# Patient Record
Sex: MALE | Race: WHITE | NOT HISPANIC OR LATINO | Employment: OTHER | URBAN - METROPOLITAN AREA
[De-identification: names, ages, dates, MRNs, and addresses within clinical notes are randomized per-mention and may not be internally consistent; named-entity substitution may affect disease eponyms.]

---

## 2021-05-19 ENCOUNTER — LAB VISIT (OUTPATIENT)
Dept: LAB | Facility: HOSPITAL | Age: 71
End: 2021-05-19
Attending: FAMILY MEDICINE
Payer: MEDICARE

## 2021-05-19 DIAGNOSIS — I10 ESSENTIAL HYPERTENSION, MALIGNANT: ICD-10-CM

## 2021-05-19 DIAGNOSIS — R43.2 AGEUSIA: ICD-10-CM

## 2021-05-19 DIAGNOSIS — R43.0 ABSENT SENSE OF SMELL: Primary | ICD-10-CM

## 2021-05-19 DIAGNOSIS — E78.00 PURE HYPERCHOLESTEROLEMIA: ICD-10-CM

## 2021-05-19 LAB
ALBUMIN SERPL BCP-MCNC: 4.3 G/DL (ref 3.5–5.2)
ALBUMIN/CREAT UR: NORMAL UG/MG (ref 0–30)
ALP SERPL-CCNC: 74 U/L (ref 55–135)
ALT SERPL W/O P-5'-P-CCNC: 19 U/L (ref 10–44)
ANION GAP SERPL CALC-SCNC: 8 MMOL/L (ref 8–16)
AST SERPL-CCNC: 25 U/L (ref 10–40)
BASOPHILS # BLD AUTO: 0.09 K/UL (ref 0–0.2)
BASOPHILS NFR BLD: 1.4 % (ref 0–1.9)
BILIRUB SERPL-MCNC: 1.1 MG/DL (ref 0.1–1)
BILIRUB UR QL STRIP: NEGATIVE
BUN SERPL-MCNC: 18 MG/DL (ref 8–23)
CALCIUM SERPL-MCNC: 9.2 MG/DL (ref 8.7–10.5)
CHLORIDE SERPL-SCNC: 99 MMOL/L (ref 95–110)
CHOLEST SERPL-MCNC: 169 MG/DL (ref 120–199)
CHOLEST/HDLC SERPL: 2.2 {RATIO} (ref 2–5)
CLARITY UR: CLEAR
CO2 SERPL-SCNC: 27 MMOL/L (ref 23–29)
COLOR UR: YELLOW
CREAT SERPL-MCNC: 1 MG/DL (ref 0.5–1.4)
CREAT UR-MCNC: 41 MG/DL (ref 23–375)
DIFFERENTIAL METHOD: ABNORMAL
EOSINOPHIL # BLD AUTO: 0.3 K/UL (ref 0–0.5)
EOSINOPHIL NFR BLD: 5 % (ref 0–8)
ERYTHROCYTE [DISTWIDTH] IN BLOOD BY AUTOMATED COUNT: 13.1 % (ref 11.5–14.5)
EST. GFR  (AFRICAN AMERICAN): >60 ML/MIN/1.73 M^2
EST. GFR  (NON AFRICAN AMERICAN): >60 ML/MIN/1.73 M^2
GLUCOSE SERPL-MCNC: 97 MG/DL (ref 70–110)
GLUCOSE UR QL STRIP: NEGATIVE
HCT VFR BLD AUTO: 41.1 % (ref 40–54)
HDLC SERPL-MCNC: 77 MG/DL (ref 40–75)
HDLC SERPL: 45.6 % (ref 20–50)
HGB BLD-MCNC: 13.8 G/DL (ref 14–18)
HGB UR QL STRIP: NEGATIVE
IMM GRANULOCYTES # BLD AUTO: 0.01 K/UL (ref 0–0.04)
IMM GRANULOCYTES NFR BLD AUTO: 0.2 % (ref 0–0.5)
KETONES UR QL STRIP: NEGATIVE
LDLC SERPL CALC-MCNC: 83.8 MG/DL (ref 63–159)
LEUKOCYTE ESTERASE UR QL STRIP: NEGATIVE
LYMPHOCYTES # BLD AUTO: 2 K/UL (ref 1–4.8)
LYMPHOCYTES NFR BLD: 31.8 % (ref 18–48)
MCH RBC QN AUTO: 30.2 PG (ref 27–31)
MCHC RBC AUTO-ENTMCNC: 33.6 G/DL (ref 32–36)
MCV RBC AUTO: 90 FL (ref 82–98)
MICROALBUMIN UR DL<=1MG/L-MCNC: <2 UG/ML
MONOCYTES # BLD AUTO: 0.6 K/UL (ref 0.3–1)
MONOCYTES NFR BLD: 9.3 % (ref 4–15)
NEUTROPHILS # BLD AUTO: 3.4 K/UL (ref 1.8–7.7)
NEUTROPHILS NFR BLD: 52.3 % (ref 38–73)
NITRITE UR QL STRIP: NEGATIVE
NONHDLC SERPL-MCNC: 92 MG/DL
NRBC BLD-RTO: 0 /100 WBC
PH UR STRIP: 7 [PH] (ref 5–8)
PLATELET # BLD AUTO: 236 K/UL (ref 150–450)
PMV BLD AUTO: 10.5 FL (ref 9.2–12.9)
POTASSIUM SERPL-SCNC: 4 MMOL/L (ref 3.5–5.1)
PROT SERPL-MCNC: 7.1 G/DL (ref 6–8.4)
PROT UR QL STRIP: NEGATIVE
RBC # BLD AUTO: 4.57 M/UL (ref 4.6–6.2)
SODIUM SERPL-SCNC: 134 MMOL/L (ref 136–145)
SP GR UR STRIP: 1.01 (ref 1–1.03)
TRIGL SERPL-MCNC: 41 MG/DL (ref 30–150)
TSH SERPL DL<=0.005 MIU/L-ACNC: 1.99 UIU/ML (ref 0.34–5.6)
URN SPEC COLLECT METH UR: NORMAL
UROBILINOGEN UR STRIP-ACNC: NEGATIVE EU/DL
WBC # BLD AUTO: 6.42 K/UL (ref 3.9–12.7)

## 2021-05-19 PROCEDURE — 81003 URINALYSIS AUTO W/O SCOPE: CPT | Performed by: FAMILY MEDICINE

## 2021-05-19 PROCEDURE — 80061 LIPID PANEL: CPT | Performed by: FAMILY MEDICINE

## 2021-05-19 PROCEDURE — 86769 SARS-COV-2 COVID-19 ANTIBODY: CPT | Performed by: FAMILY MEDICINE

## 2021-05-19 PROCEDURE — 82043 UR ALBUMIN QUANTITATIVE: CPT | Performed by: FAMILY MEDICINE

## 2021-05-19 PROCEDURE — 80053 COMPREHEN METABOLIC PANEL: CPT | Performed by: FAMILY MEDICINE

## 2021-05-19 PROCEDURE — 84443 ASSAY THYROID STIM HORMONE: CPT | Performed by: FAMILY MEDICINE

## 2021-05-19 PROCEDURE — 82570 ASSAY OF URINE CREATININE: CPT | Performed by: FAMILY MEDICINE

## 2021-05-19 PROCEDURE — 85025 COMPLETE CBC W/AUTO DIFF WBC: CPT | Performed by: FAMILY MEDICINE

## 2021-05-20 LAB
SARS-COV-2 IGG SERPL IA-ACNC: 66.9 AU/ML
SARS-COV-2 IGG SERPL QL IA: POSITIVE

## 2021-09-10 ENCOUNTER — HOSPITAL ENCOUNTER (OUTPATIENT)
Facility: HOSPITAL | Age: 71
Discharge: HOME | End: 2021-09-11
Attending: EMERGENCY MEDICINE | Admitting: INTERNAL MEDICINE
Payer: MEDICARE

## 2021-09-10 DIAGNOSIS — R07.9 CHEST PAIN: ICD-10-CM

## 2021-09-10 DIAGNOSIS — R55 SYNCOPE: ICD-10-CM

## 2021-09-10 DIAGNOSIS — R79.89 ELEVATED TROPONIN: ICD-10-CM

## 2021-09-10 DIAGNOSIS — R55 SYNCOPE, UNSPECIFIED SYNCOPE TYPE: Primary | ICD-10-CM

## 2021-09-10 DIAGNOSIS — R56.9 SEIZURE: ICD-10-CM

## 2021-09-10 PROBLEM — Z87.898 HISTORY OF SEIZURE: Status: ACTIVE | Noted: 2021-09-10

## 2021-09-10 PROBLEM — I10 ESSENTIAL HYPERTENSION: Status: ACTIVE | Noted: 2021-09-10

## 2021-09-10 PROBLEM — D64.9 ANEMIA: Status: ACTIVE | Noted: 2021-09-10

## 2021-09-10 LAB
ADENOVIRUS: NOT DETECTED
ALBUMIN SERPL BCP-MCNC: 3.9 G/DL (ref 3.5–5.2)
ALP SERPL-CCNC: 59 U/L (ref 55–135)
ALT SERPL W/O P-5'-P-CCNC: 23 U/L (ref 10–44)
AMPHET+METHAMPHET UR QL: NEGATIVE
ANION GAP SERPL CALC-SCNC: 8 MMOL/L (ref 8–16)
AST SERPL-CCNC: 28 U/L (ref 10–40)
BARBITURATES UR QL SCN>200 NG/ML: NEGATIVE
BASOPHILS # BLD AUTO: 0.08 K/UL (ref 0–0.2)
BASOPHILS NFR BLD: 1.1 % (ref 0–1.9)
BENZODIAZ UR QL SCN>200 NG/ML: NEGATIVE
BILIRUB SERPL-MCNC: 1 MG/DL (ref 0.1–1)
BILIRUB UR QL STRIP: NEGATIVE
BNP SERPL-MCNC: 129 PG/ML (ref 0–99)
BORDETELLA PARAPERTUSSIS (IS1001): NOT DETECTED
BORDETELLA PERTUSSIS (PTXP): NOT DETECTED
BUN SERPL-MCNC: 20 MG/DL (ref 8–23)
BZE UR QL SCN: NEGATIVE
CALCIUM SERPL-MCNC: 9.2 MG/DL (ref 8.7–10.5)
CANNABINOIDS UR QL SCN: NEGATIVE
CHLAMYDIA PNEUMONIAE: NOT DETECTED
CHLORIDE SERPL-SCNC: 101 MMOL/L (ref 95–110)
CK SERPL-CCNC: 119 U/L (ref 20–200)
CLARITY UR: CLEAR
CO2 SERPL-SCNC: 26 MMOL/L (ref 23–29)
COLOR UR: YELLOW
CORONAVIRUS 229E, COMMON COLD VIRUS: NOT DETECTED
CORONAVIRUS HKU1, COMMON COLD VIRUS: NOT DETECTED
CORONAVIRUS NL63, COMMON COLD VIRUS: NOT DETECTED
CORONAVIRUS OC43, COMMON COLD VIRUS: NOT DETECTED
CREAT SERPL-MCNC: 1 MG/DL (ref 0.5–1.4)
CREAT UR-MCNC: 90 MG/DL (ref 23–375)
CRP SERPL-MCNC: 0.04 MG/DL
D DIMER PPP IA.FEU-MCNC: 3.61 UG/ML FEU
DIFFERENTIAL METHOD: ABNORMAL
EOSINOPHIL # BLD AUTO: 0.2 K/UL (ref 0–0.5)
EOSINOPHIL NFR BLD: 2.6 % (ref 0–8)
ERYTHROCYTE [DISTWIDTH] IN BLOOD BY AUTOMATED COUNT: 12.7 % (ref 11.5–14.5)
EST. GFR  (AFRICAN AMERICAN): >60 ML/MIN/1.73 M^2
EST. GFR  (NON AFRICAN AMERICAN): >60 ML/MIN/1.73 M^2
FERRITIN SERPL-MCNC: 188 NG/ML (ref 20–300)
FLUBV RNA NPH QL NAA+NON-PROBE: NOT DETECTED
GLUCOSE SERPL-MCNC: 92 MG/DL (ref 70–110)
GLUCOSE SERPL-MCNC: 95 MG/DL (ref 70–110)
GLUCOSE UR QL STRIP: NEGATIVE
HCT VFR BLD AUTO: 39.2 % (ref 40–54)
HGB BLD-MCNC: 13.5 G/DL (ref 14–18)
HGB UR QL STRIP: NEGATIVE
HPIV1 RNA NPH QL NAA+NON-PROBE: NOT DETECTED
HPIV2 RNA NPH QL NAA+NON-PROBE: NOT DETECTED
HPIV3 RNA NPH QL NAA+NON-PROBE: NOT DETECTED
HPIV4 RNA NPH QL NAA+NON-PROBE: NOT DETECTED
HUMAN METAPNEUMOVIRUS: NOT DETECTED
IMM GRANULOCYTES # BLD AUTO: 0.04 K/UL (ref 0–0.04)
IMM GRANULOCYTES NFR BLD AUTO: 0.5 % (ref 0–0.5)
INFLUENZA A (SUBTYPES H1,H1-2009,H3): NOT DETECTED
INR PPP: 1.2
KETONES UR QL STRIP: NEGATIVE
LEUKOCYTE ESTERASE UR QL STRIP: NEGATIVE
LYMPHOCYTES # BLD AUTO: 1.2 K/UL (ref 1–4.8)
LYMPHOCYTES NFR BLD: 15.5 % (ref 18–48)
MAGNESIUM SERPL-MCNC: 1.8 MG/DL (ref 1.6–2.6)
MCH RBC QN AUTO: 31.1 PG (ref 27–31)
MCHC RBC AUTO-ENTMCNC: 34.4 G/DL (ref 32–36)
MCV RBC AUTO: 90 FL (ref 82–98)
MONOCYTES # BLD AUTO: 0.8 K/UL (ref 0.3–1)
MONOCYTES NFR BLD: 10.1 % (ref 4–15)
MYCOPLASMA PNEUMONIAE: NOT DETECTED
NEUTROPHILS # BLD AUTO: 5.2 K/UL (ref 1.8–7.7)
NEUTROPHILS NFR BLD: 70.2 % (ref 38–73)
NITRITE UR QL STRIP: NEGATIVE
NRBC BLD-RTO: 0 /100 WBC
OPIATES UR QL SCN: NEGATIVE
PCP UR QL SCN>25 NG/ML: NEGATIVE
PH UR STRIP: 7 [PH] (ref 5–8)
PLATELET # BLD AUTO: 225 K/UL (ref 150–450)
PMV BLD AUTO: 9.3 FL (ref 9.2–12.9)
POTASSIUM SERPL-SCNC: 4.4 MMOL/L (ref 3.5–5.1)
PROT SERPL-MCNC: 6.7 G/DL (ref 6–8.4)
PROT UR QL STRIP: NEGATIVE
PROTHROMBIN TIME: 14.6 SEC (ref 11.8–14.3)
RBC # BLD AUTO: 4.34 M/UL (ref 4.6–6.2)
RESPIRATORY INFECTION PANEL SOURCE: NORMAL
RSV RNA NPH QL NAA+NON-PROBE: NOT DETECTED
RV+EV RNA NPH QL NAA+NON-PROBE: NOT DETECTED
SARS-COV-2 RDRP RESP QL NAA+PROBE: NEGATIVE
SODIUM SERPL-SCNC: 135 MMOL/L (ref 136–145)
SP GR UR STRIP: 1.01 (ref 1–1.03)
TOXICOLOGY INFORMATION: NORMAL
TROPONIN I SERPL DL<=0.01 NG/ML-MCNC: 0.06 NG/ML
TROPONIN I SERPL DL<=0.01 NG/ML-MCNC: 0.06 NG/ML
TROPONIN I SERPL DL<=0.01 NG/ML-MCNC: 0.07 NG/ML
URN SPEC COLLECT METH UR: NORMAL
UROBILINOGEN UR STRIP-ACNC: NEGATIVE EU/DL
WBC # BLD AUTO: 7.44 K/UL (ref 3.9–12.7)

## 2021-09-10 PROCEDURE — G0378 HOSPITAL OBSERVATION PER HR: HCPCS

## 2021-09-10 PROCEDURE — 86140 C-REACTIVE PROTEIN: CPT | Performed by: PHYSICIAN ASSISTANT

## 2021-09-10 PROCEDURE — 85379 FIBRIN DEGRADATION QUANT: CPT | Performed by: PHYSICIAN ASSISTANT

## 2021-09-10 PROCEDURE — 83735 ASSAY OF MAGNESIUM: CPT | Performed by: EMERGENCY MEDICINE

## 2021-09-10 PROCEDURE — 80053 COMPREHEN METABOLIC PANEL: CPT | Performed by: PHYSICIAN ASSISTANT

## 2021-09-10 PROCEDURE — 93005 ELECTROCARDIOGRAM TRACING: CPT | Performed by: INTERNAL MEDICINE

## 2021-09-10 PROCEDURE — 36415 COLL VENOUS BLD VENIPUNCTURE: CPT | Performed by: PHYSICIAN ASSISTANT

## 2021-09-10 PROCEDURE — 83880 ASSAY OF NATRIURETIC PEPTIDE: CPT | Performed by: PHYSICIAN ASSISTANT

## 2021-09-10 PROCEDURE — 85025 COMPLETE CBC W/AUTO DIFF WBC: CPT | Performed by: PHYSICIAN ASSISTANT

## 2021-09-10 PROCEDURE — A9585 GADOBUTROL INJECTION: HCPCS | Performed by: INTERNAL MEDICINE

## 2021-09-10 PROCEDURE — 25500020 PHARM REV CODE 255: Performed by: INTERNAL MEDICINE

## 2021-09-10 PROCEDURE — 93010 ELECTROCARDIOGRAM REPORT: CPT | Mod: ,,, | Performed by: INTERNAL MEDICINE

## 2021-09-10 PROCEDURE — 25000003 PHARM REV CODE 250: Performed by: NURSE PRACTITIONER

## 2021-09-10 PROCEDURE — 85610 PROTHROMBIN TIME: CPT | Performed by: PHYSICIAN ASSISTANT

## 2021-09-10 PROCEDURE — U0002 COVID-19 LAB TEST NON-CDC: HCPCS | Performed by: EMERGENCY MEDICINE

## 2021-09-10 PROCEDURE — 84484 ASSAY OF TROPONIN QUANT: CPT | Performed by: PHYSICIAN ASSISTANT

## 2021-09-10 PROCEDURE — 93010 EKG 12-LEAD: ICD-10-PCS | Mod: ,,, | Performed by: INTERNAL MEDICINE

## 2021-09-10 PROCEDURE — 82728 ASSAY OF FERRITIN: CPT | Performed by: PHYSICIAN ASSISTANT

## 2021-09-10 PROCEDURE — 81003 URINALYSIS AUTO W/O SCOPE: CPT | Performed by: PHYSICIAN ASSISTANT

## 2021-09-10 PROCEDURE — 87798 DETECT AGENT NOS DNA AMP: CPT | Performed by: NURSE PRACTITIONER

## 2021-09-10 PROCEDURE — 36415 COLL VENOUS BLD VENIPUNCTURE: CPT | Performed by: EMERGENCY MEDICINE

## 2021-09-10 PROCEDURE — 80307 DRUG TEST PRSMV CHEM ANLYZR: CPT | Performed by: PHYSICIAN ASSISTANT

## 2021-09-10 PROCEDURE — 82962 GLUCOSE BLOOD TEST: CPT

## 2021-09-10 PROCEDURE — 84484 ASSAY OF TROPONIN QUANT: CPT | Mod: 91 | Performed by: EMERGENCY MEDICINE

## 2021-09-10 PROCEDURE — 99285 EMERGENCY DEPT VISIT HI MDM: CPT | Mod: 25

## 2021-09-10 PROCEDURE — 82550 ASSAY OF CK (CPK): CPT | Performed by: EMERGENCY MEDICINE

## 2021-09-10 RX ORDER — AMOXICILLIN 250 MG
1 CAPSULE ORAL 2 TIMES DAILY
Status: DISCONTINUED | OUTPATIENT
Start: 2021-09-10 | End: 2021-09-11 | Stop reason: HOSPADM

## 2021-09-10 RX ORDER — POTASSIUM CHLORIDE 7.45 MG/ML
40 INJECTION INTRAVENOUS
Status: DISCONTINUED | OUTPATIENT
Start: 2021-09-10 | End: 2021-09-11 | Stop reason: HOSPADM

## 2021-09-10 RX ORDER — POLYETHYLENE GLYCOL 3350 17 G/17G
17 POWDER, FOR SOLUTION ORAL 2 TIMES DAILY PRN
Status: DISCONTINUED | OUTPATIENT
Start: 2021-09-10 | End: 2021-09-11 | Stop reason: HOSPADM

## 2021-09-10 RX ORDER — ASPIRIN 81 MG/1
81 TABLET ORAL DAILY
COMMUNITY
End: 2023-10-21 | Stop reason: CLARIF

## 2021-09-10 RX ORDER — POTASSIUM CHLORIDE 7.45 MG/ML
20 INJECTION INTRAVENOUS
Status: DISCONTINUED | OUTPATIENT
Start: 2021-09-10 | End: 2021-09-11 | Stop reason: HOSPADM

## 2021-09-10 RX ORDER — POTASSIUM CHLORIDE 20 MEQ/1
40 TABLET, EXTENDED RELEASE ORAL
Status: DISCONTINUED | OUTPATIENT
Start: 2021-09-10 | End: 2021-09-11 | Stop reason: HOSPADM

## 2021-09-10 RX ORDER — ATORVASTATIN CALCIUM 40 MG/1
40 TABLET, FILM COATED ORAL NIGHTLY
Status: ON HOLD | COMMUNITY
End: 2023-10-25 | Stop reason: HOSPADM

## 2021-09-10 RX ORDER — MAGNESIUM SULFATE HEPTAHYDRATE 40 MG/ML
2 INJECTION, SOLUTION INTRAVENOUS
Status: DISCONTINUED | OUTPATIENT
Start: 2021-09-10 | End: 2021-09-11 | Stop reason: HOSPADM

## 2021-09-10 RX ORDER — TALC
6 POWDER (GRAM) TOPICAL NIGHTLY PRN
Status: DISCONTINUED | OUTPATIENT
Start: 2021-09-10 | End: 2021-09-11 | Stop reason: HOSPADM

## 2021-09-10 RX ORDER — MAGNESIUM SULFATE 1 G/100ML
1 INJECTION INTRAVENOUS
Status: DISCONTINUED | OUTPATIENT
Start: 2021-09-10 | End: 2021-09-11 | Stop reason: HOSPADM

## 2021-09-10 RX ORDER — GADOBUTROL 604.72 MG/ML
7 INJECTION INTRAVENOUS
Status: COMPLETED | OUTPATIENT
Start: 2021-09-10 | End: 2021-09-10

## 2021-09-10 RX ORDER — ASPIRIN 325 MG
325 TABLET ORAL
Status: COMPLETED | OUTPATIENT
Start: 2021-09-10 | End: 2021-09-10

## 2021-09-10 RX ORDER — LANOLIN ALCOHOL/MO/W.PET/CERES
800 CREAM (GRAM) TOPICAL
Status: DISCONTINUED | OUTPATIENT
Start: 2021-09-10 | End: 2021-09-11 | Stop reason: HOSPADM

## 2021-09-10 RX ORDER — ONDANSETRON 2 MG/ML
4 INJECTION INTRAMUSCULAR; INTRAVENOUS EVERY 8 HOURS PRN
Status: DISCONTINUED | OUTPATIENT
Start: 2021-09-10 | End: 2021-09-11 | Stop reason: HOSPADM

## 2021-09-10 RX ORDER — VALSARTAN 40 MG/1
40 TABLET ORAL DAILY
Status: DISCONTINUED | OUTPATIENT
Start: 2021-09-11 | End: 2021-09-11 | Stop reason: HOSPADM

## 2021-09-10 RX ORDER — SODIUM CHLORIDE 0.9 % (FLUSH) 0.9 %
10 SYRINGE (ML) INJECTION
Status: DISCONTINUED | OUTPATIENT
Start: 2021-09-10 | End: 2021-09-11 | Stop reason: HOSPADM

## 2021-09-10 RX ORDER — POTASSIUM CHLORIDE 20 MEQ/1
20 TABLET, EXTENDED RELEASE ORAL
Status: DISCONTINUED | OUTPATIENT
Start: 2021-09-10 | End: 2021-09-11 | Stop reason: HOSPADM

## 2021-09-10 RX ORDER — ACETAMINOPHEN 325 MG/1
650 TABLET ORAL EVERY 4 HOURS PRN
Status: DISCONTINUED | OUTPATIENT
Start: 2021-09-10 | End: 2021-09-11 | Stop reason: HOSPADM

## 2021-09-10 RX ORDER — HYDRALAZINE HYDROCHLORIDE 20 MG/ML
10 INJECTION INTRAMUSCULAR; INTRAVENOUS EVERY 4 HOURS PRN
Status: DISCONTINUED | OUTPATIENT
Start: 2021-09-10 | End: 2021-09-11 | Stop reason: HOSPADM

## 2021-09-10 RX ORDER — MAGNESIUM SULFATE HEPTAHYDRATE 40 MG/ML
4 INJECTION, SOLUTION INTRAVENOUS
Status: DISCONTINUED | OUTPATIENT
Start: 2021-09-10 | End: 2021-09-11 | Stop reason: HOSPADM

## 2021-09-10 RX ORDER — ASPIRIN 81 MG/1
81 TABLET ORAL DAILY
Status: DISCONTINUED | OUTPATIENT
Start: 2021-09-11 | End: 2021-09-11 | Stop reason: HOSPADM

## 2021-09-10 RX ORDER — ATORVASTATIN CALCIUM 20 MG/1
20 TABLET, FILM COATED ORAL DAILY
Status: DISCONTINUED | OUTPATIENT
Start: 2021-09-10 | End: 2021-09-11 | Stop reason: HOSPADM

## 2021-09-10 RX ORDER — VALSARTAN 40 MG/1
40 TABLET ORAL DAILY
COMMUNITY
End: 2023-10-21 | Stop reason: CLARIF

## 2021-09-10 RX ADMIN — IOHEXOL 100 ML: 350 INJECTION, SOLUTION INTRAVENOUS at 03:09

## 2021-09-10 RX ADMIN — GADOBUTROL 7 ML: 604.72 INJECTION INTRAVENOUS at 06:09

## 2021-09-10 RX ADMIN — SENNOSIDES AND DOCUSATE SODIUM 1 TABLET: 8.6; 5 TABLET ORAL at 09:09

## 2021-09-10 RX ADMIN — RIVAROXABAN 2.5 MG: 2.5 TABLET, FILM COATED ORAL at 04:09

## 2021-09-10 RX ADMIN — ASPIRIN 325 MG ORAL TABLET 325 MG: 325 PILL ORAL at 02:09

## 2021-09-10 RX ADMIN — ATORVASTATIN CALCIUM 20 MG: 20 TABLET, FILM COATED ORAL at 09:09

## 2021-09-11 ENCOUNTER — HOSPITAL ENCOUNTER (OUTPATIENT)
Dept: RADIOLOGY | Facility: HOSPITAL | Age: 71
Discharge: HOME | End: 2021-09-11
Payer: MEDICARE

## 2021-09-11 ENCOUNTER — CLINICAL SUPPORT (OUTPATIENT)
Dept: CARDIOLOGY | Facility: HOSPITAL | Age: 71
End: 2021-09-11
Payer: MEDICARE

## 2021-09-11 VITALS
HEART RATE: 83 BPM | BODY MASS INDEX: 23.25 KG/M2 | DIASTOLIC BLOOD PRESSURE: 58 MMHG | RESPIRATION RATE: 18 BRPM | WEIGHT: 157 LBS | TEMPERATURE: 99 F | SYSTOLIC BLOOD PRESSURE: 117 MMHG | OXYGEN SATURATION: 96 % | HEIGHT: 69 IN

## 2021-09-11 PROBLEM — R55 SYNCOPE: Status: RESOLVED | Noted: 2021-09-10 | Resolved: 2021-09-11

## 2021-09-11 LAB
ANION GAP SERPL CALC-SCNC: 10 MMOL/L (ref 8–16)
BASOPHILS # BLD AUTO: 0.08 K/UL (ref 0–0.2)
BASOPHILS NFR BLD: 0.9 % (ref 0–1.9)
BUN SERPL-MCNC: 18 MG/DL (ref 8–23)
CALCIUM SERPL-MCNC: 9.2 MG/DL (ref 8.7–10.5)
CHLORIDE SERPL-SCNC: 100 MMOL/L (ref 95–110)
CO2 SERPL-SCNC: 27 MMOL/L (ref 23–29)
CREAT SERPL-MCNC: 0.9 MG/DL (ref 0.5–1.4)
CV STRESS BASE HR: 57 BPM
DIASTOLIC BLOOD PRESSURE: 80 MMHG
DIFFERENTIAL METHOD: NORMAL
EOSINOPHIL # BLD AUTO: 0.3 K/UL (ref 0–0.5)
EOSINOPHIL NFR BLD: 3.3 % (ref 0–8)
ERYTHROCYTE [DISTWIDTH] IN BLOOD BY AUTOMATED COUNT: 12.7 % (ref 11.5–14.5)
EST. GFR  (AFRICAN AMERICAN): >60 ML/MIN/1.73 M^2
EST. GFR  (NON AFRICAN AMERICAN): >60 ML/MIN/1.73 M^2
GLUCOSE SERPL-MCNC: 87 MG/DL (ref 70–110)
HCT VFR BLD AUTO: 41.7 % (ref 40–54)
HGB BLD-MCNC: 14.1 G/DL (ref 14–18)
IMM GRANULOCYTES # BLD AUTO: 0.02 K/UL (ref 0–0.04)
IMM GRANULOCYTES NFR BLD AUTO: 0.2 % (ref 0–0.5)
LYMPHOCYTES # BLD AUTO: 1.8 K/UL (ref 1–4.8)
LYMPHOCYTES NFR BLD: 21.2 % (ref 18–48)
MAGNESIUM SERPL-MCNC: 2 MG/DL (ref 1.6–2.6)
MCH RBC QN AUTO: 29.8 PG (ref 27–31)
MCHC RBC AUTO-ENTMCNC: 33.8 G/DL (ref 32–36)
MCV RBC AUTO: 88 FL (ref 82–98)
MONOCYTES # BLD AUTO: 0.8 K/UL (ref 0.3–1)
MONOCYTES NFR BLD: 9.4 % (ref 4–15)
NEUTROPHILS # BLD AUTO: 5.5 K/UL (ref 1.8–7.7)
NEUTROPHILS NFR BLD: 65 % (ref 38–73)
NRBC BLD-RTO: 0 /100 WBC
OHS CV CPX 1 MINUTE RECOVERY HEART RATE: 85 BPM
OHS CV CPX 85 PERCENT MAX PREDICTED HEART RATE MALE: 125
OHS CV CPX MAX PREDICTED HEART RATE: 147
OHS CV CPX PATIENT IS FEMALE: 0
OHS CV CPX PATIENT IS MALE: 1
OHS CV CPX PEAK DIASTOLIC BLOOD PRESSURE: 75 MMHG
OHS CV CPX PEAK HEAR RATE: 100 BPM
OHS CV CPX PEAK RATE PRESSURE PRODUCT: NORMAL
OHS CV CPX PEAK SYSTOLIC BLOOD PRESSURE: 125 MMHG
OHS CV CPX PERCENT MAX PREDICTED HEART RATE ACHIEVED: 68
OHS CV CPX RATE PRESSURE PRODUCT PRESENTING: 6954
PLATELET # BLD AUTO: 220 K/UL (ref 150–450)
PMV BLD AUTO: 9.2 FL (ref 9.2–12.9)
POTASSIUM SERPL-SCNC: 3.9 MMOL/L (ref 3.5–5.1)
PROCALCITONIN SERPL IA-MCNC: <0.05 NG/ML (ref 0–0.5)
RBC # BLD AUTO: 4.73 M/UL (ref 4.6–6.2)
SODIUM SERPL-SCNC: 137 MMOL/L (ref 136–145)
SYSTOLIC BLOOD PRESSURE: 122 MMHG
TSH SERPL DL<=0.005 MIU/L-ACNC: 3.49 UIU/ML (ref 0.34–5.6)
WBC # BLD AUTO: 8.49 K/UL (ref 3.9–12.7)

## 2021-09-11 PROCEDURE — 84145 PROCALCITONIN (PCT): CPT | Performed by: INTERNAL MEDICINE

## 2021-09-11 PROCEDURE — 25000003 PHARM REV CODE 250: Performed by: NURSE PRACTITIONER

## 2021-09-11 PROCEDURE — 99900035 HC TECH TIME PER 15 MIN (STAT)

## 2021-09-11 PROCEDURE — 94761 N-INVAS EAR/PLS OXIMETRY MLT: CPT

## 2021-09-11 PROCEDURE — 93018 CV STRESS TEST I&R ONLY: CPT | Mod: ,,, | Performed by: INTERNAL MEDICINE

## 2021-09-11 PROCEDURE — 84443 ASSAY THYROID STIM HORMONE: CPT | Performed by: NURSE PRACTITIONER

## 2021-09-11 PROCEDURE — 93016 CV STRESS TEST SUPVJ ONLY: CPT | Mod: ,,, | Performed by: INTERNAL MEDICINE

## 2021-09-11 PROCEDURE — 36415 COLL VENOUS BLD VENIPUNCTURE: CPT | Performed by: INTERNAL MEDICINE

## 2021-09-11 PROCEDURE — 80048 BASIC METABOLIC PNL TOTAL CA: CPT | Performed by: NURSE PRACTITIONER

## 2021-09-11 PROCEDURE — 99214 OFFICE O/P EST MOD 30 MIN: CPT | Mod: 25,,, | Performed by: INTERNAL MEDICINE

## 2021-09-11 PROCEDURE — 36415 COLL VENOUS BLD VENIPUNCTURE: CPT | Performed by: NURSE PRACTITIONER

## 2021-09-11 PROCEDURE — 93016 NUCLEAR STRESS TEST (CUPID ONLY): ICD-10-PCS | Mod: ,,, | Performed by: INTERNAL MEDICINE

## 2021-09-11 PROCEDURE — 78452 HT MUSCLE IMAGE SPECT MULT: CPT | Mod: TC

## 2021-09-11 PROCEDURE — G0378 HOSPITAL OBSERVATION PER HR: HCPCS

## 2021-09-11 PROCEDURE — 63600175 PHARM REV CODE 636 W HCPCS: Performed by: INTERNAL MEDICINE

## 2021-09-11 PROCEDURE — 25000003 PHARM REV CODE 250: Performed by: INTERNAL MEDICINE

## 2021-09-11 PROCEDURE — 95819 EEG AWAKE AND ASLEEP: CPT

## 2021-09-11 PROCEDURE — 99214 PR OFFICE/OUTPT VISIT, EST, LEVL IV, 30-39 MIN: ICD-10-PCS | Mod: 25,,, | Performed by: INTERNAL MEDICINE

## 2021-09-11 PROCEDURE — 85025 COMPLETE CBC W/AUTO DIFF WBC: CPT | Performed by: NURSE PRACTITIONER

## 2021-09-11 PROCEDURE — 93018 NUCLEAR STRESS TEST (CUPID ONLY): ICD-10-PCS | Mod: ,,, | Performed by: INTERNAL MEDICINE

## 2021-09-11 PROCEDURE — 93017 CV STRESS TEST TRACING ONLY: CPT

## 2021-09-11 PROCEDURE — A9502 TC99M TETROFOSMIN: HCPCS

## 2021-09-11 PROCEDURE — 83735 ASSAY OF MAGNESIUM: CPT | Performed by: NURSE PRACTITIONER

## 2021-09-11 RX ORDER — LANOLIN ALCOHOL/MO/W.PET/CERES
400 CREAM (GRAM) TOPICAL DAILY
Status: DISCONTINUED | OUTPATIENT
Start: 2021-09-11 | End: 2021-09-11 | Stop reason: HOSPADM

## 2021-09-11 RX ORDER — LEVETIRACETAM 500 MG/1
500 TABLET ORAL ONCE
Status: COMPLETED | OUTPATIENT
Start: 2021-09-11 | End: 2021-09-11

## 2021-09-11 RX ORDER — REGADENOSON 0.08 MG/ML
0.4 INJECTION, SOLUTION INTRAVENOUS
Status: COMPLETED | OUTPATIENT
Start: 2021-09-11 | End: 2021-09-11

## 2021-09-11 RX ADMIN — REGADENOSON 0.4 MG: 0.08 INJECTION, SOLUTION INTRAVENOUS at 09:09

## 2021-09-11 RX ADMIN — ASPIRIN 81 MG: 81 TABLET, DELAYED RELEASE ORAL at 10:09

## 2021-09-11 RX ADMIN — VALSARTAN 40 MG: 40 TABLET ORAL at 10:09

## 2021-09-11 RX ADMIN — SENNOSIDES AND DOCUSATE SODIUM 1 TABLET: 8.6; 5 TABLET ORAL at 10:09

## 2021-09-11 RX ADMIN — Medication 400 MG: at 05:09

## 2021-09-11 RX ADMIN — RIVAROXABAN 2.5 MG: 2.5 TABLET, FILM COATED ORAL at 10:09

## 2021-09-11 RX ADMIN — LEVETIRACETAM 500 MG: 500 TABLET ORAL at 06:09

## 2021-09-11 RX ADMIN — RIVAROXABAN 2.5 MG: 2.5 TABLET, FILM COATED ORAL at 05:09

## 2021-09-15 ENCOUNTER — OFFICE VISIT (OUTPATIENT)
Dept: FAMILY MEDICINE | Facility: CLINIC | Age: 71
End: 2021-09-15
Payer: MEDICARE

## 2021-09-15 ENCOUNTER — OFFICE VISIT (OUTPATIENT)
Dept: CARDIOLOGY | Facility: CLINIC | Age: 71
End: 2021-09-15
Payer: MEDICARE

## 2021-09-15 VITALS
OXYGEN SATURATION: 97 % | BODY MASS INDEX: 24.88 KG/M2 | HEIGHT: 69 IN | RESPIRATION RATE: 16 BRPM | WEIGHT: 168 LBS | HEART RATE: 38 BPM | DIASTOLIC BLOOD PRESSURE: 80 MMHG | SYSTOLIC BLOOD PRESSURE: 124 MMHG

## 2021-09-15 VITALS
HEIGHT: 69 IN | OXYGEN SATURATION: 97 % | BODY MASS INDEX: 24.44 KG/M2 | SYSTOLIC BLOOD PRESSURE: 126 MMHG | HEART RATE: 34 BPM | TEMPERATURE: 98 F | WEIGHT: 165 LBS | DIASTOLIC BLOOD PRESSURE: 52 MMHG

## 2021-09-15 DIAGNOSIS — R55 SYNCOPE AND COLLAPSE: ICD-10-CM

## 2021-09-15 DIAGNOSIS — Z09 HOSPITAL DISCHARGE FOLLOW-UP: Primary | ICD-10-CM

## 2021-09-15 DIAGNOSIS — I10 ESSENTIAL HYPERTENSION: ICD-10-CM

## 2021-09-15 DIAGNOSIS — R00.1 BRADYCARDIA: ICD-10-CM

## 2021-09-15 DIAGNOSIS — Z23 NEED FOR INFLUENZA VACCINATION: ICD-10-CM

## 2021-09-15 DIAGNOSIS — R79.89 POSITIVE D DIMER: ICD-10-CM

## 2021-09-15 DIAGNOSIS — E78.5 DYSLIPIDEMIA: Primary | ICD-10-CM

## 2021-09-15 PROCEDURE — 93000 EKG 12-LEAD: ICD-10-PCS | Mod: S$GLB,,, | Performed by: INTERNAL MEDICINE

## 2021-09-15 PROCEDURE — 99215 PR OFFICE/OUTPT VISIT, EST, LEVL V, 40-54 MIN: ICD-10-PCS | Mod: S$GLB,,, | Performed by: INTERNAL MEDICINE

## 2021-09-15 PROCEDURE — 99204 PR OFFICE/OUTPT VISIT, NEW, LEVL IV, 45-59 MIN: ICD-10-PCS | Mod: 25,S$GLB,, | Performed by: FAMILY MEDICINE

## 2021-09-15 PROCEDURE — 93000 ELECTROCARDIOGRAM COMPLETE: CPT | Mod: S$GLB,,, | Performed by: INTERNAL MEDICINE

## 2021-09-15 PROCEDURE — 90662 IIV NO PRSV INCREASED AG IM: CPT | Mod: S$GLB,,, | Performed by: FAMILY MEDICINE

## 2021-09-15 PROCEDURE — 99204 OFFICE O/P NEW MOD 45 MIN: CPT | Mod: 25,S$GLB,, | Performed by: FAMILY MEDICINE

## 2021-09-15 PROCEDURE — G0008 ADMIN INFLUENZA VIRUS VAC: HCPCS | Mod: S$GLB,,, | Performed by: FAMILY MEDICINE

## 2021-09-15 PROCEDURE — 99215 OFFICE O/P EST HI 40 MIN: CPT | Mod: S$GLB,,, | Performed by: INTERNAL MEDICINE

## 2021-09-15 PROCEDURE — 90662 FLU VACCINE - QUADRIVALENT - HIGH DOSE (65+) PRESERVATIVE FREE IM: ICD-10-PCS | Mod: S$GLB,,, | Performed by: FAMILY MEDICINE

## 2021-09-15 PROCEDURE — G0008 FLU VACCINE - QUADRIVALENT - HIGH DOSE (65+) PRESERVATIVE FREE IM: ICD-10-PCS | Mod: S$GLB,,, | Performed by: FAMILY MEDICINE

## 2021-09-15 RX ORDER — ZINC GLUCONATE 50 MG
50 TABLET ORAL DAILY
COMMUNITY

## 2021-09-15 RX ORDER — MULTIVIT WITH MINERALS/HERBS
1 TABLET ORAL DAILY
COMMUNITY

## 2021-09-28 ENCOUNTER — HOSPITAL ENCOUNTER (OUTPATIENT)
Dept: CARDIOLOGY | Facility: CLINIC | Age: 71
Discharge: HOME | End: 2021-09-28
Attending: INTERNAL MEDICINE
Payer: MEDICARE

## 2021-09-28 VITALS — BODY MASS INDEX: 24.44 KG/M2 | HEIGHT: 69 IN | WEIGHT: 165 LBS

## 2021-09-28 DIAGNOSIS — R55 SYNCOPE AND COLLAPSE: ICD-10-CM

## 2021-09-28 DIAGNOSIS — I10 ESSENTIAL HYPERTENSION: ICD-10-CM

## 2021-09-28 DIAGNOSIS — E78.5 DYSLIPIDEMIA: ICD-10-CM

## 2021-09-28 PROCEDURE — 93306 ECHO (CUPID ONLY): ICD-10-PCS | Mod: S$GLB,,, | Performed by: INTERNAL MEDICINE

## 2021-09-28 PROCEDURE — 93224 XTRNL ECG REC UP TO 48 HRS: CPT | Mod: S$GLB,,, | Performed by: INTERNAL MEDICINE

## 2021-09-28 PROCEDURE — 93224 HOLTER MONITOR - 48 HOUR (CUPID ONLY): ICD-10-PCS | Mod: S$GLB,,, | Performed by: INTERNAL MEDICINE

## 2021-09-28 PROCEDURE — 93306 TTE W/DOPPLER COMPLETE: CPT | Mod: S$GLB,,, | Performed by: INTERNAL MEDICINE

## 2021-09-29 ENCOUNTER — TELEPHONE (OUTPATIENT)
Dept: CARDIOLOGY | Facility: CLINIC | Age: 71
End: 2021-09-29

## 2021-09-29 DIAGNOSIS — R55 SYNCOPE AND COLLAPSE: Primary | ICD-10-CM

## 2021-10-18 ENCOUNTER — OFFICE VISIT (OUTPATIENT)
Dept: CARDIOLOGY | Facility: CLINIC | Age: 71
End: 2021-10-18
Payer: MEDICARE

## 2021-10-18 VITALS
RESPIRATION RATE: 16 BRPM | SYSTOLIC BLOOD PRESSURE: 120 MMHG | DIASTOLIC BLOOD PRESSURE: 64 MMHG | BODY MASS INDEX: 24.59 KG/M2 | OXYGEN SATURATION: 95 % | HEIGHT: 69 IN | WEIGHT: 166 LBS | HEART RATE: 63 BPM

## 2021-10-18 DIAGNOSIS — I72.4 POPLITEAL ANEURYSM: ICD-10-CM

## 2021-10-18 DIAGNOSIS — I10 ESSENTIAL HYPERTENSION: ICD-10-CM

## 2021-10-18 DIAGNOSIS — R07.89 OTHER CHEST PAIN: ICD-10-CM

## 2021-10-18 DIAGNOSIS — R55 SYNCOPE AND COLLAPSE: ICD-10-CM

## 2021-10-18 DIAGNOSIS — E78.5 DYSLIPIDEMIA: ICD-10-CM

## 2021-10-18 PROCEDURE — 93000 ELECTROCARDIOGRAM COMPLETE: CPT | Mod: S$GLB,,, | Performed by: INTERNAL MEDICINE

## 2021-10-18 PROCEDURE — 99215 PR OFFICE/OUTPT VISIT, EST, LEVL V, 40-54 MIN: ICD-10-PCS | Mod: S$GLB,,, | Performed by: INTERNAL MEDICINE

## 2021-10-18 PROCEDURE — 99215 OFFICE O/P EST HI 40 MIN: CPT | Mod: S$GLB,,, | Performed by: INTERNAL MEDICINE

## 2021-10-18 PROCEDURE — 93000 EKG 12-LEAD: ICD-10-PCS | Mod: S$GLB,,, | Performed by: INTERNAL MEDICINE

## 2021-10-18 RX ORDER — MAGNESIUM OXIDE 200 MG
150 TABLET,CHEWABLE ORAL
COMMUNITY
End: 2023-10-21 | Stop reason: CLARIF

## 2021-10-20 LAB
OHS CV EVENT MONITOR DAY: 0
OHS CV HOLTER LENGTH DECIMAL HOURS: 48
OHS CV HOLTER LENGTH HOURS: 48
OHS CV HOLTER LENGTH MINUTES: 0
OHS CV HOLTER SINUS AVERAGE HR: 62
OHS CV HOLTER SINUS MAX HR: 129
OHS CV HOLTER SINUS MIN HR: 38

## 2021-10-21 ENCOUNTER — OFFICE VISIT (OUTPATIENT)
Dept: FAMILY MEDICINE | Facility: CLINIC | Age: 71
End: 2021-10-21
Payer: MEDICARE

## 2021-10-21 VITALS
HEIGHT: 69 IN | WEIGHT: 166 LBS | SYSTOLIC BLOOD PRESSURE: 130 MMHG | BODY MASS INDEX: 24.59 KG/M2 | HEART RATE: 51 BPM | TEMPERATURE: 98 F | OXYGEN SATURATION: 99 % | DIASTOLIC BLOOD PRESSURE: 74 MMHG | RESPIRATION RATE: 16 BRPM

## 2021-10-21 DIAGNOSIS — R55 SYNCOPE AND COLLAPSE: Primary | ICD-10-CM

## 2021-10-21 DIAGNOSIS — Z00.00 PREVENTATIVE HEALTH CARE: ICD-10-CM

## 2021-10-21 PROCEDURE — 99214 OFFICE O/P EST MOD 30 MIN: CPT | Mod: S$GLB,,, | Performed by: FAMILY MEDICINE

## 2021-10-21 PROCEDURE — 99214 PR OFFICE/OUTPT VISIT, EST, LEVL IV, 30-39 MIN: ICD-10-PCS | Mod: S$GLB,,, | Performed by: FAMILY MEDICINE

## 2021-11-01 LAB
AORTIC VALVE CUSP SEPERATION: 2.5 CM
AV INDEX (PROSTH): 1.01
AV MEAN GRADIENT: 3 MMHG
AV PEAK GRADIENT: 2 MMHG
AV REGURGITATION PRESSURE HALF TIME: 844 MS
AV VALVE AREA: 4.57 CM2
AV VELOCITY RATIO: 0.58
BSA FOR ECHO PROCEDURE: 1.91 M2
CV ECHO LV RWT: 0.53 CM
DOP CALC AO PEAK VEL: 0.79 M/S
DOP CALC AO VTI: 17.3 CM
DOP CALC LVOT AREA: 4.5 CM2
DOP CALC LVOT DIAMETER: 2.4 CM
DOP CALC LVOT PEAK VEL: 0.46 M/S
DOP CALC LVOT STROKE VOLUME: 79.13 CM3
DOP CALCLVOT PEAK VEL VTI: 17.5 CM
E WAVE DECELERATION TIME: 377 MS
E/A RATIO: 0.86
E/E' RATIO: 7.69 M/S
ECHO LV POSTERIOR WALL: 1.04 CM (ref 0.6–1.1)
EJECTION FRACTION: 65 %
FRACTIONAL SHORTENING: 34 % (ref 28–44)
INTERVENTRICULAR SEPTUM: 0.91 CM (ref 0.6–1.1)
IVRT: 127 MS
LEFT ATRIUM SIZE: 3.5 CM
LEFT INTERNAL DIMENSION IN SYSTOLE: 2.62 CM (ref 2.1–4)
LEFT VENTRICLE MASS INDEX: 64 G/M2
LEFT VENTRICULAR INTERNAL DIMENSION IN DIASTOLE: 3.96 CM (ref 3.5–6)
LEFT VENTRICULAR MASS: 120.68 G
LV LATERAL E/E' RATIO: 7.14 M/S
LV SEPTAL E/E' RATIO: 8.33 M/S
MV PEAK A VEL: 0.58 M/S
MV PEAK E VEL: 0.5 M/S
MV STENOSIS PRESSURE HALF TIME: 67 MS
MV VALVE AREA P 1/2 METHOD: 3.28 CM2
PISA TR MAX VEL: 1.98 M/S
RA PRESSURE: 3 MMHG
RIGHT VENTRICULAR END-DIASTOLIC DIMENSION: 2.83 CM
TDI LATERAL: 0.07 M/S
TDI SEPTAL: 0.06 M/S
TDI: 0.07 M/S
TR MAX PG: 16 MMHG
TV REST PULMONARY ARTERY PRESSURE: 19 MMHG

## 2021-11-18 ENCOUNTER — CLINICAL SUPPORT (OUTPATIENT)
Dept: CARDIAC REHAB | Facility: HOSPITAL | Age: 71
End: 2021-11-18
Payer: MEDICARE

## 2021-11-18 DIAGNOSIS — Z95.1 S/P CABG (CORONARY ARTERY BYPASS GRAFT): ICD-10-CM

## 2021-11-18 PROCEDURE — 93797 PHYS/QHP OP CAR RHAB WO ECG: CPT

## 2021-11-22 ENCOUNTER — CLINICAL SUPPORT (OUTPATIENT)
Dept: CARDIAC REHAB | Facility: HOSPITAL | Age: 71
End: 2021-11-22
Payer: MEDICARE

## 2021-11-22 DIAGNOSIS — Z95.1 S/P CABG (CORONARY ARTERY BYPASS GRAFT): ICD-10-CM

## 2021-11-22 PROCEDURE — 93798 PHYS/QHP OP CAR RHAB W/ECG: CPT

## 2021-11-24 ENCOUNTER — CLINICAL SUPPORT (OUTPATIENT)
Dept: CARDIAC REHAB | Facility: HOSPITAL | Age: 71
End: 2021-11-24
Payer: MEDICARE

## 2021-11-24 DIAGNOSIS — Z95.1 S/P CABG (CORONARY ARTERY BYPASS GRAFT): ICD-10-CM

## 2021-11-24 PROCEDURE — 93798 PHYS/QHP OP CAR RHAB W/ECG: CPT

## 2021-11-29 ENCOUNTER — CLINICAL SUPPORT (OUTPATIENT)
Dept: CARDIAC REHAB | Facility: HOSPITAL | Age: 71
End: 2021-11-29
Payer: MEDICARE

## 2021-11-29 DIAGNOSIS — Z95.1 S/P CABG (CORONARY ARTERY BYPASS GRAFT): ICD-10-CM

## 2021-11-29 PROCEDURE — 93798 PHYS/QHP OP CAR RHAB W/ECG: CPT

## 2021-12-06 ENCOUNTER — CLINICAL SUPPORT (OUTPATIENT)
Dept: CARDIAC REHAB | Facility: HOSPITAL | Age: 71
End: 2021-12-06
Payer: MEDICARE

## 2021-12-06 DIAGNOSIS — Z95.1 S/P CABG (CORONARY ARTERY BYPASS GRAFT): ICD-10-CM

## 2021-12-06 PROCEDURE — 93798 PHYS/QHP OP CAR RHAB W/ECG: CPT

## 2021-12-08 ENCOUNTER — CLINICAL SUPPORT (OUTPATIENT)
Dept: CARDIAC REHAB | Facility: HOSPITAL | Age: 71
End: 2021-12-08
Payer: MEDICARE

## 2021-12-08 DIAGNOSIS — Z95.1 S/P CABG (CORONARY ARTERY BYPASS GRAFT): ICD-10-CM

## 2021-12-08 PROCEDURE — 93798 PHYS/QHP OP CAR RHAB W/ECG: CPT

## 2021-12-10 ENCOUNTER — CLINICAL SUPPORT (OUTPATIENT)
Dept: CARDIAC REHAB | Facility: HOSPITAL | Age: 71
End: 2021-12-10
Payer: MEDICARE

## 2021-12-10 DIAGNOSIS — Z95.1 S/P CABG (CORONARY ARTERY BYPASS GRAFT): ICD-10-CM

## 2021-12-10 PROCEDURE — 93798 PHYS/QHP OP CAR RHAB W/ECG: CPT

## 2021-12-13 ENCOUNTER — CLINICAL SUPPORT (OUTPATIENT)
Dept: CARDIAC REHAB | Facility: HOSPITAL | Age: 71
End: 2021-12-13
Payer: MEDICARE

## 2021-12-13 DIAGNOSIS — Z95.1 S/P CABG (CORONARY ARTERY BYPASS GRAFT): ICD-10-CM

## 2021-12-13 PROCEDURE — 93798 PHYS/QHP OP CAR RHAB W/ECG: CPT

## 2021-12-15 ENCOUNTER — CLINICAL SUPPORT (OUTPATIENT)
Dept: CARDIAC REHAB | Facility: HOSPITAL | Age: 71
End: 2021-12-15
Payer: MEDICARE

## 2021-12-15 DIAGNOSIS — Z95.1 S/P CABG (CORONARY ARTERY BYPASS GRAFT): ICD-10-CM

## 2021-12-15 PROCEDURE — 93798 PHYS/QHP OP CAR RHAB W/ECG: CPT

## 2021-12-17 ENCOUNTER — CLINICAL SUPPORT (OUTPATIENT)
Dept: CARDIAC REHAB | Facility: HOSPITAL | Age: 71
End: 2021-12-17
Payer: MEDICARE

## 2021-12-17 DIAGNOSIS — Z95.1 S/P CABG (CORONARY ARTERY BYPASS GRAFT): ICD-10-CM

## 2021-12-17 PROCEDURE — 93798 PHYS/QHP OP CAR RHAB W/ECG: CPT

## 2021-12-20 ENCOUNTER — CLINICAL SUPPORT (OUTPATIENT)
Dept: CARDIAC REHAB | Facility: HOSPITAL | Age: 71
End: 2021-12-20
Payer: MEDICARE

## 2021-12-20 DIAGNOSIS — Z95.1 S/P CABG (CORONARY ARTERY BYPASS GRAFT): ICD-10-CM

## 2021-12-20 PROCEDURE — 93798 PHYS/QHP OP CAR RHAB W/ECG: CPT

## 2021-12-22 ENCOUNTER — CLINICAL SUPPORT (OUTPATIENT)
Dept: CARDIAC REHAB | Facility: HOSPITAL | Age: 71
End: 2021-12-22
Payer: MEDICARE

## 2021-12-22 DIAGNOSIS — Z95.1 S/P CABG (CORONARY ARTERY BYPASS GRAFT): ICD-10-CM

## 2021-12-22 PROCEDURE — 93798 PHYS/QHP OP CAR RHAB W/ECG: CPT

## 2021-12-27 ENCOUNTER — CLINICAL SUPPORT (OUTPATIENT)
Dept: CARDIAC REHAB | Facility: HOSPITAL | Age: 71
End: 2021-12-27
Payer: MEDICARE

## 2021-12-27 DIAGNOSIS — Z95.1 S/P CABG (CORONARY ARTERY BYPASS GRAFT): ICD-10-CM

## 2021-12-27 PROCEDURE — 93798 PHYS/QHP OP CAR RHAB W/ECG: CPT

## 2021-12-29 ENCOUNTER — CLINICAL SUPPORT (OUTPATIENT)
Dept: CARDIAC REHAB | Facility: HOSPITAL | Age: 71
End: 2021-12-29
Payer: MEDICARE

## 2021-12-29 DIAGNOSIS — Z95.1 S/P CABG (CORONARY ARTERY BYPASS GRAFT): ICD-10-CM

## 2021-12-29 PROCEDURE — 93798 PHYS/QHP OP CAR RHAB W/ECG: CPT

## 2022-01-03 ENCOUNTER — CLINICAL SUPPORT (OUTPATIENT)
Dept: CARDIAC REHAB | Facility: HOSPITAL | Age: 72
End: 2022-01-03
Payer: MEDICARE

## 2022-01-03 DIAGNOSIS — Z95.1 S/P CABG (CORONARY ARTERY BYPASS GRAFT): ICD-10-CM

## 2022-01-03 PROCEDURE — 93798 PHYS/QHP OP CAR RHAB W/ECG: CPT

## 2022-01-05 ENCOUNTER — CLINICAL SUPPORT (OUTPATIENT)
Dept: CARDIAC REHAB | Facility: HOSPITAL | Age: 72
End: 2022-01-05
Payer: MEDICARE

## 2022-01-05 DIAGNOSIS — Z95.1 S/P CABG (CORONARY ARTERY BYPASS GRAFT): ICD-10-CM

## 2022-01-05 PROCEDURE — 93798 PHYS/QHP OP CAR RHAB W/ECG: CPT

## 2022-01-07 ENCOUNTER — CLINICAL SUPPORT (OUTPATIENT)
Dept: CARDIAC REHAB | Facility: HOSPITAL | Age: 72
End: 2022-01-07
Payer: MEDICARE

## 2022-01-07 DIAGNOSIS — Z95.1 S/P CABG (CORONARY ARTERY BYPASS GRAFT): ICD-10-CM

## 2022-01-07 PROCEDURE — 93798 PHYS/QHP OP CAR RHAB W/ECG: CPT

## 2022-01-10 ENCOUNTER — CLINICAL SUPPORT (OUTPATIENT)
Dept: CARDIAC REHAB | Facility: HOSPITAL | Age: 72
End: 2022-01-10
Payer: MEDICARE

## 2022-01-10 DIAGNOSIS — Z95.1 S/P CABG (CORONARY ARTERY BYPASS GRAFT): ICD-10-CM

## 2022-01-10 PROCEDURE — 93798 PHYS/QHP OP CAR RHAB W/ECG: CPT

## 2022-01-14 ENCOUNTER — CLINICAL SUPPORT (OUTPATIENT)
Dept: CARDIAC REHAB | Facility: HOSPITAL | Age: 72
End: 2022-01-14
Payer: MEDICARE

## 2022-01-14 DIAGNOSIS — Z95.1 S/P CABG (CORONARY ARTERY BYPASS GRAFT): ICD-10-CM

## 2022-01-14 PROCEDURE — 93798 PHYS/QHP OP CAR RHAB W/ECG: CPT

## 2022-01-17 ENCOUNTER — CLINICAL SUPPORT (OUTPATIENT)
Dept: CARDIAC REHAB | Facility: HOSPITAL | Age: 72
End: 2022-01-17
Payer: MEDICARE

## 2022-01-17 DIAGNOSIS — Z95.1 S/P CABG (CORONARY ARTERY BYPASS GRAFT): ICD-10-CM

## 2022-01-17 PROCEDURE — 93798 PHYS/QHP OP CAR RHAB W/ECG: CPT

## 2022-01-24 ENCOUNTER — CLINICAL SUPPORT (OUTPATIENT)
Dept: CARDIAC REHAB | Facility: HOSPITAL | Age: 72
End: 2022-01-24
Payer: MEDICARE

## 2022-01-24 DIAGNOSIS — Z95.1 S/P CABG (CORONARY ARTERY BYPASS GRAFT): ICD-10-CM

## 2022-01-24 PROBLEM — Z00.00 PREVENTATIVE HEALTH CARE: Status: RESOLVED | Noted: 2021-10-21 | Resolved: 2022-01-24

## 2022-01-24 PROCEDURE — 93798 PHYS/QHP OP CAR RHAB W/ECG: CPT

## 2022-01-26 ENCOUNTER — CLINICAL SUPPORT (OUTPATIENT)
Dept: CARDIAC REHAB | Facility: HOSPITAL | Age: 72
End: 2022-01-26
Payer: MEDICARE

## 2022-01-26 DIAGNOSIS — Z95.1 S/P CABG (CORONARY ARTERY BYPASS GRAFT): ICD-10-CM

## 2022-01-26 PROCEDURE — 93798 PHYS/QHP OP CAR RHAB W/ECG: CPT

## 2022-01-28 ENCOUNTER — CLINICAL SUPPORT (OUTPATIENT)
Dept: CARDIAC REHAB | Facility: HOSPITAL | Age: 72
End: 2022-01-28
Payer: MEDICARE

## 2022-01-28 DIAGNOSIS — Z95.1 S/P CABG (CORONARY ARTERY BYPASS GRAFT): ICD-10-CM

## 2022-01-28 PROCEDURE — 93798 PHYS/QHP OP CAR RHAB W/ECG: CPT

## 2022-02-02 ENCOUNTER — CLINICAL SUPPORT (OUTPATIENT)
Dept: CARDIAC REHAB | Facility: HOSPITAL | Age: 72
End: 2022-02-02
Payer: MEDICARE

## 2022-02-02 DIAGNOSIS — Z95.1 S/P CABG (CORONARY ARTERY BYPASS GRAFT): ICD-10-CM

## 2022-02-02 PROCEDURE — 93798 PHYS/QHP OP CAR RHAB W/ECG: CPT

## 2022-03-24 ENCOUNTER — TELEPHONE (OUTPATIENT)
Dept: ALLERGY | Facility: CLINIC | Age: 72
End: 2022-03-24
Payer: MEDICARE

## 2022-03-24 NOTE — TELEPHONE ENCOUNTER
Called to schedule a new patient appointment, received patient records and amb. Referral. Spoke with patients wife and she stated she spoke with her husbands insurance and  is not a covered physician

## 2023-10-09 ENCOUNTER — TELEPHONE (OUTPATIENT)
Dept: HEMATOLOGY/ONCOLOGY | Facility: CLINIC | Age: 73
End: 2023-10-09
Payer: MEDICARE

## 2023-10-09 DIAGNOSIS — C67.9 UROTHELIAL CARCINOMA OF BLADDER: Primary | ICD-10-CM

## 2023-10-09 NOTE — NURSING
Patient and patient's wife, Courtney, arrived to facility and asked to speak with someone in regards to being able to transfer care here. They live in [place], but they rented an apartment in [place] so patient could receive treatment in [place] at [facility]. Mrs. Valdez states they were home for the weekend, and they noticed our facility so they wanted to stop in for a tour and to speak to me about transferring care.  Patient is currently being treated by Dr. Willett at [facility] for bladder ca. He has received 1 cycle of immunotherapy, and is due for cycle 2 on Monday, Oct. 16th. Patient does have a port a cath in place.    After speaking with patient and Mrs. Valdez, they have requested an appointment here. I offered our next available appt of Friday, Oct. 13th, but they are unable to attend that date and time. They accepted an appointment on Tuesday, Oct. 17th at 1400 with Dr. Jimenez.   I provided them with a tour of the facility, and we confirmed appointment date and time.    Navigation tool completed.     Oncology Navigation   Intake  Cancer Type: -- (Invasive high-grade urothelial carcinoma)  Internal / External Referral: External (self)  Date of Referral: 10/09/23  Initial Nurse Navigator Contact: 10/09/23  Referral to Initial Contact Timeline (days): 0  Date Worked: 10/09/23  First Appointment Available: 10/13/23  Appointment Date: 10/17/23  First Available Date vs. Scheduled Date (days): 4  Reason if booked > 7 days after scheduling: Patient request     Treatment  Current Status: Active    Surgery: Completed  Type of Surgery: Radical cystoprostatectomy  Surgery Schedule Date: 08/15/23    Medical Oncologist: Dr. Jimenez  Consult Date: 10/17/23       Procedures: Port / PICC; CT  CT Schedule Date: 09/29/23  Port / PICC Schedule Date: 05/18/23             Support Systems: Spouse/significant other     Acuity      Follow Up  No follow-ups on file.

## 2023-10-09 NOTE — TELEPHONE ENCOUNTER
Returned call and left msg on       ----- Message from Cynthia Calhoun sent at 10/9/2023  1:08 PM CDT -----  Type: Needs Medical Advice  Who Called:  Courtney   Symptoms (please be specific):    How long has patient had these symptoms:    Pharmacy name and phone #:    Best Call Back Number: [phone]  Additional Information: Courtney is requesting a call back to schedule a NP appt. for her  and to schedule a tour of the facility today if possible.

## 2023-10-11 ENCOUNTER — TELEPHONE (OUTPATIENT)
Dept: HEMATOLOGY/ONCOLOGY | Facility: CLINIC | Age: 73
End: 2023-10-11
Payer: MEDICARE

## 2023-10-11 NOTE — NURSING
Patient's wife, Mrs. Valdez, returned my call to inform me patient is currently being admitted into [facility] since his current physicians are all in [place].  She will keep us updated on if patient will be able to make his appointment with Dr. Jimenez on Tuesday. I told her we would keep his appointment scheduled until we know for sure if he will still be admitted at that time.   She is also requesting an appointment with a urologist that Dr. Jimenez recommends as they are still wanting to come home and establish care in [place].

## 2023-10-13 ENCOUNTER — TELEPHONE (OUTPATIENT)
Dept: HEMATOLOGY/ONCOLOGY | Facility: CLINIC | Age: 73
End: 2023-10-13
Payer: MEDICARE

## 2023-10-13 NOTE — NURSING
Returned patient's wife call. She wanted to update us that patient had an IR procedure done at [facility] yesterday for the bleeding.   She states they are hoping they are discharged over the weekend and can make the appointment with Dr. Jimenez on Tuesday; however, she requested if we can schedule another visit later in the week just in case they can't make Tuesday.  I have scheduled them on Thursday, 10/19 at 0800. If they come to appointment on Tuesday, we can cancel the appointment on Thursday.     Imaging received from [facility]; I will place orders for [facility] to upload the imaging.

## 2023-10-13 NOTE — TELEPHONE ENCOUNTER
----- Message from Mckenna Kelly sent at 10/12/2023  4:10 PM CDT -----  Type: Need Medical Advice   Who Called: wife of patient  Best callback number: [phone]  Additional Information: Patient wife returned call from Christiane  Please call to further assist, Thanks.

## 2023-10-16 ENCOUNTER — TELEPHONE (OUTPATIENT)
Dept: HEMATOLOGY/ONCOLOGY | Facility: CLINIC | Age: 73
End: 2023-10-16
Payer: MEDICARE

## 2023-10-16 NOTE — NURSING
Spoke with wife regarding patient's appointment with Dr. Jimenez  Patient is currently admitted at [facility] due to complications from previous procedure.  Patient tested positive for c-diff.  Wife stated he has been placed on abx and must be diarrhea free for 24 hours prior to being discharge from [facility]  Discussed with wife to contact clinic asap if she feels he will not be able to come to appt on 10/19, wife agreed.  Will review Dr. Jimenez's schedule for appt next week if 10/19 cancelled.    Wife wishes to have all services under one umbrella.  Discussed possibility of seeing UroOnc on SS if needed.  Wife agreed to plan to see Dr. Jimenez for recommendations on Urology and UroOnc if needed.  Agreed to check in with wife either late Tuesday or Wednesday am to determine discharge of patient.    Wife provided [facility] phone number and Navigation number.  Wife verbalized understanding and expressed appreciation for assistance.

## 2023-10-17 ENCOUNTER — TELEPHONE (OUTPATIENT)
Dept: HEMATOLOGY/ONCOLOGY | Facility: CLINIC | Age: 73
End: 2023-10-17
Payer: MEDICARE

## 2023-10-17 NOTE — NURSING
Returned call to wife, patient will be discharged and they plan on being at appointment with Dr. Jimenez on Thursday.  Confirmed date, time and location with wife.

## 2023-10-17 NOTE — TELEPHONE ENCOUNTER
----- Message from Mckenna Kelly sent at 10/17/2023  1:18 PM CDT -----  Type: Need Medical Advice   Who Called: Giovanny Melendez callback number: [phone]  Additional Information: Patient wife called to tell Renea, he will be released from hospital today, the patient will make the appointment on Thursday to see Dr Jimenez  Please call to further assist, Thanks.

## 2023-10-18 ENCOUNTER — TELEPHONE (OUTPATIENT)
Dept: HEMATOLOGY/ONCOLOGY | Facility: CLINIC | Age: 73
End: 2023-10-18
Payer: MEDICARE

## 2023-10-18 NOTE — NURSING
Spoke with wife to inform that appointment for tomorrow will need to be rescheduled due to provider illness.  Wife was extremely upset and concerned that patient is having to wait for an appointment.  Offered an appointment with Dr. Stack on 10/25, wife accepted but requested an appointment with Dr. Jimenez to be scheduled at next available.  Scheduled with Dr. Jimenez on 10/31, will review after patient sees Dr. Stack to determine if appointment will be necessary.  Wife again expressed her frustration and disappointment with the last minute changes.  After speaking with wife, she stated she felt better and expressed appreciation for patience and willingness to work with her and patient.  Wife expressed concern that if needed to go to hospital who would he see.  Explained that if patient presented to ED for emergency care that if admitted and needing services from additional providers she should request to have [facility] physicians.  Her desire is to have all physicians under one umbrella.  Wife agreed with plan.  Encouraged wife to reach out me if she had any additional questions.  Wife expressed appreciation and verbalized understanding.

## 2023-10-21 PROBLEM — Z98.890 HISTORY OF ILEAL CONDUIT: Status: ACTIVE | Noted: 2023-10-21

## 2023-10-21 PROBLEM — R79.89 ELEVATED SERUM CREATININE: Status: ACTIVE | Noted: 2023-10-21

## 2023-10-21 PROBLEM — Z86.711 HISTORY OF PULMONARY EMBOLISM: Status: ACTIVE | Noted: 2023-10-21

## 2023-10-21 PROBLEM — A04.72 C. DIFFICILE COLITIS: Status: ACTIVE | Noted: 2023-10-21

## 2023-10-23 ENCOUNTER — TELEPHONE (OUTPATIENT)
Dept: HEMATOLOGY/ONCOLOGY | Facility: CLINIC | Age: 73
End: 2023-10-23
Payer: MEDICARE

## 2023-10-23 PROBLEM — C67.8 MALIGNANT NEOPLASM OF OVERLAPPING SITES OF BLADDER: Status: ACTIVE | Noted: 2023-10-23

## 2023-10-23 PROBLEM — C67.9 MALIGNANT NEOPLASM OF URINARY BLADDER: Status: ACTIVE | Noted: 2023-10-23

## 2023-10-23 PROBLEM — Z86.711 HISTORY OF PULMONARY EMBOLUS (PE): Status: ACTIVE | Noted: 2023-10-23

## 2023-10-23 NOTE — NURSING
Spoke with wife, she requested to cancel appointment with Dr. Stack on 19/25 and keep 10/31 with Dr. Jimenez.  She spoke with Dr. Barbosa and Dr. Jimenez this weekend in hospital.  She requested to have all records from [facility] stay in October for visit.  Informed her the records would be requested.  Wife verbalized understanding and expressed appreciation for all assistance.

## 2023-10-27 ENCOUNTER — TELEPHONE (OUTPATIENT)
Dept: INFUSION THERAPY | Facility: HOSPITAL | Age: 73
End: 2023-10-27
Payer: MEDICARE

## 2023-10-27 ENCOUNTER — TELEPHONE (OUTPATIENT)
Dept: HEMATOLOGY/ONCOLOGY | Facility: CLINIC | Age: 73
End: 2023-10-27
Payer: MEDICARE

## 2023-10-27 DIAGNOSIS — C67.9 MALIGNANT NEOPLASM OF URINARY BLADDER: Primary | ICD-10-CM

## 2023-10-27 NOTE — NURSING
Wife called this am very distraught over her 's situation.  She stated she feels that she needs to speak with someone regarding everything that has happen over the last few months.  She is very upset regarding her 's decline in health and her decline and exhaustion.  Offered services of psychology and social workers.  Wife stated she needs assistance with several things including establishing care with a primary care physician.  She requested an internist with experience.  She also stated that her  would be evaluated today to determine if he needs to be admitted to a rehab facility for strengthening and increase nutrition.  She accepted offer of psychology and  assistance.  Message and referral sent to  team and Psychology.

## 2023-10-27 NOTE — TELEPHONE ENCOUNTER
TRI received a request to contact pt's wife for emotional support. TRI called and spoke with Courtney. She related to SW the difficult and stressfully journey they have been on since March with her husbands illness including many hospitalizations. She voiced she had a melt down and realizing she needs assistance. She reported to TRI she is coming to a 2:00 PM appointment with psychology today. She said she has relies on her oneyda and today decided she could come to see the psychologist for counseling.     TRI made a plan to meet with pt and wife at the doctors appointment with Dr Jimenez on 10/31. She agreed to meet with SW in person on 10/31.    SW to follow.     Radha Goldberg, RADHAW

## 2023-10-30 NOTE — PROGRESS NOTES
PATIENT: Aren Bey Jr.  MRN: [id number]  DATE: 10/31/2023      Diagnosis:   1. Urothelial carcinoma of bladder    2. Malignant neoplasm of urinary bladder    3. Prostate cancer    4. Healthcare maintenance    5. Anorexia    6. C. difficile colitis    7. Normocytic anemia    8. Hypertension, unspecified type    9. Thrombophilia        Chief Complaint: Follow-up and Bladder Cancer      Oncologic History:      Oncologic History     Oncologic Treatment     Pathology           Subjective:    Initial History: Mr. Bey is a 73 y.o. male with HTN, Seizures, bladder cancer, h/o pulmonary embolism, who presents to establish care for bladder cancer.  Patient underwent transurethral resection of bladder tumor on 04/28/2023 with path showing invasive urothelial carcinoma, high-grade with involvement of muscularis propria and positive for lymphovascular invasion.  Patient underwent chemotherapy at [facility].  The patient then underwent radical cystectomy and prostatectomy on 08/15/2023 with pathology showing invasive high-grade urothelial carcinoma measuring 4.2 cm, positive lymphovascular invasion, tumor identified within the soft tissue surrounding the left distal ureter, 4/6 positive regional lymph nodes with extranodal extension present. pT3aN2.  Also seen was acinar adenocarcinoma of the prostate grade group 1 (Sarah score 3+3=6) in 1 2 5% of prostate tissue pT2N0.  The patient's surgery was ultimately complicated by small-bowel obstruction, and pulmonary embolism.  PT recevied 1 dose of Nivolumab 9/18/23.  The patient was placed on anticoagulation for pulmonary embolism and ultimately developed a right perinephric bleed requiring embolization.  PT also developed UTI and acquired ureteral obstruction with placement of bilateral ureteral stents.  PT also developed C diff and was hospitalized for suspected recurrence at [facility] from 10/21/2023 to 10/25/2023.  Patient was discharged on  fidaxomicin.    Currently the patient states he has 4 days lots of fidaxomicin.  He endorses clear urine.  He endorses decreased appetite, fatigue, shortness of breath with exertion, chronic leg swelling.  The patient denies CP, cough, SOB, abdominal pain, nausea, vomiting, constipation, diarrhea.  The patient denies fever, chills, night sweats, weight loss, new lumps or bumps, easy bruising or bleeding.    Past Medical History:   Past Medical History:   Diagnosis Date    Aneurysm artery, popliteal     right leg    Hypertension     Seizures        Past Surgical HIstory: No past surgical history on file.    Family History:   Family History   Problem Relation Age of Onset    Heart disease Mother     Hypertension Mother        Social History:  reports that he has never smoked. He has never used smokeless tobacco. He reports that he does not currently use alcohol. He reports that he does not use drugs.    Allergies:  Review of patient's allergies indicates:   Allergen Reactions    Clopidogrel Other (See Comments)     Increased body temperature and redness        Medications:  Current Outpatient Medications   Medication Sig Dispense Refill    acetaminophen (TYLENOL) 500 MG tablet Take 500-1,000 mg by mouth every 6 (six) hours as needed (fever/pain).      ASCORBIC ACID, VITAMIN C, ORAL Take 1 tablet by mouth once daily.      atorvastatin (LIPITOR) 20 MG tablet Take 1 tablet (20 mg total) by mouth once daily. 90 tablet 3    b complex vitamins tablet Take 1 tablet by mouth once daily.      droNABinol (MARINOL) 2.5 MG capsule Take 5 mg by mouth 2 (two) times a day.      ferrous sulfate (SLOW RELEASE IRON) 142 mg (45 mg iron) TbSR Take 1 tablet by mouth Daily.      fidaxomicin (DIFICID) 200 mg Tab Take 1 tablet (200 mg total) by mouth 2 (two) times daily. for 10 days 20 tablet 0    Lactobacillus rhamnosus GG (CULTURELLE) 10 billion cell capsule Take 1 capsule by mouth once daily. for 10 days 10 capsule 0     "LIDOcaine-prilocaine (EMLA) cream Apply topically once as needed (to port access).      multivitamin with minerals tablet Take 1 tablet by mouth once daily.      oxyCODONE (ROXICODONE) 5 MG immediate release tablet Take 1 tablet (5 mg total) by mouth every 4 (four) hours as needed for Pain. 20 tablet 0    UNABLE TO FIND Take 1 capsule by mouth once daily. medication name: Colon health 80 billion      zinc gluconate 50 mg tablet Take 50 mg by mouth once daily.      cholecalciferol, vitamin D3, (VITAMIN D3 ORAL) Take 5,000 Units by mouth once daily.      fosfomycin (MONUROL) 3 gram Pack SMARTSIG:3 Gram(s) By Mouth Once       No current facility-administered medications for this visit.       Review of Systems   Constitutional:  Positive for appetite change and fatigue. Negative for chills, diaphoresis, fever and unexpected weight change.   HENT:  Negative for mouth sores.    Eyes:  Negative for visual disturbance.   Respiratory:  Positive for shortness of breath (with exertion). Negative for cough.    Cardiovascular:  Positive for leg swelling. Negative for chest pain and palpitations.   Gastrointestinal:  Negative for abdominal pain, constipation, diarrhea, nausea and vomiting.   Genitourinary:  Negative for frequency.   Musculoskeletal:  Negative for back pain.   Skin:  Negative for color change and rash.   Neurological:  Negative for headaches.   Hematological:  Negative for adenopathy. Does not bruise/bleed easily.   Psychiatric/Behavioral:  The patient is not nervous/anxious.        ECOG Performance Status: 1   Objective:      Vitals:   Vitals:    10/31/23 1421   BP: 136/70   BP Location: Right arm   Patient Position: Sitting   BP Method: Medium (Manual)   Pulse: 85   Resp: 12   Temp: 96.8 °F (36 °C)   TempSrc: Temporal   SpO2: 99%   Weight: 67.6 kg (149 lb 0.5 oz)   Height: 5' 9" (1.753 m)       Physical Exam  Constitutional:       General: He is not in acute distress.     Appearance: He is well-developed. He is " not diaphoretic.   HENT:      Head: Normocephalic and atraumatic.   Cardiovascular:      Rate and Rhythm: Normal rate and regular rhythm.      Heart sounds: Normal heart sounds. No murmur heard.     No friction rub. No gallop.   Pulmonary:      Effort: Pulmonary effort is normal. No respiratory distress.      Breath sounds: Normal breath sounds. No wheezing or rales.   Chest:      Chest wall: No tenderness.   Abdominal:      General: Bowel sounds are normal. There is no distension.      Palpations: Abdomen is soft. There is no mass.      Tenderness: There is no abdominal tenderness. There is no rebound.      Comments: Ileal conduit on RLQ   Musculoskeletal:      Cervical back: Normal range of motion.   Lymphadenopathy:      Cervical: No cervical adenopathy.      Upper Body:      Right upper body: No supraclavicular or axillary adenopathy.      Left upper body: No supraclavicular or axillary adenopathy.   Skin:     General: Skin is warm and dry.      Findings: No erythema or rash.   Neurological:      Mental Status: He is alert and oriented to person, place, and time.   Psychiatric:         Behavior: Behavior normal.         Laboratory Data:  Lab Visit on 10/31/2023   Component Date Value Ref Range Status    WBC 10/31/2023 7.05  3.90 - 12.70 K/uL Final    RBC 10/31/2023 3.81 (L)  4.60 - 6.20 M/uL Final    Hemoglobin 10/31/2023 11.1 (L)  14.0 - 18.0 g/dL Final    Hematocrit 10/31/2023 34.5 (L)  40.0 - 54.0 % Final    MCV 10/31/2023 91  82 - 98 fL Final    MCH 10/31/2023 29.1  27.0 - 31.0 pg Final    MCHC 10/31/2023 32.2  32.0 - 36.0 g/dL Final    RDW 10/31/2023 15.6 (H)  11.5 - 14.5 % Final    Platelets 10/31/2023 301  150 - 450 K/uL Final    MPV 10/31/2023 8.7 (L)  9.2 - 12.9 fL Final    Immature Granulocytes 10/31/2023 0.7 (H)  0.0 - 0.5 % Final    Gran # (ANC) 10/31/2023 4.8  1.8 - 7.7 K/uL Final    Immature Grans (Abs) 10/31/2023 0.05 (H)  0.00 - 0.04 K/uL Final    Comment: Mild elevation in immature granulocytes  is non specific and   can be seen in a variety of conditions including stress response,   acute inflammation, trauma and pregnancy. Correlation with other   laboratory and clinical findings is essential.      Lymph # 10/31/2023 1.0  1.0 - 4.8 K/uL Final    Mono # 10/31/2023 0.9  0.3 - 1.0 K/uL Final    Eos # 10/31/2023 0.2  0.0 - 0.5 K/uL Final    Baso # 10/31/2023 0.12  0.00 - 0.20 K/uL Final    nRBC 10/31/2023 0  0 /100 WBC Final    Gran % 10/31/2023 67.7  38.0 - 73.0 % Final    Lymph % 10/31/2023 14.6 (L)  18.0 - 48.0 % Final    Mono % 10/31/2023 13.0  4.0 - 15.0 % Final    Eosinophil % 10/31/2023 2.3  0.0 - 8.0 % Final    Basophil % 10/31/2023 1.7  0.0 - 1.9 % Final    Differential Method 10/31/2023 Automated   Final    Sodium 10/31/2023 137  136 - 145 mmol/L Final    Potassium 10/31/2023 5.0  3.5 - 5.1 mmol/L Final    Chloride 10/31/2023 102  95 - 110 mmol/L Final    CO2 10/31/2023 24  23 - 29 mmol/L Final    Glucose 10/31/2023 112 (H)  70 - 110 mg/dL Final    BUN 10/31/2023 20  8 - 23 mg/dL Final    Creatinine 10/31/2023 1.4  0.5 - 1.4 mg/dL Final    Calcium 10/31/2023 9.5  8.7 - 10.5 mg/dL Final    Total Protein 10/31/2023 7.2  6.0 - 8.4 g/dL Final    Albumin 10/31/2023 2.9 (L)  3.5 - 5.2 g/dL Final    Total Bilirubin 10/31/2023 0.4  0.1 - 1.0 mg/dL Final    Comment: For infants and newborns, interpretation of results should be based  on gestational age, weight and in agreement with clinical  observations.    Premature Infant recommended reference ranges:  Up to 24 hours.............<8.0 mg/dL  Up to 48 hours............<12.0 mg/dL  3-5 days..................<15.0 mg/dL  6-29 days.................<15.0 mg/dL      Alkaline Phosphatase 10/31/2023 101  55 - 135 U/L Final    AST 10/31/2023 72 (H)  10 - 40 U/L Final    ALT 10/31/2023 43  10 - 44 U/L Final    eGFR 10/31/2023 52.4 (A)  >60 mL/min/1.73 m^2 Final    Anion Gap 10/31/2023 11  8 - 16 mmol/L Final   No results displayed because visit has over 200 results.             Imaging:     CT abdomen and pelvis 10/21/23    New small right greater left pleural effusions identified with dependent atelectasis.  There is diffuse hepatic steatosis.  The non contrasted gallbladder, pancreas, and adrenal glands are unremarkable.  Spleen is normal size.  Calcified granulomas are noted within the spleen.  Urinary bladder is surgically absent.  There is a right lower quadrant ileal conduit.  Surgical clips are noted about the right renal pelvis.  There is minimal hydronephrosis decreased.  There are bilateral nephroureteral stents present.  There is no bowel obstruction.  There is scattered colonic stool.  There is colonic wall thickening most notable of the sigmoid colon and rectum.  Correlate for colitis.  There is no evidence of acute appendicitis.  IVC filter is present.  Atherosclerotic calcifications are noted.  Abdominal aorta is not aneurysmal.  There is small volume ascites.  There is mild anasarca.  There is no intraperitoneal free air identified.  No lymphadenopathy is noted.  No acute displaced fractures noted.          Assessment:       1. Urothelial carcinoma of bladder    2. Malignant neoplasm of urinary bladder    3. Prostate cancer    4. Healthcare maintenance    5. Anorexia    6. C. difficile colitis    7. Normocytic anemia    8. Hypertension, unspecified type    9. Thrombophilia           Plan:     Bladder Cancer - pt with stage IIIB bladder cancer pT3N2 s/p neoadjuvant chemo followed by radical cystoprostatectomy 8/15/23  -PT received one dose of Nivolumab on 9/18/23  -Will have pt undergo PET/CT    Prostate Cancer - patient with acinar adenocarcinoma of the prostate grade group 1 ([place] score 3+3=6) in 1 2 5% of prostate tissue pT2N0  -Will assess PSA    Anorexia - pt with improvement on marinol but takes medication nightly due to sedation  -Will monitor    C Diff - pt with recurrent C diff on fidaxomicin with 4 days left  -Diarrhea improved   -Will  Monitor    Anemia - Hemoglobin 11.1g/dL on 10/31/23  -Will monitor     HTN - pt not on meds  -Stable  -Will monitor    Thrombophilia - pt with prior PE  -Pt subsequently developed a right perinephric bleed requiring embolization   -Patient with IVC filter in place  -Will monitor    Route Chart for Scheduling    Med Onc Chart Routing      Follow up with physician Other. Pt needs a CBC, CMP, PSA today.  Pt needs a PET/CT with return appt with me once completed.  Pt needs an appt with Dr Alegria in family medicine for primary care.  Pt needs approval for Nivolumab.  Pt needs an appt with Dr Ghosh in Urology at [facility].   Follow up with [facility]    Infusion scheduling note    Injection scheduling note    Labs    Imaging    Pharmacy appointment    Other referrals                     Jackson Jimenez MD  [facility]  Hematology and Oncology  [facility]   [address] [place] [address]   O: [phone]  F: [phone]

## 2023-10-31 ENCOUNTER — OFFICE VISIT (OUTPATIENT)
Dept: HEMATOLOGY/ONCOLOGY | Facility: CLINIC | Age: 73
End: 2023-10-31
Payer: MEDICARE

## 2023-10-31 ENCOUNTER — DOCUMENTATION ONLY (OUTPATIENT)
Dept: INFUSION THERAPY | Facility: HOSPITAL | Age: 73
End: 2023-10-31
Payer: MEDICARE

## 2023-10-31 ENCOUNTER — LAB VISIT (OUTPATIENT)
Dept: LAB | Facility: HOSPITAL | Age: 73
End: 2023-10-31
Attending: INTERNAL MEDICINE
Payer: MEDICARE

## 2023-10-31 VITALS
WEIGHT: 149.06 LBS | HEART RATE: 85 BPM | RESPIRATION RATE: 12 BRPM | HEIGHT: 69 IN | TEMPERATURE: 97 F | SYSTOLIC BLOOD PRESSURE: 136 MMHG | DIASTOLIC BLOOD PRESSURE: 70 MMHG | BODY MASS INDEX: 22.08 KG/M2 | OXYGEN SATURATION: 99 %

## 2023-10-31 DIAGNOSIS — I10 HYPERTENSION, UNSPECIFIED TYPE: ICD-10-CM

## 2023-10-31 DIAGNOSIS — Z00.00 HEALTHCARE MAINTENANCE: ICD-10-CM

## 2023-10-31 DIAGNOSIS — C67.9 MALIGNANT NEOPLASM OF URINARY BLADDER: ICD-10-CM

## 2023-10-31 DIAGNOSIS — C61 PROSTATE CANCER: ICD-10-CM

## 2023-10-31 DIAGNOSIS — C67.9 UROTHELIAL CARCINOMA OF BLADDER: Primary | ICD-10-CM

## 2023-10-31 DIAGNOSIS — R63.0 ANOREXIA: ICD-10-CM

## 2023-10-31 DIAGNOSIS — A04.72 C. DIFFICILE COLITIS: ICD-10-CM

## 2023-10-31 DIAGNOSIS — D64.9 NORMOCYTIC ANEMIA: ICD-10-CM

## 2023-10-31 DIAGNOSIS — D68.59 THROMBOPHILIA: ICD-10-CM

## 2023-10-31 LAB
ALBUMIN SERPL BCP-MCNC: 2.9 G/DL (ref 3.5–5.2)
ALP SERPL-CCNC: 101 U/L (ref 55–135)
ALT SERPL W/O P-5'-P-CCNC: 43 U/L (ref 10–44)
ANION GAP SERPL CALC-SCNC: 11 MMOL/L (ref 8–16)
AST SERPL-CCNC: 72 U/L (ref 10–40)
BASOPHILS # BLD AUTO: 0.12 K/UL (ref 0–0.2)
BASOPHILS NFR BLD: 1.7 % (ref 0–1.9)
BILIRUB SERPL-MCNC: 0.4 MG/DL (ref 0.1–1)
BUN SERPL-MCNC: 20 MG/DL (ref 8–23)
CALCIUM SERPL-MCNC: 9.5 MG/DL (ref 8.7–10.5)
CHLORIDE SERPL-SCNC: 102 MMOL/L (ref 95–110)
CO2 SERPL-SCNC: 24 MMOL/L (ref 23–29)
COMPLEXED PSA SERPL-MCNC: 0.02 NG/ML (ref 0–4)
CREAT SERPL-MCNC: 1.4 MG/DL (ref 0.5–1.4)
DIFFERENTIAL METHOD: ABNORMAL
EOSINOPHIL # BLD AUTO: 0.2 K/UL (ref 0–0.5)
EOSINOPHIL NFR BLD: 2.3 % (ref 0–8)
ERYTHROCYTE [DISTWIDTH] IN BLOOD BY AUTOMATED COUNT: 15.6 % (ref 11.5–14.5)
EST. GFR  (NO RACE VARIABLE): 52.4 ML/MIN/1.73 M^2
GLUCOSE SERPL-MCNC: 112 MG/DL (ref 70–110)
HCT VFR BLD AUTO: 34.5 % (ref 40–54)
HGB BLD-MCNC: 11.1 G/DL (ref 14–18)
IMM GRANULOCYTES # BLD AUTO: 0.05 K/UL (ref 0–0.04)
IMM GRANULOCYTES NFR BLD AUTO: 0.7 % (ref 0–0.5)
LYMPHOCYTES # BLD AUTO: 1 K/UL (ref 1–4.8)
LYMPHOCYTES NFR BLD: 14.6 % (ref 18–48)
MCH RBC QN AUTO: 29.1 PG (ref 27–31)
MCHC RBC AUTO-ENTMCNC: 32.2 G/DL (ref 32–36)
MCV RBC AUTO: 91 FL (ref 82–98)
MONOCYTES # BLD AUTO: 0.9 K/UL (ref 0.3–1)
MONOCYTES NFR BLD: 13 % (ref 4–15)
NEUTROPHILS # BLD AUTO: 4.8 K/UL (ref 1.8–7.7)
NEUTROPHILS NFR BLD: 67.7 % (ref 38–73)
NRBC BLD-RTO: 0 /100 WBC
PLATELET # BLD AUTO: 301 K/UL (ref 150–450)
PMV BLD AUTO: 8.7 FL (ref 9.2–12.9)
POTASSIUM SERPL-SCNC: 5 MMOL/L (ref 3.5–5.1)
PROT SERPL-MCNC: 7.2 G/DL (ref 6–8.4)
RBC # BLD AUTO: 3.81 M/UL (ref 4.6–6.2)
SODIUM SERPL-SCNC: 137 MMOL/L (ref 136–145)
WBC # BLD AUTO: 7.05 K/UL (ref 3.9–12.7)

## 2023-10-31 PROCEDURE — 3288F FALL RISK ASSESSMENT DOCD: CPT | Mod: CPTII,S$GLB,, | Performed by: INTERNAL MEDICINE

## 2023-10-31 PROCEDURE — 99215 PR OFFICE/OUTPT VISIT, EST, LEVL V, 40-54 MIN: ICD-10-PCS | Mod: S$GLB,,, | Performed by: INTERNAL MEDICINE

## 2023-10-31 PROCEDURE — 84153 ASSAY OF PSA TOTAL: CPT | Performed by: INTERNAL MEDICINE

## 2023-10-31 PROCEDURE — 1157F PR ADVANCE CARE PLAN OR EQUIV PRESENT IN MEDICAL RECORD: ICD-10-PCS | Mod: CPTII,S$GLB,, | Performed by: INTERNAL MEDICINE

## 2023-10-31 PROCEDURE — 3078F DIAST BP <80 MM HG: CPT | Mod: CPTII,S$GLB,, | Performed by: INTERNAL MEDICINE

## 2023-10-31 PROCEDURE — 1111F PR DISCHARGE MEDS RECONCILED W/ CURRENT OUTPATIENT MED LIST: ICD-10-PCS | Mod: CPTII,S$GLB,, | Performed by: INTERNAL MEDICINE

## 2023-10-31 PROCEDURE — 1159F PR MEDICATION LIST DOCUMENTED IN MEDICAL RECORD: ICD-10-PCS | Mod: CPTII,S$GLB,, | Performed by: INTERNAL MEDICINE

## 2023-10-31 PROCEDURE — 1101F PR PT FALLS ASSESS DOC 0-1 FALLS W/OUT INJ PAST YR: ICD-10-PCS | Mod: CPTII,S$GLB,, | Performed by: INTERNAL MEDICINE

## 2023-10-31 PROCEDURE — 99999 PR PBB SHADOW E&M-EST. PATIENT-LVL V: CPT | Mod: PBBFAC,,, | Performed by: INTERNAL MEDICINE

## 2023-10-31 PROCEDURE — 1111F DSCHRG MED/CURRENT MED MERGE: CPT | Mod: CPTII,S$GLB,, | Performed by: INTERNAL MEDICINE

## 2023-10-31 PROCEDURE — 3075F SYST BP GE 130 - 139MM HG: CPT | Mod: CPTII,S$GLB,, | Performed by: INTERNAL MEDICINE

## 2023-10-31 PROCEDURE — 99999 PR PBB SHADOW E&M-EST. PATIENT-LVL V: ICD-10-PCS | Mod: PBBFAC,,, | Performed by: INTERNAL MEDICINE

## 2023-10-31 PROCEDURE — 3288F PR FALLS RISK ASSESSMENT DOCUMENTED: ICD-10-PCS | Mod: CPTII,S$GLB,, | Performed by: INTERNAL MEDICINE

## 2023-10-31 PROCEDURE — 1126F PR PAIN SEVERITY QUANTIFIED, NO PAIN PRESENT: ICD-10-PCS | Mod: CPTII,S$GLB,, | Performed by: INTERNAL MEDICINE

## 2023-10-31 PROCEDURE — 1157F ADVNC CARE PLAN IN RCRD: CPT | Mod: CPTII,S$GLB,, | Performed by: INTERNAL MEDICINE

## 2023-10-31 PROCEDURE — 3075F PR MOST RECENT SYSTOLIC BLOOD PRESS GE 130-139MM HG: ICD-10-PCS | Mod: CPTII,S$GLB,, | Performed by: INTERNAL MEDICINE

## 2023-10-31 PROCEDURE — 1159F MED LIST DOCD IN RCRD: CPT | Mod: CPTII,S$GLB,, | Performed by: INTERNAL MEDICINE

## 2023-10-31 PROCEDURE — 1126F AMNT PAIN NOTED NONE PRSNT: CPT | Mod: CPTII,S$GLB,, | Performed by: INTERNAL MEDICINE

## 2023-10-31 PROCEDURE — 36415 COLL VENOUS BLD VENIPUNCTURE: CPT | Mod: PN | Performed by: INTERNAL MEDICINE

## 2023-10-31 PROCEDURE — 85025 COMPLETE CBC W/AUTO DIFF WBC: CPT | Mod: PN | Performed by: INTERNAL MEDICINE

## 2023-10-31 PROCEDURE — 3078F PR MOST RECENT DIASTOLIC BLOOD PRESSURE < 80 MM HG: ICD-10-PCS | Mod: CPTII,S$GLB,, | Performed by: INTERNAL MEDICINE

## 2023-10-31 PROCEDURE — 1101F PT FALLS ASSESS-DOCD LE1/YR: CPT | Mod: CPTII,S$GLB,, | Performed by: INTERNAL MEDICINE

## 2023-10-31 PROCEDURE — 80053 COMPREHEN METABOLIC PANEL: CPT | Mod: PN | Performed by: INTERNAL MEDICINE

## 2023-10-31 PROCEDURE — 99215 OFFICE O/P EST HI 40 MIN: CPT | Mod: S$GLB,,, | Performed by: INTERNAL MEDICINE

## 2023-10-31 RX ORDER — GRANULES FOR ORAL 3 G/1
POWDER ORAL
COMMUNITY
Start: 2023-10-25 | End: 2023-11-17 | Stop reason: CLARIF

## 2023-10-31 NOTE — PROGRESS NOTES
SW met with pt and wife as planned after Dr Jimenez saw pt in clinic. SW introduced self in person and provided them with card for contact information to reach SW.     Pt and wife both voiced relief after today's visit with Dr Jimenez. They both said they liked him how through he was and how he spoke with them. They felt well informed and more optimistic about moving forward with care.     Wife Courtney said she feels less stressed since coming here. She did make it to her psychology appointment Friday. She said she plans to follow up with this service. Pt and wife thanked SW for meeting with them. They denied any further needs from SW. SW walked pt down to lab.     RADHA TalleyW

## 2023-11-01 ENCOUNTER — TELEPHONE (OUTPATIENT)
Dept: HEMATOLOGY/ONCOLOGY | Facility: CLINIC | Age: 73
End: 2023-11-01
Payer: MEDICARE

## 2023-11-01 NOTE — TELEPHONE ENCOUNTER
Spoke to wife and she gave me information to previous oncology phyisican   Zenaida Martinez   Phone [phone]  Fax [phone]  To get previous chemotherapy records.  ----- Message from Sabra Leblanc sent at 11/1/2023  2:03 PM CDT -----  Contact: Pt's spouse  Type:  Patient Returning Call    Who Called:Pt's spouse, Courtney  Who Left Message for Patient:Tracey   Does the patient know what this is regarding?:yes  Would the patient rather a call back or a response via [facility]? call  Best Call Back Number: [phone]  Additional Information: Pt's spouse is returning Tracey's call. Pt's spouse states to please let the phone ring because she may be in another part of the house. Please call pt's spouse back to advise.

## 2023-11-08 ENCOUNTER — TELEPHONE (OUTPATIENT)
Dept: HEMATOLOGY/ONCOLOGY | Facility: CLINIC | Age: 73
End: 2023-11-08
Payer: MEDICARE

## 2023-11-08 NOTE — TELEPHONE ENCOUNTER
Advised with Dr Jimenez and he stated -the problem is whether he has a UTI or not, his UA is going to be positive.He will reach out to Dr eRdman for advise. Patient wife verbalized understanding of this.   ---- Message from Jen Garcia sent at 11/8/2023 12:18 PM CST -----  Contact: self  Type: Sooner Appointment Request        Caller is requesting a sooner appointment. Caller declined first available appointment listed below. Caller will not accept being placed on the waitlist and is requesting a message be sent to doctor.        Name of Caller: [employer] [facility]   Best Call Back Number: [phone] (Nurse janet)  Additional Information: Pt had blood in urostomy bag with some sediment pt has a history of UTI.  Pt urine is clear today. Pt had s temp last night as well. Thanks

## 2023-11-09 ENCOUNTER — TELEPHONE (OUTPATIENT)
Dept: HEMATOLOGY/ONCOLOGY | Facility: CLINIC | Age: 73
End: 2023-11-09
Payer: MEDICARE

## 2023-11-09 NOTE — TELEPHONE ENCOUNTER
Per Dr Jimenez ok to move up his pet to sooner. Scheduled on 11/16. No more fever or urine issues. Has appt with Urology on Monday.  ----- Message from Cynthia Calhoun sent at 11/9/2023  9:20 AM CST -----  Type: Needs Medical Advice  Who Called: Courtney (spouse)  Symptoms (please be specific):    How long has patient had these symptoms:    Pharmacy name and phone #:    Best Call Back Number: [phone]  Additional Information: Courtney is requesting a call back regarding pain in her  right side under rib cage going from front to the back. Courtney would also like to  schedule a PET for Mr. Bey for 11/10 if possible.

## 2023-11-10 ENCOUNTER — TELEPHONE (OUTPATIENT)
Dept: HEMATOLOGY/ONCOLOGY | Facility: CLINIC | Age: 73
End: 2023-11-10
Payer: MEDICARE

## 2023-11-10 DIAGNOSIS — C67.8 MALIGNANT NEOPLASM OF OVERLAPPING SITES OF BLADDER: Primary | ICD-10-CM

## 2023-11-10 NOTE — NURSING
Spoke with wife, patient will have labs and PET on 11/6, CV w/ Dr. Jimenez and start tx on 11/7.  Wife asked if it always had to be on Friday, explained that she could discuss with Dr. Jimenez to see if it could be moved up a day or at least earlier on the next Friday of treatment.  Wife verbalized understanding of date, times and locations.

## 2023-11-13 ENCOUNTER — OFFICE VISIT (OUTPATIENT)
Dept: UROLOGY | Facility: CLINIC | Age: 73
End: 2023-11-13
Payer: MEDICARE

## 2023-11-13 VITALS
SYSTOLIC BLOOD PRESSURE: 115 MMHG | DIASTOLIC BLOOD PRESSURE: 75 MMHG | HEIGHT: 69 IN | BODY MASS INDEX: 19.69 KG/M2 | WEIGHT: 132.94 LBS | HEART RATE: 111 BPM

## 2023-11-13 DIAGNOSIS — N99.89 URETERAL-ILEAL LOOP ANASTOMOTIC STRICTURE: ICD-10-CM

## 2023-11-13 DIAGNOSIS — C67.9 MALIGNANT NEOPLASM OF URINARY BLADDER: ICD-10-CM

## 2023-11-13 DIAGNOSIS — C67.8 MALIGNANT NEOPLASM OF OVERLAPPING SITES OF BLADDER: Primary | ICD-10-CM

## 2023-11-13 DIAGNOSIS — C61 PROSTATE CANCER: ICD-10-CM

## 2023-11-13 DIAGNOSIS — C67.9 UROTHELIAL CARCINOMA OF BLADDER: ICD-10-CM

## 2023-11-13 PROCEDURE — 3078F PR MOST RECENT DIASTOLIC BLOOD PRESSURE < 80 MM HG: ICD-10-PCS | Mod: CPTII,S$GLB,, | Performed by: STUDENT IN AN ORGANIZED HEALTH CARE EDUCATION/TRAINING PROGRAM

## 2023-11-13 PROCEDURE — 3078F DIAST BP <80 MM HG: CPT | Mod: CPTII,S$GLB,, | Performed by: STUDENT IN AN ORGANIZED HEALTH CARE EDUCATION/TRAINING PROGRAM

## 2023-11-13 PROCEDURE — 3288F PR FALLS RISK ASSESSMENT DOCUMENTED: ICD-10-PCS | Mod: CPTII,S$GLB,, | Performed by: STUDENT IN AN ORGANIZED HEALTH CARE EDUCATION/TRAINING PROGRAM

## 2023-11-13 PROCEDURE — 1157F ADVNC CARE PLAN IN RCRD: CPT | Mod: CPTII,S$GLB,, | Performed by: STUDENT IN AN ORGANIZED HEALTH CARE EDUCATION/TRAINING PROGRAM

## 2023-11-13 PROCEDURE — 3288F FALL RISK ASSESSMENT DOCD: CPT | Mod: CPTII,S$GLB,, | Performed by: STUDENT IN AN ORGANIZED HEALTH CARE EDUCATION/TRAINING PROGRAM

## 2023-11-13 PROCEDURE — 1159F PR MEDICATION LIST DOCUMENTED IN MEDICAL RECORD: ICD-10-PCS | Mod: CPTII,S$GLB,, | Performed by: STUDENT IN AN ORGANIZED HEALTH CARE EDUCATION/TRAINING PROGRAM

## 2023-11-13 PROCEDURE — 1111F PR DISCHARGE MEDS RECONCILED W/ CURRENT OUTPATIENT MED LIST: ICD-10-PCS | Mod: CPTII,S$GLB,, | Performed by: STUDENT IN AN ORGANIZED HEALTH CARE EDUCATION/TRAINING PROGRAM

## 2023-11-13 PROCEDURE — 1111F DSCHRG MED/CURRENT MED MERGE: CPT | Mod: CPTII,S$GLB,, | Performed by: STUDENT IN AN ORGANIZED HEALTH CARE EDUCATION/TRAINING PROGRAM

## 2023-11-13 PROCEDURE — 1126F PR PAIN SEVERITY QUANTIFIED, NO PAIN PRESENT: ICD-10-PCS | Mod: CPTII,S$GLB,, | Performed by: STUDENT IN AN ORGANIZED HEALTH CARE EDUCATION/TRAINING PROGRAM

## 2023-11-13 PROCEDURE — 1101F PT FALLS ASSESS-DOCD LE1/YR: CPT | Mod: CPTII,S$GLB,, | Performed by: STUDENT IN AN ORGANIZED HEALTH CARE EDUCATION/TRAINING PROGRAM

## 2023-11-13 PROCEDURE — 99205 PR OFFICE/OUTPT VISIT, NEW, LEVL V, 60-74 MIN: ICD-10-PCS | Mod: S$GLB,,, | Performed by: STUDENT IN AN ORGANIZED HEALTH CARE EDUCATION/TRAINING PROGRAM

## 2023-11-13 PROCEDURE — 1159F MED LIST DOCD IN RCRD: CPT | Mod: CPTII,S$GLB,, | Performed by: STUDENT IN AN ORGANIZED HEALTH CARE EDUCATION/TRAINING PROGRAM

## 2023-11-13 PROCEDURE — 1157F PR ADVANCE CARE PLAN OR EQUIV PRESENT IN MEDICAL RECORD: ICD-10-PCS | Mod: CPTII,S$GLB,, | Performed by: STUDENT IN AN ORGANIZED HEALTH CARE EDUCATION/TRAINING PROGRAM

## 2023-11-13 PROCEDURE — 3074F PR MOST RECENT SYSTOLIC BLOOD PRESSURE < 130 MM HG: ICD-10-PCS | Mod: CPTII,S$GLB,, | Performed by: STUDENT IN AN ORGANIZED HEALTH CARE EDUCATION/TRAINING PROGRAM

## 2023-11-13 PROCEDURE — 99999 PR PBB SHADOW E&M-EST. PATIENT-LVL III: CPT | Mod: PBBFAC,,, | Performed by: STUDENT IN AN ORGANIZED HEALTH CARE EDUCATION/TRAINING PROGRAM

## 2023-11-13 PROCEDURE — 1101F PR PT FALLS ASSESS DOC 0-1 FALLS W/OUT INJ PAST YR: ICD-10-PCS | Mod: CPTII,S$GLB,, | Performed by: STUDENT IN AN ORGANIZED HEALTH CARE EDUCATION/TRAINING PROGRAM

## 2023-11-13 PROCEDURE — 99999 PR PBB SHADOW E&M-EST. PATIENT-LVL III: ICD-10-PCS | Mod: PBBFAC,,, | Performed by: STUDENT IN AN ORGANIZED HEALTH CARE EDUCATION/TRAINING PROGRAM

## 2023-11-13 PROCEDURE — 3074F SYST BP LT 130 MM HG: CPT | Mod: CPTII,S$GLB,, | Performed by: STUDENT IN AN ORGANIZED HEALTH CARE EDUCATION/TRAINING PROGRAM

## 2023-11-13 PROCEDURE — 1126F AMNT PAIN NOTED NONE PRSNT: CPT | Mod: CPTII,S$GLB,, | Performed by: STUDENT IN AN ORGANIZED HEALTH CARE EDUCATION/TRAINING PROGRAM

## 2023-11-13 PROCEDURE — 99205 OFFICE O/P NEW HI 60 MIN: CPT | Mod: S$GLB,,, | Performed by: STUDENT IN AN ORGANIZED HEALTH CARE EDUCATION/TRAINING PROGRAM

## 2023-11-13 NOTE — PROGRESS NOTES
[facility]  Urologic Oncology Clinic Note        Date of Service: 11/13/2023        Chief Complaint/Reason for Consultation: Node positive bladder cancer    Requesting Provider:   Jackson Jimenez MD  [address] [place] [address]      History of Present Illness:   Patient 73 y.o. 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 He did have neoadjuvant chemotherapy. The patient's surgery was ultimately complicated by small-bowel obstruction, and pulmonary embolism.  PT recevied 1 dose of Nivolumab 9/18/23.  The patient was placed on anticoagulation for pulmonary embolism and ultimately developed a right perinephric bleed requiring embolization. He is currently not on anticoagulation and does IVC filter.  PT also developed UTI and acquired ureteral obstruction with placement of bilateral ureteral stents.  PT also developed C diff and was hospitalized for suspected recurrence at [facility] from 10/21/2023 to 10/25/2023.  Patient was discharged on fidaxomicin.     Blood thinners:  none    No Hx of TIA, MI, and CVA   Abdominal surgeries:  radical cystectomy w/ ileal loop urinary diversion, umbilical hernia      Today his wife reports clear yellow urine output, one episode of hematuria the other day w/o fever, she does report that he has low grade fever nightly. She also reports that he has poor appetite and weight loss.       Urologic History:     8/15/2023 -- Radical cystectomy w/ ileal loop urinary diversion -- final path pT3aN2 , mateo 3+3 prostate cancer  9/28/2023 -- IR placement of nephroureteral stents       Patient Active Problem List    Diagnosis Date Noted    Prostate cancer 10/31/2023    Malignant neoplasm of overlapping sites of bladder 10/23/2023    History of pulmonary embolus (PE) 10/23/2023     "Malignant neoplasm of urinary bladder 10/23/2023    History of ileal conduit 10/21/2023    C. 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 No pertinent surgical history.   Family History   Problem Relation Age of Onset    Heart disease Mother     Hypertension Mother       Social History     Tobacco Use    Smoking status: Never    Smokeless tobacco: Never   Substance Use Topics    Alcohol use: Not Currently        Review of Systems   Constitutional:  Negative for activity change, fatigue and fever.   HENT:  Negative for congestion.    Respiratory:  Negative for cough.    Cardiovascular:  Negative for chest pain.   Gastrointestinal:  Negative for abdominal pain.   Genitourinary:  Negative for hematuria.             OBJECTIVE:     Vitals:    11/13/23 1112   BP: 115/75   Pulse: (!) 111   Weight: 60.3 kg (132 lb 15 oz)   Height: 5' 9" (1.753 m)          General Appearance: Alert, cooperative, no distress  Head: Normocephalic  Eyes: Clear conjunctiva  Neck: No obvious LND or JVD  Lungs: Normal chest excursion, no accessory muscle use  Chest: Regular rate rhythm by palpation, no pedal edema  Abdomen: Soft, non-tender, non-distended, surgical scars healing well, hernias none, urostomy pink patent and productive in right lower quadrant w/ 2 ureteral stents in place  Extremities: Atraumatic "   Lymph Nodes: No appreciable lymph adenopathy  Neurologic: No gross gait, motor or sensory deficits            LAB:      CMP  Sodium   Date Value Ref Range Status   10/31/2023 137 136 - 145 mmol/L Final     Potassium   Date Value Ref Range Status   10/31/2023 5.0 3.5 - 5.1 mmol/L Final     Chloride   Date Value Ref Range Status   10/31/2023 102 95 - 110 mmol/L Final     CO2   Date Value Ref Range Status   10/31/2023 24 23 - 29 mmol/L Final     Glucose   Date Value Ref Range Status   10/31/2023 112 (H) 70 - 110 mg/dL Final     BUN   Date Value Ref Range Status   10/31/2023 20 8 - 23 mg/dL Final     Creatinine   Date Value Ref Range Status   10/31/2023 1.4 0.5 - 1.4 mg/dL Final     Calcium   Date Value Ref Range Status   10/31/2023 9.5 8.7 - 10.5 mg/dL Final     Total Protein   Date Value Ref Range Status   10/31/2023 7.2 6.0 - 8.4 g/dL Final     Albumin   Date Value Ref Range Status   10/31/2023 2.9 (L) 3.5 - 5.2 g/dL Final     Total Bilirubin   Date Value Ref Range Status   10/31/2023 0.4 0.1 - 1.0 mg/dL Final     Comment:     For infants and newborns, interpretation of results should be based  on gestational age, weight and in agreement with clinical  observations.    Premature Infant recommended reference ranges:  Up to 24 hours.............<8.0 mg/dL  Up to 48 hours............<12.0 mg/dL  3-5 days..................<15.0 mg/dL  6-29 days.................<15.0 mg/dL       Alkaline Phosphatase   Date Value Ref Range Status   10/31/2023 101 55 - 135 U/L Final     AST   Date Value Ref Range Status   10/31/2023 72 (H) 10 - 40 U/L Final     ALT   Date Value Ref Range Status   10/31/2023 43 10 - 44 U/L Final     Anion Gap   Date Value Ref Range Status   10/31/2023 11 8 - 16 mmol/L Final     eGFR   Date Value Ref Range Status   10/31/2023 52.4 (A) >60 mL/min/1.73 m^2 Final     Lab Results   Component Value Date    WBC 7.05 10/31/2023    HGB 11.1 (L) 10/31/2023    HCT 34.5 (L) 10/31/2023    MCV 91 10/31/2023      10/31/2023               IMAGING:        10/21/2023  CT ABDOMEN PELVIS WITHOUT CONTRAST     CLINICAL HISTORY:  Fever, history of cancer..     TECHNIQUE:  Axial CT images of the abdomen and pelvis were obtained without intravenous contrast. No oral contrast.  Coronal and sagittal reformations were obtained.  Automated exposure control was utilized.  Total exam DLP is 343 mGy cm.     COMPARISON:  Outside CT 09/26/2023     FINDINGS:  New small right greater left pleural effusions identified with dependent atelectasis.  There is diffuse hepatic steatosis.  The non contrasted gallbladder, pancreas, and adrenal glands are unremarkable.  Spleen is normal size.  Calcified granulomas are noted within the spleen.  Urinary bladder is surgically absent.  There is a right lower quadrant ileal conduit.  Surgical clips are noted about the right renal pelvis.  There is minimal hydronephrosis decreased.  There are bilateral nephroureteral stents present.  There is no bowel obstruction.  There is scattered colonic stool.  There is colonic wall thickening most notable of the sigmoid colon and rectum.  Correlate for colitis.  There is no evidence of acute appendicitis.  IVC filter is present.  Atherosclerotic calcifications are noted.  Abdominal aorta is not aneurysmal.  There is small volume ascites.  There is mild anasarca.  There is no intraperitoneal free air identified.  No lymphadenopathy is noted.  No acute displaced fractures noted.     Impression:     1. Portions of colon demonstrate wall thickening may reflect colitis.  This is most notable within the sigmoid colon and rectum.  2. New small right greater than left pleural effusions.  3. Mild anasarca.  Small volume ascites.  4. Bilateral nephroureteral stents are present.  There is minimal hydronephrosis decreased.        Electronically signed by: Sd Ford MD  Date:                                            10/21/2023  Time:                                            13:57      10/21/2023  XR CHEST PA AND LATERAL     CLINICAL HISTORY:  Fever, unspecified     TECHNIQUE:  PA and lateral views of the chest were performed.     COMPARISON:  09/10/2021     FINDINGS:  Sternotomy wires noted.  Right-sided port catheter.The lungs are clear, with normal appearance of pulmonary vasculature and no pleural effusion or pneumothorax.     The cardiac silhouette is normal in size. The hilar and mediastinal contours are unremarkable.     Bones are intact.     Impression:     No acute abnormality.        Electronically signed by: Ange Parikh MD  Date:                                            10/21/2023  Time:                                           11:52    ASSESSMENT/PLAN:       72 yo M w hx of radical cystectomy w/ ileal loop urinary diversion on 8/15/2023 -- final path pT3aN2 w/ 3+3 prostate cancer. Now s/p single dose of nivolumab.      Plan:    Node positive, Bladder Cancer  Discussed overall prognosis is poor in this setting with 5 year survival approaching 30%. Discussed agreement to pursue nivolumab adjuvant therapy.     Will follow up labs and upcoming PET imaging.    Discussed referral to prehab clinic to help with nutrition and PT assessment.     Ureteral strictures  Discussed management with stents.   Discussed virtual visit in Jan 2024 to discuss operative intervention to replace stents.   Discussed that stents are not permanent and that they need to be changed every 3-4 months  Discussed possible revision of uretero-ileal anastomosis in future pending disease progression.   Would like to wait 6 months to 1 year to perform ureter-ileal anastomotic revision    Patient was given my number to call if ever signs of infection -- instructed that he would need cath specimen form urostomy to rule out or rule in UTI.    The total time for the new patient visit was at least 60 minutes in both face-to-face and non-face-to-face activities, which included chart review, interpretation of  results, counseling, education, ordering meds/tests/procedures, and/or coordination of care.         South Ghosh MD  Urologic Oncology  P: [phone]

## 2023-11-16 ENCOUNTER — HOSPITAL ENCOUNTER (OUTPATIENT)
Dept: RADIOLOGY | Facility: HOSPITAL | Age: 73
Discharge: HOME | End: 2023-11-16
Attending: INTERNAL MEDICINE
Payer: MEDICARE

## 2023-11-16 ENCOUNTER — PATIENT MESSAGE (OUTPATIENT)
Dept: HEMATOLOGY/ONCOLOGY | Facility: CLINIC | Age: 73
End: 2023-11-16
Payer: MEDICARE

## 2023-11-16 ENCOUNTER — TELEPHONE (OUTPATIENT)
Dept: HEMATOLOGY/ONCOLOGY | Facility: CLINIC | Age: 73
End: 2023-11-16
Payer: MEDICARE

## 2023-11-16 ENCOUNTER — OFFICE VISIT (OUTPATIENT)
Dept: PALLIATIVE MEDICINE | Facility: CLINIC | Age: 73
End: 2023-11-16
Payer: MEDICARE

## 2023-11-16 DIAGNOSIS — C67.9 MALIGNANT NEOPLASM OF URINARY BLADDER: ICD-10-CM

## 2023-11-16 DIAGNOSIS — Z51.5 PALLIATIVE CARE BY SPECIALIST: Primary | ICD-10-CM

## 2023-11-16 DIAGNOSIS — C61 PROSTATE CANCER: ICD-10-CM

## 2023-11-16 DIAGNOSIS — C67.9 MALIGNANT NEOPLASM OF URINARY BLADDER, UNSPECIFIED SITE: ICD-10-CM

## 2023-11-16 LAB — GLUCOSE SERPL-MCNC: 79 MG/DL (ref 70–110)

## 2023-11-16 PROCEDURE — 1123F ACP DISCUSS/DSCN MKR DOCD: CPT | Mod: CPTII,95,, | Performed by: NURSE PRACTITIONER

## 2023-11-16 PROCEDURE — 78815 NM PET CT FDG SKULL BASE TO MID THIGH: ICD-10-PCS | Mod: 26,PI,, | Performed by: RADIOLOGY

## 2023-11-16 PROCEDURE — 78815 PET IMAGE W/CT SKULL-THIGH: CPT | Mod: 26,PI,, | Performed by: RADIOLOGY

## 2023-11-16 PROCEDURE — 99204 PR OFFICE/OUTPT VISIT, NEW, LEVL IV, 45-59 MIN: ICD-10-PCS | Mod: 95,,, | Performed by: NURSE PRACTITIONER

## 2023-11-16 PROCEDURE — 99204 OFFICE O/P NEW MOD 45 MIN: CPT | Mod: 95,,, | Performed by: NURSE PRACTITIONER

## 2023-11-16 PROCEDURE — 1123F PR ADV CARE PLAN DISCUSSED, PLAN OR SURROGATE DOCUMENTED: ICD-10-PCS | Mod: CPTII,95,, | Performed by: NURSE PRACTITIONER

## 2023-11-16 PROCEDURE — A9552 F18 FDG: HCPCS | Mod: PN

## 2023-11-16 NOTE — PROGRESS NOTES
PET Imaging Questionnaire    Are you a Diabetic? Recent Blood Sugar level? No    Are you anemic? Bone Marrow Stimulation Meds? No    Have you had a CT Scan, if so when & where was your last one? Yes -     Have you had a PET Scan, if so when & where was your last one? No    Chemotherapy or currently on Chemotherapy? Yes    Radiation therapy? No    Surgical History: No past surgical history on file.     Have you been fasting for at least 6 hours? Yes    Is there any chance you may be pregnant or breastfeeding? No    Assay: 13.73 MCi@:12.36   Injection Site:lt hand    Residual: .592 mCi@: 12.38   Technologist: Laura Boland Injected:13.13mCi

## 2023-11-16 NOTE — PROGRESS NOTES
The patient location is: [place]  The chief complaint leading to consultation is: symptom management, GOC discussion    Visit type: audiovisual    Face to Face time with patient: 20  45 minutes of total time spent on the encounter, which includes face to face time and non-face to face time preparing to see the patient (eg, review of tests), Obtaining and/or reviewing separately obtained history, Documenting clinical information in the electronic or other health record, Independently interpreting results (not separately reported) and communicating results to the patient/family/caregiver, or Care coordination (not separately reported).         Each patient to whom he or she provides medical services by telemedicine is:  (1) informed of the relationship between the physician and patient and the respective role of any other health care provider with respect to management of the patient; and (2) notified that he or she may decline to receive medical services by telemedicine and may withdraw from such care at any time.    Notes:         Virtual Visit  [facility]      Consult Requested By: Dr. Jackson Jimenez        ASSESSMENT/PLAN:     Plan/Recommendations:  Diagnoses and all orders for this visit:    Palliative care by specialist  Introduced the role of Palliative Care to provide support for patients with serious illness  Patient is independent ADLS, well supported by wife, no in home needs identified today  ECOG 2   Discussed resources available at cancer center including support groups,  PT, Psych , SW, nutritionist, and services in integrative oncology    Malignant neoplasm of urinary bladder, unspecified site  Prostate cancer  Patient under management of Dr Jimenez, he is on a break from therapy as he does not want to be sick during the holidays  Continue to follow      Understanding of illness: cancer is treatable    Prognosis: fair    Goals of care: avoid hospitalization    Follow up: after  thanksgiving         SUBJECTIVE:     History of Present Illness:  Patient is a 73 y.o. year old male with h/o bladder cancer under the management of Dr Jimenez. He has had a complicated  course since diagnosis, he was seen by Palliative Care while inpatient at [facility] for Cdiff, he is referred for continued support with symptom management and [facility] discussion      Cancer History   Muscle invasive bladder cancer s/p radical cystectomy w/ ileal loop urinary diversion on 8/15/2023 showing pT3aN2 disease. He did have neoadjuvant chemotherapy. The patient's surgery was ultimately complicated by small-bowel obstruction, and pulmonary embolism.  PT recevied 1 dose of Nivolumab 9/18/23.  The patient was placed on anticoagulation for pulmonary embolism and ultimately developed a right perinephric bleed requiring embolization. He is currently not on anticoagulation and does IVC filter.  PT also developed UTI and acquired ureteral obstruction with placement of bilateral ureteral stents.  PT also developed C diff and was hospitalized for suspected recurrence at [facility] from 10/21/2023 to 10/25/2023.  Patient was discharged on fidaxomicin.      Palliative Discussion:  Patient  seen virtually today, he states he felt to weak to get to clinic, wife is present and assisting with history, she reports he was diagnosed with bladder cancer around April this year, organically though to be curable with surgical intervention, however following TURBT and cystoprostatectomy his cancer is at stage III and is considered only treatable. Patient originally received care in [place] but has established now with Dr Jimenez in order to be close to home. Patient started Immunotherapy but he is taking a break until after thanksgiving as he does not want to risk being sick while his family is here.  He has recovered from Cdiff, he has some mild diarrhea which is being managed with lomotil    Discussed goals, patient  enjoys fishing and  gardening and is hoping to be able to do this again some day. His wife would also like to have more time with him at home. Wife is full time care giver. Denies caregiver strain. Denies any in home needs  Discussed the role of Palliative Care in outpatient setting to provide support and symptom management. Also informed of resources here at Cancer center to include support groups, Psychologist, PT, Nutritionists , SW and Integrative Oncology    ROS:  Review of Systems   Constitutional:  Positive for appetite change and unexpected weight change.   HENT:  Negative for mouth sores.    Eyes:  Negative for visual disturbance.   Respiratory:  Positive for shortness of breath. Negative for cough.    Cardiovascular:  Negative for chest pain.   Gastrointestinal:  Positive for diarrhea. Negative for abdominal pain.   Genitourinary:  Negative for frequency.   Musculoskeletal:  Negative for back pain.   Skin:  Negative for rash.   Neurological:  Negative for headaches.   Hematological:  Negative for adenopathy.   Psychiatric/Behavioral:  The patient is nervous/anxious.        Past Medical History:   Diagnosis Date    Aneurysm artery, popliteal     right leg    Hypertension     Seizures      No past surgical history on file.  Family History   Problem Relation Age of Onset    Heart disease Mother     Hypertension Mother      Review of patient's allergies indicates:   Allergen Reactions    Clopidogrel Other (See Comments)     Increased body temperature and redness      Social Determinants of Health     Tobacco Use: Low Risk  (11/29/2023)    Patient History     Smoking Tobacco Use: Never     Smokeless Tobacco Use: Never     Passive Exposure: Not on file   Alcohol Use: Not At Risk (11/10/2023)    AUDIT-C     Frequency of Alcohol Consumption: Never     Average Number of Drinks: Patient does not drink     Frequency of Binge Drinking: Never   Financial Resource Strain: Unknown (11/10/2023)    Overall Financial Resource Strain (CARDIA)      Difficulty of Paying Living Expenses: Patient refused   Food Insecurity: No Food Insecurity (11/18/2023)    Hunger Vital Sign     Worried About Running Out of Food in the Last Year: Never true     Ran Out of Food in the Last Year: Never true   Transportation Needs: No Transportation Needs (11/18/2023)    PRAPARE - Transportation     Lack of Transportation (Medical): No     Lack of Transportation (Non-Medical): No   Physical Activity: Insufficiently Active (11/10/2023)    Exercise Vital Sign     Days of Exercise per Week: 7 days     Minutes of Exercise per Session: 10 min   Stress: No Stress Concern Present (11/10/2023)    [employer] [place] [employer] - Occupational Stress Questionnaire     Feeling of Stress : Not at all   Social Connections: Unknown (11/10/2023)    Social Connection and Isolation Panel [NHANES]     Frequency of Communication with Friends and Family: More than three times a week     Frequency of Social Gatherings with Friends and Family: Patient refused     Attends Shinto Services: Not on file     Active Member of Clubs or Organizations: Patient refused     Attends Club or Organization Meetings: Patient refused     Marital Status:    Housing Stability: Low Risk  (11/18/2023)    Housing Stability Vital Sign     Unable to Pay for Housing in the Last Year: No     Number of Places Lived in the Last Year: 2     Unstable Housing in the Last Year: No   Depression: Low Risk  (10/21/2021)    Depression     Last PHQ-4: Flowsheet Data: 0   Recent Concern: Depression - Medium Risk (9/15/2021)    Depression     Last PHQ-4: Flowsheet Data: 4      The Modified Caregiver Strain Index (MCSI) -   No data recorded   No data recorded   No data recorded   No data recorded   No data recorded   No data recorded   No data recorded   No data recorded   No data recorded   No data recorded   No data recorded   No data recorded   No data recorded   No data recorded       OBJECTIVE:     Physical  Exam:  Vitals:    Physical Exam  HENT:      Head: Normocephalic.   Pulmonary:      Effort: Pulmonary effort is normal.   Musculoskeletal:      Cervical back: Normal range of motion.   Neurological:      Mental Status: He is alert and oriented to person, place, and time.   Psychiatric:         Mood and Affect: Mood normal.           Review of Symptoms      Symptom Assessment (ESAS 0-10 Scale)  Pain:  0  Dyspnea:  2  Anxiety:  0  Nausea:  0  Depression:  0  Anorexia:  4  Fatigue:  0  Insomnia:  0  Restlessness:  0  Agitation:  0       Anxiety:  Is nervous/anxious    ECOG Performance Status stGstrstastdstest:st st1st Living Arrangements:  Lives with spouse    Psychosocial/Cultural:   See Palliative Psychosocial Note: Yes  **Primary  to Follow**  Palliative Care  Consult: No      Advance Care Planning   Advance Directives:   Living Will: Yes        Copy on chart: Yes      Decision Making:  Patient answered questions and Family answered questions  Goals of Care: The patient endorses that what is most important right now is to focus on spending time at home    Accordingly, we have decided that the best plan to meet the patient's goals includes continuing with treatment                Medications:    Current Outpatient Medications:     acetaminophen (TYLENOL) 500 MG tablet, Take 500-1,000 mg by mouth every 6 (six) hours as needed (fever/pain)., Disp: , Rfl:     ASCORBIC ACID, VITAMIN C, ORAL, Take 1 tablet by mouth once daily., Disp: , Rfl:     atorvastatin (LIPITOR) 20 MG tablet, Take 2 tablets (40 mg total) by mouth once daily., Disp: , Rfl:     b complex vitamins tablet, Take 1 tablet by mouth once daily., Disp: , Rfl:     cholecalciferol, vitamin D3, (VITAMIN D3 ORAL), Take 5,000 Units by mouth once daily., Disp: , Rfl:     diphenoxylate-atropine 2.5-0.025 mg (LOMOTIL) 2.5-0.025 mg per tablet, Take 1 tablet by mouth 4 (four) times daily as needed for Diarrhea., Disp: , Rfl:     doxycycline (VIBRA-TABS) 100  MG tablet, Take 1 tablet (100 mg total) by mouth every 12 (twelve) hours. for 25 days, Disp: 50 tablet, Rfl: 0    ferrous sulfate (SLOW RELEASE IRON) 142 mg (45 mg iron) TbSR, Take 1 tablet by mouth Daily., Disp: , Rfl:     fidaxomicin (DIFICID) 200 mg Tab, Take 1 tablet (200 mg total) by mouth every other day. for 25 days, Disp: 12 tablet, Rfl: 0    Lactobacillus rhamnosus GG (CULTURELLE) 10 billion cell capsule, Take 1 capsule by mouth once daily., Disp: 30 capsule, Rfl: 0    LIDOcaine-prilocaine (EMLA) cream, Apply topically once as needed (to port access)., Disp: , Rfl:     multivitamin with minerals tablet, Take 1 tablet by mouth once daily., Disp: , Rfl:     UNABLE TO FIND, Take 1 capsule by mouth once daily. medication name: Colon health 80 billion, Disp: , Rfl:     zinc gluconate 50 mg tablet, Take 50 mg by mouth once daily., Disp: , Rfl:     Labs:  CBC:   WBC   Date Value Ref Range Status   11/29/2023 8.42 3.90 - 12.70 K/uL Final     Hemoglobin   Date Value Ref Range Status   11/29/2023 10.7 (L) 14.0 - 18.0 g/dL Final     Hematocrit   Date Value Ref Range Status   11/29/2023 32.3 (L) 40.0 - 54.0 % Final     MCV   Date Value Ref Range Status   11/29/2023 91 82 - 98 fL Final     Platelets   Date Value Ref Range Status   11/29/2023 352 150 - 450 K/uL Final       LFT:   Lab Results   Component Value Date    AST 25 11/29/2023    ALKPHOS 82 11/29/2023    BILITOT 0.4 11/29/2023       Albumin:   Albumin   Date Value Ref Range Status   11/29/2023 3.3 (L) 3.5 - 5.2 g/dL Final     Protein:   Total Protein   Date Value Ref Range Status   11/29/2023 7.4 6.0 - 8.4 g/dL Final             Signature: Lakisha Salinas NP

## 2023-11-16 NOTE — PROGRESS NOTES
PATIENT: Aren Bey Jr.  MRN: [id number]  DATE: 11/18/2023      Diagnosis:   1. Urothelial carcinoma of bladder    2. Acute cystitis with hematuria    3. Sepsis, due to unspecified organism, unspecified whether acute organ dysfunction present    4. Malignant neoplasm of urinary bladder, unspecified site    5. Prostate cancer    6. Healthcare maintenance    7. Anorexia    8. C. difficile colitis    9. Normocytic anemia    10. Hypertension, unspecified type    11. Thrombophilia          Chief Complaint: urothelial carcinoma of bladder  (2 week follow up )      Oncologic History:      Oncologic History     Oncologic Treatment     Pathology           Subjective:    Interval History:  Mr. Bey is a 73 y.o. male with HTN, Seizures, bladder cancer, h/o pulmonary embolism, who presents for bladder cancer.  Since the last clinic visit the patient saw Dr. Calderon with Urology on 11/13/2023 for with recommendations for virtual visit in January to discuss possible operative intervention replaced stents, possible revision of ureteral ileal anastomosis pending disease progression.  It was instructed that the patient would need catheterized specimen from urostomy to rule out UTI if patient with future symptoms.  Patient underwent PET-CT on 11/16/2023 showing a new hypermetabolic subpleural nodule in the posterior right lower lobe measuring 2.2 x 1.3 cm.  The patient endorses a fever starting on the night of 11/16/2023.  Patient was prescribed Bactrim for suspected potential urinary tract infection.  The patient states he continued to have fever this morning with last fever at 1:00 p.m. today.  The patient also endorses abdominal pain and cloudy urinary sediment.  The patient denies CP, cough, SOB, nausea, vomiting, constipation, diarrhea.  The patient denies chills, night sweats, weight loss, new lumps or bumps, easy bruising or bleeding.    Prior History:  Patient underwent transurethral resection of bladder tumor on  04/28/2023 with path showing invasive urothelial carcinoma, high-grade with involvement of muscularis propria and positive for lymphovascular invasion.  Patient underwent chemotherapy at [facility].  The patient then underwent radical cystectomy and prostatectomy on 08/15/2023 with pathology showing invasive high-grade urothelial carcinoma measuring 4.2 cm, positive lymphovascular invasion, tumor identified within the soft tissue surrounding the left distal ureter, 4/6 positive regional lymph nodes with extranodal extension present. pT3aN2.  Also seen was acinar adenocarcinoma of the prostate grade group 1 ([place] score 3+3=6) in 1 2 5% of prostate tissue pT2N0.  The patient's surgery was ultimately complicated by small-bowel obstruction, and pulmonary embolism.  PT recevied 1 dose of Nivolumab 9/18/23.  The patient was placed on anticoagulation for pulmonary embolism and ultimately developed a right perinephric bleed requiring embolization.  PT also developed UTI and acquired ureteral obstruction with placement of bilateral ureteral stents.  PT also developed C diff and was hospitalized for suspected recurrence at [facility] from 10/21/2023 to 10/25/2023.  Patient was discharged on fidaxomicin.    Past Medical History:   Past Medical History:   Diagnosis Date    Aneurysm artery, popliteal     right leg    Hypertension     Seizures        Past Surgical HIstory: No past surgical history on file.    Family History:   Family History   Problem Relation Age of Onset    Heart disease Mother     Hypertension Mother        Social History:  reports that he has never smoked. He has never used smokeless tobacco. He reports that he does not currently use alcohol. He reports that he does not use drugs.    Allergies:  Review of patient's allergies indicates:   Allergen Reactions    Clopidogrel Other (See Comments)     Increased body temperature and redness        Medications:  No current  facility-administered medications for this visit.     No current outpatient medications on file.     Facility-Administered Medications Ordered in Other Visits   Medication Dose Route Frequency Provider Last Rate Last Admin    0.9%  NaCl infusion   Intravenous Continuous Brenden Reagan  mL/hr at 11/18/23 0943 New Bag at 11/18/23 0943    acetaminophen tablet 650 mg  650 mg Oral Q8H PRN Emi Greer MD   650 mg at 11/18/23 0943    ascorbic acid (vitamin C) tablet 500 mg  500 mg Oral Daily Emi Greer MD   500 mg at 11/18/23 0943    atorvastatin tablet 40 mg  40 mg Oral Daily Emi Greer MD   40 mg at 11/18/23 0943    chlorhexidine 0.12 % solution 15 mL  15 mL Mouth/Throat BID Brenden Reagan MD   15 mL at 11/18/23 0943    cholecalciferol (vitamin D3) 125 mcg (5,000 unit) tablet 5,000 Units  5,000 Units Oral Daily Emi Greer MD   5,000 Units at 11/18/23 0943    dextrose 50% injection 12.5 g  12.5 g Intravenous PRN Emi Greer MD        dextrose 50% injection 25 g  25 g Intravenous PRN Emi Greer MD        dextrose oral liquid/gel 15 g  15 g Oral PRN Emi Greer MD        dextrose oral liquid/gel 30 g  30 g Oral PRN Emi Greer MD        fidaxomicin tablet 200 mg  200 mg Oral BID Brenden Reagan MD   200 mg at 11/18/23 1056    glucagon (human recombinant) injection 1 mg  1 mg Intramuscular PRN Emi Greer MD        multivitamin tablet  1 tablet Oral Daily Emi Greer MD   1 tablet at 11/18/23 0943    mupirocin 2 % ointment   Nasal BID Brenden Reagan MD   1 g at 11/18/23 0943    naloxone 0.4 mg/mL injection 0.02 mg  0.02 mg Intravenous PRN Emi Greer MD        piperacillin-tazobactam (ZOSYN) 4.5 g in dextrose 5 % in water (D5W) 100 mL IVPB (MB+)  4.5 g Intravenous Q8H Greer, Emi, MD 25 mL/hr at 11/18/23 0943 4.5 g at 11/18/23 0943    sodium chloride 0.9% bolus 500 mL 500 mL  500 mL Intravenous Once Brenden Reagan MD        sodium chloride  "0.9% flush 10 mL  10 mL Intravenous Q12H PRN Emi Greer MD        vit b cmplx 3-fa-vit c-biotin 1- mg-mg-mcg per tablet 1 tablet  1 tablet Oral Daily Emi Greer MD   1 tablet at 11/18/23 0943       Review of Systems   Constitutional:  Positive for fever. Negative for appetite change, chills, diaphoresis, fatigue and unexpected weight change.   HENT:  Negative for mouth sores.    Eyes:  Negative for visual disturbance.   Respiratory:  Negative for cough and shortness of breath.    Cardiovascular:  Negative for chest pain, palpitations and leg swelling.   Gastrointestinal:  Positive for abdominal pain. Negative for constipation, diarrhea, nausea and vomiting.   Genitourinary:  Negative for frequency.        Cloudy urinary sediment in urostomy bag   Musculoskeletal:  Negative for back pain.   Skin:  Negative for color change and rash.   Neurological:  Negative for headaches.   Hematological:  Negative for adenopathy. Does not bruise/bleed easily.   Psychiatric/Behavioral:  The patient is not nervous/anxious.        ECOG Performance Status: 1   Objective:      Vitals:   Vitals:    11/17/23 1448   BP: (!) 96/50   BP Location: Right arm   Patient Position: Sitting   BP Method: Medium (Manual)   Pulse: 83   Resp: 16   Temp: 99 °F (37.2 °C)   TempSrc: Oral   SpO2: 97%   Weight: 62.9 kg (138 lb 10.7 oz)   Height: 5' 9" (1.753 m)         Physical Exam  Constitutional:       General: He is not in acute distress.     Appearance: He is well-developed. He is not diaphoretic.   HENT:      Head: Normocephalic and atraumatic.   Cardiovascular:      Rate and Rhythm: Normal rate and regular rhythm.      Heart sounds: Normal heart sounds. No murmur heard.     No friction rub. No gallop.   Pulmonary:      Effort: Pulmonary effort is normal. No respiratory distress.      Breath sounds: Normal breath sounds. No wheezing or rales.   Chest:      Chest wall: No tenderness.   Abdominal:      General: Bowel sounds are normal. " There is no distension.      Palpations: Abdomen is soft. There is no mass.      Tenderness: There is no abdominal tenderness. There is no rebound.      Comments: Ileal conduit on RLQ with clear urine visualized   Musculoskeletal:      Cervical back: Normal range of motion.   Lymphadenopathy:      Cervical: No cervical adenopathy.      Upper Body:      Right upper body: No supraclavicular or axillary adenopathy.      Left upper body: No supraclavicular or axillary adenopathy.   Skin:     General: Skin is warm and dry.      Findings: No erythema or rash.   Neurological:      Mental Status: He is alert and oriented to person, place, and time.   Psychiatric:         Behavior: Behavior normal.         Laboratory Data:  Admission on 11/17/2023   Component Date Value Ref Range Status    aPTT 11/17/2023 33.4  24.6 - 36.7 sec Final    PTT normal range is not established for pediatrics.    Blood Culture, Routine 11/17/2023 No Growth to date   Preliminary    Blood Culture, Routine 11/17/2023 No Growth to date   Preliminary    WBC 11/17/2023 13.53 (H)  3.90 - 12.70 K/uL Final    RBC 11/17/2023 3.29 (L)  4.60 - 6.20 M/uL Final    Hemoglobin 11/17/2023 9.8 (L)  14.0 - 18.0 g/dL Final    Hematocrit 11/17/2023 30.4 (L)  40.0 - 54.0 % Final    MCV 11/17/2023 92  82 - 98 fL Final    MCH 11/17/2023 29.8  27.0 - 31.0 pg Final    MCHC 11/17/2023 32.2  32.0 - 36.0 g/dL Final    RDW 11/17/2023 16.0 (H)  11.5 - 14.5 % Final    Platelets 11/17/2023 292  150 - 450 K/uL Final    MPV 11/17/2023 8.9 (L)  9.2 - 12.9 fL Final    Immature Granulocytes 11/17/2023 0.3  0.0 - 0.5 % Final    Gran # (ANC) 11/17/2023 10.6 (H)  1.8 - 7.7 K/uL Final    Immature Grans (Abs) 11/17/2023 0.04  0.00 - 0.04 K/uL Final    Comment: Mild elevation in immature granulocytes is non specific and   can be seen in a variety of conditions including stress response,   acute inflammation, trauma and pregnancy. Correlation with other   laboratory and clinical findings is  essential.      Lymph # 11/17/2023 1.4  1.0 - 4.8 K/uL Final    Mono # 11/17/2023 1.4 (H)  0.3 - 1.0 K/uL Final    Eos # 11/17/2023 0.0  0.0 - 0.5 K/uL Final    Baso # 11/17/2023 0.10  0.00 - 0.20 K/uL Final    nRBC 11/17/2023 0  0 /100 WBC Final    Gran % 11/17/2023 78.0 (H)  38.0 - 73.0 % Final    Lymph % 11/17/2023 10.6 (L)  18.0 - 48.0 % Final    Mono % 11/17/2023 10.1  4.0 - 15.0 % Final    Eosinophil % 11/17/2023 0.3  0.0 - 8.0 % Final    Basophil % 11/17/2023 0.7  0.0 - 1.9 % Final    Differential Method 11/17/2023 Automated   Final    Sodium 11/17/2023 124 (L)  136 - 145 mmol/L Final    Potassium 11/17/2023 4.2  3.5 - 5.1 mmol/L Final    Anion Gap reference range revised on 4/28/2023    Chloride 11/17/2023 92 (L)  95 - 110 mmol/L Final    CO2 11/17/2023 21 (L)  22 - 31 mmol/L Final    Glucose 11/17/2023 112 (H)  70 - 110 mg/dL Final    Comment: The ADA recommends the following guidelines for fasting glucose:    Normal:       less than 100 mg/dL    Prediabetes:  100 mg/dL to 125 mg/dL    Diabetes:     126 mg/dL or higher      BUN 11/17/2023 25 (H)  9 - 21 mg/dL Final    Creatinine 11/17/2023 1.71 (H)  0.50 - 1.40 mg/dL Final    Calcium 11/17/2023 8.6  8.4 - 10.2 mg/dL Final    Total Protein 11/17/2023 7.2  6.0 - 8.4 g/dL Final    Albumin 11/17/2023 3.6  3.5 - 5.2 g/dL Final    Total Bilirubin 11/17/2023 0.5  0.2 - 1.3 mg/dL Final    Alkaline Phosphatase 11/17/2023 77  38 - 145 U/L Final    AST 11/17/2023 38  17 - 59 U/L Final    ALT 11/17/2023 29  0 - 50 U/L Final    Anion Gap 11/17/2023 11  5 - 12 mmol/L Final    Anion Gap reference range revised on 4/28/2023    eGFR 11/17/2023 41 (A)  >60 mL/min/1.73 m^2 Final    Lactate (Lactic Acid) 11/17/2023 2.1  0.5 - 2.2 mmol/L Final    PT 11/17/2023 12.9  11.8 - 14.7 sec Final    PT normal range is not established for pediatrics.    INR 11/17/2023 1.0   Final    Specimen UA 11/17/2023 Urine, Unspecified   Final    urostomy    Color, UA 11/17/2023 Yellow  Yellow,  Straw, Doris Final    Appearance, UA 11/17/2023 Clear  Clear Final    pH, UA 11/17/2023 6.0  5.0 - 8.0 Final    Specific Gravity, UA 11/17/2023 <=1.005  1.005 - 1.030 Final    Protein, UA 11/17/2023 Trace (A)  Negative Final    Comment: Recommend a 24 hour urine protein or a urine   protein/creatinine ratio if globulin induced proteinuria is  clinically suspected.      Glucose, UA 11/17/2023 Negative  Negative Final    Ketones, UA 11/17/2023 Negative  Negative Final    Bilirubin (UA) 11/17/2023 Negative  Negative Final    Occult Blood UA 11/17/2023 2+ (A)  Negative Final    Nitrite, UA 11/17/2023 Positive (A)  Negative Final    Urobilinogen, UA 11/17/2023 0.2  <2.0 EU/dL Final    Leukocytes, UA 11/17/2023 3+ (A)  Negative Final    SARS-CoV2 (COVID-19) Qualitative P* 11/17/2023 Negative  Negative Final    Comment: This test utilizes a real-time RT-PCR test intended for  the simultaneous qualitative detection and differentiation  of SARS-CoV-2, influenza A, influenza B, and respiratory  syncytial virus (RSV) viral RNA. The analytical sensitivity  (limit of detection) of the SARS-CoV-2 assay is 131 copies/mL.  Negative results do not rule out the possibility of SARS-CoV-2,  influenza A virus, influenza B virus and/or RSV infection   and should not be used as a sole basis for treatment or other  patient management decisions.    This test is only for use under the Food and Drug Administration's  Emergency Use Authorization (EUA). The [employer] Xpert Xpress  SARS-CoV-2/FLU/RSV Letter of Authorization, along with the   authorized Fact Sheet for Healthcare Providers, the authorized  Fact Sheet for Patients and authorized labeling are available  on the FDA website:    http://www.fda.gov/medical-devices/coronavirus-disease-2019-  covid-19-emergency-useauthorizations-medical-devices/vitro-  diagnostics-euas                           .      Influenza A, Molecular 11/17/2023 Negative  Negative Final    Influenza B, Molecular  11/17/2023 Negative  Negative Final    RBC, UA 11/17/2023 4  0 - 4 /hpf Final    WBC, UA 11/17/2023 21 (H)  0 - 5 /hpf Final    Squam Epithel, UA 11/17/2023 2  /hpf Final    Hyaline Casts, UA 11/17/2023 2 (A)  0 - 1 /lpf Final    Bacteria 11/17/2023 Negative  Negative /hpf Final    RBC, UA 11/17/2023 4  0 - 4 /hpf Final    WBC, UA 11/17/2023 21 (H)  0 - 5 /hpf Final    Bacteria 11/17/2023 Negative  Negative /hpf Final    Squam Epithel, UA 11/17/2023 2  /hpf Final    Hyaline Casts, UA 11/17/2023 2 (A)  0 - 1 /lpf Final    Microscopic Comment 11/17/2023 SEE COMMENT   Final    Comment: Other formed elements not mentioned in the report are not   present in the microscopic examination.       Specimen UA 11/17/2023 Urine, Clean Catch   Final    Color, UA 11/17/2023 Yellow  Yellow, Straw, Doris Final    Appearance, UA 11/17/2023 Clear  Clear Final    pH, UA 11/17/2023 7.0  5.0 - 8.0 Final    Specific Gravity, UA 11/17/2023 <=1.005  1.005 - 1.030 Final    Protein, UA 11/17/2023 Trace (A)  Negative Final    Comment: Recommend a 24 hour urine protein or a urine   protein/creatinine ratio if globulin induced proteinuria is  clinically suspected.      Glucose, UA 11/17/2023 Negative  Negative Final    Ketones, UA 11/17/2023 Negative  Negative Final    Bilirubin (UA) 11/17/2023 Negative  Negative Final    Occult Blood UA 11/17/2023 2+ (A)  Negative Final    Nitrite, UA 11/17/2023 Negative  Negative Final    Urobilinogen, UA 11/17/2023 Negative  <2.0 EU/dL Final    Leukocytes, UA 11/17/2023 3+ (A)  Negative Final    RBC, UA 11/17/2023 3  0 - 4 /hpf Final    WBC, UA 11/17/2023 5  0 - 5 /hpf Final    Bacteria 11/17/2023 Negative  Negative /hpf Final    Microscopic Comment 11/17/2023 SEE COMMENT   Final    Comment: Other formed elements not mentioned in the report are not   present in the microscopic examination.   MUCUS PRESENT  Urinalysis done on 2 mL of urine. Normal ranges are based on 12 mLs   of spun urine. Urine elements in  small numbers may be undetectable   and not reported.      C. diff PCR 11/18/2023 Positive (A)  Negative Final    Comment: C. difficile positive result(s) called and verbal readback obtained   from  Billy Cesar([facility]). by [facility] 11/18/2023 08:35      Sodium 11/18/2023 126 (L)  136 - 145 mmol/L Final    Potassium 11/18/2023 4.0  3.5 - 5.1 mmol/L Final    Anion Gap reference range revised on 4/28/2023    Chloride 11/18/2023 97  95 - 110 mmol/L Final    CO2 11/18/2023 22  22 - 31 mmol/L Final    Glucose 11/18/2023 113 (H)  70 - 110 mg/dL Final    Comment: The ADA recommends the following guidelines for fasting glucose:    Normal:       less than 100 mg/dL    Prediabetes:  100 mg/dL to 125 mg/dL    Diabetes:     126 mg/dL or higher      BUN 11/18/2023 18  9 - 21 mg/dL Final    Creatinine 11/18/2023 1.54 (H)  0.50 - 1.40 mg/dL Final    Calcium 11/18/2023 8.4  8.4 - 10.2 mg/dL Final    Anion Gap 11/18/2023 7  5 - 12 mmol/L Final    Anion Gap reference range revised on 4/28/2023    eGFR 11/18/2023 47 (A)  >60 mL/min/1.73 m^2 Final    WBC 11/18/2023 12.38  3.90 - 12.70 K/uL Final    RBC 11/18/2023 3.33 (L)  4.60 - 6.20 M/uL Final    Hemoglobin 11/18/2023 9.9 (L)  14.0 - 18.0 g/dL Final    Hematocrit 11/18/2023 29.9 (L)  40.0 - 54.0 % Final    MCV 11/18/2023 90  82 - 98 fL Final    MCH 11/18/2023 29.7  27.0 - 31.0 pg Final    MCHC 11/18/2023 33.1  32.0 - 36.0 g/dL Final    RDW 11/18/2023 15.9 (H)  11.5 - 14.5 % Final    Platelets 11/18/2023 270  150 - 450 K/uL Final    MPV 11/18/2023 8.6 (L)  9.2 - 12.9 fL Final    Immature Granulocytes 11/18/2023 0.5  0.0 - 0.5 % Final    Gran # (ANC) 11/18/2023 10.1 (H)  1.8 - 7.7 K/uL Final    Immature Grans (Abs) 11/18/2023 0.06 (H)  0.00 - 0.04 K/uL Final    Comment: Mild elevation in immature granulocytes is non specific and   can be seen in a variety of conditions including stress response,   acute inflammation, trauma and pregnancy. Correlation with other   laboratory and clinical  findings is essential.      Lymph # 11/18/2023 0.9 (L)  1.0 - 4.8 K/uL Final    Mono # 11/18/2023 1.2 (H)  0.3 - 1.0 K/uL Final    Eos # 11/18/2023 0.0  0.0 - 0.5 K/uL Final    Baso # 11/18/2023 0.09  0.00 - 0.20 K/uL Final    nRBC 11/18/2023 0  0 /100 WBC Final    Gran % 11/18/2023 81.2 (H)  38.0 - 73.0 % Final    Lymph % 11/18/2023 7.5 (L)  18.0 - 48.0 % Final    Mono % 11/18/2023 10.0  4.0 - 15.0 % Final    Eosinophil % 11/18/2023 0.1  0.0 - 8.0 % Final    Basophil % 11/18/2023 0.7  0.0 - 1.9 % Final    Differential Method 11/18/2023 Automated   Final    Sodium, Urine 11/18/2023 97  20 - 250 mmol/L Final    Comment: The random urine reference ranges provided were established   for 24 hour urine collections.  No reference ranges exist for  random urine specimens.  Correlate clinically.      Osmolality, Urine 11/18/2023 401  50 - 1200 mOsm/kg Final    Comment: The random urine reference ranges provided were established   for 24 hour urine collections.  No reference ranges exist for  random urine specimens.  Correlate clinically.      Lactate (Lactic Acid) 11/18/2023 3.0 (H)  0.5 - 2.2 mmol/L Final    Giardia Antigen - EIA 11/18/2023 Negative  Negative Final    Cryptosporidium Antigen 11/18/2023 Negative  Negative Final    Procalcitonin 11/18/2023 0.49 (H)  <0.25 ng/mL Final    Comment: A concentration < 0.25 ng/mL represents a low risk of bacterial   infection.  Procalcitonin may not be accurate among patients with localized   infection, recent trauma or major surgery, immunosuppressed state,   invasive fungal infection, renal dysfunction. Decisions regarding   initiation or continuation of antibiotic therapy should not be based   solely on procalcitonin levels.     Hospital Outpatient Visit on 11/16/2023   Component Date Value Ref Range Status    POC Glucose 11/16/2023 79  70 - 110 MG/DL Final         Imaging:     PET/CT 11/16/23  Head/neck:     No significant abnormal hypermetabolic foci are identified within the  head and neck region. No lymphadenopathy is present.     Chest:     Since the recent CT of 10/21/2023, the bilateral pleural effusions have resolved. There is a new hypermetabolic subpleural nodule in the posterior right lower lobe which is suspicious for primary or metastatic neoplasm. The cannot be certain whether this nodule was present on the recent CT study due to the presence of a right pleural effusion, but the nodule was not present on the previous CTA of the chest on 09/10/2021. On images 130 9-141 the nodule measures 2.2 x 1.3 cm with a max SUV of 2.9. No other significant abnormal hypermetabolic foci are identified within the chest. No lymphadenopathy is present.     Abdomen/pelvis:     There are postoperative changes of a cystectomy and prostatectomy with ureteral diversion right lower quadrant ileostomy. There is no hydronephrosis. No significant abnormal hypermetabolic foci are identified within the abdomen and pelvis. No lymphadenopathy is present. The adrenal glands are normal.     Skeleton:     No significant abnormal hypermetabolic foci are identified within the skeleton. There are no findings to suggest osseous metastatic disease.       Assessment:       1. Urothelial carcinoma of bladder    2. Acute cystitis with hematuria    3. Sepsis, due to unspecified organism, unspecified whether acute organ dysfunction present    4. Malignant neoplasm of urinary bladder, unspecified site    5. Prostate cancer    6. Healthcare maintenance    7. Anorexia    8. C. difficile colitis    9. Normocytic anemia    10. Hypertension, unspecified type    11. Thrombophilia             Plan:     UTI - pt with concern for UTI and sepsis given fever and complaint of cloudy urine  -Pt continuing to have fevers despite Bactrim  -PT instructed to go to the ER  -checkout given to [facility] physician Dr Iftikhar Morrow    Bladder Cancer - pt with stage IIIB bladder cancer pT3N2 s/p neoadjuvant chemo followed by radical  cystoprostatectomy 8/15/23  -PT received one dose of Nivolumab on 9/18/23  -PEt/Ct suggestive of a RLL pulmonary nodule  -PT will need biopsy prior to initiating treatment  -Would treat with Pembrolizumab if pt with metastatic disease    Pulmonary nodule - see above    Prostate Cancer - patient with acinar adenocarcinoma of the prostate grade group 1 ([place] score 3+3=6) in 1 2 5% of prostate tissue pT2N0  -PSA 10/31/23 0.02ng/mL    Anorexia - pt with improvement on marinol but takes medication nightly due to sedation  -Will monitor    C Diff - pt with recurrent C diff on fidaxomicin with 4 days left  -Diarrhea improved   -Will Monitor    Anemia - Hemoglobin 11.1g/dL on 10/31/23  -Labs not drawn 11/16/23  -Pt to have labs in ER  -Will monitor     HTN - pt not on meds  -Stable  -Will monitor    Thrombophilia - pt with prior PE  -Pt subsequently developed a right perinephric bleed requiring embolization   -Patient with IVC filter in place  -Will monitor    Route Chart for Scheduling  Med Onc Route Chart for Scheduling  Treatment Plan Information   OP NIVOLUMAB 480 MG Q4W   Jackson Jimenez MD   Upcoming Treatment Dates - OP NIVOLUMAB 480 MG Q4W    11/16/2023       Chemotherapy       nivolumab 480 mg in sodium chloride 0.9% 98 mL infusion  12/14/2023       Chemotherapy       nivolumab 480 mg in sodium chloride 0.9% 98 mL infusion  1/11/2024       Chemotherapy       nivolumab 480 mg in sodium chloride 0.9% 98 mL infusion  2/8/2024       Chemotherapy       nivolumab 480 mg in sodium chloride 0.9% 98 mL infusion      Jackson Jimenez MD  [facility]  Hematology and Oncology  [facility]   [address] [place] [address]   O: [phone]  F: [phone]

## 2023-11-17 ENCOUNTER — OFFICE VISIT (OUTPATIENT)
Dept: HEMATOLOGY/ONCOLOGY | Facility: CLINIC | Age: 73
End: 2023-11-17
Payer: MEDICARE

## 2023-11-17 VITALS
OXYGEN SATURATION: 97 % | TEMPERATURE: 99 F | RESPIRATION RATE: 16 BRPM | HEART RATE: 83 BPM | HEIGHT: 69 IN | SYSTOLIC BLOOD PRESSURE: 96 MMHG | DIASTOLIC BLOOD PRESSURE: 50 MMHG | BODY MASS INDEX: 20.54 KG/M2 | WEIGHT: 138.69 LBS

## 2023-11-17 DIAGNOSIS — I10 HYPERTENSION, UNSPECIFIED TYPE: ICD-10-CM

## 2023-11-17 DIAGNOSIS — C67.9 UROTHELIAL CARCINOMA OF BLADDER: Primary | ICD-10-CM

## 2023-11-17 DIAGNOSIS — A04.72 C. DIFFICILE COLITIS: ICD-10-CM

## 2023-11-17 DIAGNOSIS — Z00.00 HEALTHCARE MAINTENANCE: ICD-10-CM

## 2023-11-17 DIAGNOSIS — A41.9 SEPSIS, DUE TO UNSPECIFIED ORGANISM, UNSPECIFIED WHETHER ACUTE ORGAN DYSFUNCTION PRESENT: ICD-10-CM

## 2023-11-17 DIAGNOSIS — C67.9 MALIGNANT NEOPLASM OF URINARY BLADDER, UNSPECIFIED SITE: ICD-10-CM

## 2023-11-17 DIAGNOSIS — N30.01 ACUTE CYSTITIS WITH HEMATURIA: ICD-10-CM

## 2023-11-17 DIAGNOSIS — C61 PROSTATE CANCER: ICD-10-CM

## 2023-11-17 DIAGNOSIS — R63.0 ANOREXIA: ICD-10-CM

## 2023-11-17 DIAGNOSIS — D68.59 THROMBOPHILIA: ICD-10-CM

## 2023-11-17 DIAGNOSIS — D64.9 NORMOCYTIC ANEMIA: ICD-10-CM

## 2023-11-17 PROBLEM — N17.9 AKI (ACUTE KIDNEY INJURY): Status: ACTIVE | Noted: 2023-11-17

## 2023-11-17 PROBLEM — R65.20 SEVERE SEPSIS: Status: ACTIVE | Noted: 2023-11-17

## 2023-11-17 PROBLEM — E87.1 HYPONATREMIA: Status: ACTIVE | Noted: 2023-11-17

## 2023-11-17 PROCEDURE — 99999 PR PBB SHADOW E&M-EST. PATIENT-LVL IV: CPT | Mod: PBBFAC,,, | Performed by: INTERNAL MEDICINE

## 2023-11-17 PROCEDURE — 3078F PR MOST RECENT DIASTOLIC BLOOD PRESSURE < 80 MM HG: ICD-10-PCS | Mod: CPTII,S$GLB,, | Performed by: INTERNAL MEDICINE

## 2023-11-17 PROCEDURE — 99999 PR PBB SHADOW E&M-EST. PATIENT-LVL IV: ICD-10-PCS | Mod: PBBFAC,,, | Performed by: INTERNAL MEDICINE

## 2023-11-17 PROCEDURE — 99215 PR OFFICE/OUTPT VISIT, EST, LEVL V, 40-54 MIN: ICD-10-PCS | Mod: S$GLB,,, | Performed by: INTERNAL MEDICINE

## 2023-11-17 PROCEDURE — 1101F PT FALLS ASSESS-DOCD LE1/YR: CPT | Mod: CPTII,S$GLB,, | Performed by: INTERNAL MEDICINE

## 2023-11-17 PROCEDURE — 1157F PR ADVANCE CARE PLAN OR EQUIV PRESENT IN MEDICAL RECORD: ICD-10-PCS | Mod: CPTII,S$GLB,, | Performed by: INTERNAL MEDICINE

## 2023-11-17 PROCEDURE — 3078F DIAST BP <80 MM HG: CPT | Mod: CPTII,S$GLB,, | Performed by: INTERNAL MEDICINE

## 2023-11-17 PROCEDURE — 3074F PR MOST RECENT SYSTOLIC BLOOD PRESSURE < 130 MM HG: ICD-10-PCS | Mod: CPTII,S$GLB,, | Performed by: INTERNAL MEDICINE

## 2023-11-17 PROCEDURE — 1111F PR DISCHARGE MEDS RECONCILED W/ CURRENT OUTPATIENT MED LIST: ICD-10-PCS | Mod: CPTII,S$GLB,, | Performed by: INTERNAL MEDICINE

## 2023-11-17 PROCEDURE — 3288F PR FALLS RISK ASSESSMENT DOCUMENTED: ICD-10-PCS | Mod: CPTII,S$GLB,, | Performed by: INTERNAL MEDICINE

## 2023-11-17 PROCEDURE — 3074F SYST BP LT 130 MM HG: CPT | Mod: CPTII,S$GLB,, | Performed by: INTERNAL MEDICINE

## 2023-11-17 PROCEDURE — 3288F FALL RISK ASSESSMENT DOCD: CPT | Mod: CPTII,S$GLB,, | Performed by: INTERNAL MEDICINE

## 2023-11-17 PROCEDURE — 1101F PR PT FALLS ASSESS DOC 0-1 FALLS W/OUT INJ PAST YR: ICD-10-PCS | Mod: CPTII,S$GLB,, | Performed by: INTERNAL MEDICINE

## 2023-11-17 PROCEDURE — 1157F ADVNC CARE PLAN IN RCRD: CPT | Mod: CPTII,S$GLB,, | Performed by: INTERNAL MEDICINE

## 2023-11-17 PROCEDURE — 1125F AMNT PAIN NOTED PAIN PRSNT: CPT | Mod: CPTII,S$GLB,, | Performed by: INTERNAL MEDICINE

## 2023-11-17 PROCEDURE — 99215 OFFICE O/P EST HI 40 MIN: CPT | Mod: S$GLB,,, | Performed by: INTERNAL MEDICINE

## 2023-11-17 PROCEDURE — 1125F PR PAIN SEVERITY QUANTIFIED, PAIN PRESENT: ICD-10-PCS | Mod: CPTII,S$GLB,, | Performed by: INTERNAL MEDICINE

## 2023-11-17 PROCEDURE — 1111F DSCHRG MED/CURRENT MED MERGE: CPT | Mod: CPTII,S$GLB,, | Performed by: INTERNAL MEDICINE

## 2023-11-18 PROBLEM — E43 SEVERE MALNUTRITION: Status: ACTIVE | Noted: 2023-11-18

## 2023-11-19 PROBLEM — N30.00 ACUTE CYSTITIS WITHOUT HEMATURIA: Status: ACTIVE | Noted: 2023-11-19

## 2023-11-19 PROBLEM — A04.72 C. DIFFICILE COLITIS: Status: ACTIVE | Noted: 2023-11-19

## 2023-11-21 ENCOUNTER — TELEPHONE (OUTPATIENT)
Dept: OTOLARYNGOLOGY | Facility: CLINIC | Age: 73
End: 2023-11-21
Payer: MEDICARE

## 2023-11-21 ENCOUNTER — TELEPHONE (OUTPATIENT)
Dept: HEMATOLOGY/ONCOLOGY | Facility: CLINIC | Age: 73
End: 2023-11-21
Payer: MEDICARE

## 2023-11-21 NOTE — TELEPHONE ENCOUNTER
Spoke to pt's wife on telephone to confirm f/u aroldo on 11/29 at 10:20am. She confirmed and advised pt still admitted at [facility].

## 2023-11-21 NOTE — TELEPHONE ENCOUNTER
----- Message from Kelly Hidalgo Patient Care Assistant sent at 2023  8:38 AM CST -----  Regardin week hospial follow up  Type: Needs Medical Advice  Who Called:  eddy Melendez Call Back Number: [phone]    Additional Information: eddy is being discharges today from [facility] and needs a 1 week hospital follow up . Please call eddy to confirm date and time thank you

## 2023-11-21 NOTE — TELEPHONE ENCOUNTER
----- Message from Kristy Wisdom sent at 11/21/2023 10:08 AM CST -----  Regarding: Needs hospital follow up  Type: Needs Medical Advice  Who Called:  [facility] Call Back Number: [phone]    Additional Information: Pt is being discharged today and needs 1 month hospital follow up please advise

## 2023-11-22 ENCOUNTER — TELEPHONE (OUTPATIENT)
Dept: UROLOGY | Facility: CLINIC | Age: 73
End: 2023-11-22
Payer: MEDICARE

## 2023-11-22 ENCOUNTER — PATIENT MESSAGE (OUTPATIENT)
Dept: UROLOGY | Facility: CLINIC | Age: 73
End: 2023-11-22
Payer: MEDICARE

## 2023-11-22 NOTE — TELEPHONE ENCOUNTER
Spoke with patient's wife who was able to provide acceptable patient identifiers prior to start of conversation. Mrs Caldwell requesting virtual visit second to difficult commute for she and her  due to health issues at this time.  Requesting first available.   Virtual appointment scheduled as requested.

## 2023-11-22 NOTE — TELEPHONE ENCOUNTER
Spoke to pt's wife and scheduled pt a follow up with dr. Rivas. She verbalized understanding of the apt.

## 2023-11-27 ENCOUNTER — OFFICE VISIT (OUTPATIENT)
Dept: UROLOGY | Facility: CLINIC | Age: 73
End: 2023-11-27
Payer: MEDICARE

## 2023-11-27 ENCOUNTER — TELEPHONE (OUTPATIENT)
Dept: FAMILY MEDICINE | Facility: CLINIC | Age: 73
End: 2023-11-27
Payer: MEDICARE

## 2023-11-27 DIAGNOSIS — N99.89 URETERAL-ILEAL LOOP ANASTOMOTIC STRICTURE: ICD-10-CM

## 2023-11-27 DIAGNOSIS — C67.9 MALIGNANT NEOPLASM OF URINARY BLADDER, UNSPECIFIED SITE: ICD-10-CM

## 2023-11-27 DIAGNOSIS — C67.9 UROTHELIAL CARCINOMA OF BLADDER: Primary | ICD-10-CM

## 2023-11-27 PROCEDURE — 1111F DSCHRG MED/CURRENT MED MERGE: CPT | Mod: CPTII,95,, | Performed by: STUDENT IN AN ORGANIZED HEALTH CARE EDUCATION/TRAINING PROGRAM

## 2023-11-27 PROCEDURE — 1157F ADVNC CARE PLAN IN RCRD: CPT | Mod: CPTII,95,, | Performed by: STUDENT IN AN ORGANIZED HEALTH CARE EDUCATION/TRAINING PROGRAM

## 2023-11-27 PROCEDURE — 99214 OFFICE O/P EST MOD 30 MIN: CPT | Mod: 95,,, | Performed by: STUDENT IN AN ORGANIZED HEALTH CARE EDUCATION/TRAINING PROGRAM

## 2023-11-27 NOTE — TELEPHONE ENCOUNTER
----- Message from Yahaira Alberto sent at 11/27/2023  8:53 AM CST -----  Contact: WifeCourtney  Type:  Sooner Appointment Request    Caller is requesting a sooner appointment.  Caller declined first available appointment listed below.  Caller will not accept being placed on the waitlist and is requesting a message be sent to doctor.    Name of Caller:  Wife, Courtney  When is the first available appointment?  N/A    Would the patient rather a call back or a response via [facility]?   Call back  Best Call Back Number:  [phone]    Additional Information: States she and her  both would like to speak with Dr Villegas only - states patient and Dr Villegas's father went to [facility] together and [employer] together - Dr Villegas's dad mentioned Dr Villegas to these patients - states both patient (cancer patient) and wife (Courtney - MRN [id number]) need new PCP's in the [place] area due to their medical issues - states patient, Aren, is a cancer patient and wife, Courtney, has lower spine and neck issues that will need surgery and both have been advised to establish care and with Dr Villegas, especially considering the personal relationship with Dr Villegas's dad - please have only Dr Villegas call back to discuss - thank you

## 2023-11-27 NOTE — PROGRESS NOTES
[facility]  Urologic Oncology Clinic Note        Telehealth visit  Visit type: audio + visual   Patient location : Home      Patient was identified by name and birth date. The patient agreed to proceed with a telehealth visit. The visit was performed by audio and video communication.       Date of Service: 11/27/2023        Chief Complaint/Reason for Consultation: Node positive bladder cancer    Requesting Provider:   No referring provider defined for this encounter.      History of Present Illness:   Patient 73 y.o. 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 He did have neoadjuvant chemotherapy. The patient's surgery was ultimately complicated by small-bowel obstruction, and pulmonary embolism.  PT recevied 1 dose of Nivolumab 9/18/23.  The patient was placed on anticoagulation for pulmonary embolism and ultimately developed a right perinephric bleed requiring embolization. He is currently not on anticoagulation and does IVC filter.  PT also developed UTI and acquired ureteral obstruction with placement of bilateral ureteral stents.  PT also developed C diff and was hospitalized for suspected recurrence at [facility] from 10/21/2023 to 10/25/2023.  Patient was discharged on fidaxomicin.     Blood thinners:  none    No Hx of TIA, MI, and CVA   Abdominal surgeries:  radical cystectomy w/ ileal loop urinary diversion, umbilical hernia      Patient and wife requested visit today due to recent hospitalization for c diff and possible UTI.      Urologic History:     8/15/2023 -- Radical cystectomy w/ ileal loop urinary diversion -- final path pT3aN2 , mateo 3+3 prostate cancer  9/28/2023 -- IR placement of nephroureteral stents       Patient Active Problem List    Diagnosis Date Noted    Acute cystitis without  hematuria 11/19/2023    C. difficile colitis 11/19/2023    Severe malnutrition 11/18/2023    Severe sepsis 11/17/2023    RAOUL (acute kidney injury) 11/17/2023    Hyponatremia 11/17/2023    Prostate cancer 10/31/2023    Malignant neoplasm of overlapping sites of bladder 10/23/2023    History of pulmonary embolus (PE) 10/23/2023    Malignant neoplasm of urinary bladder 10/23/2023    History of ileal conduit 10/21/2023    History of pulmonary embolism 10/21/2023    Elevated serum creatinine 10/21/2023    Other chest pain 10/18/2021    Popliteal aneurysm 10/18/2021    Syncope and collapse 09/15/2021    Dyslipidemia 09/15/2021    History of seizure 09/10/2021    Elevated troponin 09/10/2021    Positive D dimer 09/10/2021    Mild Anemia 09/10/2021    Essential hypertension 09/10/2021          Review of patient's allergies indicates:   Allergen Reactions    Clopidogrel Other (See Comments)     Increased body temperature and redness         Past Medical History:   Diagnosis Date    Aneurysm artery, popliteal     right leg    Hypertension     Seizures       No past surgical history on file.   Family History   Problem Relation Age of Onset    Heart disease Mother     Hypertension Mother       Social History     Tobacco Use    Smoking status: Never    Smokeless tobacco: Never   Substance Use Topics    Alcohol use: Not Currently        Review of Systems   Constitutional:  Negative for activity change, fatigue and fever.   HENT:  Negative for congestion.    Respiratory:  Negative for cough.    Cardiovascular:  Negative for chest pain.   Gastrointestinal:  Negative for abdominal pain.   Genitourinary:  Negative for hematuria.             OBJECTIVE:     There were no vitals filed for this visit.         General Appearance: Alert, cooperative, no distress  Head: Normocephalic  Eyes: Clear conjunctiva  Neck: No obvious LND or JVD  Lungs: Normal chest excursion, no accessory muscle use  Chest: Regular rate rhythm by palpation, no  pedal edema  Abdomen: Soft, non-tender, non-distended, surgical scars healing well, hernias none, urostomy pink patent and productive in right lower quadrant w/ 2 ureteral stents in place  Extremities: Atraumatic   Lymph Nodes: No appreciable lymph adenopathy  Neurologic: No gross gait, motor or sensory deficits            LAB:      CMP  Sodium   Date Value Ref Range Status   11/21/2023 132 (L) 136 - 145 mmol/L Final     Potassium   Date Value Ref Range Status   11/21/2023 3.6 3.5 - 5.1 mmol/L Final     Comment:     Anion Gap reference range revised on 4/28/2023     Chloride   Date Value Ref Range Status   11/21/2023 102 95 - 110 mmol/L Final     CO2   Date Value Ref Range Status   11/21/2023 20 (L) 22 - 31 mmol/L Final     Glucose   Date Value Ref Range Status   11/21/2023 111 (H) 70 - 110 mg/dL Final     Comment:     The ADA recommends the following guidelines for fasting glucose:    Normal:       less than 100 mg/dL    Prediabetes:  100 mg/dL to 125 mg/dL    Diabetes:     126 mg/dL or higher       BUN   Date Value Ref Range Status   11/21/2023 16 9 - 21 mg/dL Final     Creatinine   Date Value Ref Range Status   11/21/2023 0.94 0.50 - 1.40 mg/dL Final     Calcium   Date Value Ref Range Status   11/21/2023 8.8 8.4 - 10.2 mg/dL Final     Total Protein   Date Value Ref Range Status   11/17/2023 7.2 6.0 - 8.4 g/dL Final     Albumin   Date Value Ref Range Status   11/17/2023 3.6 3.5 - 5.2 g/dL Final     Total Bilirubin   Date Value Ref Range Status   11/17/2023 0.5 0.2 - 1.3 mg/dL Final     Alkaline Phosphatase   Date Value Ref Range Status   11/17/2023 77 38 - 145 U/L Final     AST   Date Value Ref Range Status   11/17/2023 38 17 - 59 U/L Final     ALT   Date Value Ref Range Status   11/17/2023 29 0 - 50 U/L Final     Anion Gap   Date Value Ref Range Status   11/21/2023 10 5 - 12 mmol/L Final     Comment:     Anion Gap reference range revised on 4/28/2023     eGFR   Date Value Ref Range Status   11/21/2023 >60 >60  mL/min/1.73 m^2 Final     Lab Results   Component Value Date    WBC 15.08 (H) 11/21/2023    HGB 9.0 (L) 11/21/2023    HCT 26.6 (L) 11/21/2023    MCV 87 11/21/2023     11/21/2023               IMAGING:        10/21/2023  CT ABDOMEN PELVIS WITHOUT CONTRAST     CLINICAL HISTORY:  Fever, history of cancer..     TECHNIQUE:  Axial CT images of the abdomen and pelvis were obtained without intravenous contrast. No oral contrast.  Coronal and sagittal reformations were obtained.  Automated exposure control was utilized.  Total exam DLP is 343 mGy cm.     COMPARISON:  Outside CT 09/26/2023     FINDINGS:  New small right greater left pleural effusions identified with dependent atelectasis.  There is diffuse hepatic steatosis.  The non contrasted gallbladder, pancreas, and adrenal glands are unremarkable.  Spleen is normal size.  Calcified granulomas are noted within the spleen.  Urinary bladder is surgically absent.  There is a right lower quadrant ileal conduit.  Surgical clips are noted about the right renal pelvis.  There is minimal hydronephrosis decreased.  There are bilateral nephroureteral stents present.  There is no bowel obstruction.  There is scattered colonic stool.  There is colonic wall thickening most notable of the sigmoid colon and rectum.  Correlate for colitis.  There is no evidence of acute appendicitis.  IVC filter is present.  Atherosclerotic calcifications are noted.  Abdominal aorta is not aneurysmal.  There is small volume ascites.  There is mild anasarca.  There is no intraperitoneal free air identified.  No lymphadenopathy is noted.  No acute displaced fractures noted.     Impression:     1. Portions of colon demonstrate wall thickening may reflect colitis.  This is most notable within the sigmoid colon and rectum.  2. New small right greater than left pleural effusions.  3. Mild anasarca.  Small volume ascites.  4. Bilateral nephroureteral stents are present.  There is minimal hydronephrosis  decreased.        Electronically signed by: Sd Ford MD  Date:                                            10/21/2023  Time:                                           13:57      10/21/2023  XR CHEST PA AND LATERAL     CLINICAL HISTORY:  Fever, unspecified     TECHNIQUE:  PA and lateral views of the chest were performed.     COMPARISON:  09/10/2021     FINDINGS:  Sternotomy wires noted.  Right-sided port catheter.The lungs are clear, with normal appearance of pulmonary vasculature and no pleural effusion or pneumothorax.     The cardiac silhouette is normal in size. The hilar and mediastinal contours are unremarkable.     Bones are intact.     Impression:     No acute abnormality.        Electronically signed by: Ange Parikh MD  Date:                                            10/21/2023  Time:                                           11:52    ASSESSMENT/PLAN:       72 yo M w hx of radical cystectomy w/ ileal loop urinary diversion on 8/15/2023 -- final path pT3aN2 w/ 3+3 prostate cancer. Now s/p single dose of nivolumab.      Plan:    Node positive, Bladder Cancer  Discussed overall prognosis is poor in this setting with 5 year survival approaching 30%. Discussed agreement to pursue nivolumab adjuvant therapy.     Will follow up labs and upcoming PET imaging.    Discussed referral to prehab clinic to help with nutrition and PT assessment.     Will follow with Dr. Jimenez for management    Ureteral strictures  Discussed management with stents.   Discussed virtual visit in Jan 2024 to discuss operative intervention to replace stents.   Discussed that stents are not permanent and that they need to be changed every 3-4 months  Discussed possible revision of uretero-ileal anastomosis in future pending disease progression.   Would like to wait 6 months to 1 year to perform ureter-ileal anastomotic revision          I spent a total of 30 minutes on the day of the visit.  This includes face to face time and non-face  to face time preparing to see the patient (eg, review of tests), obtaining and/or reviewing separately obtained history, documenting clinical information in the electronic or other health record, independently interpreting results and communicating results to the patient/family/caregiver, or care coordinator.          South Ghosh MD  Urologic Oncology  P: [phone]

## 2023-11-28 NOTE — TELEPHONE ENCOUNTER
I am sorry, but I will not be able to accommodate their request since my panel is full. Off er appt with provider accepting new patient.

## 2023-11-29 ENCOUNTER — LAB VISIT (OUTPATIENT)
Dept: LAB | Facility: HOSPITAL | Age: 73
End: 2023-11-29
Attending: INTERNAL MEDICINE
Payer: MEDICARE

## 2023-11-29 ENCOUNTER — OFFICE VISIT (OUTPATIENT)
Dept: HEMATOLOGY/ONCOLOGY | Facility: CLINIC | Age: 73
End: 2023-11-29
Payer: MEDICARE

## 2023-11-29 ENCOUNTER — TELEPHONE (OUTPATIENT)
Dept: HEMATOLOGY/ONCOLOGY | Facility: CLINIC | Age: 73
End: 2023-11-29
Payer: MEDICARE

## 2023-11-29 VITALS
OXYGEN SATURATION: 100 % | HEART RATE: 50 BPM | HEIGHT: 69 IN | RESPIRATION RATE: 18 BRPM | WEIGHT: 138.25 LBS | BODY MASS INDEX: 20.48 KG/M2 | TEMPERATURE: 97 F | DIASTOLIC BLOOD PRESSURE: 70 MMHG | SYSTOLIC BLOOD PRESSURE: 112 MMHG

## 2023-11-29 DIAGNOSIS — I10 HYPERTENSION, UNSPECIFIED TYPE: ICD-10-CM

## 2023-11-29 DIAGNOSIS — D64.9 NORMOCYTIC ANEMIA: ICD-10-CM

## 2023-11-29 DIAGNOSIS — A04.72 C. DIFFICILE COLITIS: ICD-10-CM

## 2023-11-29 DIAGNOSIS — D68.59 THROMBOPHILIA: ICD-10-CM

## 2023-11-29 DIAGNOSIS — C67.9 UROTHELIAL CARCINOMA OF BLADDER: ICD-10-CM

## 2023-11-29 DIAGNOSIS — R79.89 BLOOD CREATININE INCREASED COMPARED WITH PRIOR MEASUREMENT: ICD-10-CM

## 2023-11-29 DIAGNOSIS — R63.0 ANOREXIA: ICD-10-CM

## 2023-11-29 DIAGNOSIS — C67.9 UROTHELIAL CARCINOMA OF BLADDER: Primary | ICD-10-CM

## 2023-11-29 DIAGNOSIS — N39.0 URINARY TRACT INFECTION WITHOUT HEMATURIA, SITE UNSPECIFIED: ICD-10-CM

## 2023-11-29 DIAGNOSIS — R91.1 LUNG NODULE: ICD-10-CM

## 2023-11-29 LAB
ALBUMIN SERPL BCP-MCNC: 3.3 G/DL (ref 3.5–5.2)
ALP SERPL-CCNC: 82 U/L (ref 55–135)
ALT SERPL W/O P-5'-P-CCNC: 21 U/L (ref 10–44)
ANION GAP SERPL CALC-SCNC: 10 MMOL/L (ref 8–16)
AST SERPL-CCNC: 25 U/L (ref 10–40)
BASOPHILS # BLD AUTO: 0.11 K/UL (ref 0–0.2)
BASOPHILS NFR BLD: 1.3 % (ref 0–1.9)
BILIRUB SERPL-MCNC: 0.4 MG/DL (ref 0.1–1)
BUN SERPL-MCNC: 27 MG/DL (ref 8–23)
CALCIUM SERPL-MCNC: 9.9 MG/DL (ref 8.7–10.5)
CHLORIDE SERPL-SCNC: 102 MMOL/L (ref 95–110)
CO2 SERPL-SCNC: 23 MMOL/L (ref 23–29)
CREAT SERPL-MCNC: 1.5 MG/DL (ref 0.5–1.4)
DIFFERENTIAL METHOD: ABNORMAL
EOSINOPHIL # BLD AUTO: 0.2 K/UL (ref 0–0.5)
EOSINOPHIL NFR BLD: 1.9 % (ref 0–8)
ERYTHROCYTE [DISTWIDTH] IN BLOOD BY AUTOMATED COUNT: 16.5 % (ref 11.5–14.5)
EST. GFR  (NO RACE VARIABLE): 48.2 ML/MIN/1.73 M^2
GLUCOSE SERPL-MCNC: 98 MG/DL (ref 70–110)
HCT VFR BLD AUTO: 32.3 % (ref 40–54)
HGB BLD-MCNC: 10.7 G/DL (ref 14–18)
IMM GRANULOCYTES # BLD AUTO: 0.04 K/UL (ref 0–0.04)
IMM GRANULOCYTES NFR BLD AUTO: 0.5 % (ref 0–0.5)
LYMPHOCYTES # BLD AUTO: 2.1 K/UL (ref 1–4.8)
LYMPHOCYTES NFR BLD: 25.1 % (ref 18–48)
MCH RBC QN AUTO: 30 PG (ref 27–31)
MCHC RBC AUTO-ENTMCNC: 33.1 G/DL (ref 32–36)
MCV RBC AUTO: 91 FL (ref 82–98)
MONOCYTES # BLD AUTO: 0.8 K/UL (ref 0.3–1)
MONOCYTES NFR BLD: 9.6 % (ref 4–15)
NEUTROPHILS # BLD AUTO: 5.2 K/UL (ref 1.8–7.7)
NEUTROPHILS NFR BLD: 61.6 % (ref 38–73)
NRBC BLD-RTO: 0 /100 WBC
PLATELET # BLD AUTO: 352 K/UL (ref 150–450)
PMV BLD AUTO: 8.5 FL (ref 9.2–12.9)
POTASSIUM SERPL-SCNC: 4.6 MMOL/L (ref 3.5–5.1)
PROT SERPL-MCNC: 7.4 G/DL (ref 6–8.4)
RBC # BLD AUTO: 3.57 M/UL (ref 4.6–6.2)
SODIUM SERPL-SCNC: 135 MMOL/L (ref 136–145)
WBC # BLD AUTO: 8.42 K/UL (ref 3.9–12.7)

## 2023-11-29 PROCEDURE — 99215 OFFICE O/P EST HI 40 MIN: CPT | Mod: S$GLB,,, | Performed by: INTERNAL MEDICINE

## 2023-11-29 PROCEDURE — 3288F PR FALLS RISK ASSESSMENT DOCUMENTED: ICD-10-PCS | Mod: CPTII,S$GLB,, | Performed by: INTERNAL MEDICINE

## 2023-11-29 PROCEDURE — 3074F PR MOST RECENT SYSTOLIC BLOOD PRESSURE < 130 MM HG: ICD-10-PCS | Mod: CPTII,S$GLB,, | Performed by: INTERNAL MEDICINE

## 2023-11-29 PROCEDURE — 85025 COMPLETE CBC W/AUTO DIFF WBC: CPT | Mod: PN | Performed by: INTERNAL MEDICINE

## 2023-11-29 PROCEDURE — 1101F PT FALLS ASSESS-DOCD LE1/YR: CPT | Mod: CPTII,S$GLB,, | Performed by: INTERNAL MEDICINE

## 2023-11-29 PROCEDURE — 36415 COLL VENOUS BLD VENIPUNCTURE: CPT | Mod: PN | Performed by: INTERNAL MEDICINE

## 2023-11-29 PROCEDURE — 3078F DIAST BP <80 MM HG: CPT | Mod: CPTII,S$GLB,, | Performed by: INTERNAL MEDICINE

## 2023-11-29 PROCEDURE — 1157F ADVNC CARE PLAN IN RCRD: CPT | Mod: CPTII,S$GLB,, | Performed by: INTERNAL MEDICINE

## 2023-11-29 PROCEDURE — 99999 PR PBB SHADOW E&M-EST. PATIENT-LVL V: ICD-10-PCS | Mod: PBBFAC,,, | Performed by: INTERNAL MEDICINE

## 2023-11-29 PROCEDURE — 3074F SYST BP LT 130 MM HG: CPT | Mod: CPTII,S$GLB,, | Performed by: INTERNAL MEDICINE

## 2023-11-29 PROCEDURE — 1101F PR PT FALLS ASSESS DOC 0-1 FALLS W/OUT INJ PAST YR: ICD-10-PCS | Mod: CPTII,S$GLB,, | Performed by: INTERNAL MEDICINE

## 2023-11-29 PROCEDURE — 99999 PR PBB SHADOW E&M-EST. PATIENT-LVL V: CPT | Mod: PBBFAC,,, | Performed by: INTERNAL MEDICINE

## 2023-11-29 PROCEDURE — 1126F PR PAIN SEVERITY QUANTIFIED, NO PAIN PRESENT: ICD-10-PCS | Mod: CPTII,S$GLB,, | Performed by: INTERNAL MEDICINE

## 2023-11-29 PROCEDURE — 3288F FALL RISK ASSESSMENT DOCD: CPT | Mod: CPTII,S$GLB,, | Performed by: INTERNAL MEDICINE

## 2023-11-29 PROCEDURE — 99215 PR OFFICE/OUTPT VISIT, EST, LEVL V, 40-54 MIN: ICD-10-PCS | Mod: S$GLB,,, | Performed by: INTERNAL MEDICINE

## 2023-11-29 PROCEDURE — 1157F PR ADVANCE CARE PLAN OR EQUIV PRESENT IN MEDICAL RECORD: ICD-10-PCS | Mod: CPTII,S$GLB,, | Performed by: INTERNAL MEDICINE

## 2023-11-29 PROCEDURE — 1111F DSCHRG MED/CURRENT MED MERGE: CPT | Mod: CPTII,S$GLB,, | Performed by: INTERNAL MEDICINE

## 2023-11-29 PROCEDURE — 1159F PR MEDICATION LIST DOCUMENTED IN MEDICAL RECORD: ICD-10-PCS | Mod: CPTII,S$GLB,, | Performed by: INTERNAL MEDICINE

## 2023-11-29 PROCEDURE — 1159F MED LIST DOCD IN RCRD: CPT | Mod: CPTII,S$GLB,, | Performed by: INTERNAL MEDICINE

## 2023-11-29 PROCEDURE — 1126F AMNT PAIN NOTED NONE PRSNT: CPT | Mod: CPTII,S$GLB,, | Performed by: INTERNAL MEDICINE

## 2023-11-29 PROCEDURE — 1111F PR DISCHARGE MEDS RECONCILED W/ CURRENT OUTPATIENT MED LIST: ICD-10-PCS | Mod: CPTII,S$GLB,, | Performed by: INTERNAL MEDICINE

## 2023-11-29 PROCEDURE — 80053 COMPREHEN METABOLIC PANEL: CPT | Mod: PN | Performed by: INTERNAL MEDICINE

## 2023-11-29 PROCEDURE — 3078F PR MOST RECENT DIASTOLIC BLOOD PRESSURE < 80 MM HG: ICD-10-PCS | Mod: CPTII,S$GLB,, | Performed by: INTERNAL MEDICINE

## 2023-11-29 NOTE — TELEPHONE ENCOUNTER
Spoke with the pt and the pt and wife stated that they will do the vv visit on 11/30. Pt also stated if he is satisfied with the md on 11/30 they will keep that one or might call back and do the pcp that they can see in July.

## 2023-11-29 NOTE — PROGRESS NOTES
PATIENT: Aren Bey Jr.  MRN: [id number]  DATE: 11/29/2023      Diagnosis:   1. Urothelial carcinoma of bladder    2. Lung nodule    3. Urinary tract infection without hematuria, site unspecified    4. C. difficile colitis    5. Anorexia    6. Normocytic anemia    7. Hypertension, unspecified type    8. Thrombophilia    9. Blood creatinine increased compared with prior measurement            Chief Complaint: Bladder Cancer      Oncologic History:      Oncologic History     Oncologic Treatment     Pathology           Subjective:    Interval History:  Mr. Bey is a 73 y.o. male with HTN, Seizures, bladder cancer, h/o pulmonary embolism, who presents for bladder cancer.  Since the last clinic visit the patient was admitted to the hospital from [id number] for C diff colitis and suspected urinary tract infection the patient was discharged on fidaxomicin 200 mg b.i.d. for additional 7 days as well as 200 mg every other day for 25 days.  Patient was also discharged on doxycycline 100 mg b.i.d. for 25 days with instructions from Infectious Disease to remain on antibiotics as long as his ureteral stents were in place.  The patient states his diarrhea has improved over the last several days.  He denies cloudy urine or fever.  The patient denies CP, cough, SOB, abdominal pain, nausea, vomiting, constipation.  The patient denies fever, chills, night sweats, weight loss, new lumps or bumps, easy bruising or bleeding.    Prior History:  Patient underwent transurethral resection of bladder tumor on 04/28/2023 with path showing invasive urothelial carcinoma, high-grade with involvement of muscularis propria and positive for lymphovascular invasion.  Patient underwent chemotherapy at [facility].  The patient then underwent radical cystectomy and prostatectomy on 08/15/2023 with pathology showing invasive high-grade urothelial carcinoma measuring 4.2 cm, positive lymphovascular invasion, tumor  identified within the soft tissue surrounding the left distal ureter, 4/6 positive regional lymph nodes with extranodal extension present. pT3aN2.  Also seen was acinar adenocarcinoma of the prostate grade group 1 (Gipsy score 3+3=6) in 1 2 5% of prostate tissue pT2N0.  The patient's surgery was ultimately complicated by small-bowel obstruction, and pulmonary embolism.  PT recevied 1 dose of Nivolumab 9/18/23.  The patient was placed on anticoagulation for pulmonary embolism and ultimately developed a right perinephric bleed requiring embolization.  PT also developed UTI and acquired ureteral obstruction with placement of bilateral ureteral stents.  PT also developed C diff and was hospitalized for suspected recurrence at [facility] from 10/21/2023 to 10/25/2023.  Patient was discharged on fidaxomicin.   The patient saw Dr. Ghosh with Urology on 11/13/2023 for with recommendations for virtual visit in January to discuss possible operative intervention replaced stents, possible revision of ureteral ileal anastomosis pending disease progression.  It was instructed that the patient would need catheterized specimen from urostomy to rule out UTI if patient with future symptoms.  Patient underwent PET-CT on 11/16/2023 showing a new hypermetabolic subpleural nodule in the posterior right lower lobe measuring 2.2 x 1.3 cm.  The patient endorses a fever starting on the night of 11/16/2023.  Patient was prescribed Bactrim for suspected potential urinary tract infection.    Past Medical History:   Past Medical History:   Diagnosis Date    Aneurysm artery, popliteal     right leg    Hypertension     Seizures        Past Surgical HIstory: No past surgical history on file.    Family History:   Family History   Problem Relation Age of Onset    Heart disease Mother     Hypertension Mother        Social History:  reports that he has never smoked. He has never used smokeless tobacco. He reports that he does not currently use  alcohol. He reports that he does not use drugs.    Allergies:  Review of patient's allergies indicates:   Allergen Reactions    Clopidogrel Other (See Comments)     Increased body temperature and redness        Medications:  Current Outpatient Medications   Medication Sig Dispense Refill    acetaminophen (TYLENOL) 500 MG tablet Take 500-1,000 mg by mouth every 6 (six) hours as needed (fever/pain).      ASCORBIC ACID, VITAMIN C, ORAL Take 1 tablet by mouth once daily.      atorvastatin (LIPITOR) 20 MG tablet Take 2 tablets (40 mg total) by mouth once daily.      b complex vitamins tablet Take 1 tablet by mouth once daily.      cholecalciferol, vitamin D3, (VITAMIN D3 ORAL) Take 5,000 Units by mouth once daily.      diphenoxylate-atropine 2.5-0.025 mg (LOMOTIL) 2.5-0.025 mg per tablet Take 1 tablet by mouth 4 (four) times daily as needed for Diarrhea.      doxycycline (VIBRA-TABS) 100 MG tablet Take 1 tablet (100 mg total) by mouth every 12 (twelve) hours. for 25 days 50 tablet 0    ferrous sulfate (SLOW RELEASE IRON) 142 mg (45 mg iron) TbSR Take 1 tablet by mouth Daily.      fidaxomicin (DIFICID) 200 mg Tab Take 1 tablet (200 mg total) by mouth every other day. for 25 days 12 tablet 0    Lactobacillus rhamnosus GG (CULTURELLE) 10 billion cell capsule Take 1 capsule by mouth once daily. 30 capsule 0    LIDOcaine-prilocaine (EMLA) cream Apply topically once as needed (to port access).      multivitamin with minerals tablet Take 1 tablet by mouth once daily.      UNABLE TO FIND Take 1 capsule by mouth once daily. medication name: Colon health 80 billion      zinc gluconate 50 mg tablet Take 50 mg by mouth once daily.       No current facility-administered medications for this visit.       Review of Systems   Constitutional:  Negative for appetite change, chills, diaphoresis, fatigue, fever and unexpected weight change.   HENT:  Negative for mouth sores.    Eyes:  Negative for visual disturbance.   Respiratory:   "Negative for cough and shortness of breath.    Cardiovascular:  Negative for chest pain, palpitations and leg swelling.   Gastrointestinal:  Negative for abdominal pain, constipation, diarrhea, nausea and vomiting.   Genitourinary:  Negative for frequency.   Musculoskeletal:  Negative for back pain.   Skin:  Negative for color change and rash.   Neurological:  Negative for headaches.   Hematological:  Negative for adenopathy. Does not bruise/bleed easily.   Psychiatric/Behavioral:  The patient is not nervous/anxious.        ECOG Performance Status: 1   Objective:      Vitals:   Vitals:    11/29/23 1026   BP: 112/70   BP Location: Right arm   Patient Position: Sitting   BP Method: Medium (Manual)   Pulse: (!) 50   Resp: 18   Temp: 97.3 °F (36.3 °C)   TempSrc: Temporal   SpO2: 100%   Weight: 62.7 kg (138 lb 3.7 oz)   Height: 5' 9" (1.753 m)           Physical Exam  Constitutional:       General: He is not in acute distress.     Appearance: He is well-developed. He is not diaphoretic.   HENT:      Head: Normocephalic and atraumatic.   Cardiovascular:      Rate and Rhythm: Normal rate and regular rhythm.      Heart sounds: Normal heart sounds. No murmur heard.     No friction rub. No gallop.   Pulmonary:      Effort: Pulmonary effort is normal. No respiratory distress.      Breath sounds: Normal breath sounds. No wheezing or rales.   Chest:      Chest wall: No tenderness.   Abdominal:      General: Bowel sounds are normal. There is no distension.      Palpations: Abdomen is soft. There is no mass.      Tenderness: There is no abdominal tenderness. There is no rebound.      Comments: Ileal conduit on RLQ with clear urine visualized   Musculoskeletal:      Cervical back: Normal range of motion.   Lymphadenopathy:      Cervical: No cervical adenopathy.      Upper Body:      Right upper body: No supraclavicular or axillary adenopathy.      Left upper body: No supraclavicular or axillary adenopathy.   Skin:     General: Skin " is warm and dry.      Findings: No erythema or rash.   Neurological:      Mental Status: He is alert and oriented to person, place, and time.   Psychiatric:         Behavior: Behavior normal.         Laboratory Data:  Lab Visit on 11/29/2023   Component Date Value Ref Range Status    WBC 11/29/2023 8.42  3.90 - 12.70 K/uL Final    RBC 11/29/2023 3.57 (L)  4.60 - 6.20 M/uL Final    Hemoglobin 11/29/2023 10.7 (L)  14.0 - 18.0 g/dL Final    Hematocrit 11/29/2023 32.3 (L)  40.0 - 54.0 % Final    MCV 11/29/2023 91  82 - 98 fL Final    MCH 11/29/2023 30.0  27.0 - 31.0 pg Final    MCHC 11/29/2023 33.1  32.0 - 36.0 g/dL Final    RDW 11/29/2023 16.5 (H)  11.5 - 14.5 % Final    Platelets 11/29/2023 352  150 - 450 K/uL Final    MPV 11/29/2023 8.5 (L)  9.2 - 12.9 fL Final    Immature Granulocytes 11/29/2023 0.5  0.0 - 0.5 % Final    Gran # (ANC) 11/29/2023 5.2  1.8 - 7.7 K/uL Final    Immature Grans (Abs) 11/29/2023 0.04  0.00 - 0.04 K/uL Final    Comment: Mild elevation in immature granulocytes is non specific and   can be seen in a variety of conditions including stress response,   acute inflammation, trauma and pregnancy. Correlation with other   laboratory and clinical findings is essential.      Lymph # 11/29/2023 2.1  1.0 - 4.8 K/uL Final    Mono # 11/29/2023 0.8  0.3 - 1.0 K/uL Final    Eos # 11/29/2023 0.2  0.0 - 0.5 K/uL Final    Baso # 11/29/2023 0.11  0.00 - 0.20 K/uL Final    nRBC 11/29/2023 0  0 /100 WBC Final    Gran % 11/29/2023 61.6  38.0 - 73.0 % Final    Lymph % 11/29/2023 25.1  18.0 - 48.0 % Final    Mono % 11/29/2023 9.6  4.0 - 15.0 % Final    Eosinophil % 11/29/2023 1.9  0.0 - 8.0 % Final    Basophil % 11/29/2023 1.3  0.0 - 1.9 % Final    Differential Method 11/29/2023 Automated   Final    Sodium 11/29/2023 135 (L)  136 - 145 mmol/L Final    Potassium 11/29/2023 4.6  3.5 - 5.1 mmol/L Final    Chloride 11/29/2023 102  95 - 110 mmol/L Final    CO2 11/29/2023 23  23 - 29 mmol/L Final    Glucose 11/29/2023 98   70 - 110 mg/dL Final    BUN 11/29/2023 27 (H)  8 - 23 mg/dL Final    Creatinine 11/29/2023 1.5 (H)  0.5 - 1.4 mg/dL Final    Calcium 11/29/2023 9.9  8.7 - 10.5 mg/dL Final    Total Protein 11/29/2023 7.4  6.0 - 8.4 g/dL Final    Albumin 11/29/2023 3.3 (L)  3.5 - 5.2 g/dL Final    Total Bilirubin 11/29/2023 0.4  0.1 - 1.0 mg/dL Final    Comment: For infants and newborns, interpretation of results should be based  on gestational age, weight and in agreement with clinical  observations.    Premature Infant recommended reference ranges:  Up to 24 hours.............<8.0 mg/dL  Up to 48 hours............<12.0 mg/dL  3-5 days..................<15.0 mg/dL  6-29 days.................<15.0 mg/dL      Alkaline Phosphatase 11/29/2023 82  55 - 135 U/L Final    AST 11/29/2023 25  10 - 40 U/L Final    ALT 11/29/2023 21  10 - 44 U/L Final    eGFR 11/29/2023 48.2 (A)  >60 mL/min/1.73 m^2 Final    Anion Gap 11/29/2023 10  8 - 16 mmol/L Final         Imaging:     PET/CT 11/16/23  Head/neck:     No significant abnormal hypermetabolic foci are identified within the head and neck region. No lymphadenopathy is present.     Chest:     Since the recent CT of 10/21/2023, the bilateral pleural effusions have resolved. There is a new hypermetabolic subpleural nodule in the posterior right lower lobe which is suspicious for primary or metastatic neoplasm. The cannot be certain whether this nodule was present on the recent CT study due to the presence of a right pleural effusion, but the nodule was not present on the previous CTA of the chest on 09/10/2021. On images 130 9-141 the nodule measures 2.2 x 1.3 cm with a max SUV of 2.9. No other significant abnormal hypermetabolic foci are identified within the chest. No lymphadenopathy is present.     Abdomen/pelvis:     There are postoperative changes of a cystectomy and prostatectomy with ureteral diversion right lower quadrant ileostomy. There is no hydronephrosis. No significant abnormal  hypermetabolic foci are identified within the abdomen and pelvis. No lymphadenopathy is present. The adrenal glands are normal.     Skeleton:     No significant abnormal hypermetabolic foci are identified within the skeleton. There are no findings to suggest osseous metastatic disease.       Assessment:       1. Urothelial carcinoma of bladder    2. Lung nodule    3. Urinary tract infection without hematuria, site unspecified    4. C. difficile colitis    5. Anorexia    6. Normocytic anemia    7. Hypertension, unspecified type    8. Thrombophilia    9. Blood creatinine increased compared with prior measurement               Plan:     Bladder Cancer - pt with stage IIIB bladder cancer pT3N2 s/p neoadjuvant chemo followed by radical cystoprostatectomy 8/15/23  -PT received one dose of Nivolumab on 9/18/23  -PET/CT suggestive of a RLL pulmonary nodule  -PT will need biopsy prior to initiating treatment  -Would treat with Pembrolizumab if pt with metastatic disease    Pulmonary nodule - see above    Prostate Cancer - patient with acinar adenocarcinoma of the prostate grade group 1 (Greenwood score 3+3=6) in 1 2 5% of prostate tissue pT2N0  -PSA 10/31/23 0.02ng/mL    UTI - Pt on treatment with doxycycline until ureteral stents removed per ID recs  -Case discussed with Dr Ghosh today whom states he plans on removing stents in mid December    C D-iff - diarrhea improved  -Pt on Fidaxomicin every other day for 25 days  -Managemetn per ID    Anorexia - pt with improvement on marinol but takes medication nightly due to sedation  -Will monitor    C Diff - pt with recurrent C diff on fidaxomicin with 4 days left  -Diarrhea improved   -Will Monitor    Anemia - Hemoglobin 10.7g/dL on 11/29/23  -Will monitor     HTN - pt not on meds  -Stable  -Will monitor    Thrombophilia - pt with prior PE  -Pt subsequently developed a right perinephric bleed requiring embolization   -Patient with IVC filter in place  -Will monitor    Increased  Creatinine - creatinine up to 1.5 on 11/29/23  -Will have pt see nephrology    Route Chart for Scheduling    Med Onc Chart Routing      Follow up with physician Other. Pt needs a CBC and CMP today.  He needs a STAT biopsy with IR of the lung with a return appt with me a week after the biopsy.   Follow up with [facility]    Infusion scheduling note    Injection scheduling note    Labs    Imaging    Pharmacy appointment    Other referrals              Treatment Plan Information   OP NIVOLUMAB 480 MG Q4W   Jackson Jimenez MD   Upcoming Treatment Dates - OP NIVOLUMAB 480 MG Q4W    11/16/2023       Chemotherapy       nivolumab 480 mg in sodium chloride 0.9% 98 mL infusion  12/14/2023       Chemotherapy       nivolumab 480 mg in sodium chloride 0.9% 98 mL infusion  1/11/2024       Chemotherapy       nivolumab 480 mg in sodium chloride 0.9% 98 mL infusion  2/8/2024       Chemotherapy       nivolumab 480 mg in sodium chloride 0.9% 98 mL infusion      Jackson Jimenez MD  [facility]  Hematology and Oncology  [facility] [address] [place] [address]   O: [phone]  F: [phone]

## 2023-11-30 ENCOUNTER — OFFICE VISIT (OUTPATIENT)
Dept: PALLIATIVE MEDICINE | Facility: CLINIC | Age: 73
End: 2023-11-30
Payer: MEDICARE

## 2023-11-30 DIAGNOSIS — C61 PROSTATE CANCER: Primary | ICD-10-CM

## 2023-11-30 DIAGNOSIS — C67.8 MALIGNANT NEOPLASM OF OVERLAPPING SITES OF BLADDER: ICD-10-CM

## 2023-11-30 PROCEDURE — 1111F PR DISCHARGE MEDS RECONCILED W/ CURRENT OUTPATIENT MED LIST: ICD-10-PCS | Mod: CPTII,95,, | Performed by: NURSE PRACTITIONER

## 2023-11-30 PROCEDURE — 1123F ACP DISCUSS/DSCN MKR DOCD: CPT | Mod: CPTII,95,, | Performed by: NURSE PRACTITIONER

## 2023-11-30 PROCEDURE — 1111F DSCHRG MED/CURRENT MED MERGE: CPT | Mod: CPTII,95,, | Performed by: NURSE PRACTITIONER

## 2023-11-30 PROCEDURE — 3066F PR DOCUMENTATION OF TREATMENT FOR NEPHROPATHY: ICD-10-PCS | Mod: CPTII,95,, | Performed by: NURSE PRACTITIONER

## 2023-11-30 PROCEDURE — 99214 PR OFFICE/OUTPT VISIT, EST, LEVL IV, 30-39 MIN: ICD-10-PCS | Mod: 95,,, | Performed by: NURSE PRACTITIONER

## 2023-11-30 PROCEDURE — 3066F NEPHROPATHY DOC TX: CPT | Mod: CPTII,95,, | Performed by: NURSE PRACTITIONER

## 2023-11-30 PROCEDURE — 99214 OFFICE O/P EST MOD 30 MIN: CPT | Mod: 95,,, | Performed by: NURSE PRACTITIONER

## 2023-11-30 PROCEDURE — 1123F PR ADV CARE PLAN DISCUSSED, PLAN OR SURROGATE DOCUMENTED: ICD-10-PCS | Mod: CPTII,95,, | Performed by: NURSE PRACTITIONER

## 2023-11-30 NOTE — PROGRESS NOTES
The patient location is: [place]  The chief complaint leading to consultation is: symptom management, goal of care discussion    Visit type: audiovisual    Face to Face time with patient: 20  30 minutes of total time spent on the encounter, which includes face to face time and non-face to face time preparing to see the patient (eg, review of tests), Obtaining and/or reviewing separately obtained history, Documenting clinical information in the electronic or other health record, Independently interpreting results (not separately reported) and communicating results to the patient/family/caregiver, or Care coordination (not separately reported).         Each patient to whom he or she provides medical services by telemedicine is:  (1) informed of the relationship between the physician and patient and the respective role of any other health care provider with respect to management of the patient; and (2) notified that he or she may decline to receive medical services by telemedicine and may withdraw from such care at any time.    Notes:       Virtual Visit  [facility]        ASSESSMENT/PLAN:     Plan/Recommendations:  Diagnoses and all orders for this visit:    Palliative care by specialist  Patient is independent ADLS, well supported by wife, no in home needs identified today   Spent most of visit discussing patient's challenge in finding a local PCP   Wife denies any Palliative Care needs and will follow up as needed.  Patient's goal at this time is to avoid hospitalization, remain independent and spend time with his family.   ECOG 2   Discussed resources available at cancer center including support groups,  PT, Psych , SW, nutritionist, and services in integrative oncology   Patient not ready to engage at this time    Malignant neoplasm of overlapping sites of bladder  Prostate cancer  Patient under management of Dr Jimenez  Continue to follow     Follow up: 2 months        SUBJECTIVE:     History  of Present Illness:  Patient is a 73 y.o. year old male with h/o bladder cancer under the management of Dr Jimenez. He has had a complicated  course since diagnosis, he was seen by Palliative Care while inpatient at [facility] for Cdiff, he is referred for continued support with symptom management and 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 He did have neoadjuvant chemotherapy. The patient's surgery was ultimately complicated by small-bowel obstruction, and pulmonary embolism.  PT recevied 1 dose of Nivolumab 9/18/23.  The patient was placed on anticoagulation for pulmonary embolism and ultimately developed a right perinephric bleed requiring embolization. He is currently not on anticoagulation and does IVC filter.  PT also developed UTI and acquired ureteral obstruction with placement of bilateral ureteral stents.  PT also developed C diff and was hospitalized for suspected recurrence at [facility] from 10/21/2023 to 10/25/2023.  Patient was discharged on fidaxomicin.       Palliative Discussion:  Patient  seen virtually today, for follow up, last seen prior to week of thanksgiving and patient had opted to take a break from his treatment so as not to be sick when his family came to [facility]. Wife is present and assisting with history, patient doing well, she reports difficult engaging with new PCP, patient has preference for a male doctor only. She has secured him an appt with [facility] but it is far out. Patient denies any emergent needs .  Since last visit, they have been seen by Oncology, his notes indicates : -  -PET/CT suggestive of a RLL pulmonary nodule  -PT will need biopsy prior to initiating treatment  -Would treat with Pembrolizumab if pt with metastatic disease    Patient also to have removal of urethral stents in December. Wife reports she is unsure why patient needs to see  Palliative Care, reminded that our role is supportive, we can manage any symptoms as well as connect him to the resources here at the Cancer Center to include support groups, Psychologist, PT, Nutritionists , SW and Integrative Oncology Patient can see us as often our as little as he chooses.    Discussed goals, patient  enjoys fishing and gardening and is hoping to be able to do this again some day. His wife would also like to have more time with him at home.     ROS:  Review of Systems   Constitutional:  Negative for appetite change and unexpected weight change.   HENT:  Negative for mouth sores.    Eyes:  Negative for visual disturbance.   Respiratory:  Positive for shortness of breath. Negative for cough.    Cardiovascular:  Negative for chest pain.   Gastrointestinal:  Positive for abdominal pain. Negative for diarrhea.   Genitourinary:  Negative for frequency.   Musculoskeletal:  Negative for back pain.   Skin:  Negative for rash.   Neurological:  Negative for headaches.   Hematological:  Negative for adenopathy.   Psychiatric/Behavioral:  The patient is not nervous/anxious.      Past Medical History:   Diagnosis Date    Aneurysm artery, popliteal     right leg    Hypertension     Seizures      No past surgical history on file.  Family History   Problem Relation Age of Onset    Heart disease Mother     Hypertension Mother      Review of patient's allergies indicates:   Allergen Reactions    Clopidogrel Other (See Comments)     Increased body temperature and redness      Social Determinants of Health     Tobacco Use: Low Risk  (11/29/2023)    Patient History     Smoking Tobacco Use: Never     Smokeless Tobacco Use: Never     Passive Exposure: Not on file   Alcohol Use: Not At Risk (11/10/2023)    AUDIT-C     Frequency of Alcohol Consumption: Never     Average Number of Drinks: Patient does not drink     Frequency of Binge Drinking: Never   Financial Resource Strain: Unknown (11/10/2023)    Overall Financial  Resource Strain (CARDIA)     Difficulty of Paying Living Expenses: Patient refused   Food Insecurity: No Food Insecurity (11/18/2023)    Hunger Vital Sign     Worried About Running Out of Food in the Last Year: Never true     Ran Out of Food in the Last Year: Never true   Transportation Needs: No Transportation Needs (11/18/2023)    PRAPARE - Transportation     Lack of Transportation (Medical): No     Lack of Transportation (Non-Medical): No   Physical Activity: Insufficiently Active (11/10/2023)    Exercise Vital Sign     Days of Exercise per Week: 7 days     Minutes of Exercise per Session: 10 min   Stress: No Stress Concern Present (11/10/2023)    [place] [employer] - Occupational Stress Questionnaire     Feeling of Stress : Not at all   Social Connections: Unknown (11/10/2023)    Social Connection and Isolation Panel [NHANES]     Frequency of Communication with Friends and Family: More than three times a week     Frequency of Social Gatherings with Friends and Family: Patient refused     Attends Jainism Services: Not on file     Active Member of Clubs or Organizations: Patient refused     Attends Club or Organization Meetings: Patient refused     Marital Status:    Housing Stability: Low Risk  (11/18/2023)    Housing Stability Vital Sign     Unable to Pay for Housing in the Last Year: No     Number of Places Lived in the Last Year: 2     Unstable Housing in the Last Year: No   Depression: Low Risk  (10/21/2021)    Depression     Last PHQ-4: Flowsheet Data: 0   Recent Concern: Depression - Medium Risk (9/15/2021)    Depression     Last PHQ-4: Flowsheet Data: 4      The Modified Caregiver Strain Index (MCSI) -   No data recorded   No data recorded   No data recorded   No data recorded   No data recorded   No data recorded   No data recorded   No data recorded   No data recorded   No data recorded   No data recorded   No data recorded   No data recorded   No data recorded        OBJECTIVE:     Physical Exam:  Vitals:    Physical Exam  Pulmonary:      Effort: Pulmonary effort is normal.   Neurological:      Mental Status: He is alert and oriented to person, place, and time.   Psychiatric:         Mood and Affect: Mood normal.           Review of Symptoms      Symptom Assessment (ESAS 0-10 Scale)  Pain:  0  Dyspnea:  0  Anxiety:  0  Nausea:  0  Depression:  0  Anorexia:  0  Fatigue:  0  Insomnia:  0  Restlessness:  0  Agitation:  0     Diarrhea:  Positive    Anxiety:  Is not nervous/anxious    ECOG Performance Status rdGrdrrdarddrderd:rd rd3rd Psychosocial/Cultural:   See Palliative Psychosocial Note: Yes  **Primary  to Follow**  Palliative Care  Consult: No      Advance Care Planning   Advance Directives:   Living Will: Yes        Copy on chart: Yes    Medical Power of : Yes      Decision Making:  Patient answered questions  Goals of Care: What is most important right now is to focus on curative/life-prolongation (regardless of treatment burdens). Accordingly, we have decided that the best plan to meet the patient's goals includes continuing with treatment.                Medications:    Current Outpatient Medications:     acetaminophen (TYLENOL) 500 MG tablet, Take 500-1,000 mg by mouth every 6 (six) hours as needed (fever/pain)., Disp: , Rfl:     ASCORBIC ACID, VITAMIN C, ORAL, Take 1 tablet by mouth once daily., Disp: , Rfl:     atorvastatin (LIPITOR) 20 MG tablet, Take 2 tablets (40 mg total) by mouth once daily., Disp: , Rfl:     b complex vitamins tablet, Take 1 tablet by mouth once daily., Disp: , Rfl:     cholecalciferol, vitamin D3, (VITAMIN D3 ORAL), Take 5,000 Units by mouth once daily., Disp: , Rfl:     diphenoxylate-atropine 2.5-0.025 mg (LOMOTIL) 2.5-0.025 mg per tablet, Take 1 tablet by mouth 4 (four) times daily as needed for Diarrhea., Disp: , Rfl:     doxycycline (VIBRA-TABS) 100 MG tablet, Take 1 tablet (100 mg total) by mouth every 12 (twelve)  hours. for 25 days, Disp: 50 tablet, Rfl: 0    ferrous sulfate (SLOW RELEASE IRON) 142 mg (45 mg iron) TbSR, Take 1 tablet by mouth Daily., Disp: , Rfl:     fidaxomicin (DIFICID) 200 mg Tab, Take 1 tablet (200 mg total) by mouth every other day. for 25 days, Disp: 12 tablet, Rfl: 0    Lactobacillus rhamnosus GG (CULTURELLE) 10 billion cell capsule, Take 1 capsule by mouth once daily., Disp: 30 capsule, Rfl: 0    LIDOcaine-prilocaine (EMLA) cream, Apply topically once as needed (to port access)., Disp: , Rfl:     multivitamin with minerals tablet, Take 1 tablet by mouth once daily., Disp: , Rfl:     UNABLE TO FIND, Take 1 capsule by mouth once daily. medication name: Colon health 80 billion, Disp: , Rfl:     zinc gluconate 50 mg tablet, Take 50 mg by mouth once daily., Disp: , Rfl:     Labs:  CBC:   WBC   Date Value Ref Range Status   11/29/2023 8.42 3.90 - 12.70 K/uL Final     Hemoglobin   Date Value Ref Range Status   11/29/2023 10.7 (L) 14.0 - 18.0 g/dL Final     Hematocrit   Date Value Ref Range Status   11/29/2023 32.3 (L) 40.0 - 54.0 % Final     MCV   Date Value Ref Range Status   11/29/2023 91 82 - 98 fL Final     Platelets   Date Value Ref Range Status   11/29/2023 352 150 - 450 K/uL Final       LFT:   Lab Results   Component Value Date    AST 25 11/29/2023    ALKPHOS 82 11/29/2023    BILITOT 0.4 11/29/2023       Albumin:   Albumin   Date Value Ref Range Status   11/29/2023 3.3 (L) 3.5 - 5.2 g/dL Final     Protein:   Total Protein   Date Value Ref Range Status   11/29/2023 7.4 6.0 - 8.4 g/dL Final       Radiology:None      Signature: Lakisha Salinas NP

## 2023-12-06 ENCOUNTER — PATIENT MESSAGE (OUTPATIENT)
Dept: NEPHROLOGY | Facility: CLINIC | Age: 73
End: 2023-12-06

## 2023-12-06 ENCOUNTER — OFFICE VISIT (OUTPATIENT)
Dept: NEPHROLOGY | Facility: CLINIC | Age: 73
End: 2023-12-06
Payer: MEDICARE

## 2023-12-06 VITALS
DIASTOLIC BLOOD PRESSURE: 80 MMHG | SYSTOLIC BLOOD PRESSURE: 132 MMHG | HEART RATE: 70 BPM | HEIGHT: 69 IN | BODY MASS INDEX: 20.88 KG/M2 | WEIGHT: 141 LBS

## 2023-12-06 DIAGNOSIS — N18.9 CHRONIC KIDNEY DISEASE, UNSPECIFIED CKD STAGE: ICD-10-CM

## 2023-12-06 DIAGNOSIS — R79.89 BLOOD CREATININE INCREASED COMPARED WITH PRIOR MEASUREMENT: ICD-10-CM

## 2023-12-06 DIAGNOSIS — C61 PROSTATE CANCER: ICD-10-CM

## 2023-12-06 DIAGNOSIS — Z86.711 HISTORY OF PULMONARY EMBOLUS (PE): ICD-10-CM

## 2023-12-06 DIAGNOSIS — I10 ESSENTIAL HYPERTENSION: ICD-10-CM

## 2023-12-06 DIAGNOSIS — N13.39 OTHER HYDRONEPHROSIS: ICD-10-CM

## 2023-12-06 DIAGNOSIS — C67.9 MALIGNANT NEOPLASM OF URINARY BLADDER, UNSPECIFIED SITE: ICD-10-CM

## 2023-12-06 DIAGNOSIS — N17.9 AKI (ACUTE KIDNEY INJURY): Primary | ICD-10-CM

## 2023-12-06 PROCEDURE — 1159F PR MEDICATION LIST DOCUMENTED IN MEDICAL RECORD: ICD-10-PCS | Mod: CPTII,S$GLB,, | Performed by: INTERNAL MEDICINE

## 2023-12-06 PROCEDURE — 1160F RVW MEDS BY RX/DR IN RCRD: CPT | Mod: CPTII,S$GLB,, | Performed by: INTERNAL MEDICINE

## 2023-12-06 PROCEDURE — 3079F PR MOST RECENT DIASTOLIC BLOOD PRESSURE 80-89 MM HG: ICD-10-PCS | Mod: CPTII,S$GLB,, | Performed by: INTERNAL MEDICINE

## 2023-12-06 PROCEDURE — 99204 PR OFFICE/OUTPT VISIT, NEW, LEVL IV, 45-59 MIN: ICD-10-PCS | Mod: S$GLB,,, | Performed by: INTERNAL MEDICINE

## 2023-12-06 PROCEDURE — 1126F PR PAIN SEVERITY QUANTIFIED, NO PAIN PRESENT: ICD-10-PCS | Mod: CPTII,S$GLB,, | Performed by: INTERNAL MEDICINE

## 2023-12-06 PROCEDURE — 1111F DSCHRG MED/CURRENT MED MERGE: CPT | Mod: CPTII,S$GLB,, | Performed by: INTERNAL MEDICINE

## 2023-12-06 PROCEDURE — 1157F PR ADVANCE CARE PLAN OR EQUIV PRESENT IN MEDICAL RECORD: ICD-10-PCS | Mod: CPTII,S$GLB,, | Performed by: INTERNAL MEDICINE

## 2023-12-06 PROCEDURE — 3066F NEPHROPATHY DOC TX: CPT | Mod: CPTII,S$GLB,, | Performed by: INTERNAL MEDICINE

## 2023-12-06 PROCEDURE — 3288F FALL RISK ASSESSMENT DOCD: CPT | Mod: CPTII,S$GLB,, | Performed by: INTERNAL MEDICINE

## 2023-12-06 PROCEDURE — 3075F SYST BP GE 130 - 139MM HG: CPT | Mod: CPTII,S$GLB,, | Performed by: INTERNAL MEDICINE

## 2023-12-06 PROCEDURE — 99999 PR PBB SHADOW E&M-EST. PATIENT-LVL IV: ICD-10-PCS | Mod: PBBFAC,,, | Performed by: INTERNAL MEDICINE

## 2023-12-06 PROCEDURE — 1159F MED LIST DOCD IN RCRD: CPT | Mod: CPTII,S$GLB,, | Performed by: INTERNAL MEDICINE

## 2023-12-06 PROCEDURE — 1160F PR REVIEW ALL MEDS BY PRESCRIBER/CLIN PHARMACIST DOCUMENTED: ICD-10-PCS | Mod: CPTII,S$GLB,, | Performed by: INTERNAL MEDICINE

## 2023-12-06 PROCEDURE — 3075F PR MOST RECENT SYSTOLIC BLOOD PRESS GE 130-139MM HG: ICD-10-PCS | Mod: CPTII,S$GLB,, | Performed by: INTERNAL MEDICINE

## 2023-12-06 PROCEDURE — 1111F PR DISCHARGE MEDS RECONCILED W/ CURRENT OUTPATIENT MED LIST: ICD-10-PCS | Mod: CPTII,S$GLB,, | Performed by: INTERNAL MEDICINE

## 2023-12-06 PROCEDURE — 99204 OFFICE O/P NEW MOD 45 MIN: CPT | Mod: S$GLB,,, | Performed by: INTERNAL MEDICINE

## 2023-12-06 PROCEDURE — 1157F ADVNC CARE PLAN IN RCRD: CPT | Mod: CPTII,S$GLB,, | Performed by: INTERNAL MEDICINE

## 2023-12-06 PROCEDURE — 3066F PR DOCUMENTATION OF TREATMENT FOR NEPHROPATHY: ICD-10-PCS | Mod: CPTII,S$GLB,, | Performed by: INTERNAL MEDICINE

## 2023-12-06 PROCEDURE — 1101F PR PT FALLS ASSESS DOC 0-1 FALLS W/OUT INJ PAST YR: ICD-10-PCS | Mod: CPTII,S$GLB,, | Performed by: INTERNAL MEDICINE

## 2023-12-06 PROCEDURE — 1101F PT FALLS ASSESS-DOCD LE1/YR: CPT | Mod: CPTII,S$GLB,, | Performed by: INTERNAL MEDICINE

## 2023-12-06 PROCEDURE — 3079F DIAST BP 80-89 MM HG: CPT | Mod: CPTII,S$GLB,, | Performed by: INTERNAL MEDICINE

## 2023-12-06 PROCEDURE — 99999 PR PBB SHADOW E&M-EST. PATIENT-LVL IV: CPT | Mod: PBBFAC,,, | Performed by: INTERNAL MEDICINE

## 2023-12-06 PROCEDURE — 1126F AMNT PAIN NOTED NONE PRSNT: CPT | Mod: CPTII,S$GLB,, | Performed by: INTERNAL MEDICINE

## 2023-12-06 PROCEDURE — 3288F PR FALLS RISK ASSESSMENT DOCUMENTED: ICD-10-PCS | Mod: CPTII,S$GLB,, | Performed by: INTERNAL MEDICINE

## 2023-12-06 NOTE — PROGRESS NOTES
Subjective:      Patient ID: Aren Bey Jr. is a 73 y.o. White male who presents for new evaluation of Consult (/), RAOUL, and Chronic Kidney Disease    HPI    He is referred by his Oncologist for RAUOL/CKD  Several episodes of RAOUL during the past several years, most recent with UTI  History of prostate and bladder cancer  Family reports complications and SEs throughout his cancer treatment   Has lost significant weight but appetite is better, and high calorie healthy foods  No LE edema currently but had LE post discharge (s/p IVFs during recent stay)   C diff X 2, stool is better now, not loose  No NSAIDs just Tylenol   Hydronephrosis in past s/p ureteral stents--Urologist is Uro-Onc at [facility]   Lives at home with spouse, accompanies today and greatly assists with history     Review of Systems   Constitutional:  Positive for activity change, appetite change and fatigue.   HENT:  Negative for facial swelling.    Respiratory:  Negative for shortness of breath.    Cardiovascular:  Negative for leg swelling (none currently).   Gastrointestinal:  Negative for constipation and diarrhea.   Genitourinary:  Negative for difficulty urinating.   Musculoskeletal:  Positive for gait problem.   Allergic/Immunologic: Positive for immunocompromised state.   Neurological:  Positive for weakness (strength improving). Negative for light-headedness.   Psychiatric/Behavioral:  Negative for confusion and decreased concentration.       Objective:     Physical Exam  Vitals and nursing note reviewed.   Constitutional:       General: He is not in acute distress.     Appearance: He is not ill-appearing.   Eyes:      General: No scleral icterus.  Cardiovascular:      Rate and Rhythm: Normal rate.   Pulmonary:      Breath sounds: No wheezing or rales.   Abdominal:      General: There is no distension.   Musculoskeletal:      Right lower leg: No edema.      Left lower leg: No edema.   Skin:     General: Skin is warm and dry.    Neurological:      Mental Status: He is alert and oriented to person, place, and time. Mental status is at baseline.   Psychiatric:         Mood and Affect: Mood normal.       Assessment:     1. RAOUL (acute kidney injury)    2. Blood creatinine increased compared with prior measurement    3. Chronic kidney disease, unspecified CKD stage    4. Prostate cancer    5. Malignant neoplasm of urinary bladder, unspecified site    6. History of pulmonary embolus (PE)    7. Essential hypertension       Plan:       RAOUL on mild CKD  - CKD from several episodes of RAOUL  - hopefully this post hospital discharge creatinine of 1.5mg/dL reflects continued treatment of infection and off IVFs, with hopeful improvement in kidney function with this Friday lab check  - if no significant improvement will need to check on stents and hydro--stent replacement if occlusion. CT from 11/17 suggests worsening hydro (?)  - Maintain hydration, po has improved thus far  - increased protein in diet is fine   - avoid NSAIDs as doing       Labs this Friday pre lung bx

## 2023-12-14 NOTE — PROGRESS NOTES
PATIENT: Aren Bey Jr.  MRN: [id number]  DATE: 12/16/2023      Diagnosis:   1. Urothelial carcinoma of bladder    2. Other specified disorders involving the immune mechanism, not elsewhere classified    3. Aneurysm of right popliteal artery    4. Lung nodule    5. Urinary tract infection without hematuria, site unspecified    6. Anorexia    7. Normocytic anemia    8. Hypertension, unspecified type    9. Thrombophilia    10. Blood creatinine increased compared with prior measurement              Chief Complaint: Urothelial carcinoma of bladder (2 week follow up)      Oncologic History:      Oncologic History     Oncologic Treatment     Pathology           Subjective:    Interval History:  Mr. Bey is a 73 y.o. male with HTN, Seizures, bladder cancer, h/o pulmonary embolism, who presents for bladder cancer.  Since the last clinic visit the patient underwent biopsy of nodule in the right lung on 12/08/2023 showing fibrosis and chronic inflammation but no evidence of neoplasm or granuloma.  The patient endorses occasional shortness of breath.  The patient also endorses occasional itchiness in his back.  The patient denies any rash.  The patient denies CP, cough, SOB, abdominal pain, nausea, vomiting, constipation, diarrhea.  The patient denies fever, chills, night sweats, weight loss, new lumps or bumps, easy bruising or bleeding.    Prior History:  Patient underwent transurethral resection of bladder tumor on 04/28/2023 with path showing invasive urothelial carcinoma, high-grade with involvement of muscularis propria and positive for lymphovascular invasion.  Patient underwent chemotherapy at [facility].  The patient then underwent radical cystectomy and prostatectomy on 08/15/2023 with pathology showing invasive high-grade urothelial carcinoma measuring 4.2 cm, positive lymphovascular invasion, tumor identified within the soft tissue surrounding the left distal ureter, 4/6 positive  regional lymph nodes with extranodal extension present. pT3aN2.  Also seen was acinar adenocarcinoma of the prostate grade group 1 (Sarah score 3+3=6) in 1 2 5% of prostate tissue pT2N0.  The patient's surgery was ultimately complicated by small-bowel obstruction, and pulmonary embolism.  PT recevied 1 dose of Nivolumab 9/18/23.  The patient was placed on anticoagulation for pulmonary embolism and ultimately developed a right perinephric bleed requiring embolization.  PT also developed UTI and acquired ureteral obstruction with placement of bilateral ureteral stents.  PT also developed C diff and was hospitalized for suspected recurrence at [facility] from 10/21/2023 to 10/25/2023.  Patient was discharged on fidaxomicin.   The patient saw Dr. Ghosh with Urology on 11/13/2023 for with recommendations for virtual visit in January to discuss possible operative intervention replaced stents, possible revision of ureteral ileal anastomosis pending disease progression.  It was instructed that the patient would need catheterized specimen from urostomy to rule out UTI if patient with future symptoms.  Patient underwent PET-CT on 11/16/2023 showing a new hypermetabolic subpleural nodule in the posterior right lower lobe measuring 2.2 x 1.3 cm.  The patient endorses a fever starting on the night of 11/16/2023.  Patient was prescribed Bactrim for suspected potential urinary tract infection.   The patient was admitted to the hospital from 11/17/23-11/21/23 for C diff colitis and suspected urinary tract infection the patient was discharged on fidaxomicin 200 mg b.i.d. for additional 7 days as well as 200 mg every other day for 25 days.  Patient was also discharged on doxycycline 100 mg b.i.d. for 25 days with instructions from Infectious Disease to remain on antibiotics as long as his ureteral stents were in place.    Past Medical History:   Past Medical History:   Diagnosis Date    Aneurysm artery, popliteal     right leg     Bladder cancer     Hypertension     Malignant neoplasm of prostate     Seizures        Past Surgical HIstory:   Past Surgical History:   Procedure Laterality Date    PROSTATECTOMY         Family History:   Family History   Problem Relation Age of Onset    Heart disease Mother     Hypertension Mother        Social History:  reports that he has never smoked. He has never used smokeless tobacco. He reports that he does not currently use alcohol. He reports that he does not use drugs.    Allergies:  Review of patient's allergies indicates:   Allergen Reactions    Clopidogrel Other (See Comments)     Increased body temperature and redness        Medications:  Current Outpatient Medications   Medication Sig Dispense Refill    acetaminophen (TYLENOL) 500 MG tablet Take 500-1,000 mg by mouth every 6 (six) hours as needed (fever/pain).      ASCORBIC ACID, VITAMIN C, ORAL Take 1 tablet by mouth once daily.      atorvastatin (LIPITOR) 20 MG tablet Take 2 tablets (40 mg total) by mouth once daily. (Patient taking differently: Take 20 mg by mouth once daily.)      b complex vitamins tablet Take 1 tablet by mouth once daily.      cholecalciferol, vitamin D3, (VITAMIN D3 ORAL) Take 5,000 Units by mouth once daily.      diphenoxylate-atropine 2.5-0.025 mg (LOMOTIL) 2.5-0.025 mg per tablet Take 1 tablet by mouth 4 (four) times daily as needed for Diarrhea.      doxycycline (VIBRA-TABS) 100 MG tablet Take 1 tablet (100 mg total) by mouth every 12 (twelve) hours. for 25 days 50 tablet 0    ferrous sulfate (SLOW RELEASE IRON) 142 mg (45 mg iron) TbSR Take 1 tablet by mouth Daily.      Lactobacillus rhamnosus GG (CULTURELLE) 10 billion cell capsule Take 1 capsule by mouth once daily. 30 capsule 0    LIDOcaine-prilocaine (EMLA) cream Apply topically once as needed (to port access).      multivitamin with minerals tablet Take 1 tablet by mouth once daily.      UNABLE TO FIND Take 1 capsule by mouth once daily. medication name: Colon  "health 80 billion      zinc gluconate 50 mg tablet Take 50 mg by mouth once daily.       No current facility-administered medications for this visit.       Review of Systems   Constitutional:  Negative for appetite change, chills, diaphoresis, fatigue, fever and unexpected weight change.   HENT:  Negative for mouth sores.    Eyes:  Negative for visual disturbance.   Respiratory:  Positive for shortness of breath (on occasion). Negative for cough.    Cardiovascular:  Negative for chest pain, palpitations and leg swelling.   Gastrointestinal:  Negative for abdominal pain, constipation, diarrhea, nausea and vomiting.   Genitourinary:  Negative for frequency.   Musculoskeletal:  Negative for back pain.   Skin:  Negative for color change and rash.        Itchiness back   Neurological:  Negative for headaches.   Hematological:  Negative for adenopathy. Does not bruise/bleed easily.   Psychiatric/Behavioral:  The patient is not nervous/anxious.        ECOG Performance Status: 1   Objective:      Vitals:   Vitals:    12/15/23 1429   BP: 112/70   BP Location: Right arm   Patient Position: Sitting   BP Method: Medium (Manual)   Pulse: 81   Resp: 18   Temp: 97.1 °F (36.2 °C)   TempSrc: Temporal   SpO2: 99%   Weight: 64.9 kg (143 lb 1.3 oz)   Height: 5' 9" (1.753 m)             Physical Exam  Constitutional:       General: He is not in acute distress.     Appearance: He is well-developed. He is not diaphoretic.   HENT:      Head: Normocephalic and atraumatic.   Cardiovascular:      Rate and Rhythm: Normal rate and regular rhythm.      Heart sounds: Normal heart sounds. No murmur heard.     No friction rub. No gallop.   Pulmonary:      Effort: Pulmonary effort is normal. No respiratory distress.      Breath sounds: Normal breath sounds. No wheezing or rales.   Chest:      Chest wall: No tenderness.   Abdominal:      General: Bowel sounds are normal. There is no distension.      Palpations: Abdomen is soft. There is no mass.      " Tenderness: There is no abdominal tenderness. There is no rebound.      Comments: Ileal conduit on RLQ with clear urine visualized   Musculoskeletal:      Cervical back: Normal range of motion.   Lymphadenopathy:      Cervical: No cervical adenopathy.      Upper Body:      Right upper body: No supraclavicular or axillary adenopathy.      Left upper body: No supraclavicular or axillary adenopathy.   Skin:     General: Skin is warm and dry.      Findings: No erythema or rash.   Neurological:      Mental Status: He is alert and oriented to person, place, and time.   Psychiatric:         Behavior: Behavior normal.         Laboratory Data:  Lab Visit on 12/15/2023   Component Date Value Ref Range Status    WBC 12/15/2023 8.31  3.90 - 12.70 K/uL Final    RBC 12/15/2023 3.52 (L)  4.60 - 6.20 M/uL Final    Hemoglobin 12/15/2023 10.7 (L)  14.0 - 18.0 g/dL Final    Hematocrit 12/15/2023 32.3 (L)  40.0 - 54.0 % Final    MCV 12/15/2023 92  82 - 98 fL Final    MCH 12/15/2023 30.4  27.0 - 31.0 pg Final    MCHC 12/15/2023 33.1  32.0 - 36.0 g/dL Final    RDW 12/15/2023 15.9 (H)  11.5 - 14.5 % Final    Platelets 12/15/2023 238  150 - 450 K/uL Final    MPV 12/15/2023 8.5 (L)  9.2 - 12.9 fL Final    Immature Granulocytes 12/15/2023 0.4  0.0 - 0.5 % Final    Gran # (ANC) 12/15/2023 4.8  1.8 - 7.7 K/uL Final    Immature Grans (Abs) 12/15/2023 0.03  0.00 - 0.04 K/uL Final    Comment: Mild elevation in immature granulocytes is non specific and   can be seen in a variety of conditions including stress response,   acute inflammation, trauma and pregnancy. Correlation with other   laboratory and clinical findings is essential.      Lymph # 12/15/2023 2.3  1.0 - 4.8 K/uL Final    Mono # 12/15/2023 0.9  0.3 - 1.0 K/uL Final    Eos # 12/15/2023 0.2  0.0 - 0.5 K/uL Final    Baso # 12/15/2023 0.10  0.00 - 0.20 K/uL Final    nRBC 12/15/2023 0  0 /100 WBC Final    Gran % 12/15/2023 57.8  38.0 - 73.0 % Final    Lymph % 12/15/2023 27.2  18.0 - 48.0 %  Final    Mono % 12/15/2023 10.8  4.0 - 15.0 % Final    Eosinophil % 12/15/2023 2.6  0.0 - 8.0 % Final    Basophil % 12/15/2023 1.2  0.0 - 1.9 % Final    Differential Method 12/15/2023 Automated   Final    Sodium 12/15/2023 134 (L)  136 - 145 mmol/L Final    Potassium 12/15/2023 5.1  3.5 - 5.1 mmol/L Final    Chloride 12/15/2023 101  95 - 110 mmol/L Final    CO2 12/15/2023 23  23 - 29 mmol/L Final    Glucose 12/15/2023 92  70 - 110 mg/dL Final    BUN 12/15/2023 42 (H)  8 - 23 mg/dL Final    Creatinine 12/15/2023 1.8 (H)  0.5 - 1.4 mg/dL Final    Calcium 12/15/2023 9.9  8.7 - 10.5 mg/dL Final    Total Protein 12/15/2023 7.5  6.0 - 8.4 g/dL Final    Albumin 12/15/2023 3.7  3.5 - 5.2 g/dL Final    Total Bilirubin 12/15/2023 0.4  0.1 - 1.0 mg/dL Final    Comment: For infants and newborns, interpretation of results should be based  on gestational age, weight and in agreement with clinical  observations.    Premature Infant recommended reference ranges:  Up to 24 hours.............<8.0 mg/dL  Up to 48 hours............<12.0 mg/dL  3-5 days..................<15.0 mg/dL  6-29 days.................<15.0 mg/dL      Alkaline Phosphatase 12/15/2023 84  55 - 135 U/L Final    AST 12/15/2023 29  10 - 40 U/L Final    ALT 12/15/2023 21  10 - 44 U/L Final    eGFR 12/15/2023 38.8 (A)  >60 mL/min/1.73 m^2 Final    Anion Gap 12/15/2023 10  8 - 16 mmol/L Final    TSH 12/15/2023 1.008  0.400 - 4.000 uIU/mL Final         Imaging:     PET/CT 11/16/23  Head/neck:     No significant abnormal hypermetabolic foci are identified within the head and neck region. No lymphadenopathy is present.     Chest:     Since the recent CT of 10/21/2023, the bilateral pleural effusions have resolved. There is a new hypermetabolic subpleural nodule in the posterior right lower lobe which is suspicious for primary or metastatic neoplasm. The cannot be certain whether this nodule was present on the recent CT study due to the presence of a right pleural effusion,  but the nodule was not present on the previous CTA of the chest on 09/10/2021. On images 130 9-141 the nodule measures 2.2 x 1.3 cm with a max SUV of 2.9. No other significant abnormal hypermetabolic foci are identified within the chest. No lymphadenopathy is present.     Abdomen/pelvis:     There are postoperative changes of a cystectomy and prostatectomy with ureteral diversion right lower quadrant ileostomy. There is no hydronephrosis. No significant abnormal hypermetabolic foci are identified within the abdomen and pelvis. No lymphadenopathy is present. The adrenal glands are normal.     Skeleton:     No significant abnormal hypermetabolic foci are identified within the skeleton. There are no findings to suggest osseous metastatic disease.       Assessment:       1. Urothelial carcinoma of bladder    2. Other specified disorders involving the immune mechanism, not elsewhere classified    3. Aneurysm of right popliteal artery    4. Lung nodule    5. Urinary tract infection without hematuria, site unspecified    6. Anorexia    7. Normocytic anemia    8. Hypertension, unspecified type    9. Thrombophilia    10. Blood creatinine increased compared with prior measurement                 Plan:     Bladder Cancer - pt with stage IIIB bladder cancer pT3N2 s/p neoadjuvant chemo followed by radical cystoprostatectomy 8/15/23  -PT received one dose of Nivolumab on 9/18/23  -PET/CT suggestive of a RLL pulmonary nodule  -Biopsy on 12/08/23 negative for malignancy  -Will start the patient on Nivolumab  Patient was consented for chemotherapy today 12/16/2023 Nivolumab.  An extensive discussion was had which included a thorough discussion of the risk and benefits of treatment and alternatives.  Risks, including but not limited to fatigue, diarrhea, nausea, vomiting, decrease in appetite, rash, flu-like symptoms, fever, infusion reaction, inflammation of the stomach or intestines, inflammation of the liver, inflammation of the  brain or nervous system, inflammation of the lungs, inflammation of the pancreas, inflammation of the kidneys, inflammation of the eyes, inflammation of the hormone producing glands of the body, impaired kidney function, impaired liver function, problems with sleep, unstable blood sugars, BP changes, infertility, and rarely death were all discussed.  The patient agrees with the plan, and all questions have been answered to their satisfaction.  Consent was signed the patient, provider, and a third party witness.  -Consented the patient to the treatment plan and the patient was educated on the planned duration of the treatment and schedule of the treatment administration.    Pulmonary nodule - see above    Prostate Cancer - patient with acinar adenocarcinoma of the prostate grade group 1 (Hagerman score 3+3=6) in 1 2 5% of prostate tissue pT2N0  -PSA 10/31/23 0.02ng/mL    UTI - Pt on treatment with doxycycline until ureteral stents removed per ID recs  -Management per ID and Dr Ghosh    Anorexia - on marinol  -Will monitor    Anemia - Hemoglobin 10.7g/dL on 12/15/23  -Will monitor     HTN - pt not on meds  -Stable  -Will monitor    Thrombophilia - pt with prior PE  -Pt subsequently developed a right perinephric bleed requiring embolization   -Patient with IVC filter in place  -PT to see vascular surgery to determine when anticoagulation could be restarted  -Will monitor    Right popliteal aneurysm - pt to see vascular surgery    Increased Creatinine - creatinine up to 1.8 on 12/15/23  -Pt following with nephrology    Itchiness - possibly from prior immunotherapy  -Pt instructed to use antihistamines    Route Chart for Scheduling    Med Onc Chart Routing      Follow up with physician Other. Pt needs CBC, CMP and TSH today. Pt needs chem o class next week and to start treatment.  PT prefers mornign appts and would like to get treatment early in the week.  Pt needs CBC, CMP and TSH with an appt with me the day before his  second treatment.  Pt also needs appt with vascular surgeon for politeal aneurysm.   Follow up with [facility]    Infusion scheduling note    Injection scheduling note    Labs    Imaging    Pharmacy appointment    Other referrals              Treatment Plan Information   OP NIVOLUMAB 480 MG Q4W   Jackson Jimenez MD   Upcoming Treatment Dates - OP NIVOLUMAB 480 MG Q4W    11/20/2023       Chemotherapy       nivolumab 480 mg in sodium chloride 0.9% 98 mL infusion  12/18/2023       Chemotherapy       nivolumab 480 mg in sodium chloride 0.9% 98 mL infusion  1/15/2024       Chemotherapy       nivolumab 480 mg in sodium chloride 0.9% 98 mL infusion  2/12/2024       Chemotherapy       nivolumab 480 mg in sodium chloride 0.9% 98 mL infusion      Jackson Jimenez MD  [facility]  Hematology and Oncology  [facility] [address] [place] [address]   O: [phone]  F: [phone]

## 2023-12-15 ENCOUNTER — LAB VISIT (OUTPATIENT)
Dept: LAB | Facility: HOSPITAL | Age: 73
End: 2023-12-15
Attending: INTERNAL MEDICINE
Payer: MEDICARE

## 2023-12-15 ENCOUNTER — OFFICE VISIT (OUTPATIENT)
Dept: HEMATOLOGY/ONCOLOGY | Facility: CLINIC | Age: 73
End: 2023-12-15
Payer: MEDICARE

## 2023-12-15 VITALS
DIASTOLIC BLOOD PRESSURE: 70 MMHG | OXYGEN SATURATION: 99 % | TEMPERATURE: 97 F | WEIGHT: 143.06 LBS | HEART RATE: 81 BPM | BODY MASS INDEX: 21.19 KG/M2 | RESPIRATION RATE: 18 BRPM | SYSTOLIC BLOOD PRESSURE: 112 MMHG | HEIGHT: 69 IN

## 2023-12-15 DIAGNOSIS — R63.0 ANOREXIA: ICD-10-CM

## 2023-12-15 DIAGNOSIS — R91.1 LUNG NODULE: ICD-10-CM

## 2023-12-15 DIAGNOSIS — D89.89 OTHER SPECIFIED DISORDERS INVOLVING THE IMMUNE MECHANISM, NOT ELSEWHERE CLASSIFIED: ICD-10-CM

## 2023-12-15 DIAGNOSIS — D64.9 NORMOCYTIC ANEMIA: ICD-10-CM

## 2023-12-15 DIAGNOSIS — R79.89 BLOOD CREATININE INCREASED COMPARED WITH PRIOR MEASUREMENT: ICD-10-CM

## 2023-12-15 DIAGNOSIS — D68.59 THROMBOPHILIA: ICD-10-CM

## 2023-12-15 DIAGNOSIS — C67.9 UROTHELIAL CARCINOMA OF BLADDER: ICD-10-CM

## 2023-12-15 DIAGNOSIS — I72.4 ANEURYSM OF RIGHT POPLITEAL ARTERY: ICD-10-CM

## 2023-12-15 DIAGNOSIS — C67.9 UROTHELIAL CARCINOMA OF BLADDER: Primary | ICD-10-CM

## 2023-12-15 DIAGNOSIS — N39.0 URINARY TRACT INFECTION WITHOUT HEMATURIA, SITE UNSPECIFIED: ICD-10-CM

## 2023-12-15 DIAGNOSIS — I10 HYPERTENSION, UNSPECIFIED TYPE: ICD-10-CM

## 2023-12-15 LAB
ALBUMIN SERPL BCP-MCNC: 3.7 G/DL (ref 3.5–5.2)
ALP SERPL-CCNC: 84 U/L (ref 55–135)
ALT SERPL W/O P-5'-P-CCNC: 21 U/L (ref 10–44)
ANION GAP SERPL CALC-SCNC: 10 MMOL/L (ref 8–16)
AST SERPL-CCNC: 29 U/L (ref 10–40)
BASOPHILS # BLD AUTO: 0.1 K/UL (ref 0–0.2)
BASOPHILS NFR BLD: 1.2 % (ref 0–1.9)
BILIRUB SERPL-MCNC: 0.4 MG/DL (ref 0.1–1)
BUN SERPL-MCNC: 42 MG/DL (ref 8–23)
CALCIUM SERPL-MCNC: 9.9 MG/DL (ref 8.7–10.5)
CHLORIDE SERPL-SCNC: 101 MMOL/L (ref 95–110)
CO2 SERPL-SCNC: 23 MMOL/L (ref 23–29)
CREAT SERPL-MCNC: 1.8 MG/DL (ref 0.5–1.4)
DIFFERENTIAL METHOD: ABNORMAL
EOSINOPHIL # BLD AUTO: 0.2 K/UL (ref 0–0.5)
EOSINOPHIL NFR BLD: 2.6 % (ref 0–8)
ERYTHROCYTE [DISTWIDTH] IN BLOOD BY AUTOMATED COUNT: 15.9 % (ref 11.5–14.5)
EST. GFR  (NO RACE VARIABLE): 38.8 ML/MIN/1.73 M^2
GLUCOSE SERPL-MCNC: 92 MG/DL (ref 70–110)
HCT VFR BLD AUTO: 32.3 % (ref 40–54)
HGB BLD-MCNC: 10.7 G/DL (ref 14–18)
IMM GRANULOCYTES # BLD AUTO: 0.03 K/UL (ref 0–0.04)
IMM GRANULOCYTES NFR BLD AUTO: 0.4 % (ref 0–0.5)
LYMPHOCYTES # BLD AUTO: 2.3 K/UL (ref 1–4.8)
LYMPHOCYTES NFR BLD: 27.2 % (ref 18–48)
MCH RBC QN AUTO: 30.4 PG (ref 27–31)
MCHC RBC AUTO-ENTMCNC: 33.1 G/DL (ref 32–36)
MCV RBC AUTO: 92 FL (ref 82–98)
MONOCYTES # BLD AUTO: 0.9 K/UL (ref 0.3–1)
MONOCYTES NFR BLD: 10.8 % (ref 4–15)
NEUTROPHILS # BLD AUTO: 4.8 K/UL (ref 1.8–7.7)
NEUTROPHILS NFR BLD: 57.8 % (ref 38–73)
NRBC BLD-RTO: 0 /100 WBC
PLATELET # BLD AUTO: 238 K/UL (ref 150–450)
PMV BLD AUTO: 8.5 FL (ref 9.2–12.9)
POTASSIUM SERPL-SCNC: 5.1 MMOL/L (ref 3.5–5.1)
PROT SERPL-MCNC: 7.5 G/DL (ref 6–8.4)
RBC # BLD AUTO: 3.52 M/UL (ref 4.6–6.2)
SODIUM SERPL-SCNC: 134 MMOL/L (ref 136–145)
TSH SERPL DL<=0.005 MIU/L-ACNC: 1.01 UIU/ML (ref 0.4–4)
WBC # BLD AUTO: 8.31 K/UL (ref 3.9–12.7)

## 2023-12-15 PROCEDURE — 99215 OFFICE O/P EST HI 40 MIN: CPT | Mod: S$GLB,,, | Performed by: INTERNAL MEDICINE

## 2023-12-15 PROCEDURE — 80053 COMPREHEN METABOLIC PANEL: CPT | Mod: PN | Performed by: INTERNAL MEDICINE

## 2023-12-15 PROCEDURE — 1159F PR MEDICATION LIST DOCUMENTED IN MEDICAL RECORD: ICD-10-PCS | Mod: CPTII,S$GLB,, | Performed by: INTERNAL MEDICINE

## 2023-12-15 PROCEDURE — 3074F PR MOST RECENT SYSTOLIC BLOOD PRESSURE < 130 MM HG: ICD-10-PCS | Mod: CPTII,S$GLB,, | Performed by: INTERNAL MEDICINE

## 2023-12-15 PROCEDURE — 1126F PR PAIN SEVERITY QUANTIFIED, NO PAIN PRESENT: ICD-10-PCS | Mod: CPTII,S$GLB,, | Performed by: INTERNAL MEDICINE

## 2023-12-15 PROCEDURE — 84443 ASSAY THYROID STIM HORMONE: CPT | Performed by: INTERNAL MEDICINE

## 2023-12-15 PROCEDURE — 1101F PT FALLS ASSESS-DOCD LE1/YR: CPT | Mod: CPTII,S$GLB,, | Performed by: INTERNAL MEDICINE

## 2023-12-15 PROCEDURE — 3066F PR DOCUMENTATION OF TREATMENT FOR NEPHROPATHY: ICD-10-PCS | Mod: CPTII,S$GLB,, | Performed by: INTERNAL MEDICINE

## 2023-12-15 PROCEDURE — 99215 PR OFFICE/OUTPT VISIT, EST, LEVL V, 40-54 MIN: ICD-10-PCS | Mod: S$GLB,,, | Performed by: INTERNAL MEDICINE

## 2023-12-15 PROCEDURE — 3288F FALL RISK ASSESSMENT DOCD: CPT | Mod: CPTII,S$GLB,, | Performed by: INTERNAL MEDICINE

## 2023-12-15 PROCEDURE — 3066F NEPHROPATHY DOC TX: CPT | Mod: CPTII,S$GLB,, | Performed by: INTERNAL MEDICINE

## 2023-12-15 PROCEDURE — 1111F PR DISCHARGE MEDS RECONCILED W/ CURRENT OUTPATIENT MED LIST: ICD-10-PCS | Mod: CPTII,S$GLB,, | Performed by: INTERNAL MEDICINE

## 2023-12-15 PROCEDURE — 99999 PR PBB SHADOW E&M-EST. PATIENT-LVL V: CPT | Mod: PBBFAC,,, | Performed by: INTERNAL MEDICINE

## 2023-12-15 PROCEDURE — 1157F PR ADVANCE CARE PLAN OR EQUIV PRESENT IN MEDICAL RECORD: ICD-10-PCS | Mod: CPTII,S$GLB,, | Performed by: INTERNAL MEDICINE

## 2023-12-15 PROCEDURE — 3078F DIAST BP <80 MM HG: CPT | Mod: CPTII,S$GLB,, | Performed by: INTERNAL MEDICINE

## 2023-12-15 PROCEDURE — 3078F PR MOST RECENT DIASTOLIC BLOOD PRESSURE < 80 MM HG: ICD-10-PCS | Mod: CPTII,S$GLB,, | Performed by: INTERNAL MEDICINE

## 2023-12-15 PROCEDURE — 1157F ADVNC CARE PLAN IN RCRD: CPT | Mod: CPTII,S$GLB,, | Performed by: INTERNAL MEDICINE

## 2023-12-15 PROCEDURE — 36415 COLL VENOUS BLD VENIPUNCTURE: CPT | Mod: PN | Performed by: INTERNAL MEDICINE

## 2023-12-15 PROCEDURE — 1101F PR PT FALLS ASSESS DOC 0-1 FALLS W/OUT INJ PAST YR: ICD-10-PCS | Mod: CPTII,S$GLB,, | Performed by: INTERNAL MEDICINE

## 2023-12-15 PROCEDURE — 99999 PR PBB SHADOW E&M-EST. PATIENT-LVL V: ICD-10-PCS | Mod: PBBFAC,,, | Performed by: INTERNAL MEDICINE

## 2023-12-15 PROCEDURE — 1159F MED LIST DOCD IN RCRD: CPT | Mod: CPTII,S$GLB,, | Performed by: INTERNAL MEDICINE

## 2023-12-15 PROCEDURE — 3288F PR FALLS RISK ASSESSMENT DOCUMENTED: ICD-10-PCS | Mod: CPTII,S$GLB,, | Performed by: INTERNAL MEDICINE

## 2023-12-15 PROCEDURE — 85025 COMPLETE CBC W/AUTO DIFF WBC: CPT | Mod: PN | Performed by: INTERNAL MEDICINE

## 2023-12-15 PROCEDURE — 3074F SYST BP LT 130 MM HG: CPT | Mod: CPTII,S$GLB,, | Performed by: INTERNAL MEDICINE

## 2023-12-15 PROCEDURE — 1126F AMNT PAIN NOTED NONE PRSNT: CPT | Mod: CPTII,S$GLB,, | Performed by: INTERNAL MEDICINE

## 2023-12-15 PROCEDURE — 1111F DSCHRG MED/CURRENT MED MERGE: CPT | Mod: CPTII,S$GLB,, | Performed by: INTERNAL MEDICINE

## 2023-12-19 ENCOUNTER — TELEPHONE (OUTPATIENT)
Dept: HEMATOLOGY/ONCOLOGY | Facility: CLINIC | Age: 73
End: 2023-12-19
Payer: MEDICARE

## 2023-12-19 DIAGNOSIS — C67.8 MALIGNANT NEOPLASM OF OVERLAPPING SITES OF BLADDER: ICD-10-CM

## 2023-12-19 DIAGNOSIS — C67.9 UROTHELIAL CARCINOMA OF BLADDER: ICD-10-CM

## 2023-12-19 DIAGNOSIS — D64.9 NORMOCYTIC ANEMIA: Primary | ICD-10-CM

## 2023-12-19 DIAGNOSIS — C67.9 UROTHELIAL CARCINOMA OF BLADDER: Primary | ICD-10-CM

## 2023-12-19 NOTE — NURSING
Spoke with Pt's wife, pt scheduled for labs 12/20, education with NP on 12/21 at 8am and TX on 12/21 to follow education.  Pt and wife agreed to times/dates/locations.

## 2023-12-19 NOTE — TELEPHONE ENCOUNTER
----- Message from Cynthia Calhoun sent at 12/19/2023  2:20 PM CST -----  Type:  Patient Returning Call    Who Called:  Courtney (spouse)  Who Left Message for Patient:  Sally COLON  Does the patient know what this is regarding?: yes  Best Call Back Number:  [phone]  Additional Information:   Courtney returning missed call for her .

## 2023-12-20 ENCOUNTER — PATIENT MESSAGE (OUTPATIENT)
Dept: HEMATOLOGY/ONCOLOGY | Facility: CLINIC | Age: 73
End: 2023-12-20
Payer: MEDICARE

## 2023-12-20 ENCOUNTER — LAB VISIT (OUTPATIENT)
Dept: LAB | Facility: HOSPITAL | Age: 73
End: 2023-12-20
Attending: INTERNAL MEDICINE
Payer: MEDICARE

## 2023-12-20 ENCOUNTER — TELEPHONE (OUTPATIENT)
Dept: HEMATOLOGY/ONCOLOGY | Facility: CLINIC | Age: 73
End: 2023-12-20
Payer: MEDICARE

## 2023-12-20 ENCOUNTER — PATIENT MESSAGE (OUTPATIENT)
Dept: NEPHROLOGY | Facility: CLINIC | Age: 73
End: 2023-12-20
Payer: MEDICARE

## 2023-12-20 DIAGNOSIS — C67.8 MALIGNANT NEOPLASM OF OVERLAPPING SITES OF BLADDER: ICD-10-CM

## 2023-12-20 DIAGNOSIS — C67.9 UROTHELIAL CARCINOMA OF BLADDER: ICD-10-CM

## 2023-12-20 DIAGNOSIS — D64.9 NORMOCYTIC ANEMIA: ICD-10-CM

## 2023-12-20 LAB
ALBUMIN SERPL BCP-MCNC: 3.5 G/DL (ref 3.5–5.2)
ALP SERPL-CCNC: 68 U/L (ref 55–135)
ALT SERPL W/O P-5'-P-CCNC: 16 U/L (ref 10–44)
ANION GAP SERPL CALC-SCNC: 8 MMOL/L (ref 8–16)
AST SERPL-CCNC: 25 U/L (ref 10–40)
BASOPHILS # BLD AUTO: 0.07 K/UL (ref 0–0.2)
BASOPHILS NFR BLD: 1 % (ref 0–1.9)
BILIRUB SERPL-MCNC: 0.4 MG/DL (ref 0.1–1)
BUN SERPL-MCNC: 34 MG/DL (ref 8–23)
CALCIUM SERPL-MCNC: 9.4 MG/DL (ref 8.7–10.5)
CHLORIDE SERPL-SCNC: 103 MMOL/L (ref 95–110)
CO2 SERPL-SCNC: 23 MMOL/L (ref 23–29)
CREAT SERPL-MCNC: 1.5 MG/DL (ref 0.5–1.4)
DIFFERENTIAL METHOD: ABNORMAL
EOSINOPHIL # BLD AUTO: 0.2 K/UL (ref 0–0.5)
EOSINOPHIL NFR BLD: 2.6 % (ref 0–8)
ERYTHROCYTE [DISTWIDTH] IN BLOOD BY AUTOMATED COUNT: 15.9 % (ref 11.5–14.5)
EST. GFR  (NO RACE VARIABLE): 48 ML/MIN/1.73 M^2
GLUCOSE SERPL-MCNC: 92 MG/DL (ref 70–110)
HCT VFR BLD AUTO: 30.9 % (ref 40–54)
HGB BLD-MCNC: 10.5 G/DL (ref 14–18)
IMM GRANULOCYTES # BLD AUTO: 0.01 K/UL (ref 0–0.04)
IMM GRANULOCYTES NFR BLD AUTO: 0.1 % (ref 0–0.5)
LYMPHOCYTES # BLD AUTO: 1.7 K/UL (ref 1–4.8)
LYMPHOCYTES NFR BLD: 24.3 % (ref 18–48)
MCH RBC QN AUTO: 31.2 PG (ref 27–31)
MCHC RBC AUTO-ENTMCNC: 34 G/DL (ref 32–36)
MCV RBC AUTO: 92 FL (ref 82–98)
MONOCYTES # BLD AUTO: 0.8 K/UL (ref 0.3–1)
MONOCYTES NFR BLD: 11.2 % (ref 4–15)
NEUTROPHILS # BLD AUTO: 4.2 K/UL (ref 1.8–7.7)
NEUTROPHILS NFR BLD: 60.8 % (ref 38–73)
NRBC BLD-RTO: 0 /100 WBC
PLATELET # BLD AUTO: 210 K/UL (ref 150–450)
PMV BLD AUTO: 8.3 FL (ref 9.2–12.9)
POTASSIUM SERPL-SCNC: 4.3 MMOL/L (ref 3.5–5.1)
PROT SERPL-MCNC: 7 G/DL (ref 6–8.4)
RBC # BLD AUTO: 3.37 M/UL (ref 4.6–6.2)
SODIUM SERPL-SCNC: 134 MMOL/L (ref 136–145)
TSH SERPL DL<=0.005 MIU/L-ACNC: 1.33 UIU/ML (ref 0.4–4)
WBC # BLD AUTO: 6.96 K/UL (ref 3.9–12.7)

## 2023-12-20 PROCEDURE — 80053 COMPREHEN METABOLIC PANEL: CPT | Mod: PO | Performed by: INTERNAL MEDICINE

## 2023-12-20 PROCEDURE — 36415 COLL VENOUS BLD VENIPUNCTURE: CPT | Mod: PN | Performed by: INTERNAL MEDICINE

## 2023-12-20 PROCEDURE — 84443 ASSAY THYROID STIM HORMONE: CPT | Performed by: INTERNAL MEDICINE

## 2023-12-20 PROCEDURE — 85025 COMPLETE CBC W/AUTO DIFF WBC: CPT | Mod: PN | Performed by: INTERNAL MEDICINE

## 2023-12-20 RX ORDER — EPINEPHRINE 0.3 MG/.3ML
0.3 INJECTION SUBCUTANEOUS ONCE AS NEEDED
Status: CANCELLED | OUTPATIENT
Start: 2023-12-21

## 2023-12-20 RX ORDER — PROCHLORPERAZINE EDISYLATE 5 MG/ML
5 INJECTION INTRAMUSCULAR; INTRAVENOUS ONCE AS NEEDED
Status: CANCELLED
Start: 2023-12-21

## 2023-12-20 RX ORDER — DIPHENHYDRAMINE HYDROCHLORIDE 50 MG/ML
50 INJECTION INTRAMUSCULAR; INTRAVENOUS ONCE AS NEEDED
Status: CANCELLED | OUTPATIENT
Start: 2023-12-21

## 2023-12-20 RX ORDER — SODIUM CHLORIDE 0.9 % (FLUSH) 0.9 %
10 SYRINGE (ML) INJECTION
Status: CANCELLED | OUTPATIENT
Start: 2023-12-21

## 2023-12-20 RX ORDER — HEPARIN 100 UNIT/ML
500 SYRINGE INTRAVENOUS
Status: CANCELLED | OUTPATIENT
Start: 2023-12-21

## 2023-12-20 NOTE — TELEPHONE ENCOUNTER
I spoke with the patient's wife about getting an IO appt scheduled per referral. I explained in detail what we offer and listed some symptoms/side effects that we can help address using our support services. She verbalized understanding and accepted a date/time. Location was reviewed and a message was sent to the portal if they had any follow up questions.     Home

## 2023-12-21 ENCOUNTER — INFUSION (OUTPATIENT)
Dept: INFUSION THERAPY | Facility: HOSPITAL | Age: 73
End: 2023-12-21
Attending: INTERNAL MEDICINE
Payer: MEDICARE

## 2023-12-21 ENCOUNTER — DOCUMENTATION ONLY (OUTPATIENT)
Dept: INFUSION THERAPY | Facility: HOSPITAL | Age: 73
End: 2023-12-21

## 2023-12-21 ENCOUNTER — CLINICAL SUPPORT (OUTPATIENT)
Dept: HEMATOLOGY/ONCOLOGY | Facility: CLINIC | Age: 73
End: 2023-12-21
Payer: MEDICARE

## 2023-12-21 VITALS
DIASTOLIC BLOOD PRESSURE: 77 MMHG | SYSTOLIC BLOOD PRESSURE: 149 MMHG | OXYGEN SATURATION: 100 % | HEIGHT: 69 IN | HEART RATE: 69 BPM | WEIGHT: 143.75 LBS | BODY MASS INDEX: 21.29 KG/M2 | TEMPERATURE: 98 F | RESPIRATION RATE: 16 BRPM

## 2023-12-21 VITALS
DIASTOLIC BLOOD PRESSURE: 84 MMHG | HEART RATE: 69 BPM | BODY MASS INDEX: 21.36 KG/M2 | RESPIRATION RATE: 16 BRPM | TEMPERATURE: 97 F | SYSTOLIC BLOOD PRESSURE: 124 MMHG | WEIGHT: 144.19 LBS | HEIGHT: 69 IN

## 2023-12-21 DIAGNOSIS — C67.8 MALIGNANT NEOPLASM OF OVERLAPPING SITES OF BLADDER: Primary | ICD-10-CM

## 2023-12-21 DIAGNOSIS — C67.9 UROTHELIAL CARCINOMA OF BLADDER: ICD-10-CM

## 2023-12-21 PROCEDURE — 25000003 PHARM REV CODE 250: Mod: PN | Performed by: INTERNAL MEDICINE

## 2023-12-21 PROCEDURE — 99215 PR OFFICE/OUTPT VISIT, EST, LEVL V, 40-54 MIN: ICD-10-PCS | Mod: S$GLB,,, | Performed by: NURSE PRACTITIONER

## 2023-12-21 PROCEDURE — 99215 OFFICE O/P EST HI 40 MIN: CPT | Mod: S$GLB,,, | Performed by: NURSE PRACTITIONER

## 2023-12-21 PROCEDURE — 63600175 PHARM REV CODE 636 W HCPCS: Mod: JZ,JG,PN | Performed by: INTERNAL MEDICINE

## 2023-12-21 PROCEDURE — 99999 PR PBB SHADOW E&M-EST. PATIENT-LVL V: ICD-10-PCS | Mod: PBBFAC,,, | Performed by: NURSE PRACTITIONER

## 2023-12-21 PROCEDURE — 99999 PR PBB SHADOW E&M-EST. PATIENT-LVL V: CPT | Mod: PBBFAC,,, | Performed by: NURSE PRACTITIONER

## 2023-12-21 PROCEDURE — 96413 CHEMO IV INFUSION 1 HR: CPT | Mod: PN

## 2023-12-21 RX ORDER — DIPHENHYDRAMINE HYDROCHLORIDE 50 MG/ML
50 INJECTION INTRAMUSCULAR; INTRAVENOUS ONCE AS NEEDED
Status: DISCONTINUED | OUTPATIENT
Start: 2023-12-21 | End: 2023-12-21 | Stop reason: HOSPADM

## 2023-12-21 RX ORDER — SODIUM CHLORIDE 0.9 % (FLUSH) 0.9 %
10 SYRINGE (ML) INJECTION
Status: DISCONTINUED | OUTPATIENT
Start: 2023-12-21 | End: 2023-12-21 | Stop reason: HOSPADM

## 2023-12-21 RX ORDER — PROCHLORPERAZINE EDISYLATE 5 MG/ML
5 INJECTION INTRAMUSCULAR; INTRAVENOUS ONCE AS NEEDED
Status: DISCONTINUED | OUTPATIENT
Start: 2023-12-21 | End: 2023-12-21 | Stop reason: HOSPADM

## 2023-12-21 RX ORDER — EPINEPHRINE 0.3 MG/.3ML
0.3 INJECTION SUBCUTANEOUS ONCE AS NEEDED
Status: DISCONTINUED | OUTPATIENT
Start: 2023-12-21 | End: 2023-12-21 | Stop reason: HOSPADM

## 2023-12-21 RX ADMIN — SODIUM CHLORIDE: 9 INJECTION, SOLUTION INTRAVENOUS at 10:12

## 2023-12-21 RX ADMIN — SODIUM CHLORIDE 480 MG: 9 INJECTION, SOLUTION INTRAVENOUS at 10:12

## 2023-12-21 NOTE — PROGRESS NOTES
Oncology Nutrition   New Patient Education  Aren Bey Jr.   1950    Nutrition Education   This is a 73 y.o.male with a medical diagnosis of bladder cancer.     Met w/ pt and pt's spouse, Courtney, at chairside to discuss current nutritional status and nutrition as it relates to cancer and cancer treatment. Pt currently low nutrition risk. Pt spouse states she does all of the cooking at home and they do not eat out. She has been making pt high calorie foods to help increase his weight. Pt denies any nutrition concerns at this time. RD discussed role in POC.      Wt Readings from Last 10 Encounters:   12/21/23 65.2 kg (143 lb 11.8 oz)   12/21/23 65.4 kg (144 lb 2.9 oz)   12/15/23 64.9 kg (143 lb 1.3 oz)   12/08/23 63.5 kg (140 lb)   12/06/23 64 kg (141 lb)   11/29/23 62.7 kg (138 lb 3.7 oz)   11/18/23 61.7 kg (136 lb 0.4 oz)   11/17/23 62.9 kg (138 lb 10.7 oz)   11/13/23 60.3 kg (132 lb 15 oz)   10/31/23 67.6 kg (149 lb 0.5 oz)      [x] PMHx reviewed  [x] Labs reviewed    Educated on food safety and common nutrition impact symptoms associated with chemotherapy treatment. Reinforced the importance of good hydration. Handouts provided.    Answered all nutrition related questions.     Patient provided with dietitian contact number and advised to call with questions or make future appointment if further intervention is needed. RD to follow throughout tx prn.    Yoana Watts, CALIN, LDN  12/21/2023  3:09 PM

## 2023-12-21 NOTE — PROGRESS NOTES
Subjective:      Name: Aren Bey Jr.  : 1950  MRN: [id number]    CC:  Education    HPI:   Aren Bey Jr. is a 73 y.o. male presents to the clinic with his wife for education for a diagnosis of Urothelial carcinoma of bladder (Class)    Prior History:  Patient underwent transurethral resection of bladder tumor on 2023 with path showing invasive urothelial carcinoma, high-grade with involvement of muscularis propria and positive for lymphovascular invasion.  Patient underwent chemotherapy at [facility].  The patient then underwent radical cystectomy and prostatectomy on 08/15/2023 with pathology showing invasive high-grade urothelial carcinoma measuring 4.2 cm, positive lymphovascular invasion, tumor identified within the soft tissue surrounding the left distal ureter, 4/6 positive regional lymph nodes with extranodal extension present. pT3aN2.  Also seen was acinar adenocarcinoma of the prostate grade group 1 ([place] score 3+3=6) in 1 2 5% of prostate tissue pT2N0.  The patient's surgery was ultimately complicated by small-bowel obstruction, and pulmonary embolism.  PT recevied 1 dose of Nivolumab 23.  The patient was placed on anticoagulation for pulmonary embolism and ultimately developed a right perinephric bleed requiring embolization.  PT also developed UTI and acquired ureteral obstruction with placement of bilateral ureteral stents.  PT also developed C diff and was hospitalized for suspected recurrence at [facility] from 10/21/2023 to 10/25/2023.  Patient was discharged on fidaxomicin.              The patient saw Dr. Ghosh with Urology on 2023 for with recommendations for virtual visit in January to discuss possible operative intervention replaced stents, possible revision of ureteral ileal anastomosis pending disease progression.  It was instructed that the patient would need catheterized specimen from urostomy to rule out UTI if patient  with future symptoms.  Patient underwent PET-CT on 11/16/2023 showing a new hypermetabolic subpleural nodule in the posterior right lower lobe measuring 2.2 x 1.3 cm.  The patient endorses a fever starting on the night of 11/16/2023.  Patient was prescribed Bactrim for suspected potential urinary tract infection.              The patient was admitted to the hospital from 11/17/23-11/21/23 for C diff colitis and suspected urinary tract infection the patient was discharged on fidaxomicin 200 mg b.i.d. for additional 7 days as well as 200 mg every other day for 25 days.  Patient was also discharged on doxycycline 100 mg b.i.d. for 25 days with instructions from Infectious Disease to remain on antibiotics as long as his ureteral stents were in place.    Patient underwent biopsy of nodule in the right lung on 12/08/2023 showing fibrosis and chronic inflammation but no evidence of neoplasm or granuloma.     Oncology History   Malignant neoplasm of overlapping sites of bladder   10/23/2023 Initial Diagnosis    Malignant neoplasm of overlapping sites of bladder     10/31/2023 Cancer Staged    Staging form: Urinary Bladder, AJCC 8th Edition  - Pathologic: Stage IIIB (pT3, pN2, cM0)     12/21/2023 -  Chemotherapy    Treatment Summary   Plan Name: OP NIVOLUMAB 480 MG Q4W  Treatment Goal: Curative  Status: Active  Start Date: 12/21/2023 (Planned)  End Date: 11/21/2024 (Planned)  Provider: Jackson Jimenez MD  Chemotherapy: [No matching medication found in this treatment plan]     Prostate cancer   10/31/2023 Initial Diagnosis    Prostate cancer     10/31/2023 Cancer Staged    Staging form: Prostate, AJCC 8th Edition  - Pathologic: pT2, pN0, cM0            Past Medical History:   Diagnosis Date    Aneurysm artery, popliteal     right leg    Bladder cancer     Hypertension     Malignant neoplasm of prostate     Seizures        Past Surgical History:   Procedure Laterality Date    PROSTATECTOMY         Family History   Problem  Relation Age of Onset    Heart disease Mother     Hypertension Mother        Social History     Socioeconomic History    Marital status:    Occupational History    Occupation: retired   Tobacco Use    Smoking status: Never    Smokeless tobacco: Never   Substance and Sexual Activity    Alcohol use: Not Currently    Drug use: Never    Sexual activity: Yes     Partners: Female     Social Determinants of Health     Financial Resource Strain: Patient Declined (11/10/2023)    Overall Financial Resource Strain (CARDIA)     Difficulty of Paying Living Expenses: Patient declined   Food Insecurity: No Food Insecurity (11/18/2023)    Hunger Vital Sign     Worried About Running Out of Food in the Last Year: Never true     Ran Out of Food in the Last Year: Never true   Transportation Needs: No Transportation Needs (11/18/2023)    PRAPARE - Transportation     Lack of Transportation (Medical): No     Lack of Transportation (Non-Medical): No   Physical Activity: Insufficiently Active (11/10/2023)    Exercise Vital Sign     Days of Exercise per Week: 7 days     Minutes of Exercise per Session: 10 min   Stress: No Stress Concern Present (11/10/2023)    [place] [employer] - Occupational Stress Questionnaire     Feeling of Stress : Not at all   Social Connections: Unknown (11/10/2023)    Social Connection and Isolation Panel [NHANES]     Frequency of Communication with Friends and Family: More than three times a week     Frequency of Social Gatherings with Friends and Family: Patient declined     Active Member of Clubs or Organizations: Patient declined     Attends Club or Organization Meetings: Patient declined     Marital Status:    Housing Stability: Low Risk  (11/18/2023)    Housing Stability Vital Sign     Unable to Pay for Housing in the Last Year: No     Number of Places Lived in the Last Year: 2     Unstable Housing in the Last Year: No       Review of patient's allergies indicates:  "  Allergen Reactions    Clopidogrel Other (See Comments)     Increased body temperature and redness        Review of Systems   Eyes:  Negative for visual disturbance.   Cardiovascular:  Negative for chest pain.   Respiratory:  Positive for shortness of breath. Negative for cough.    Hematologic/Lymphatic: Negative for adenopathy.   Skin:  Negative for rash.   Musculoskeletal:  Negative for back pain.   Gastrointestinal:  Negative for abdominal pain and diarrhea.   Genitourinary:  Negative for frequency.   Neurological:  Negative for headaches.   Psychiatric/Behavioral:  The patient is not nervous/anxious.    All other systems reviewed and are negative.           Objective:     Vitals:    12/21/23 0810   BP: 124/84   BP Location: Left arm   Patient Position: Sitting   BP Method: Medium (Automatic)   Pulse: 69   Resp: 16   Temp: 97.4 °F (36.3 °C)   TempSrc: Temporal   SpO2: (P) 98%   Weight: 65.4 kg (144 lb 2.9 oz)   Height: 5' 9" (1.753 m)        Physical Exam  Vitals reviewed.   Constitutional:       General: He is not in acute distress.  HENT:      Head: Normocephalic.   Pulmonary:      Effort: Pulmonary effort is normal.   Neurological:      Mental Status: He is alert and oriented to person, place, and time.   Psychiatric:         Behavior: Behavior normal.         Thought Content: Thought content normal.             Current Outpatient Medications on File Prior to Visit   Medication Sig    acetaminophen (TYLENOL) 500 MG tablet Take 500-1,000 mg by mouth every 6 (six) hours as needed (fever/pain).    ASCORBIC ACID, VITAMIN C, ORAL Take 1 tablet by mouth once daily.    atorvastatin (LIPITOR) 20 MG tablet Take 2 tablets (40 mg total) by mouth once daily. (Patient taking differently: Take 20 mg by mouth once daily.)    b complex vitamins tablet Take 1 tablet by mouth once daily.    cholecalciferol, vitamin D3, (VITAMIN D3 ORAL) Take 5,000 Units by mouth once daily.    diphenoxylate-atropine 2.5-0.025 mg (LOMOTIL) " 2.5-0.025 mg per tablet Take 1 tablet by mouth 4 (four) times daily as needed for Diarrhea.    ferrous sulfate (SLOW RELEASE IRON) 142 mg (45 mg iron) TbSR Take 1 tablet by mouth Daily.    Lactobacillus rhamnosus GG (CULTURELLE) 10 billion cell capsule Take 1 capsule by mouth once daily.    LIDOcaine-prilocaine (EMLA) cream Apply topically once as needed (to port access).    multivitamin with minerals tablet Take 1 tablet by mouth once daily.    UNABLE TO FIND Take 1 capsule by mouth once daily. medication name: Colon health 80 billion    zinc gluconate 50 mg tablet Take 50 mg by mouth once daily.     No current facility-administered medications on file prior to visit.       CBC:  Lab Results   Component Value Date    WBC 6.96 12/20/2023    HGB 10.5 (L) 12/20/2023    HCT 30.9 (L) 12/20/2023    MCV 92 12/20/2023     12/20/2023         CMP:  Sodium   Date Value Ref Range Status   12/20/2023 134 (L) 136 - 145 mmol/L Final     Potassium   Date Value Ref Range Status   12/20/2023 4.3 3.5 - 5.1 mmol/L Final     Chloride   Date Value Ref Range Status   12/20/2023 103 95 - 110 mmol/L Final     CO2   Date Value Ref Range Status   12/20/2023 23 23 - 29 mmol/L Final     Glucose   Date Value Ref Range Status   12/20/2023 92 70 - 110 mg/dL Final     BUN   Date Value Ref Range Status   12/20/2023 34 (H) 8 - 23 mg/dL Final     Creatinine   Date Value Ref Range Status   12/20/2023 1.5 (H) 0.5 - 1.4 mg/dL Final     Calcium   Date Value Ref Range Status   12/20/2023 9.4 8.7 - 10.5 mg/dL Final     Total Protein   Date Value Ref Range Status   12/20/2023 7.0 6.0 - 8.4 g/dL Final     Albumin   Date Value Ref Range Status   12/20/2023 3.5 3.5 - 5.2 g/dL Final     Total Bilirubin   Date Value Ref Range Status   12/20/2023 0.4 0.1 - 1.0 mg/dL Final     Comment:     For infants and newborns, interpretation of results should be based  on gestational age, weight and in agreement with clinical  observations.    Premature Infant  recommended reference ranges:  Up to 24 hours.............<8.0 mg/dL  Up to 48 hours............<12.0 mg/dL  3-5 days..................<15.0 mg/dL  6-29 days.................<15.0 mg/dL       Alkaline Phosphatase   Date Value Ref Range Status   12/20/2023 68 55 - 135 U/L Final     AST   Date Value Ref Range Status   12/20/2023 25 10 - 40 U/L Final     ALT   Date Value Ref Range Status   12/20/2023 16 10 - 44 U/L Final     Anion Gap   Date Value Ref Range Status   12/20/2023 8 8 - 16 mmol/L Final     eGFR if    Date Value Ref Range Status   09/11/2021 >60.0 >60 mL/min/1.73 m^2 Final     eGFR    Date Value Ref Range Status   10/07/2023 42 mL/min/1.73mSq Final     Comment:     In accordance with NKF-ASN Task Force recommendation, calculation based on the Chronic Kidney Disease Epidemiology Collaboration (CKD-EPI) equation without adjustment for race. eGFR adjusted for gender and age and calculated in ml/min/1.73mSquared. eGFR cannot be calculated if patient is under 18 years of age.     Reference Range:   >= 60 ml/min/1.73mSquared.     eGFR if non    Date Value Ref Range Status   09/11/2021 >60.0 >60 mL/min/1.73 m^2 Final     Comment:     Calculation used to obtain the estimated glomerular filtration  rate (eGFR) is the CKD-EPI equation.          X-Ray Chest 1 View  Narrative: EXAMINATION:  XR CHEST 1 VIEW    CLINICAL HISTORY:  Right lung mass, transitional cell carcinoma    TECHNIQUE:  Single frontal view of the chest was performed.    COMPARISON:  11/17/2023    FINDINGS:  Cardiac silhouette unchanged in size.  Postoperative changes of median sternotomy.  Right IJ Port-A-Cath noted.    There is a small apical right chest pneumothorax in this patient status post right lung biopsy.  The pneumothorax occupies less than 5% of the volume of the right hemithorax.  There is no mediastinal shift or mass effect.  Impression: 1. Small less than 5% right apical pneumothorax without  mediastinal shift    Electronically signed by: Teo Logn MD  Date:    12/08/2023  Time:    16:47  X-Ray Chest 1 View  Narrative: EXAMINATION:  XR CHEST 1 VIEW    CLINICAL HISTORY:  Lung mass    TECHNIQUE:  Single frontal view of the chest was performed.    COMPARISON:  12/08/2023 at 10:14 hours    FINDINGS:  There is a small persistent right apical pneumothorax, less than 5% of the overall volume of the right hemithorax.  The right lung base is well aerated.  The cardiac silhouette is unchanged in size.  Impression: 1. Tiny persistent right apical pneumothorax.    Electronically signed by: Teo Long MD  Date:    12/08/2023  Time:    16:46  CT Biopsy Lung w/ guidance  Narrative: EXAMINATION:  CT-guided lung mass biopsy with IV conscious sedation.    CPT code 34516, 80674,  64977.    INDICATION:  Right lower lobe lung mass    TECHNIQUE:  Informed consent was obtained from the patient.    A time-out was performed prior to the onset of the procedure as per hospital protocol.    The patient was placed in a prone position on the CT table.    Maximal skin barrier/sterile technique was utilized. MIPS reporting.    The skin of the back was prepped and draped into a sterile field.  1% lidocaine was used for subcutaneous anesthesia.    Under CT guidance, a 19 gauge coaxial needle was advanced into a subcentimeter nodule at the extreme right lung base abutting the right hemidiaphragm.  A total of 4 core biopsies were obtained from the lesion using a 20 gauge Monopty biopsy device.    After the 2nd specimen acquisition, repeat imaging demonstrates a small pneumothorax which was then in vacuo aided using an a vacuo aided glass bottle.  Repeat biopsies were then performed in additional areas of the lesion.    Following specimen acquisition, the needle was withdrawn.  Sterile dressings were applied.  There were no complications.    Intravenous conscious sedation was provided by the radiologist with monitoring  performed by the radiology nurse.    Sedation time: 60 minutes.    Versed and fentanyl administered.    RADIATION DOSE:  1138 DLP.    Automated exposure control was utilized.    Iterative reconstruction technique was utilized.  Impression: 1. CT-guided biopsy of a right lower lobe pulmonary mass.  _______________________________________________________________    Electronically signed by: Teo Long MD  Date:    12/08/2023  Time:    14:55            Assessment:       1. Urothelial carcinoma of bladder         Plan:     Urothelial carcinoma of bladder  -     Ambulatory referral/consult to [employer] [employer]  -     Ambulatory referral/consult to [employer] [employer]  -     Ambulatory referral/consult to Hematology/Oncology/Nutrition  -     Ambulatory referral/consult to Oncology Social Work       1.  Treatment plan of Nivolumab (Opdivo) IV every 4 weeks x 13 cycles discussed with patient and wife.  2.  Handouts provided on immunotherapy agent.    3.  Discussed the mechanism of action, potential side effects of this treatment as well as ways to manage them at home.   4.  Dietary modifications discussed.  Detail instructions to be provided by dietician.   5.  Chemotherapy Portfolio supplied with contact information.   6.  Discussed follow-up with the physician for toxicity monitoring throughout treatment.    7.  Consented the patient to the treatment plan and the patient was educated on the planned duration of the treatment and schedule of the treatment administration.             45 minutes were spent in coordination of patient's care, record review and counseling.      Answers submitted by the patient for this visit:  Review of Systems Questionnaire (Submitted on 12/20/2023)  appetite change : No  unexpected weight change: No  mouth sores: No

## 2023-12-21 NOTE — PLAN OF CARE
Problem: Adult Inpatient Plan of Care  Goal: Optimal Comfort and Wellbeing  Intervention: Provide Person-Centered Care  Flowsheets (Taken 12/21/2023 1144)  Trust Relationship/Rapport:   care explained   questions encouraged     Problem: Fatigue  Goal: Improved Activity Tolerance  Outcome: Ongoing, Progressing  Intervention: Promote Improved Energy  Flowsheets (Taken 12/21/2023 1144)  Fatigue Management:   activity schedule adjusted   paced activity encouraged   frequent rest breaks encouraged  Sleep/Rest Enhancement:   relaxation techniques promoted   regular sleep/rest pattern promoted  Activity Management:   Ambulated -L4   Ambulated in [place] - L4     Problem: Adult Inpatient Plan of Care  Goal: Patient-Specific Goal (Individualized)  Outcome: Ongoing, Progressing  Flowsheets (Taken 12/21/2023 1144)  Anxieties, Fears or Concerns: None  Individualized Care Needs: Recliner, education  Tolerated treatment with no known distress.  Ambulated from infusion center with steady gait.

## 2023-12-21 NOTE — PLAN OF CARE
Problem: Adult Inpatient Plan of Care  Goal: Plan of Care Review  Outcome: Met   Tolerated infusion well today Able to be d/c to home  Discharge instructions given with copy of avs and pt ambulated to private vehicle without difficulty NAD

## 2023-12-27 ENCOUNTER — OFFICE VISIT (OUTPATIENT)
Dept: INFECTIOUS DISEASES | Facility: CLINIC | Age: 73
End: 2023-12-27
Payer: MEDICARE

## 2023-12-27 VITALS
HEIGHT: 69 IN | WEIGHT: 144.38 LBS | HEART RATE: 58 BPM | BODY MASS INDEX: 21.38 KG/M2 | DIASTOLIC BLOOD PRESSURE: 80 MMHG | SYSTOLIC BLOOD PRESSURE: 135 MMHG

## 2023-12-27 DIAGNOSIS — N30.00 ACUTE CYSTITIS WITHOUT HEMATURIA: ICD-10-CM

## 2023-12-27 DIAGNOSIS — A04.72 C. DIFFICILE COLITIS: Primary | ICD-10-CM

## 2023-12-27 PROCEDURE — 3066F NEPHROPATHY DOC TX: CPT | Mod: CPTII,S$GLB,, | Performed by: INTERNAL MEDICINE

## 2023-12-27 PROCEDURE — 99214 PR OFFICE/OUTPT VISIT, EST, LEVL IV, 30-39 MIN: ICD-10-PCS | Mod: S$GLB,,, | Performed by: INTERNAL MEDICINE

## 2023-12-27 PROCEDURE — 1160F RVW MEDS BY RX/DR IN RCRD: CPT | Mod: CPTII,S$GLB,, | Performed by: INTERNAL MEDICINE

## 2023-12-27 PROCEDURE — 3075F SYST BP GE 130 - 139MM HG: CPT | Mod: CPTII,S$GLB,, | Performed by: INTERNAL MEDICINE

## 2023-12-27 PROCEDURE — 1126F AMNT PAIN NOTED NONE PRSNT: CPT | Mod: CPTII,S$GLB,, | Performed by: INTERNAL MEDICINE

## 2023-12-27 PROCEDURE — 3075F PR MOST RECENT SYSTOLIC BLOOD PRESS GE 130-139MM HG: ICD-10-PCS | Mod: CPTII,S$GLB,, | Performed by: INTERNAL MEDICINE

## 2023-12-27 PROCEDURE — 3079F DIAST BP 80-89 MM HG: CPT | Mod: CPTII,S$GLB,, | Performed by: INTERNAL MEDICINE

## 2023-12-27 PROCEDURE — 1126F PR PAIN SEVERITY QUANTIFIED, NO PAIN PRESENT: ICD-10-PCS | Mod: CPTII,S$GLB,, | Performed by: INTERNAL MEDICINE

## 2023-12-27 PROCEDURE — 99214 OFFICE O/P EST MOD 30 MIN: CPT | Mod: S$GLB,,, | Performed by: INTERNAL MEDICINE

## 2023-12-27 PROCEDURE — 3079F PR MOST RECENT DIASTOLIC BLOOD PRESSURE 80-89 MM HG: ICD-10-PCS | Mod: CPTII,S$GLB,, | Performed by: INTERNAL MEDICINE

## 2023-12-27 PROCEDURE — 1160F PR REVIEW ALL MEDS BY PRESCRIBER/CLIN PHARMACIST DOCUMENTED: ICD-10-PCS | Mod: CPTII,S$GLB,, | Performed by: INTERNAL MEDICINE

## 2023-12-27 PROCEDURE — 3288F PR FALLS RISK ASSESSMENT DOCUMENTED: ICD-10-PCS | Mod: CPTII,S$GLB,, | Performed by: INTERNAL MEDICINE

## 2023-12-27 PROCEDURE — 3066F PR DOCUMENTATION OF TREATMENT FOR NEPHROPATHY: ICD-10-PCS | Mod: CPTII,S$GLB,, | Performed by: INTERNAL MEDICINE

## 2023-12-27 PROCEDURE — 3288F FALL RISK ASSESSMENT DOCD: CPT | Mod: CPTII,S$GLB,, | Performed by: INTERNAL MEDICINE

## 2023-12-27 PROCEDURE — 99999 PR PBB SHADOW E&M-EST. PATIENT-LVL III: ICD-10-PCS | Mod: PBBFAC,,, | Performed by: INTERNAL MEDICINE

## 2023-12-27 PROCEDURE — 1157F PR ADVANCE CARE PLAN OR EQUIV PRESENT IN MEDICAL RECORD: ICD-10-PCS | Mod: CPTII,S$GLB,, | Performed by: INTERNAL MEDICINE

## 2023-12-27 PROCEDURE — 1157F ADVNC CARE PLAN IN RCRD: CPT | Mod: CPTII,S$GLB,, | Performed by: INTERNAL MEDICINE

## 2023-12-27 PROCEDURE — 99999 PR PBB SHADOW E&M-EST. PATIENT-LVL III: CPT | Mod: PBBFAC,,, | Performed by: INTERNAL MEDICINE

## 2023-12-27 PROCEDURE — 1101F PR PT FALLS ASSESS DOC 0-1 FALLS W/OUT INJ PAST YR: ICD-10-PCS | Mod: CPTII,S$GLB,, | Performed by: INTERNAL MEDICINE

## 2023-12-27 PROCEDURE — 1101F PT FALLS ASSESS-DOCD LE1/YR: CPT | Mod: CPTII,S$GLB,, | Performed by: INTERNAL MEDICINE

## 2023-12-27 PROCEDURE — 1159F MED LIST DOCD IN RCRD: CPT | Mod: CPTII,S$GLB,, | Performed by: INTERNAL MEDICINE

## 2023-12-27 PROCEDURE — 1159F PR MEDICATION LIST DOCUMENTED IN MEDICAL RECORD: ICD-10-PCS | Mod: CPTII,S$GLB,, | Performed by: INTERNAL MEDICINE

## 2023-12-27 NOTE — PROGRESS NOTES
Patient ID: Aren Bey Jr. is a 73 y.o. male.    Subjective:           Chief Complaint: Follow-up (Pt has been doing well )      HPI    Patient is here for hospital follow-up.  The patient was admitted from 11/17/2023 through 11/21/2023 due to UTI and C diff colitis.  Patient is seen by Infectious diseases with the below recommendations.      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   - Will change abx to Doxycycline 100 mg po BID for an additional 3 days for UTI   - Ideally would like prophylaxis until stents are removed.   - Doxycycline prophylaxis (although less effective in urine ) has lower risk of exacerbating Cdiff   - Fosfomycin 3g orally x 2 doses also an option for treatment of acute UTI as outpt in the future.   - Patient was sent home on Doxycycline PO prophylaxis until stents are changed but received 25 days     # C. difficile colitis  - Recurrent Cdiff colitis following a course of Fidaximicin x 10 days.   - Symptoms improved on another course of Fidaxomicin.   - Cont 200mg Bid for 10 days follow by Fidaxomycin 1 tab q48hrs for anothr 25 days  - Limit exacerbating antibiotics and Use po vancomycin for prophylaxis with all courses of abx in the future.   - Consider GI consult as outpt for fecal transplant if no improvement    Interval HPI  - Patient tells me that he is feeling significantly better.  More active, eating well.    - Denies fever, chills, night sweats   - Denies abnormal urine or abdominal pain.    Past Medical History:   Diagnosis Date    Aneurysm artery, popliteal     right leg    Bladder cancer     Hypertension     Malignant neoplasm of prostate     Seizures        Past Surgical History:   Procedure Laterality Date    PROSTATECTOMY         Review of  patient's allergies indicates:   Allergen Reactions    Clopidogrel Other (See Comments)     Increased body temperature and redness        Family History   Problem Relation Age of Onset    Heart disease Mother     Hypertension Mother        Social History     Socioeconomic History    Marital status:    Occupational History    Occupation: retired   Tobacco Use    Smoking status: Never    Smokeless tobacco: Never   Substance and Sexual Activity    Alcohol use: Not Currently    Drug use: Never    Sexual activity: Yes     Partners: Female     Social Determinants of Health     Financial Resource Strain: Patient Declined (11/10/2023)    Overall Financial Resource Strain (CARDIA)     Difficulty of Paying Living Expenses: Patient declined   Food Insecurity: No Food Insecurity (11/18/2023)    Hunger Vital Sign     Worried About Running Out of Food in the Last Year: Never true     Ran Out of Food in the Last Year: Never true   Transportation Needs: No Transportation Needs (11/18/2023)    PRAPARE - Transportation     Lack of Transportation (Medical): No     Lack of Transportation (Non-Medical): No   Physical Activity: Insufficiently Active (11/10/2023)    Exercise Vital Sign     Days of Exercise per Week: 7 days     Minutes of Exercise per Session: 10 min   Stress: No Stress Concern Present (11/10/2023)    [employer] [place] [employer] - Occupational Stress Questionnaire     Feeling of Stress : Not at all   Social Connections: Unknown (11/10/2023)    Social Connection and Isolation Panel [NHANES]     Frequency of Communication with Friends and Family: More than three times a week     Frequency of Social Gatherings with Friends and Family: Patient declined     Active Member of Clubs or Organizations: Patient declined     Attends Club or Organization Meetings: Patient declined     Marital Status:    Housing Stability: Low Risk  (11/18/2023)    Housing Stability Vital Sign     Unable to Pay for Housing in  the Last Year: No     Number of Places Lived in the Last Year: 2     Unstable Housing in the Last Year: No       Current Outpatient Medications   Medication Instructions    acetaminophen (TYLENOL) 500-1,000 mg, Oral, Every 6 hours PRN    ASCORBIC ACID, VITAMIN C, ORAL 1 tablet, Oral, Daily    atorvastatin (LIPITOR) 40 mg, Oral, Daily    b complex vitamins tablet 1 tablet, Oral, Daily    cholecalciferol, vitamin D3, (VITAMIN D3 ORAL) 5,000 Units, Oral, Daily    diphenoxylate-atropine 2.5-0.025 mg (LOMOTIL) 2.5-0.025 mg per tablet 1 tablet, Oral, 4 times daily PRN    ferrous sulfate (SLOW RELEASE IRON) 142 mg (45 mg iron) TbSR 1 tablet, Oral, Daily    LIDOcaine-prilocaine (EMLA) cream Topical (Top), Once as needed    multivitamin with minerals tablet 1 tablet, Oral, Daily    UNABLE TO FIND 1 capsule, Oral, Daily, medication name: Colon health 80 billion     zinc gluconate 50 mg, Oral, Daily         Review of Systems   Constitutional:  Negative for activity change, chills, diaphoresis, fatigue, fever and unexpected weight change.   HENT:  Negative for sore throat.    Eyes:  Negative for photophobia and visual disturbance.   Respiratory:  Negative for cough and shortness of breath.    Cardiovascular:  Negative for chest pain and leg swelling.   Gastrointestinal:  Negative for abdominal distention, abdominal pain, nausea and vomiting.   Genitourinary:  Negative for difficulty urinating, dysuria and flank pain.   Musculoskeletal:  Negative for arthralgias.   Skin:  Negative for color change and rash.   Allergic/Immunologic: Negative for immunocompromised state.   Neurological:  Negative for dizziness and headaches.   Psychiatric/Behavioral:  The patient is not nervous/anxious.            Objective:     Vitals:    12/27/23 1057   BP: 135/80   Pulse: (!) 58       Body mass index is 21.32 kg/m².         Physical Exam  Vitals reviewed.   Constitutional:       General: He is not in acute distress.     Appearance: Normal  appearance. He is well-developed. He is not ill-appearing.   HENT:      Head: Normocephalic and atraumatic.      Right Ear: External ear normal.      Left Ear: External ear normal.      Nose: Nose normal.   Eyes:      General: No scleral icterus.        Right eye: No discharge.         Left eye: No discharge.      Pupils: Pupils are equal, round, and reactive to light.   Cardiovascular:      Rate and Rhythm: Normal rate and regular rhythm.      Heart sounds: Normal heart sounds. No murmur heard.  Pulmonary:      Effort: Pulmonary effort is normal. No respiratory distress.      Breath sounds: Normal breath sounds. No wheezing.   Abdominal:      General: There is no distension.      Palpations: Abdomen is soft.      Tenderness: There is no abdominal tenderness.   Genitourinary:     Comments: Ileal conduit  Musculoskeletal:         General: Normal range of motion.      Cervical back: Normal range of motion and neck supple. No rigidity.   Lymphadenopathy:      Cervical: No cervical adenopathy.   Skin:     General: Skin is warm and dry.      Coloration: Skin is not jaundiced.   Neurological:      Mental Status: He is alert and oriented to person, place, and time.   Psychiatric:         Mood and Affect: Mood normal.         Speech: Speech normal.         Behavior: Behavior normal.         Judgment: Judgment normal.           Assessment:           Aren was seen today for follow-up.    Diagnoses and all orders for this visit:    C. difficile colitis    Acute cystitis without hematuria         Plan:     Hematuria and recurrent UTI?  Unclear if bacteria in bladder is secondary to ileal conduit as opposed to true urinary tract infection.  Concern that hematuria is likely secondary to bladder cancer instead.    Patient finished doxycycline and the original plan was for patient to continue doxycycline until seen by Urology and stent replaced fortunately patient has stopped doxycycline because hospital medicine only gave him 25  days.  And has been out of for 1 week.    Case discussed with patient and wife at length.  Since patient is doing very well and he has been off any antibiotics for more than a week then will observe for now.    Return to Infectious Diseases needed.    All questions answered       Greater than 30 minutes was spent on this encounter, which included: review of recent encounters, review and interpretation of labs/images, obtaining pertinent history, performing a physical examination, counseling and educating the patient/family/caregiver, ordering medications/tests, documenting in the electronic health record, and coordinating care with necessary providers.    Kai Rivas MD  Infectious Diseases

## 2024-01-04 ENCOUNTER — TELEPHONE (OUTPATIENT)
Dept: HEMATOLOGY/ONCOLOGY | Facility: CLINIC | Age: 74
End: 2024-01-04
Payer: MEDICARE

## 2024-01-04 DIAGNOSIS — C67.8 MALIGNANT NEOPLASM OF OVERLAPPING SITES OF BLADDER: Primary | ICD-10-CM

## 2024-01-04 NOTE — TELEPHONE ENCOUNTER
Spoke with the pt wife and to let her know why the appt was being scheduled on 01/11. Pt verbalized and understanding.  ----- Message from Kelly Hidalgo, Patient Care Assistant sent at 1/4/2024  3:11 PM CST -----  Regarding: vinny  Type: Needs Medical Advice  Who Called:  rich  Best Call Back Number: [phone]    Additional Information: rich would like to move 1/11 appointment with vinny to the 1/16 if possible , please call to further discuss, thank you .

## 2024-01-04 NOTE — TELEPHONE ENCOUNTER
Spoke with the patient's wife, Courtney to reschedule his cancelled IO appt. Wife said he is doing really well and will call if he is in need.

## 2024-01-04 NOTE — TELEPHONE ENCOUNTER
----- Message from Kelly Hidalgo, Patient Care Assistant sent at 1/4/2024  3:11 PM CST -----  Regarding: vinny  Type: Needs Medical Advice  Who Called:  rich  Best Call Back Number: [phone]    Additional Information: rich would like to move 1/11 appointment with vinny to the 1/16 if possible , please call to further discuss, thank you .

## 2024-01-05 ENCOUNTER — PATIENT MESSAGE (OUTPATIENT)
Dept: UROLOGY | Facility: CLINIC | Age: 74
End: 2024-01-05
Payer: MEDICARE

## 2024-01-09 ENCOUNTER — TELEPHONE (OUTPATIENT)
Dept: UROLOGY | Facility: CLINIC | Age: 74
End: 2024-01-09
Payer: MEDICARE

## 2024-01-09 ENCOUNTER — PATIENT MESSAGE (OUTPATIENT)
Dept: PALLIATIVE MEDICINE | Facility: CLINIC | Age: 74
End: 2024-01-09
Payer: MEDICARE

## 2024-01-09 DIAGNOSIS — C67.8 MALIGNANT NEOPLASM OF OVERLAPPING SITES OF BLADDER: Primary | ICD-10-CM

## 2024-01-09 DIAGNOSIS — T73.3XXA FATIGUE DUE TO EXCESSIVE EXERTION, INITIAL ENCOUNTER: ICD-10-CM

## 2024-01-09 DIAGNOSIS — Z19.1 HORMONE SENSITIVE MALIGNANCY STATUS: ICD-10-CM

## 2024-01-09 NOTE — TELEPHONE ENCOUNTER
Spoke with Madelyn at [facility] [phone].  Requested fax request for supply order for patient.

## 2024-01-10 NOTE — PROGRESS NOTES
PATIENT: Aren Bey Jr.  MRN: [id number]  DATE: 1/11/2024      Diagnosis:   1. Malignant neoplasm of overlapping sites of bladder    2. Other specified disorders involving the immune mechanism, not elsewhere classified    3. Urothelial carcinoma of bladder    4. Aneurysm of right popliteal artery    5. Lung nodule    6. Normocytic anemia    7. Hypertension, unspecified type    8. Other primary thrombophilia    9. Blood creatinine increased compared with prior measurement    10. Itching    11. Ureteral stent present                Chief Complaint: Urothelial carcinoma of bladder (1 month follow up )      Oncologic History:      Oncologic History     Oncologic Treatment     Pathology           Subjective:    Interval History:  Mr. Bey is a 73 y.o. male with HTN, Seizures, bladder cancer, h/o pulmonary embolism, who presents for bladder cancer.  Since the last clinic visit the patient was to have ureteral stents exchanged on 01/02/2024 but missed his appointment due to inclement weather.  The patient is trying to have this rescheduled with Dr. Ghosh.  The patient states he is currently running out of urostomy bags.  The patient denies CP, cough, SOB, abdominal pain, nausea, vomiting, constipation, diarrhea.  The patient denies fever, chills, night sweats, weight loss, new lumps or bumps, easy bruising or bleeding.    Prior History:  Patient underwent transurethral resection of bladder tumor on 04/28/2023 with path showing invasive urothelial carcinoma, high-grade with involvement of muscularis propria and positive for lymphovascular invasion.  Patient underwent chemotherapy at [facility].  The patient then underwent radical cystectomy and prostatectomy on 08/15/2023 with pathology showing invasive high-grade urothelial carcinoma measuring 4.2 cm, positive lymphovascular invasion, tumor identified within the soft tissue surrounding the left distal ureter, 4/6 positive regional lymph  nodes with extranodal extension present. pT3aN2.  Also seen was acinar adenocarcinoma of the prostate grade group 1 ([place] score 3+3=6) in 1 2 5% of prostate tissue pT2N0.  The patient's surgery was ultimately complicated by small-bowel obstruction, and pulmonary embolism.  PT recevied 1 dose of Nivolumab 9/18/23.  The patient was placed on anticoagulation for pulmonary embolism and ultimately developed a right perinephric bleed requiring embolization.  PT also developed UTI and acquired ureteral obstruction with placement of bilateral ureteral stents.  PT also developed C diff and was hospitalized for suspected recurrence at [facility] from 10/21/2023 to 10/25/2023.  Patient was discharged on fidaxomicin.   The patient saw Dr. Ghosh with Urology on 11/13/2023 for with recommendations for virtual visit in January to discuss possible operative intervention replaced stents, possible revision of ureteral ileal anastomosis pending disease progression.  It was instructed that the patient would need catheterized specimen from urostomy to rule out UTI if patient with future symptoms.  Patient underwent PET-CT on 11/16/2023 showing a new hypermetabolic subpleural nodule in the posterior right lower lobe measuring 2.2 x 1.3 cm.  The patient endorses a fever starting on the night of 11/16/2023.  Patient was prescribed Bactrim for suspected potential urinary tract infection.   The patient was admitted to the hospital from 11/17/23-11/21/23 for C diff colitis and suspected urinary tract infection the patient was discharged on fidaxomicin 200 mg b.i.d. for additional 7 days as well as 200 mg every other day for 25 days.  Patient was also discharged on doxycycline 100 mg b.i.d. for 25 days with instructions from Infectious Disease to remain on antibiotics as long as his ureteral stents were in place.   The patient underwent biopsy of nodule in the right lung on 12/08/2023 showing fibrosis and chronic inflammation but no  evidence of neoplasm or granuloma.    Past Medical History:   Past Medical History:   Diagnosis Date    Aneurysm artery, popliteal     right leg    Bladder cancer     Hypertension     Malignant neoplasm of prostate     Seizures        Past Surgical HIstory:   Past Surgical History:   Procedure Laterality Date    PROSTATECTOMY         Family History:   Family History   Problem Relation Age of Onset    Heart disease Mother     Hypertension Mother        Social History:  reports that he has never smoked. He has never used smokeless tobacco. He reports that he does not currently use alcohol. He reports that he does not use drugs.    Allergies:  Review of patient's allergies indicates:   Allergen Reactions    Clopidogrel Other (See Comments)     Increased body temperature and redness        Medications:  Current Outpatient Medications   Medication Sig Dispense Refill    acetaminophen (TYLENOL) 500 MG tablet Take 500-1,000 mg by mouth every 6 (six) hours as needed (fever/pain).      ASCORBIC ACID, VITAMIN C, ORAL Take 1 tablet by mouth once daily.      atorvastatin (LIPITOR) 20 MG tablet Take 2 tablets (40 mg total) by mouth once daily. (Patient taking differently: Take 20 mg by mouth once daily.)      b complex vitamins tablet Take 1 tablet by mouth once daily.      cholecalciferol, vitamin D3, (VITAMIN D3 ORAL) Take 5,000 Units by mouth once daily.      cyanocobalamin, vitamin B-12, 2,500 mcg Lozg       diphenoxylate-atropine 2.5-0.025 mg (LOMOTIL) 2.5-0.025 mg per tablet Take 1 tablet by mouth 4 (four) times daily as needed for Diarrhea.      ferrous sulfate (SLOW RELEASE IRON) 142 mg (45 mg iron) TbSR Take 1 tablet by mouth Daily.      LIDOcaine-prilocaine (EMLA) cream Apply topically once as needed (to port access).      multivitamin with minerals tablet Take 1 tablet by mouth once daily.      UNABLE TO FIND Take 1 capsule by mouth once daily. medication name: Colon health 80 billion      zinc gluconate 50 mg tablet  "Take 50 mg by mouth once daily.      hydrOXYzine HCL (ATARAX) 25 MG tablet Take 1 tablet (25 mg total) by mouth 3 (three) times daily as needed for Itching. 90 tablet 3    triamcinolone acetonide 0.5% (KENALOG) 0.5 % Crea Apply topically 2 (two) times daily. 454 g 2     No current facility-administered medications for this visit.       Review of Systems   Constitutional:  Negative for chills, diaphoresis, fatigue, fever and unexpected weight change.   Respiratory:  Negative for cough and shortness of breath.    Cardiovascular:  Negative for chest pain and palpitations.   Gastrointestinal:  Negative for abdominal pain, constipation, diarrhea, nausea and vomiting.   Skin:  Negative for color change and rash.   Neurological:  Negative for headaches.   Hematological:  Negative for adenopathy. Does not bruise/bleed easily.       ECOG Performance Status: 1   Objective:      Vitals:   Vitals:    01/11/24 0952   BP: 96/64   BP Location: Left arm   Patient Position: Sitting   BP Method: Medium (Manual)   Pulse: 63   Temp: 98.4 °F (36.9 °C)   TempSrc: Temporal   SpO2: 99%   Weight: 66.7 kg (147 lb 0.8 oz)   Height: 5' 9" (1.753 m)               Physical Exam  Constitutional:       General: He is not in acute distress.     Appearance: He is well-developed. He is not diaphoretic.   HENT:      Head: Normocephalic and atraumatic.   Cardiovascular:      Rate and Rhythm: Normal rate and regular rhythm.      Heart sounds: Normal heart sounds. No murmur heard.     No friction rub. No gallop.   Pulmonary:      Effort: Pulmonary effort is normal. No respiratory distress.      Breath sounds: Normal breath sounds. No wheezing or rales.   Chest:      Chest wall: No tenderness.   Abdominal:      General: Bowel sounds are normal. There is no distension.      Palpations: Abdomen is soft. There is no mass.      Tenderness: There is no abdominal tenderness. There is no rebound.      Comments: Ileal conduit on RLQ with clear urine visualized "   Musculoskeletal:      Cervical back: Normal range of motion.   Lymphadenopathy:      Cervical: No cervical adenopathy.      Upper Body:      Right upper body: No supraclavicular or axillary adenopathy.      Left upper body: No supraclavicular or axillary adenopathy.   Skin:     General: Skin is warm and dry.      Findings: No erythema or rash.   Neurological:      Mental Status: He is alert and oriented to person, place, and time.   Psychiatric:         Behavior: Behavior normal.         Laboratory Data:  Lab Visit on 01/11/2024   Component Date Value Ref Range Status    Sodium 01/11/2024 138  136 - 145 mmol/L Final    Potassium 01/11/2024 4.8  3.5 - 5.1 mmol/L Final    Chloride 01/11/2024 105  95 - 110 mmol/L Final    CO2 01/11/2024 23  23 - 29 mmol/L Final    Glucose 01/11/2024 111 (H)  70 - 110 mg/dL Final    BUN 01/11/2024 23  8 - 23 mg/dL Final    Creatinine 01/11/2024 1.5 (H)  0.5 - 1.4 mg/dL Final    Calcium 01/11/2024 9.7  8.7 - 10.5 mg/dL Final    Total Protein 01/11/2024 7.2  6.0 - 8.4 g/dL Final    Albumin 01/11/2024 3.8  3.5 - 5.2 g/dL Final    Total Bilirubin 01/11/2024 0.5  0.1 - 1.0 mg/dL Final    Comment: For infants and newborns, interpretation of results should be based  on gestational age, weight and in agreement with clinical  observations.    Premature Infant recommended reference ranges:  Up to 24 hours.............<8.0 mg/dL  Up to 48 hours............<12.0 mg/dL  3-5 days..................<15.0 mg/dL  6-29 days.................<15.0 mg/dL      Alkaline Phosphatase 01/11/2024 79  55 - 135 U/L Final    AST 01/11/2024 26  10 - 40 U/L Final    ALT 01/11/2024 16  10 - 44 U/L Final    eGFR 01/11/2024 48.2 (A)  >60 mL/min/1.73 m^2 Final    Anion Gap 01/11/2024 10  8 - 16 mmol/L Final    Phosphorus 01/11/2024 2.8  2.7 - 4.5 mg/dL Final    WBC 01/11/2024 6.01  3.90 - 12.70 K/uL Final    RBC 01/11/2024 3.80 (L)  4.60 - 6.20 M/uL Final    Hemoglobin 01/11/2024 11.9 (L)  14.0 - 18.0 g/dL Final     Hematocrit 01/11/2024 35.4 (L)  40.0 - 54.0 % Final    MCV 01/11/2024 93  82 - 98 fL Final    MCH 01/11/2024 31.3 (H)  27.0 - 31.0 pg Final    MCHC 01/11/2024 33.6  32.0 - 36.0 g/dL Final    RDW 01/11/2024 14.5  11.5 - 14.5 % Final    Platelets 01/11/2024 215  150 - 450 K/uL Final    MPV 01/11/2024 8.4 (L)  9.2 - 12.9 fL Final    Immature Granulocytes 01/11/2024 0.2  0.0 - 0.5 % Final    Gran # (ANC) 01/11/2024 3.2  1.8 - 7.7 K/uL Final    Immature Grans (Abs) 01/11/2024 0.01  0.00 - 0.04 K/uL Final    Comment: Mild elevation in immature granulocytes is non specific and   can be seen in a variety of conditions including stress response,   acute inflammation, trauma and pregnancy. Correlation with other   laboratory and clinical findings is essential.      Lymph # 01/11/2024 1.7  1.0 - 4.8 K/uL Final    Mono # 01/11/2024 0.6  0.3 - 1.0 K/uL Final    Eos # 01/11/2024 0.4  0.0 - 0.5 K/uL Final    Baso # 01/11/2024 0.10  0.00 - 0.20 K/uL Final    nRBC 01/11/2024 0  0 /100 WBC Final    Gran % 01/11/2024 52.7  38.0 - 73.0 % Final    Lymph % 01/11/2024 29.0  18.0 - 48.0 % Final    Mono % 01/11/2024 9.7  4.0 - 15.0 % Final    Eosinophil % 01/11/2024 6.7  0.0 - 8.0 % Final    Basophil % 01/11/2024 1.7  0.0 - 1.9 % Final    Differential Method 01/11/2024 Automated   Final    Sodium 01/11/2024 138  136 - 145 mmol/L Final    Potassium 01/11/2024 4.8  3.5 - 5.1 mmol/L Final    Chloride 01/11/2024 105  95 - 110 mmol/L Final    CO2 01/11/2024 23  23 - 29 mmol/L Final    Glucose 01/11/2024 111 (H)  70 - 110 mg/dL Final    BUN 01/11/2024 23  8 - 23 mg/dL Final    Creatinine 01/11/2024 1.5 (H)  0.5 - 1.4 mg/dL Final    Calcium 01/11/2024 9.7  8.7 - 10.5 mg/dL Final    Total Protein 01/11/2024 7.2  6.0 - 8.4 g/dL Final    Albumin 01/11/2024 3.8  3.5 - 5.2 g/dL Final    Total Bilirubin 01/11/2024 0.5  0.1 - 1.0 mg/dL Final    Comment: For infants and newborns, interpretation of results should be based  on gestational age, weight and in  agreement with clinical  observations.    Premature Infant recommended reference ranges:  Up to 24 hours.............<8.0 mg/dL  Up to 48 hours............<12.0 mg/dL  3-5 days..................<15.0 mg/dL  6-29 days.................<15.0 mg/dL      Alkaline Phosphatase 01/11/2024 79  55 - 135 U/L Final    AST 01/11/2024 26  10 - 40 U/L Final    ALT 01/11/2024 16  10 - 44 U/L Final    eGFR 01/11/2024 48.2 (A)  >60 mL/min/1.73 m^2 Final    Anion Gap 01/11/2024 10  8 - 16 mmol/L Final         Imaging:     PET/CT 11/16/23  Head/neck:     No significant abnormal hypermetabolic foci are identified within the head and neck region. No lymphadenopathy is present.     Chest:     Since the recent CT of 10/21/2023, the bilateral pleural effusions have resolved. There is a new hypermetabolic subpleural nodule in the posterior right lower lobe which is suspicious for primary or metastatic neoplasm. The cannot be certain whether this nodule was present on the recent CT study due to the presence of a right pleural effusion, but the nodule was not present on the previous CTA of the chest on 09/10/2021. On images 130 9-141 the nodule measures 2.2 x 1.3 cm with a max SUV of 2.9. No other significant abnormal hypermetabolic foci are identified within the chest. No lymphadenopathy is present.     Abdomen/pelvis:     There are postoperative changes of a cystectomy and prostatectomy with ureteral diversion right lower quadrant ileostomy. There is no hydronephrosis. No significant abnormal hypermetabolic foci are identified within the abdomen and pelvis. No lymphadenopathy is present. The adrenal glands are normal.     Skeleton:     No significant abnormal hypermetabolic foci are identified within the skeleton. There are no findings to suggest osseous metastatic disease.       Assessment:       1. Malignant neoplasm of overlapping sites of bladder    2. Other specified disorders involving the immune mechanism, not elsewhere classified     3. Urothelial carcinoma of bladder    4. Aneurysm of right popliteal artery    5. Lung nodule    6. Normocytic anemia    7. Hypertension, unspecified type    8. Other primary thrombophilia    9. Blood creatinine increased compared with prior measurement    10. Itching    11. Ureteral stent present                   Plan:     Bladder Cancer - pt with stage IIIB bladder cancer pT3N2 s/p neoadjuvant chemo followed by radical cystoprostatectomy 8/15/23  -PT received one dose of Nivolumab on 9/18/23  -PET/CT suggestive of a RLL pulmonary nodule  -Biopsy on 12/08/23 negative for malignancy  -Pt to proceed with cycle 3 of Nivolumab, 2nd cycle in our clinic  Visit today included increased complexity associated with the care of the episodic problem (immunotherapy) addressed and managing the longitudinal care of the patient due to the serious and/or complex managed problem(s) bladder cancer    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  -No sign of infection  -Will monitor    Pulmonary nodule - see above    Prostate Cancer - patient with acinar adenocarcinoma of the prostate grade group 1 ([place] score 3+3=6) in 1 2 5% of prostate tissue pT2N0  -PSA 10/31/23 0.02ng/mL    Ureteral Stents - pt missed his appt for stent exchange 1/02/24 and is to reschedule the procedure with Dr Ghosh  -Pt seeing Dr Rivas for recurrent infection    Anorexia - on marinol  -Will monitor    Anemia - Hemoglobin 11.9g/dL on 1/11/24  -Will monitor     HTN - pt not on meds  -Stable  -Will monitor    Thrombophilia - pt with prior PE  -Pt subsequently developed a right perinephric bleed requiring embolization   -Patient with IVC filter in place  -PT was to see vascular surgery to determine when anticoagulation could be restarted  -Will monitor    Right popliteal aneurysm - pt to see vascular surgery    Increased Creatinine - creatinine up to 1.5 on 1/11/24  -Pt following with nephrology    Itchiness - possibly from immunotherapy  -Pt  prescribed atarax    Route Chart for Scheduling    Med Onc Chart Routing      Follow up with physician 4 weeks. Pt can proceed with treatment.  He will need a CBC, CMP and TSH on 2/09/24 wit an appt with me.   Follow up with KW[facility]    Infusion scheduling note    Injection scheduling note    Labs    Imaging    Pharmacy appointment    Other referrals              Treatment Plan Information   OP NIVOLUMAB 480 MG Q4W   Jackson Jimenez MD   Upcoming Treatment Dates - OP NIVOLUMAB 480 MG Q4W    1/18/2024       Chemotherapy       nivolumab 480 mg in sodium chloride 0.9% 98 mL infusion  2/15/2024       Chemotherapy       nivolumab 480 mg in sodium chloride 0.9% 98 mL infusion  3/14/2024       Chemotherapy       nivolumab 480 mg in sodium chloride 0.9% 98 mL infusion  4/11/2024       Chemotherapy       nivolumab 480 mg in sodium chloride 0.9% 98 mL infusion      Jackson Jimenez MD  [facility]  Hematology and Oncology  [facility]   [address] [place] [address]   O: [phone]  F: [phone]

## 2024-01-11 ENCOUNTER — LAB VISIT (OUTPATIENT)
Dept: LAB | Facility: HOSPITAL | Age: 74
End: 2024-01-11
Attending: INTERNAL MEDICINE
Payer: MEDICARE

## 2024-01-11 ENCOUNTER — PATIENT MESSAGE (OUTPATIENT)
Dept: PALLIATIVE MEDICINE | Facility: CLINIC | Age: 74
End: 2024-01-11
Payer: MEDICARE

## 2024-01-11 ENCOUNTER — OFFICE VISIT (OUTPATIENT)
Dept: HEMATOLOGY/ONCOLOGY | Facility: CLINIC | Age: 74
End: 2024-01-11
Payer: MEDICARE

## 2024-01-11 VITALS
TEMPERATURE: 98 F | HEART RATE: 63 BPM | DIASTOLIC BLOOD PRESSURE: 64 MMHG | WEIGHT: 147.06 LBS | HEIGHT: 69 IN | OXYGEN SATURATION: 99 % | BODY MASS INDEX: 21.78 KG/M2 | SYSTOLIC BLOOD PRESSURE: 96 MMHG

## 2024-01-11 DIAGNOSIS — R79.89 BLOOD CREATININE INCREASED COMPARED WITH PRIOR MEASUREMENT: ICD-10-CM

## 2024-01-11 DIAGNOSIS — C67.8 MALIGNANT NEOPLASM OF OVERLAPPING SITES OF BLADDER: ICD-10-CM

## 2024-01-11 DIAGNOSIS — D68.59 OTHER PRIMARY THROMBOPHILIA: ICD-10-CM

## 2024-01-11 DIAGNOSIS — D64.9 NORMOCYTIC ANEMIA: ICD-10-CM

## 2024-01-11 DIAGNOSIS — T73.3XXA FATIGUE DUE TO EXCESSIVE EXERTION, INITIAL ENCOUNTER: ICD-10-CM

## 2024-01-11 DIAGNOSIS — C67.8 MALIGNANT NEOPLASM OF OVERLAPPING SITES OF BLADDER: Primary | ICD-10-CM

## 2024-01-11 DIAGNOSIS — C67.9 UROTHELIAL CARCINOMA OF BLADDER: ICD-10-CM

## 2024-01-11 DIAGNOSIS — I10 HYPERTENSION, UNSPECIFIED TYPE: ICD-10-CM

## 2024-01-11 DIAGNOSIS — R91.1 LUNG NODULE: ICD-10-CM

## 2024-01-11 DIAGNOSIS — Z96.0 URETERAL STENT PRESENT: ICD-10-CM

## 2024-01-11 DIAGNOSIS — Z19.1 HORMONE SENSITIVE MALIGNANCY STATUS: ICD-10-CM

## 2024-01-11 DIAGNOSIS — N17.9 AKI (ACUTE KIDNEY INJURY): ICD-10-CM

## 2024-01-11 DIAGNOSIS — L29.9 ITCHING: ICD-10-CM

## 2024-01-11 DIAGNOSIS — D89.89 OTHER SPECIFIED DISORDERS INVOLVING THE IMMUNE MECHANISM, NOT ELSEWHERE CLASSIFIED: ICD-10-CM

## 2024-01-11 DIAGNOSIS — I72.4 ANEURYSM OF RIGHT POPLITEAL ARTERY: ICD-10-CM

## 2024-01-11 LAB
ALBUMIN SERPL BCP-MCNC: 3.8 G/DL (ref 3.5–5.2)
ALBUMIN SERPL BCP-MCNC: 3.8 G/DL (ref 3.5–5.2)
ALP SERPL-CCNC: 79 U/L (ref 55–135)
ALP SERPL-CCNC: 79 U/L (ref 55–135)
ALT SERPL W/O P-5'-P-CCNC: 16 U/L (ref 10–44)
ALT SERPL W/O P-5'-P-CCNC: 16 U/L (ref 10–44)
ANION GAP SERPL CALC-SCNC: 10 MMOL/L (ref 8–16)
ANION GAP SERPL CALC-SCNC: 10 MMOL/L (ref 8–16)
AST SERPL-CCNC: 26 U/L (ref 10–40)
AST SERPL-CCNC: 26 U/L (ref 10–40)
BASOPHILS # BLD AUTO: 0.1 K/UL (ref 0–0.2)
BASOPHILS NFR BLD: 1.7 % (ref 0–1.9)
BILIRUB SERPL-MCNC: 0.5 MG/DL (ref 0.1–1)
BILIRUB SERPL-MCNC: 0.5 MG/DL (ref 0.1–1)
BUN SERPL-MCNC: 23 MG/DL (ref 8–23)
BUN SERPL-MCNC: 23 MG/DL (ref 8–23)
CALCIUM SERPL-MCNC: 9.7 MG/DL (ref 8.7–10.5)
CALCIUM SERPL-MCNC: 9.7 MG/DL (ref 8.7–10.5)
CHLORIDE SERPL-SCNC: 105 MMOL/L (ref 95–110)
CHLORIDE SERPL-SCNC: 105 MMOL/L (ref 95–110)
CO2 SERPL-SCNC: 23 MMOL/L (ref 23–29)
CO2 SERPL-SCNC: 23 MMOL/L (ref 23–29)
CREAT SERPL-MCNC: 1.5 MG/DL (ref 0.5–1.4)
CREAT SERPL-MCNC: 1.5 MG/DL (ref 0.5–1.4)
DIFFERENTIAL METHOD BLD: ABNORMAL
EOSINOPHIL # BLD AUTO: 0.4 K/UL (ref 0–0.5)
EOSINOPHIL NFR BLD: 6.7 % (ref 0–8)
ERYTHROCYTE [DISTWIDTH] IN BLOOD BY AUTOMATED COUNT: 14.5 % (ref 11.5–14.5)
EST. GFR  (NO RACE VARIABLE): 48.2 ML/MIN/1.73 M^2
EST. GFR  (NO RACE VARIABLE): 48.2 ML/MIN/1.73 M^2
GLUCOSE SERPL-MCNC: 111 MG/DL (ref 70–110)
GLUCOSE SERPL-MCNC: 111 MG/DL (ref 70–110)
HCT VFR BLD AUTO: 35.4 % (ref 40–54)
HGB BLD-MCNC: 11.9 G/DL (ref 14–18)
IMM GRANULOCYTES # BLD AUTO: 0.01 K/UL (ref 0–0.04)
IMM GRANULOCYTES NFR BLD AUTO: 0.2 % (ref 0–0.5)
LYMPHOCYTES # BLD AUTO: 1.7 K/UL (ref 1–4.8)
LYMPHOCYTES NFR BLD: 29 % (ref 18–48)
MCH RBC QN AUTO: 31.3 PG (ref 27–31)
MCHC RBC AUTO-ENTMCNC: 33.6 G/DL (ref 32–36)
MCV RBC AUTO: 93 FL (ref 82–98)
MONOCYTES # BLD AUTO: 0.6 K/UL (ref 0.3–1)
MONOCYTES NFR BLD: 9.7 % (ref 4–15)
NEUTROPHILS # BLD AUTO: 3.2 K/UL (ref 1.8–7.7)
NEUTROPHILS NFR BLD: 52.7 % (ref 38–73)
NRBC BLD-RTO: 0 /100 WBC
PHOSPHATE SERPL-MCNC: 2.8 MG/DL (ref 2.7–4.5)
PLATELET # BLD AUTO: 215 K/UL (ref 150–450)
PMV BLD AUTO: 8.4 FL (ref 9.2–12.9)
POTASSIUM SERPL-SCNC: 4.8 MMOL/L (ref 3.5–5.1)
POTASSIUM SERPL-SCNC: 4.8 MMOL/L (ref 3.5–5.1)
PROT SERPL-MCNC: 7.2 G/DL (ref 6–8.4)
PROT SERPL-MCNC: 7.2 G/DL (ref 6–8.4)
RBC # BLD AUTO: 3.8 M/UL (ref 4.6–6.2)
SODIUM SERPL-SCNC: 138 MMOL/L (ref 136–145)
SODIUM SERPL-SCNC: 138 MMOL/L (ref 136–145)
TSH SERPL DL<=0.005 MIU/L-ACNC: 1.26 UIU/ML (ref 0.4–4)
WBC # BLD AUTO: 6.01 K/UL (ref 3.9–12.7)

## 2024-01-11 PROCEDURE — G2211 COMPLEX E/M VISIT ADD ON: HCPCS | Mod: S$GLB,,, | Performed by: INTERNAL MEDICINE

## 2024-01-11 PROCEDURE — 84100 ASSAY OF PHOSPHORUS: CPT | Mod: PN | Performed by: INTERNAL MEDICINE

## 2024-01-11 PROCEDURE — 99215 OFFICE O/P EST HI 40 MIN: CPT | Mod: S$GLB,,, | Performed by: INTERNAL MEDICINE

## 2024-01-11 PROCEDURE — 3288F FALL RISK ASSESSMENT DOCD: CPT | Mod: CPTII,S$GLB,, | Performed by: INTERNAL MEDICINE

## 2024-01-11 PROCEDURE — 1101F PT FALLS ASSESS-DOCD LE1/YR: CPT | Mod: CPTII,S$GLB,, | Performed by: INTERNAL MEDICINE

## 2024-01-11 PROCEDURE — 36415 COLL VENOUS BLD VENIPUNCTURE: CPT | Mod: PN | Performed by: INTERNAL MEDICINE

## 2024-01-11 PROCEDURE — 1157F ADVNC CARE PLAN IN RCRD: CPT | Mod: CPTII,S$GLB,, | Performed by: INTERNAL MEDICINE

## 2024-01-11 PROCEDURE — 3074F SYST BP LT 130 MM HG: CPT | Mod: CPTII,S$GLB,, | Performed by: INTERNAL MEDICINE

## 2024-01-11 PROCEDURE — 80053 COMPREHEN METABOLIC PANEL: CPT | Mod: PN | Performed by: INTERNAL MEDICINE

## 2024-01-11 PROCEDURE — 99999 PR PBB SHADOW E&M-EST. PATIENT-LVL III: CPT | Mod: PBBFAC,,, | Performed by: INTERNAL MEDICINE

## 2024-01-11 PROCEDURE — 84443 ASSAY THYROID STIM HORMONE: CPT | Performed by: INTERNAL MEDICINE

## 2024-01-11 PROCEDURE — 1159F MED LIST DOCD IN RCRD: CPT | Mod: CPTII,S$GLB,, | Performed by: INTERNAL MEDICINE

## 2024-01-11 PROCEDURE — 85025 COMPLETE CBC W/AUTO DIFF WBC: CPT | Mod: PN | Performed by: INTERNAL MEDICINE

## 2024-01-11 PROCEDURE — 3078F DIAST BP <80 MM HG: CPT | Mod: CPTII,S$GLB,, | Performed by: INTERNAL MEDICINE

## 2024-01-11 PROCEDURE — 1126F AMNT PAIN NOTED NONE PRSNT: CPT | Mod: CPTII,S$GLB,, | Performed by: INTERNAL MEDICINE

## 2024-01-11 RX ORDER — DIPHENHYDRAMINE HYDROCHLORIDE 50 MG/ML
50 INJECTION, SOLUTION INTRAMUSCULAR; INTRAVENOUS ONCE AS NEEDED
Status: CANCELLED | OUTPATIENT
Start: 2024-01-16

## 2024-01-11 RX ORDER — TRIAMCINOLONE ACETONIDE 5 MG/G
CREAM TOPICAL 2 TIMES DAILY
Qty: 454 G | Refills: 2 | Status: SHIPPED | OUTPATIENT
Start: 2024-01-11

## 2024-01-11 RX ORDER — PROCHLORPERAZINE EDISYLATE 5 MG/ML
5 INJECTION INTRAMUSCULAR; INTRAVENOUS ONCE AS NEEDED
Status: CANCELLED
Start: 2024-01-16

## 2024-01-11 RX ORDER — HYDROXYZINE HYDROCHLORIDE 25 MG/1
25 TABLET, FILM COATED ORAL 3 TIMES DAILY PRN
Qty: 90 TABLET | Refills: 3 | Status: SHIPPED | OUTPATIENT
Start: 2024-01-11

## 2024-01-11 RX ORDER — SODIUM CHLORIDE 0.9 % (FLUSH) 0.9 %
10 SYRINGE (ML) INJECTION
Status: CANCELLED | OUTPATIENT
Start: 2024-01-16

## 2024-01-11 RX ORDER — EPINEPHRINE 0.3 MG/.3ML
0.3 INJECTION SUBCUTANEOUS ONCE AS NEEDED
Status: CANCELLED | OUTPATIENT
Start: 2024-01-16

## 2024-01-11 RX ORDER — HEPARIN 100 UNIT/ML
500 SYRINGE INTRAVENOUS
Status: CANCELLED | OUTPATIENT
Start: 2024-01-16

## 2024-01-12 ENCOUNTER — TELEPHONE (OUTPATIENT)
Dept: HEMATOLOGY/ONCOLOGY | Facility: CLINIC | Age: 74
End: 2024-01-12
Payer: MEDICARE

## 2024-01-12 ENCOUNTER — TELEPHONE (OUTPATIENT)
Dept: UROLOGY | Facility: CLINIC | Age: 74
End: 2024-01-12
Payer: MEDICARE

## 2024-01-12 DIAGNOSIS — N13.39 OTHER HYDRONEPHROSIS: Primary | ICD-10-CM

## 2024-01-12 NOTE — TELEPHONE ENCOUNTER
Spoke with patient's wife who was able to provide acceptable patient identifiers prior to start of conversation.   Surgery scheduled for 1/23.  Anticipatory guidance provided and all questions answered.

## 2024-01-12 NOTE — TELEPHONE ENCOUNTER
Returned Pt call regarding medical supply list letting her know we faxed the orders.    Split-Thickness Skin Graft Text: The defect edges were debeveled with a #15 scalpel blade.  Given the location of the defect, shape of the defect and the proximity to free margins a split thickness skin graft was deemed most appropriate.  Using a sterile surgical marker, the primary defect shape was transferred to the donor site. The split thickness graft was then harvested.  The skin graft was then placed in the primary defect and oriented appropriately.

## 2024-01-12 NOTE — TELEPHONE ENCOUNTER
----- Message from Cynthia Calhoun sent at 1/12/2024 10:50 AM CST -----  Type: Needs Medical Advice  Who Called:  Courtney(spouse)  Symptoms (please be specific):    How long has patient had these symptoms:    Pharmacy name and phone #:    Best Call Back Number:   Additional Information: Courtney is requesting a call back from Tracey regarding her  medical supply order.

## 2024-01-16 ENCOUNTER — INFUSION (OUTPATIENT)
Dept: INFUSION THERAPY | Facility: HOSPITAL | Age: 74
End: 2024-01-16
Attending: INTERNAL MEDICINE
Payer: MEDICARE

## 2024-01-16 VITALS
RESPIRATION RATE: 18 BRPM | HEIGHT: 69 IN | HEART RATE: 54 BPM | DIASTOLIC BLOOD PRESSURE: 77 MMHG | SYSTOLIC BLOOD PRESSURE: 158 MMHG | BODY MASS INDEX: 21.98 KG/M2 | TEMPERATURE: 98 F | WEIGHT: 148.38 LBS

## 2024-01-16 DIAGNOSIS — C67.8 MALIGNANT NEOPLASM OF OVERLAPPING SITES OF BLADDER: Primary | ICD-10-CM

## 2024-01-16 PROCEDURE — 96413 CHEMO IV INFUSION 1 HR: CPT | Mod: PN

## 2024-01-16 PROCEDURE — 25000003 PHARM REV CODE 250: Mod: PN | Performed by: INTERNAL MEDICINE

## 2024-01-16 PROCEDURE — 63600175 PHARM REV CODE 636 W HCPCS: Mod: PN | Performed by: INTERNAL MEDICINE

## 2024-01-16 RX ORDER — HEPARIN 100 UNIT/ML
500 SYRINGE INTRAVENOUS
Status: DISCONTINUED | OUTPATIENT
Start: 2024-01-16 | End: 2024-01-16 | Stop reason: HOSPADM

## 2024-01-16 RX ORDER — SODIUM CHLORIDE 0.9 % (FLUSH) 0.9 %
10 SYRINGE (ML) INJECTION
Status: DISCONTINUED | OUTPATIENT
Start: 2024-01-16 | End: 2024-01-16 | Stop reason: HOSPADM

## 2024-01-16 RX ADMIN — SODIUM CHLORIDE: 9 INJECTION, SOLUTION INTRAVENOUS at 01:01

## 2024-01-16 RX ADMIN — Medication 500 UNITS: at 02:01

## 2024-01-16 RX ADMIN — SODIUM CHLORIDE 480 MG: 9 INJECTION, SOLUTION INTRAVENOUS at 01:01

## 2024-01-16 NOTE — ANESTHESIA PAT ROS NOTE
01/16/2024  Aren Bey Jr. is a 73 y.o., male.      Pre-op Assessment    I have reviewed the NPO Status.   I have reviewed the Medications.     Review of Systems  Anesthesia Hx:  No problems with previous Anesthesia   History of prior surgery of interest to airway management or planning:          Denies Family Hx of Anesthesia complications.    Denies Personal Hx of Anesthesia complications.                    Social:  Non-Smoker, No Alcohol Use       Hematology/Oncology:       -- Anemia:                  Current/Recent Cancer.  Other (see Oncology comments)       chemotherapy and surgery   Oncology Comments: Prostate cancer and bladder cancer s/p prostatectomy and cystectomy, chemo     EENT/Dental:  EENT/Dental Normal           Cardiovascular:  Exercise tolerance: good   Hypertension, well controlled   CAD  asymptomatic CABG/stent    Denies Angina.     hyperlipidemia     Functional Capacity good / => 4 METS   Coronary Artery Disease:         S/P Aorto-Coronary Bypass Graft Surgery (CABG): CABG was 2021         Peripheral Arterial Disease      Carotoid Artery Disease, bilateral   Peripheral Arterial Aneurysm, popliteal, right             Pulmonary:  Pulmonary Normal   Denies COPD.  Denies Asthma.   Denies Shortness of breath.   Denies Sleep Apnea.             Pulmonary Embolism (post anticoagulation), resolved      Renal/:  Chronic Renal Disease        Kidney Function/Disease             Hepatic/GI:  Hepatic/GI Normal                 Neurological:  Denies TIA.  Denies CVA. Denies Neuromuscular Disease.   Denies Seizures.                                Endocrine:  Endocrine Normal            Dermatological:  Skin Normal    Psych:  Psychiatric Normal                         Anesthesia Assessment: Preoperative EQUATION    Planned Procedure: Procedure(s) (LRB):  CYSTOSCOPY, WITH RETROGRADE PYELOGRAM  AND URETERAL STENT INSERTION (Bilateral)  LOOPOGRAM (N/A)  Requested Anesthesia Type:General  Surgeon: South Ghosh MD  Service: Urology  Known or anticipated Date of Surgery:1/23/2024    Surgeon notes: reviewed    Electronic QUestionnaire Assessment completed via nurse interview with patient.        Triage considerations:     The patient has no apparent active cardiac condition (No unstable coronary Syndrome such as severe unstable angina or recent [<1 month] myocardial infarction, decompensated CHF, severe valvular   disease or significant arrhythmia)    Previous anesthesia records:GETA and Not available  -no [facility] anes records, had general anes [facility]/15/[facility]--prostatectomy, cystectomy with ileal conduit, general anes, ASA 3  Anesthesia Procedure Notes - Leigh Ann Gan CRNA - 08/15/2023 7:40 AM CDT  Associated Order(s): Airway  Formatting of this note might be different from the original.  Airway  Performed by: Leigh Ann Gan CRNA  Authorized by: Jesus Nixon MD  Start Time: 8/15/2023 7:10 AM    Location: OR  Urgency: Elective  Difficult Airway: No  Anesthesiologist: Jesus Nixon MD  Resident/CRNA: Travon Douglas CRNA  Other Anesthesia Staff: Crissy Adames  Performed by:  other anesthesia staff  Indications for Airway Management: Anesthesia and airway protection  Spontaneous Ventilation: absent  Sedation Level: Deep  Eyes Taped: Both and Paper tape  Dentition: As per Pre-op  Preoxygenated: Yes  Mask Fit: Good  Mask Ventilation: Easy  Patient Position: Sniffing  Final Airway Type: Endotracheal airway  Tracheal Tube Type: Standard and OETT  Stylet Used: Adult  Technique Used for Successful ETT Placement: Direct laryngoscopy  Devices/Methods Used in Placement: Intubating stylet  Route: Oral  Blade Type: Karyna  Laryngoscope Blade/ Videolaryngoscope Blade Size: 3  ETT Size (mm): 7.5  Airway Secured: @ cm and taped  Measured from: Lips  ETT to Lips (cm):  22  Cuffed: Yes  Cuff Inflated with: Air and Inflated to just seal  Placement Verified by:  CO2 confirmed, equal chest expansion and bilateral breath sounds  Placement Verified After Positioning by: bilateral breath sounds  Cormack-Lehane Classification: Grade IIa - partial view of glottis  Number of Attempts at Approach: 1    Electronically signed by Leigh Ann Gan CRNA at 08/15/2023 7:43 AM CDT     Last PCP note: within 1 month , within [facility]   Subspecialty notes: Hematology/Oncology, Infectious Disease, Urology    Other important co-morbidities: HLD, HTN, and CAD s/p CABG , aortic atherosclerosis, PAD, bilat carotid artery stenosis, rt popliteal artery aneurysm, bladder cancer, prostate cancer, s/p cystectomy with ileal conduit, urostomy, prostatectomy, hydronephrosis       Tests already available:  Available tests,  within 1 month , within 3 months , within [facility] .   24 CBC, CMP, TSH  23 EKG            Instructions given. (See in Nurse's note)    Optimization:  Anesthesia Preop Clinic Assessment  Indicated--phone screen          Plan:    Testing:  None Needed     Navigation:  Straight Line to surgery.               No tests, anesthesia preop clinic visit, or consult required.

## 2024-01-16 NOTE — PLAN OF CARE
Problem: Adult Inpatient Plan of Care  Goal: Patient-Specific Goal (Individualized)  Outcome: Ongoing, Progressing  Flowsheets (Taken 1/16/2024 1300)  Anxieties, Fears or Concerns: forgot to emla cream  Individualized Care Needs: recliner, conversation, spouse chairside     Problem: Fatigue  Goal: Improved Activity Tolerance  Outcome: Ongoing, Progressing  Intervention: Promote Improved Energy  Flowsheets (Taken 1/16/2024 1300)  Fatigue Management: paced activity encouraged  Sleep/Rest Enhancement:   natural light exposure provided   noise level reduced   relaxation techniques promoted  Activity Management:   Ambulated -L4   Up in chair - L3    Pt tolerated opdivo, VSS. Pt voiced no new complaints or concerns at this time. NAD noted, pt d/c home.

## 2024-01-17 ENCOUNTER — TELEPHONE (OUTPATIENT)
Dept: UROLOGY | Facility: CLINIC | Age: 74
End: 2024-01-17
Payer: MEDICARE

## 2024-01-17 NOTE — TELEPHONE ENCOUNTER
Spoke with patient's wife who was able to provide acceptable patient identifiers prior to start of conversation.    Ms Caldwell called regarding time of arrival for 1/23 OR procedure.  Notified of time and that a final confirmation call will be made on 1/22 from our office.

## 2024-01-22 ENCOUNTER — ANESTHESIA EVENT (OUTPATIENT)
Dept: SURGERY | Facility: HOSPITAL | Age: 74
End: 2024-01-22
Payer: MEDICARE

## 2024-01-22 ENCOUNTER — OFFICE VISIT (OUTPATIENT)
Dept: FAMILY MEDICINE | Facility: CLINIC | Age: 74
End: 2024-01-22
Payer: MEDICARE

## 2024-01-22 ENCOUNTER — TELEPHONE (OUTPATIENT)
Dept: UROLOGY | Facility: CLINIC | Age: 74
End: 2024-01-22
Payer: MEDICARE

## 2024-01-22 VITALS
HEART RATE: 78 BPM | BODY MASS INDEX: 21.63 KG/M2 | SYSTOLIC BLOOD PRESSURE: 108 MMHG | HEIGHT: 69 IN | DIASTOLIC BLOOD PRESSURE: 62 MMHG | WEIGHT: 146.06 LBS

## 2024-01-22 DIAGNOSIS — Z98.890 HISTORY OF UROSTOMY: ICD-10-CM

## 2024-01-22 DIAGNOSIS — Z23 IMMUNIZATION DUE: ICD-10-CM

## 2024-01-22 DIAGNOSIS — E78.5 DYSLIPIDEMIA: ICD-10-CM

## 2024-01-22 DIAGNOSIS — I72.4 POPLITEAL ANEURYSM: ICD-10-CM

## 2024-01-22 DIAGNOSIS — I10 ESSENTIAL HYPERTENSION: ICD-10-CM

## 2024-01-22 DIAGNOSIS — N18.9 CHRONIC KIDNEY DISEASE, UNSPECIFIED CKD STAGE: ICD-10-CM

## 2024-01-22 DIAGNOSIS — Z79.899 DRUG THERAPY: ICD-10-CM

## 2024-01-22 DIAGNOSIS — C61 PROSTATE CANCER: ICD-10-CM

## 2024-01-22 DIAGNOSIS — N39.0 RECURRENT UTI: ICD-10-CM

## 2024-01-22 DIAGNOSIS — I25.10 CORONARY ARTERY DISEASE INVOLVING NATIVE CORONARY ARTERY OF NATIVE HEART WITHOUT ANGINA PECTORIS: ICD-10-CM

## 2024-01-22 DIAGNOSIS — C67.9 MALIGNANT NEOPLASM OF URINARY BLADDER, UNSPECIFIED SITE: ICD-10-CM

## 2024-01-22 DIAGNOSIS — Z86.711 HISTORY OF PULMONARY EMBOLUS (PE): ICD-10-CM

## 2024-01-22 DIAGNOSIS — Z00.00 HEALTHCARE MAINTENANCE: ICD-10-CM

## 2024-01-22 DIAGNOSIS — Z87.898 HISTORY OF SEIZURE: Primary | ICD-10-CM

## 2024-01-22 DIAGNOSIS — C67.8 MALIGNANT NEOPLASM OF OVERLAPPING SITES OF BLADDER: ICD-10-CM

## 2024-01-22 PROBLEM — R79.89 POSITIVE D DIMER: Status: RESOLVED | Noted: 2021-09-10 | Resolved: 2024-01-22

## 2024-01-22 PROBLEM — A41.9 SEVERE SEPSIS: Status: RESOLVED | Noted: 2023-11-17 | Resolved: 2024-01-22

## 2024-01-22 PROBLEM — N30.00 ACUTE CYSTITIS WITHOUT HEMATURIA: Status: RESOLVED | Noted: 2023-11-19 | Resolved: 2024-01-22

## 2024-01-22 PROBLEM — D64.9 ANEMIA: Status: RESOLVED | Noted: 2021-09-10 | Resolved: 2024-01-22

## 2024-01-22 PROBLEM — N17.9 AKI (ACUTE KIDNEY INJURY): Status: RESOLVED | Noted: 2023-11-17 | Resolved: 2024-01-22

## 2024-01-22 PROBLEM — R79.89 ELEVATED TROPONIN: Status: RESOLVED | Noted: 2021-09-10 | Resolved: 2024-01-22

## 2024-01-22 PROBLEM — R07.89 OTHER CHEST PAIN: Status: RESOLVED | Noted: 2021-10-18 | Resolved: 2024-01-22

## 2024-01-22 PROBLEM — R55 SYNCOPE AND COLLAPSE: Status: RESOLVED | Noted: 2021-09-15 | Resolved: 2024-01-22

## 2024-01-22 PROBLEM — E43 SEVERE MALNUTRITION: Status: RESOLVED | Noted: 2023-11-18 | Resolved: 2024-01-22

## 2024-01-22 PROBLEM — A04.72 C. DIFFICILE COLITIS: Status: RESOLVED | Noted: 2023-11-19 | Resolved: 2024-01-22

## 2024-01-22 PROBLEM — E87.1 HYPONATREMIA: Status: RESOLVED | Noted: 2023-11-17 | Resolved: 2024-01-22

## 2024-01-22 PROBLEM — R79.89 ELEVATED SERUM CREATININE: Status: RESOLVED | Noted: 2023-10-21 | Resolved: 2024-01-22

## 2024-01-22 PROBLEM — R65.20 SEVERE SEPSIS: Status: RESOLVED | Noted: 2023-11-17 | Resolved: 2024-01-22

## 2024-01-22 PROCEDURE — 3288F FALL RISK ASSESSMENT DOCD: CPT | Mod: CPTII,S$GLB,, | Performed by: FAMILY MEDICINE

## 2024-01-22 PROCEDURE — 1157F ADVNC CARE PLAN IN RCRD: CPT | Mod: CPTII,S$GLB,, | Performed by: FAMILY MEDICINE

## 2024-01-22 PROCEDURE — 1126F AMNT PAIN NOTED NONE PRSNT: CPT | Mod: CPTII,S$GLB,, | Performed by: FAMILY MEDICINE

## 2024-01-22 PROCEDURE — 1160F RVW MEDS BY RX/DR IN RCRD: CPT | Mod: CPTII,S$GLB,, | Performed by: FAMILY MEDICINE

## 2024-01-22 PROCEDURE — 3078F DIAST BP <80 MM HG: CPT | Mod: CPTII,S$GLB,, | Performed by: FAMILY MEDICINE

## 2024-01-22 PROCEDURE — 99203 OFFICE O/P NEW LOW 30 MIN: CPT | Mod: S$GLB,,, | Performed by: FAMILY MEDICINE

## 2024-01-22 PROCEDURE — 1159F MED LIST DOCD IN RCRD: CPT | Mod: CPTII,S$GLB,, | Performed by: FAMILY MEDICINE

## 2024-01-22 PROCEDURE — 1101F PT FALLS ASSESS-DOCD LE1/YR: CPT | Mod: CPTII,S$GLB,, | Performed by: FAMILY MEDICINE

## 2024-01-22 PROCEDURE — 3074F SYST BP LT 130 MM HG: CPT | Mod: CPTII,S$GLB,, | Performed by: FAMILY MEDICINE

## 2024-01-22 PROCEDURE — 99999 PR PBB SHADOW E&M-EST. PATIENT-LVL IV: CPT | Mod: PBBFAC,,, | Performed by: FAMILY MEDICINE

## 2024-01-22 NOTE — TELEPHONE ENCOUNTER
I Spoke to Aren Bey Jr.  Arrival time of 0530 given to check in at [facility] on the 1st Floor of the Hospital on [facility].  Instructed to: not to drink or eat anything after midnight except for a 12 oz Gatorade 2 hours prior to arrival time. to bathe the night before and in the morning of with Hibiclens, not to wear anything metal or to apply any lotions, powders, or deodorant, .  Only one person can accompany you the morning of surgery.   Patient verified understanding of instructions.

## 2024-01-22 NOTE — PROGRESS NOTES
THIS DOCUMENT WAS MADE IN PART WITH VOICE RECOGNITION SOFTWARE.  OCCASIONALLY THIS SOFTWARE WILL MISINTERPRET WORDS OR PHRASES.    Assessment and Plan:    1. History of seizure        2. Healthcare maintenance  Ambulatory referral/consult to Family Practice      3. Dyslipidemia        4. Essential hypertension        5. Popliteal aneurysm        6. Chronic kidney disease, unspecified CKD stage        7. History of pulmonary embolus (PE)        8. Malignant neoplasm of overlapping sites of bladder        9. Malignant neoplasm of urinary bladder, unspecified site        10. Prostate cancer        11. Immunization due        12. Drug therapy  Hepatitis C Antibody    CANCELED: Lipid Panel      13. Recurrent UTI        14. History of urostomy        15. Coronary artery disease involving native coronary artery of native heart without angina pectoris          Will screen for hepatitis-C sometime in the future, no major concerns there     Chronic conditions reviewed appear to be well controlled   Maintain follow-up with specialist     Counseled on immunizations     Consider D mannose for prevention of urinary tract infections, patient will discuss this with Urology/infectious disease per their preference     Follow-up every 4 months      ______________________________________________________________________  Subjective:    Chief Complaint:  Chief Complaint   Patient presents with    Establish Care        HPI:  Aren is a 73 y.o. year old     Patient presents today to establish care  Chronic conditions reviewed, see below     Chronic kidney disease , 3a  Baseline creatinine 1.4-1.5  Nephrology : MD Aron     History of Hypertension  Rx-none   Normotensive in clinic     Dyslipidemia + CAD   S/p CABG x 4 (11/2021)  Rx-atorvastatin 20 mg  Prev rx : metoprolol (bradycardia)   Cardiology : MD Pineda     Right Popliteal artery aneurysm   Vasc: MD Lalo   F/u yearly with US : stable x 3 year     History of pulmonary  embolism   August 2023   No current anticoagulation    Bladder cancer -invasive urothelial carcinoma  Oncology - MD Tony   Urology- MD Augie   Previous treatment-chemotherapy, surgery  Currently undergoing immunotherapy  GOAL: CURE  Status post radical cystectomy, prostatectomy August 2023 with urostomy placement  Positive lymphovascular invasion, soft tissue invasion  Status post ureteral stenting    Recurrent UTI  Following the urostomy / bladder resection     History C diff colitis   November 2023  Currently taking probiotics     Prostate cancer   Oncology- MD Tony  S/p prostatectomy     Mild anemia, normocytic   Taking Iron / B complex    Generalized Pruritus   Rx : Prn hydroxyzine, Kenalog, Cerave   Prev rx : Zyrtec (ineffective)        Past Medical History:  Past Medical History:   Diagnosis Date    Aneurysm artery, popliteal     right leg    Bladder cancer     Hypertension     Malignant neoplasm of prostate     Seizures        Past Surgical History:  Past Surgical History:   Procedure Laterality Date    PROSTATECTOMY         Family History:  Family History   Problem Relation Age of Onset    Heart disease Mother     Hypertension Mother        Social History:  Social History     Socioeconomic History    Marital status:    Occupational History    Occupation: retired   Tobacco Use    Smoking status: Never     Passive exposure: Never    Smokeless tobacco: Never   Substance and Sexual Activity    Alcohol use: Not Currently    Drug use: Never    Sexual activity: Yes     Partners: Female     Social Determinants of Health     Financial Resource Strain: Patient Declined (11/10/2023)    Overall Financial Resource Strain (CARDIA)     Difficulty of Paying Living Expenses: Patient declined   Food Insecurity: No Food Insecurity (11/18/2023)    Hunger Vital Sign     Worried About Running Out of Food in the Last Year: Never true     Ran Out of Food in the Last Year: Never true   Transportation Needs: No  Transportation Needs (11/18/2023)    PRAPARE - Transportation     Lack of Transportation (Medical): No     Lack of Transportation (Non-Medical): No   Physical Activity: Insufficiently Active (11/10/2023)    Exercise Vital Sign     Days of Exercise per Week: 7 days     Minutes of Exercise per Session: 10 min   Stress: No Stress Concern Present (11/10/2023)    [place] [employer] - Occupational Stress Questionnaire     Feeling of Stress : Not at all   Social Connections: Unknown (11/10/2023)    Social Connection and Isolation Panel [NHANES]     Frequency of Communication with Friends and Family: More than three times a week     Frequency of Social Gatherings with Friends and Family: Patient declined     Active Member of Clubs or Organizations: Patient declined     Attends Club or Organization Meetings: Patient declined     Marital Status:    Housing Stability: Low Risk  (11/18/2023)    Housing Stability Vital Sign     Unable to Pay for Housing in the Last Year: No     Number of Places Lived in the Last Year: 2     Unstable Housing in the Last Year: No       Medications:  Current Outpatient Medications on File Prior to Visit   Medication Sig Dispense Refill    acetaminophen (TYLENOL) 500 MG tablet Take 500-1,000 mg by mouth every 6 (six) hours as needed (fever/pain).      ASCORBIC ACID, VITAMIN C, ORAL Take 1 tablet by mouth once daily.      atorvastatin (LIPITOR) 20 MG tablet Take 2 tablets (40 mg total) by mouth once daily. (Patient taking differently: Take 20 mg by mouth once daily.)      cholecalciferol, vitamin D3, (VITAMIN D3 ORAL) Take 5,000 Units by mouth once daily.      cyanocobalamin, vitamin B-12, 2,500 mcg Lozg       ferrous sulfate (SLOW RELEASE IRON) 142 mg (45 mg iron) TbSR Take 1 tablet by mouth Daily.      multivitamin with minerals tablet Take 1 tablet by mouth once daily.      UNABLE TO FIND Take 1 capsule by mouth once daily. medication name: Colon health 80 billion       "zinc gluconate 50 mg tablet Take 50 mg by mouth once daily.      b complex vitamins tablet Take 1 tablet by mouth once daily.      diphenoxylate-atropine 2.5-0.025 mg (LOMOTIL) 2.5-0.025 mg per tablet Take 1 tablet by mouth 4 (four) times daily as needed for Diarrhea.      hydrOXYzine HCL (ATARAX) 25 MG tablet Take 1 tablet (25 mg total) by mouth 3 (three) times daily as needed for Itching. (Patient not taking: Reported on 1/22/2024) 90 tablet 3    LIDOcaine-prilocaine (EMLA) cream Apply topically once as needed (to port access).      triamcinolone acetonide 0.5% (KENALOG) 0.5 % Crea Apply topically 2 (two) times daily. (Patient not taking: Reported on 1/22/2024) 454 g 2     No current facility-administered medications on file prior to visit.       Allergies:  Clopidogrel    Immunizations:  Immunization History   Administered Date(s) Administered    Influenza (FLUAD) - Trivalent - Adjuvanted - PF (65+) 11/12/2019    Influenza - High Dose - PF (65 years and older) 10/21/2015    Influenza - Quadrivalent - High Dose - PF (65 years and older) 09/24/2020, 09/15/2021    Influenza - Quadrivalent - PF *Preferred* (6 months and older) 10/11/2022    Influenza A (H5N1) 2013 Monovalent 11/27/2018    Pneumococcal Conjugate - 13 Valent 03/17/2020    Pneumococcal Polysaccharide - 23 Valent 03/26/2021    Tdap 01/01/2016    Zoster 10/21/2015    Zoster Recombinant 10/12/2020, 01/29/2021       Review of Systems:  Review of Systems   All other systems reviewed and are negative.      Objective:    Vitals:  Vitals:    01/22/24 1001   BP: 108/62   Pulse: 78   Weight: 66.3 kg (146 lb 0.9 oz)   Height: 5' 9" (1.753 m)   PainSc: 0-No pain       Physical Exam  Vitals reviewed.   Constitutional:       General: He is not in acute distress.  HENT:      Head: Normocephalic and atraumatic.   Eyes:      Pupils: Pupils are equal, round, and reactive to light.   Cardiovascular:      Rate and Rhythm: Normal rate and regular rhythm.      Heart sounds: " No murmur heard.     No friction rub.   Pulmonary:      Effort: Pulmonary effort is normal.      Breath sounds: Normal breath sounds.   Abdominal:      General: Bowel sounds are normal. There is no distension.      Palpations: Abdomen is soft.      Tenderness: There is no abdominal tenderness.   Musculoskeletal:      Cervical back: Neck supple.   Skin:     General: Skin is warm and dry.      Findings: No rash.   Psychiatric:         Behavior: Behavior normal.           Javon Shelley MD  Family Medicine

## 2024-01-22 NOTE — ANESTHESIA PREPROCEDURE EVALUATION
[facility]  Anesthesia Pre-Operative Evaluation         Patient Name/: Aren Bey Jr., 1950  MRN: [id number]    SUBJECTIVE:     Pre-operative evaluation for Procedure(s) (LRB):  CYSTOSCOPY, WITH RETROGRADE PYELOGRAM AND URETERAL STENT INSERTION (Bilateral)  LOOPOGRAM (N/A)     2024    Aren Bey Jr. is a 73 y.o. male w/ a significant PMHx of hydronephrosis, HTN, CAD s/p stent, CKD, PE, and seizure. Patient now presents for the above procedure(s).    Patient denies any other known cardiopulmonary disease.     NPO status: Appropriate    Previous Anesthetics:  Date: 08/15/23   Procedure: Cystoprostatectomy with Ileal  Anesthesia Type: General  Complications: None reported    Previous Airway:  Mask Ventilation: Easy  Patient Position: Sniffing  Final Airway Type: Endotracheal airway  Tracheal Tube Type: Standard and OETT  Stylet Used: Adult  Technique Used for Successful ETT Placement: Direct laryngoscopy  Devices/Methods Used in Placement: Intubating stylet  Route: Oral  Blade Type: Karyna  Laryngoscope Blade/ Videolaryngoscope Blade Size: 3  ETT Size (mm): 7.5  Airway Secured: @ cm and taped  Measured from: Lips  ETT to Lips (cm): 22  Cuffed: Yes   Cuff Inflated with: Air and Inflated to just seal  Placement Verified by:  CO2 confirmed, equal chest expansion and bilateral breath sounds   Placement Verified After Positioning by: bilateral breath sounds   Cormack-Lehane Classification: Grade IIa - partial view of glottis  Number of Attempts at Approach: 1     ________________________________________  Results for orders placed during the hospital encounter of 21    Echo    Interpretation Summary  · The left ventricle is normal in size with normal systolic function.  · Normal left ventricular diastolic function.  · The estimated ejection fraction is 65%.  · Normal right ventricular size with normal right ventricular systolic function.  · Mild left  atrial enlargement.  · Mild-to-moderate aortic regurgitation.  · Mild tricuspid regurgitation.  · Mild mitral regurgitation.  · Normal central venous pressure (3 mmHg).  · The estimated PA systolic pressure is 19 mmHg.    ________________________________________    LDA:        PowerPort A Cath Single Lumen Subclavian Right (Active)   Dressing Type Transparent (Tegaderm) 01/16/24 1305   Dressing Status Clean;Dry;Intact 01/16/24 1305   Dressing Intervention First dressing 01/16/24 1305   Patency/Care flushed w/o difficulty;blood return present;heparin locked 01/16/24 1415   Status Deaccessed 01/16/24 1415   Accessed by: [email or web address] 01/16/24 1305   Needle Insertion Date 01/16/24 01/16/24 1305   Needle Insertion Time 1305 01/16/24 1305   Type of Needle Yuan 01/16/24 1305   Gauge 20 01/16/24 1305   Needle Length 1 in 01/16/24 1305   Needle Status Removed 01/16/24 1415   Flush Performed Yes 01/16/24 1415   Needle Removal Date 01/16/24 01/16/24 1415   Needle Removal Time 1415 01/16/24 1415   Deaccessed By RT 01/16/24 1415   Number of days:             Urostomy 10/21/23 RLQ (Active)   Number of days: 93       Drips: None documented.      Patient Active Problem List   Diagnosis    History of seizure    Essential hypertension    Dyslipidemia    Popliteal aneurysm    History of urostomy    Malignant neoplasm of overlapping sites of bladder    History of pulmonary embolus (PE)    Malignant neoplasm of urinary bladder    Prostate cancer    CKD (chronic kidney disease)    Other hydronephrosis    Recurrent UTI    Coronary artery disease involving native coronary artery of native heart without angina pectoris       Review of patient's allergies indicates:   Allergen Reactions    Clopidogrel Other (See Comments)     Increased body temperature and redness        Current Inpatient Medications:       No current facility-administered medications on file prior to encounter.     Current Outpatient Medications on File Prior to Encounter    Medication Sig Dispense Refill    acetaminophen (TYLENOL) 500 MG tablet Take 500-1,000 mg by mouth every 6 (six) hours as needed (fever/pain).      ASCORBIC ACID, VITAMIN C, ORAL Take 1 tablet by mouth once daily.      atorvastatin (LIPITOR) 20 MG tablet Take 2 tablets (40 mg total) by mouth once daily. (Patient taking differently: Take 20 mg by mouth once daily.)      b complex vitamins tablet Take 1 tablet by mouth once daily.      cholecalciferol, vitamin D3, (VITAMIN D3 ORAL) Take 5,000 Units by mouth once daily.      cyanocobalamin, vitamin B-12, 2,500 mcg Lozg       diphenoxylate-atropine 2.5-0.025 mg (LOMOTIL) 2.5-0.025 mg per tablet Take 1 tablet by mouth 4 (four) times daily as needed for Diarrhea.      ferrous sulfate (SLOW RELEASE IRON) 142 mg (45 mg iron) TbSR Take 1 tablet by mouth Daily.      hydrOXYzine HCL (ATARAX) 25 MG tablet Take 1 tablet (25 mg total) by mouth 3 (three) times daily as needed for Itching. (Patient not taking: Reported on 1/22/2024) 90 tablet 3    LIDOcaine-prilocaine (EMLA) cream Apply topically once as needed (to port access).      multivitamin with minerals tablet Take 1 tablet by mouth once daily.      triamcinolone acetonide 0.5% (KENALOG) 0.5 % Crea Apply topically 2 (two) times daily. (Patient not taking: Reported on 1/22/2024) 454 g 2    UNABLE TO FIND Take 1 capsule by mouth once daily. medication name: Colon health 80 billion      zinc gluconate 50 mg tablet Take 50 mg by mouth once daily.         Past Surgical History:   Procedure Laterality Date    PROSTATECTOMY         Social History:  Tobacco Use: Low Risk  (1/22/2024)    Patient History     Smoking Tobacco Use: Never     Smokeless Tobacco Use: Never     Passive Exposure: Never       Alcohol Use: Not At Risk (11/10/2023)    AUDIT-C     Frequency of Alcohol Consumption: Never     Average Number of Drinks: Patient does not drink     Frequency of Binge Drinking: Never       OBJECTIVE:     Vital Signs Range:  BMI  Readings from Last 1 Encounters:   01/22/24 21.57 kg/m²       Pulse:  [78]   BP: (108)/(62)        Significant Labs:        Component Value Date/Time    WBC 6.01 01/11/2024 0918    HGB 11.9 (L) 01/11/2024 0918    HCT 35.4 (L) 01/11/2024 0918     01/11/2024 0918     01/11/2024 0918     01/11/2024 0918    K 4.8 01/11/2024 0918    K 4.8 01/11/2024 0918     01/11/2024 0918     01/11/2024 0918    CO2 23 01/11/2024 0918    CO2 23 01/11/2024 0918     (H) 01/11/2024 0918     (H) 01/11/2024 0918    BUN 23 01/11/2024 0918    BUN 23 01/11/2024 0918    CREATININE 1.5 (H) 01/11/2024 0918    CREATININE 1.5 (H) 01/11/2024 0918    MG 1.6 11/21/2023 0500    PHOS 2.8 01/11/2024 0918    CALCIUM 9.7 01/11/2024 0918    CALCIUM 9.7 01/11/2024 0918    ALBUMIN 3.8 01/11/2024 0918    ALBUMIN 3.8 01/11/2024 0918    PROT 7.2 01/11/2024 0918    PROT 7.2 01/11/2024 0918    ALKPHOS 79 01/11/2024 0918    ALKPHOS 79 01/11/2024 0918    BILITOT 0.5 01/11/2024 0918    BILITOT 0.5 01/11/2024 0918    AST 26 01/11/2024 0918    AST 26 01/11/2024 0918    ALT 16 01/11/2024 0918    ALT 16 01/11/2024 0918    INR 1.1 12/08/2023 0802    HGBA1C 5.2 11/03/2021 1621        Please see Results Review for additional labs.     Diagnostic Studies: No relevant studies.    EKG:   Results for orders placed or performed during the hospital encounter of 11/17/23   EKG 12-lead    Collection Time: 11/17/23  4:01 PM    Narrative    Test Reason : R50.9,    Vent. Rate : 070 BPM     Atrial Rate : 070 BPM     P-R Int : 174 ms          QRS Dur : 080 ms      QT Int : 400 ms       P-R-T Axes : 067 020 044 degrees     QTc Int : 432 ms    Normal sinus rhythm  Normal ECG  When compared with ECG of 21-OCT-2023 13:06,  Premature atrial complexes are no longer Present  Confirmed by Félix BEY, Nathan MCNEAL ([id number]) on 11/17/2023 5:16:39 PM    Referred By:  JORGE           Confirmed By:Nathan Heard MD       ECHO:  See subjective, if  available.      ASSESSMENT/PLAN:           Pre-op Assessment    I have reviewed the Patient Summary Reports.    I have reviewed the NPO Status.   I have reviewed the Medications.     Review of Systems  Anesthesia Hx:  No problems with previous Anesthesia   History of prior surgery of interest to airway management or planning:          Denies Family Hx of Anesthesia complications.    Denies Personal Hx of Anesthesia complications.                    Social:  Non-Smoker, No Alcohol Use       Hematology/Oncology:       -- Anemia:                  Current/Recent Cancer.  Other (see Oncology comments)       chemotherapy and surgery   Oncology Comments: Prostate cancer and bladder cancer s/p prostatectomy and cystectomy, chemo     EENT/Dental:  EENT/Dental Normal           Cardiovascular:  Exercise tolerance: good   Hypertension, well controlled   CAD  asymptomatic CABG/stent    Denies Angina.     hyperlipidemia   ECG has been reviewed.  Functional Capacity good / => 4 METS   Coronary Artery Disease:         S/P Aorto-Coronary Bypass Graft Surgery (CABG): CABG was 2021         Peripheral Arterial Disease      Carotoid Artery Disease, bilateral   Peripheral Arterial Aneurysm, popliteal, right             Pulmonary:  Pulmonary Normal   Denies COPD.  Denies Asthma.   Denies Shortness of breath.   Denies Sleep Apnea.                Renal/:  Chronic Renal Disease        Kidney Function/Disease             Hepatic/GI:      Denies GERD.             Neurological:  Denies TIA.  Denies CVA. Denies Neuromuscular Disease.   Denies Seizures.                                Endocrine:  Endocrine Normal            Dermatological:  Skin Normal    Psych:  Psychiatric Normal                    Physical Exam  General: Cooperative and Alert    Airway:  Mallampati: III   Mouth Opening: Normal  TM Distance: Normal  Neck ROM: Normal ROM    Dental:  Intact        Anesthesia Plan  Type of Anesthesia, risks & benefits discussed:    Anesthesia  Type: Gen ETT, Gen Natural Airway  Intra-op Monitoring Plan: Standard ASA Monitors  Post Op Pain Control Plan: multimodal analgesia and IV/PO Opioids PRN  Induction:  IV  Airway Plan: Video and Direct, Post-Induction  Informed Consent: Informed consent signed with the Patient and all parties understand the risks and agree with anesthesia plan.  All questions answered.   ASA Score: 3  Day of Surgery Review of History & Physical: H&P Update referred to the surgeon/provider.    Ready For Surgery From Anesthesia Perspective.     .

## 2024-01-23 ENCOUNTER — ANESTHESIA (OUTPATIENT)
Dept: SURGERY | Facility: HOSPITAL | Age: 74
End: 2024-01-23
Payer: MEDICARE

## 2024-01-23 ENCOUNTER — HOSPITAL ENCOUNTER (OUTPATIENT)
Facility: HOSPITAL | Age: 74
Discharge: HOME | End: 2024-01-23
Attending: STUDENT IN AN ORGANIZED HEALTH CARE EDUCATION/TRAINING PROGRAM | Admitting: STUDENT IN AN ORGANIZED HEALTH CARE EDUCATION/TRAINING PROGRAM
Payer: MEDICARE

## 2024-01-23 VITALS
TEMPERATURE: 98 F | HEART RATE: 57 BPM | WEIGHT: 138 LBS | BODY MASS INDEX: 20.44 KG/M2 | SYSTOLIC BLOOD PRESSURE: 142 MMHG | HEIGHT: 69 IN | OXYGEN SATURATION: 96 % | RESPIRATION RATE: 18 BRPM | DIASTOLIC BLOOD PRESSURE: 65 MMHG

## 2024-01-23 DIAGNOSIS — N13.39 OTHER HYDRONEPHROSIS: Primary | ICD-10-CM

## 2024-01-23 DIAGNOSIS — N13.5 URETERAL STRICTURE: ICD-10-CM

## 2024-01-23 DIAGNOSIS — C67.8 MALIGNANT NEOPLASM OF OVERLAPPING SITES OF BLADDER: ICD-10-CM

## 2024-01-23 PROCEDURE — D9220A PRA ANESTHESIA: Mod: ,,, | Performed by: ANESTHESIOLOGY

## 2024-01-23 PROCEDURE — 37000008 HC ANESTHESIA 1ST 15 MINUTES: Performed by: STUDENT IN AN ORGANIZED HEALTH CARE EDUCATION/TRAINING PROGRAM

## 2024-01-23 PROCEDURE — 52332 CYSTOSCOPY AND TREATMENT: CPT | Mod: 50,,, | Performed by: STUDENT IN AN ORGANIZED HEALTH CARE EDUCATION/TRAINING PROGRAM

## 2024-01-23 PROCEDURE — 63600175 PHARM REV CODE 636 W HCPCS

## 2024-01-23 PROCEDURE — 36000707: Performed by: STUDENT IN AN ORGANIZED HEALTH CARE EDUCATION/TRAINING PROGRAM

## 2024-01-23 PROCEDURE — 25000003 PHARM REV CODE 250: Performed by: STUDENT IN AN ORGANIZED HEALTH CARE EDUCATION/TRAINING PROGRAM

## 2024-01-23 PROCEDURE — 25000003 PHARM REV CODE 250

## 2024-01-23 PROCEDURE — 25500020 PHARM REV CODE 255: Performed by: STUDENT IN AN ORGANIZED HEALTH CARE EDUCATION/TRAINING PROGRAM

## 2024-01-23 PROCEDURE — 63600175 PHARM REV CODE 636 W HCPCS: Performed by: STUDENT IN AN ORGANIZED HEALTH CARE EDUCATION/TRAINING PROGRAM

## 2024-01-23 PROCEDURE — 36000706: Performed by: STUDENT IN AN ORGANIZED HEALTH CARE EDUCATION/TRAINING PROGRAM

## 2024-01-23 PROCEDURE — 71000044 HC DOSC ROUTINE RECOVERY FIRST HOUR: Performed by: STUDENT IN AN ORGANIZED HEALTH CARE EDUCATION/TRAINING PROGRAM

## 2024-01-23 PROCEDURE — 71000015 HC POSTOP RECOV 1ST HR: Performed by: STUDENT IN AN ORGANIZED HEALTH CARE EDUCATION/TRAINING PROGRAM

## 2024-01-23 PROCEDURE — 37000009 HC ANESTHESIA EA ADD 15 MINS: Performed by: STUDENT IN AN ORGANIZED HEALTH CARE EDUCATION/TRAINING PROGRAM

## 2024-01-23 PROCEDURE — C1769 GUIDE WIRE: HCPCS | Performed by: STUDENT IN AN ORGANIZED HEALTH CARE EDUCATION/TRAINING PROGRAM

## 2024-01-23 DEVICE — BANDER URETERAL DIVERSION OPEN-END STENT SET
Type: IMPLANTABLE DEVICE | Site: URETER | Status: NON-FUNCTIONAL
Brand: BANDER
Removed: 2024-05-15

## 2024-01-23 RX ORDER — PROPOFOL 10 MG/ML
VIAL (ML) INTRAVENOUS
Status: DISCONTINUED | OUTPATIENT
Start: 2024-01-23 | End: 2024-01-23

## 2024-01-23 RX ORDER — SODIUM CHLORIDE 0.9 % (FLUSH) 0.9 %
10 SYRINGE (ML) INJECTION
Status: DISCONTINUED | OUTPATIENT
Start: 2024-01-23 | End: 2024-01-24 | Stop reason: HOSPADM

## 2024-01-23 RX ORDER — PROCHLORPERAZINE EDISYLATE 5 MG/ML
5 INJECTION INTRAMUSCULAR; INTRAVENOUS EVERY 30 MIN PRN
Status: DISCONTINUED | OUTPATIENT
Start: 2024-01-23 | End: 2024-01-24 | Stop reason: HOSPADM

## 2024-01-23 RX ORDER — FENTANYL CITRATE 50 UG/ML
INJECTION, SOLUTION INTRAMUSCULAR; INTRAVENOUS
Status: DISCONTINUED | OUTPATIENT
Start: 2024-01-23 | End: 2024-01-23

## 2024-01-23 RX ORDER — PROPOFOL 10 MG/ML
VIAL (ML) INTRAVENOUS CONTINUOUS PRN
Status: DISCONTINUED | OUTPATIENT
Start: 2024-01-23 | End: 2024-01-23

## 2024-01-23 RX ORDER — HALOPERIDOL 5 MG/ML
0.5 INJECTION INTRAMUSCULAR EVERY 10 MIN PRN
Status: DISCONTINUED | OUTPATIENT
Start: 2024-01-23 | End: 2024-01-24 | Stop reason: HOSPADM

## 2024-01-23 RX ORDER — ONDANSETRON HYDROCHLORIDE 2 MG/ML
INJECTION, SOLUTION INTRAVENOUS
Status: DISCONTINUED | OUTPATIENT
Start: 2024-01-23 | End: 2024-01-23

## 2024-01-23 RX ORDER — HYDROMORPHONE HYDROCHLORIDE 1 MG/ML
0.2 INJECTION, SOLUTION INTRAMUSCULAR; INTRAVENOUS; SUBCUTANEOUS EVERY 5 MIN PRN
Status: DISCONTINUED | OUTPATIENT
Start: 2024-01-23 | End: 2024-01-24 | Stop reason: HOSPADM

## 2024-01-23 RX ORDER — SODIUM CHLORIDE 9 MG/ML
INJECTION, SOLUTION INTRAVENOUS CONTINUOUS PRN
Status: DISCONTINUED | OUTPATIENT
Start: 2024-01-23 | End: 2024-01-23

## 2024-01-23 RX ORDER — ACETAMINOPHEN 500 MG
1000 TABLET ORAL ONCE
Status: COMPLETED | OUTPATIENT
Start: 2024-01-23 | End: 2024-01-23

## 2024-01-23 RX ADMIN — FENTANYL CITRATE 50 MCG: 50 INJECTION, SOLUTION INTRAMUSCULAR; INTRAVENOUS at 02:01

## 2024-01-23 RX ADMIN — CEFEPIME 1 G: 1 INJECTION, POWDER, FOR SOLUTION INTRAMUSCULAR; INTRAVENOUS at 02:01

## 2024-01-23 RX ADMIN — PROPOFOL 100 MCG/KG/MIN: 10 INJECTION, EMULSION INTRAVENOUS at 02:01

## 2024-01-23 RX ADMIN — Medication 30 MG: at 02:01

## 2024-01-23 RX ADMIN — PIPERACILLIN SODIUM AND TAZOBACTAM SODIUM 3.38 G: 3; .375 INJECTION, POWDER, FOR SOLUTION INTRAVENOUS at 01:01

## 2024-01-23 RX ADMIN — ONDANSETRON 4 MG: 2 INJECTION INTRAMUSCULAR; INTRAVENOUS at 03:01

## 2024-01-23 RX ADMIN — Medication 20 MG: at 03:01

## 2024-01-23 RX ADMIN — SODIUM CHLORIDE: 0.9 INJECTION, SOLUTION INTRAVENOUS at 02:01

## 2024-01-23 RX ADMIN — ACETAMINOPHEN 1000 MG: 500 TABLET ORAL at 01:01

## 2024-01-23 NOTE — H&P
[facility]  Urologic Oncology Clinic Note      Chief Complaint/Reason for Consultation: Surgery for stent placement    Requesting Provider:   South Ghosh MD  [address] [place] [address]      History of Present Illness:   Patient 73 y.o. 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 He did have neoadjuvant chemotherapy. The patient's surgery was ultimately complicated by small-bowel obstruction, and pulmonary embolism.  PT recevied 1 dose of Nivolumab 9/18/23.  The patient was placed on anticoagulation for pulmonary embolism and ultimately developed a right perinephric bleed requiring embolization. He is currently not on anticoagulation and does IVC filter.  PT also developed UTI and acquired ureteral obstruction with placement of bilateral ureteral stents.  PT also developed C diff and was hospitalized for suspected recurrence at [facility] from 10/21/2023 to 10/25/2023.  Patient was discharged on fidaxomicin.     Blood thinners:  none    No Hx of TIA, MI, and CVA   Abdominal surgeries:  radical cystectomy w/ ileal loop urinary diversion, umbilical hernia      Patient and wife requested visit today due to recent hospitalization for c diff and possible UTI.    No changes since clinic.      Urologic History:     8/15/2023 -- Radical cystectomy w/ ileal loop urinary diversion -- final path pT3aN2 , mateo 3+3 prostate cancer  9/28/2023 -- IR placement of nephroureteral stents       Patient Active Problem List    Diagnosis Date Noted    Recurrent UTI 01/22/2024    Coronary artery disease involving native coronary artery of native heart without angina pectoris 01/22/2024    CKD (chronic kidney disease) 12/06/2023    Other hydronephrosis 12/06/2023    Prostate cancer 10/31/2023    Malignant neoplasm of  "overlapping sites of bladder 10/23/2023    History of pulmonary embolus (PE) 10/23/2023    Malignant neoplasm of urinary bladder 10/23/2023    History of urostomy 10/21/2023    Popliteal aneurysm 10/18/2021    Dyslipidemia 09/15/2021    History of seizure 09/10/2021    Essential hypertension 09/10/2021          Review of patient's allergies indicates:   Allergen Reactions    Clopidogrel Other (See Comments)     Increased body temperature and redness         Past Medical History:   Diagnosis Date    Aneurysm artery, popliteal     right leg    Bladder cancer     Hypertension     Malignant neoplasm of prostate     Seizures       Past Surgical History:   Procedure Laterality Date    PROSTATECTOMY        Family History   Problem Relation Age of Onset    Heart disease Mother     Hypertension Mother       Social History     Tobacco Use    Smoking status: Never     Passive exposure: Never    Smokeless tobacco: Never   Substance Use Topics    Alcohol use: Not Currently        Review of Systems   Constitutional:  Negative for activity change, fatigue and fever.   HENT:  Negative for congestion.    Respiratory:  Negative for cough.    Cardiovascular:  Negative for chest pain.   Gastrointestinal:  Negative for abdominal pain.   Genitourinary:  Negative for hematuria.             OBJECTIVE:     Vitals:    01/23/24 1322   BP: 139/65   BP Location: Left arm   Patient Position: Lying   Pulse: (!) 56   Resp: 20   Temp: 98.1 °F (36.7 °C)   TempSrc: Skin   SpO2: 100%   Weight: 62.6 kg (138 lb)   Height: 5' 9" (1.753 m)            General Appearance: Alert, cooperative, no distress  Head: Normocephalic  Eyes: Clear conjunctiva  Neck: No obvious LND or JVD  Lungs: Normal chest excursion, no accessory muscle use  Chest: Regular rate rhythm by palpation, no pedal edema  Abdomen: Soft, non-tender, non-distended, surgical scars healing well, hernias none, urostomy pink patent and productive in right lower quadrant w/ 2 ureteral stents in " place  Extremities: Atraumatic   Lymph Nodes: No appreciable lymph adenopathy  Neurologic: No gross gait, motor or sensory deficits            LAB:      CMP  Sodium   Date Value Ref Range Status   01/11/2024 138 136 - 145 mmol/L Final   01/11/2024 138 136 - 145 mmol/L Final     Potassium   Date Value Ref Range Status   01/11/2024 4.8 3.5 - 5.1 mmol/L Final   01/11/2024 4.8 3.5 - 5.1 mmol/L Final     Chloride   Date Value Ref Range Status   01/11/2024 105 95 - 110 mmol/L Final   01/11/2024 105 95 - 110 mmol/L Final     CO2   Date Value Ref Range Status   01/11/2024 23 23 - 29 mmol/L Final   01/11/2024 23 23 - 29 mmol/L Final     Glucose   Date Value Ref Range Status   01/11/2024 111 (H) 70 - 110 mg/dL Final   01/11/2024 111 (H) 70 - 110 mg/dL Final     BUN   Date Value Ref Range Status   01/11/2024 23 8 - 23 mg/dL Final   01/11/2024 23 8 - 23 mg/dL Final     Creatinine   Date Value Ref Range Status   01/11/2024 1.5 (H) 0.5 - 1.4 mg/dL Final   01/11/2024 1.5 (H) 0.5 - 1.4 mg/dL Final     Calcium   Date Value Ref Range Status   01/11/2024 9.7 8.7 - 10.5 mg/dL Final   01/11/2024 9.7 8.7 - 10.5 mg/dL Final     Total Protein   Date Value Ref Range Status   01/11/2024 7.2 6.0 - 8.4 g/dL Final   01/11/2024 7.2 6.0 - 8.4 g/dL Final     Albumin   Date Value Ref Range Status   01/11/2024 3.8 3.5 - 5.2 g/dL Final   01/11/2024 3.8 3.5 - 5.2 g/dL Final     Total Bilirubin   Date Value Ref Range Status   01/11/2024 0.5 0.1 - 1.0 mg/dL Final     Comment:     For infants and newborns, interpretation of results should be based  on gestational age, weight and in agreement with clinical  observations.    Premature Infant recommended reference ranges:  Up to 24 hours.............<8.0 mg/dL  Up to 48 hours............<12.0 mg/dL  3-5 days..................<15.0 mg/dL  6-29 days.................<15.0 mg/dL     01/11/2024 0.5 0.1 - 1.0 mg/dL Final     Comment:     For infants and newborns, interpretation of results should be based  on  gestational age, weight and in agreement with clinical  observations.    Premature Infant recommended reference ranges:  Up to 24 hours.............<8.0 mg/dL  Up to 48 hours............<12.0 mg/dL  3-5 days..................<15.0 mg/dL  6-29 days.................<15.0 mg/dL       Alkaline Phosphatase   Date Value Ref Range Status   01/11/2024 79 55 - 135 U/L Final   01/11/2024 79 55 - 135 U/L Final     AST   Date Value Ref Range Status   01/11/2024 26 10 - 40 U/L Final   01/11/2024 26 10 - 40 U/L Final     ALT   Date Value Ref Range Status   01/11/2024 16 10 - 44 U/L Final   01/11/2024 16 10 - 44 U/L Final     Anion Gap   Date Value Ref Range Status   01/11/2024 10 8 - 16 mmol/L Final   01/11/2024 10 8 - 16 mmol/L Final     eGFR   Date Value Ref Range Status   01/11/2024 48.2 (A) >60 mL/min/1.73 m^2 Final   01/11/2024 48.2 (A) >60 mL/min/1.73 m^2 Final     Lab Results   Component Value Date    WBC 6.01 01/11/2024    HGB 11.9 (L) 01/11/2024    HCT 35.4 (L) 01/11/2024    MCV 93 01/11/2024     01/11/2024               IMAGING:        10/21/2023  CT ABDOMEN PELVIS WITHOUT CONTRAST     CLINICAL HISTORY:  Fever, history of cancer..     TECHNIQUE:  Axial CT images of the abdomen and pelvis were obtained without intravenous contrast. No oral contrast.  Coronal and sagittal reformations were obtained.  Automated exposure control was utilized.  Total exam DLP is 343 mGy cm.     COMPARISON:  Outside CT 09/26/2023     FINDINGS:  New small right greater left pleural effusions identified with dependent atelectasis.  There is diffuse hepatic steatosis.  The non contrasted gallbladder, pancreas, and adrenal glands are unremarkable.  Spleen is normal size.  Calcified granulomas are noted within the spleen.  Urinary bladder is surgically absent.  There is a right lower quadrant ileal conduit.  Surgical clips are noted about the right renal pelvis.  There is minimal hydronephrosis decreased.  There are bilateral nephroureteral  stents present.  There is no bowel obstruction.  There is scattered colonic stool.  There is colonic wall thickening most notable of the sigmoid colon and rectum.  Correlate for colitis.  There is no evidence of acute appendicitis.  IVC filter is present.  Atherosclerotic calcifications are noted.  Abdominal aorta is not aneurysmal.  There is small volume ascites.  There is mild anasarca.  There is no intraperitoneal free air identified.  No lymphadenopathy is noted.  No acute displaced fractures noted.     Impression:     1. Portions of colon demonstrate wall thickening may reflect colitis.  This is most notable within the sigmoid colon and rectum.  2. New small right greater than left pleural effusions.  3. Mild anasarca.  Small volume ascites.  4. Bilateral nephroureteral stents are present.  There is minimal hydronephrosis decreased.        Electronically signed by: Sd Ford MD  Date:                                            10/21/2023  Time:                                           13:57      10/21/2023  XR CHEST PA AND LATERAL     CLINICAL HISTORY:  Fever, unspecified     TECHNIQUE:  PA and lateral views of the chest were performed.     COMPARISON:  09/10/2021     FINDINGS:  Sternotomy wires noted.  Right-sided port catheter.The lungs are clear, with normal appearance of pulmonary vasculature and no pleural effusion or pneumothorax.     The cardiac silhouette is normal in size. The hilar and mediastinal contours are unremarkable.     Bones are intact.     Impression:     No acute abnormality.        Electronically signed by: Ange Parikh MD  Date:                                            10/21/2023  Time:                                           11:52    ASSESSMENT/PLAN:       72 yo M w hx of radical cystectomy w/ ileal loop urinary diversion on 8/15/2023 -- final path pT3aN2 w/ 3+3 prostate cancer. Now s/p single dose of nivolumab.      Plan:    Node positive, Bladder Cancer  Discussed overall  prognosis is poor in this setting with 5 year survival approaching 30%. Discussed agreement to pursue nivolumab adjuvant therapy.     Will follow up labs and upcoming PET imaging.    Discussed referral to prehab clinic to help with nutrition and PT assessment.     Will follow with Dr. Jimenez for management    Ureteral strictures  Discussed management with stents.   Discussed virtual visit in Jan 2024 to discuss operative intervention to replace stents.   Discussed that stents are not permanent and that they need to be changed every 3-4 months  Discussed possible revision of uretero-ileal anastomosis in future pending disease progression.   Would like to wait 6 months to 1 year to perform ureter-ileal anastomotic revision      - To OR for loopogram, looposcopy, bilateral stent insertion  - Consents obtained in preop with patient and wife. No further questions or concerns.    Bull Alberto MD  [facility] - PGY3

## 2024-01-23 NOTE — PLAN OF CARE
Pt discharged per orders. AAOx'a 3. VSS. No s/s of acute distress. Resp even and unlabored. Denies complaints of pain verbalized. IV removed prior to discharge. Reviewed discharge instructions, follow up care/appointments with patient and spouse. Patient and spouse verbalized understanding of discharge and follow up care/appointments. Discharged with all personal belongings.Escorted out with staff in wheelchair.Pt transported home via personal transportation.

## 2024-01-23 NOTE — TRANSFER OF CARE
"Anesthesia Transfer of Care Note    Patient: Aren Bey Jr.    Procedure(s) Performed: Procedure(s) (LRB):  INSERTION, STENT, URETER (Bilateral)  REMOVAL-STENT (Bilateral)    Patient location: [facility]    Anesthesia Type: general    Transport from OR: Transported from OR on 6-10 L/min O2 by face mask with adequate spontaneous ventilation    Post pain: adequate analgesia    Post assessment: no apparent anesthetic complications    Post vital signs: stable    Level of consciousness: responds to stimulation, awake and alert    Nausea/Vomiting: no nausea/vomiting    Complications: none    Transfer of care protocol was followed      Last vitals: Visit Vitals  /65 (BP Location: Left arm, Patient Position: Lying)   Pulse (!) 56   Temp 36.7 °C (98.1 °F) (Skin)   Resp 20   Ht 5' 9" (1.753 m)   Wt 62.6 kg (138 lb)   SpO2 100%   BMI 20.38 kg/m²     "

## 2024-01-23 NOTE — OP NOTE
[facility]  Operative Note    Date: 01/23/2024    Pre-Op Diagnosis: Bilateral hydronephrosis    Post-Op Diagnosis: same    Procedure(s) Performed:   1.  Bilateral JJ ureteral stent exchange  2.  Fluoro < 1 h    Specimen(s): None    Staff Surgeon: South Ghosh MD    Assistant Surgeon: MD Emiliano Moses MD    Anesthesia: General endotracheal anesthesia    Indications: Aren Bey Jr. is a 73 y.o. male with Bilateral double J ureteral stent currently in place x 2 months.  He presents for stent exchange today with possible loopogram, possible looposcopy.    Findings:   Bilateral JJ ureteral stents removed and exchanged for ostomy single J ureteral stents over a wire  BLue stent in left kidney, Red stent in right kidney    Estimated Blood Loss: min    Drains:   1.  Bilateral single J ureteral stents    Procedure in Detail:  After risks, benefits and possible complications of the procedure were explained, the patient elected to undergo the procedure and informed consent was obtained.  All questions were answered in the doug-operative area. The patient was transferred to the cystoscopy suite and placed on the fluoroscopy table in the supine position.  SCDs were applied and working. Time out was performed, doug-procedural antibiotics were given. Anesthesia was administered.  After adequate anesthesia the patient was placed in supine position and prepped and draped in the usual sterile fashion after removing his ostomy appliance. We used a PCNL drape.    We then cut the tips of the bilateral ureteral stents. A motion wire was inserted via the channel and coiled in the left renal pelvis. We then inserted a second motion wire via the other stent and coiled in the right renal pelvis. This was confirmed with intermittent fluoroscopy.    We then removed the bilateral JJ stents. They were inspected and found to be intact. We then inserted the blue single J ureteral stent over the  motion wire to the left renal pelvis. We confirmed our position to be within the renal pelvis. The wire was removed and a 180 degree coil was seen within the left kidney. We then performed an identical maneuver on the right with the red single J stent. Again, a 180 degree coil was seen within the right renal pelvis on fluoroscopy. Both stents were cut at the level of the right ASIS. The ostomy appliance was applied sterilely and clear yellow urine was seen dripping from both ureteral stents.    The patient tolerated the procedure well and was transferred to the recovery room in stable condition.      Follow up care: The patient will follow up with Dr. Ghosh in 8 weeks.     Bull Alberto MD

## 2024-01-23 NOTE — DISCHARGE SUMMARY
[facility] - [facility] (2nd Fl)  Discharge Note  Short Stay    Procedure(s) (LRB):  INSERTION, STENT, URETER (Bilateral)  REMOVAL-STENT (Bilateral)      OUTCOME: Patient tolerated treatment/procedure well without complication and is now ready for discharge.    DISPOSITION: Home or Self Care    FINAL DIAGNOSIS:  Malignant neoplasm of urinary bladder    FOLLOWUP: In clinic in 8 weeks with Dr. Ghosh    DISCHARGE INSTRUCTIONS:    Discharge Procedure Orders   Notify your health care provider if you experience any of the following:  temperature >100.4     Notify your health care provider if you experience any of the following:  persistent nausea and vomiting or diarrhea     Notify your health care provider if you experience any of the following:  severe uncontrolled pain     Notify your health care provider if you experience any of the following:  redness, tenderness, or signs of infection (pain, swelling, redness, odor or green/yellow discharge around incision site)     Notify your health care provider if you experience any of the following:  worsening rash     Notify your health care provider if you experience any of the following:  persistent dizziness, light-headedness, or visual disturbances        TIME SPENT ON DISCHARGE: 10 minutes

## 2024-01-23 NOTE — DISCHARGE INSTRUCTIONS
Post Cystoscopy Instructions  Do not strain to have a bowel movement  No strenuous exercise x 7 days  No driving while you are on narcotic pain medications or if your angel  catheter is in place    You can expect:  To pass stone fragments if you had a stone procedure  Have pain when you void from your stent if you have a stent in place  See blood in your urine if you have a stent in place    If you have a catheter, please return to the ER if your catheter stops draining or you are having abdominal pain.    Call the doctor if:  Temperature is greater than 101F  Persistent vomiting and inability to keep food down  Inability to void if you do not have a catheter    You will follow up with Dr. Ghosh in approximately 8 weeks.

## 2024-01-24 NOTE — ANESTHESIA POSTPROCEDURE EVALUATION
Anesthesia Post Evaluation    Patient: Aren Bey Jr.    Procedure(s) Performed: Procedure(s) (LRB):  INSERTION, STENT, URETER (Bilateral)  REMOVAL-STENT (Bilateral)    Final Anesthesia Type: general      Patient location during evaluation: PACU  Patient participation: Yes- Able to Participate  Level of consciousness: awake and alert  Post-procedure vital signs: reviewed and stable  Pain management: adequate  Airway patency: patent    PONV status at discharge: No PONV  Anesthetic complications: no      Cardiovascular status: hemodynamically stable  Respiratory status: unassisted, spontaneous ventilation and room air  Hydration status: euvolemic  Follow-up not needed.            Vitals Value Taken Time   /72 01/23/24 1632   Temp  01/24/24 0758   Pulse 60 01/23/24 1634   Resp 47 01/23/24 1634   SpO2 96 % 01/23/24 1634   Vitals shown include unvalidated device data.      No case tracking events are documented in the log.      Pain/Kelly Score: Pain Rating Prior to Med Admin: 0 (pre op) (1/23/2024  1:38 PM)  Kelly Score: 10 (1/23/2024  4:15 PM)

## 2024-01-24 NOTE — ANESTHESIA RELEASE NOTE
"Anesthesia Release from PACU Note    Patient: Aren Bey Jr.    Procedure(s) Performed: Procedure(s) (LRB):  INSERTION, STENT, URETER (Bilateral)  REMOVAL-STENT (Bilateral)    Anesthesia type: general    Post pain: Adequate analgesia    Post assessment: no apparent anesthetic complications and tolerated procedure well    Last Vitals: Visit Vitals  BP (!) 142/65 (BP Location: Right arm, Patient Position: Lying)   Pulse (!) 57   Temp 36.7 °C (98.1 °F) (Skin)   Resp 18   Ht 5' 9" (1.753 m)   Wt 62.6 kg (138 lb)   SpO2 96%   BMI 20.38 kg/m²       Post vital signs: stable    Level of consciousness: awake and alert     Nausea/Vomiting: no nausea/no vomiting    Complications: none    Airway Patency: patent    Respiratory: unassisted, spontaneous ventilation, room air    Cardiovascular: stable and blood pressure at baseline    Hydration: euvolemic  "

## 2024-01-25 ENCOUNTER — PATIENT MESSAGE (OUTPATIENT)
Dept: HEMATOLOGY/ONCOLOGY | Facility: CLINIC | Age: 74
End: 2024-01-25
Payer: MEDICARE

## 2024-01-28 NOTE — PROGRESS NOTES
"[facility]   Clinic Note    Subjective:     Chief Complaint: Establish Care (Urothelial carcinoma of bladder)      History of Present Illness:  Aren Bey Jr. is a 73 y.o. male who presents to clinic for evaluation and management of bladder cancer. 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 Artis Pinon showing pT3aN2 disease. He did have neoadjuvant chemotherapy. The patient's surgery was complicated by small-bowel obstruction and pulmonary embolism. He is followed by Dr. Jimenez and is on Nivolumab.  The patient was placed on anticoagulation for pulmonary embolism and  developed a right perinephric bleed requiring embolization. He is currently not on anticoagulation and does have an IVC filter.  He also developed UTI and acquired ureteral obstruction with placement of bilateral ureteral stents. He also developed C diff and was hospitalized for suspected recurrence at [facility] from 10/21/2023 to 10/25/2023.      He was recently seen by Dr. Ghosh and underwent exchange of his ureteral stents with placement of bilateral single J stents on 1/23/2024.     Past medical, family, surgical and social history reviewed as documented in chart with pertinent positive medical, family, surgical and social history detailed in HPI.    A review systems was conducted with pertinent positive and negative findings documented in HPI.    Objective:     Estimated body mass index is 21.78 kg/m² as calculated from the following:    Height as of this encounter: 5' 9" (1.753 m).    Weight as of this encounter: 66.9 kg (147 lb 7.8 oz).    Vital Signs (Most Recent)       Physical Exam  Constitutional:       General: He is not in acute distress.     Appearance: He is not ill-appearing or toxic-appearing.   Pulmonary:      Effort: Pulmonary effort is normal. No accessory muscle " usage or respiratory distress.   Abdominal:      Comments: RLQ urostomy, single J stents in place.    Neurological:      Mental Status: He is alert.         Labs reviewed below:  Lab Results   Component Value Date    BUN 23 01/11/2024    BUN 23 01/11/2024    CREATININE 1.5 (H) 01/11/2024    CREATININE 1.5 (H) 01/11/2024    GFRNONAA 96 11/07/2021    WBC 6.01 01/11/2024    HGB 11.9 (L) 01/11/2024    HCT 35.4 (L) 01/11/2024     01/11/2024    AST 26 01/11/2024    AST 26 01/11/2024    ALT 16 01/11/2024    ALT 16 01/11/2024    ALKPHOS 79 01/11/2024    ALKPHOS 79 01/11/2024    ALBUMIN 3.8 01/11/2024    ALBUMIN 3.8 01/11/2024    HGBA1C 5.2 11/03/2021     PSA trend reviewed below:  Lab Results   Component Value Date    PSADIAG 0.02 10/31/2023      Assessment:     1. Urothelial carcinoma of bladder    2. Ureteral-ileal loop anastomotic stricture    3. Presence of urostomy      Plan:     Reviewed his case. He will continue to see Dr. Ghosh for now for the stent exchanges in the event there may be a revision in the near future. However if stents become the long-term plan then I am happy to perform the stent exchanges here.   They will reach out as needed for supplies approval    Manuleito Olivares MD     Total time today for this encounter was 30 minutes. This includes preparing to see the patient (eg, review of tests), obtaining and/or reviewing separately obtained history, documenting clinical information in the electronic or other health record, independently interpreting results and communicating results to the patient/family/caregiver, and discussing the plan with other providers when necessary.

## 2024-01-29 ENCOUNTER — OFFICE VISIT (OUTPATIENT)
Dept: UROLOGY | Facility: CLINIC | Age: 74
End: 2024-01-29
Payer: MEDICARE

## 2024-01-29 VITALS — WEIGHT: 147.5 LBS | HEIGHT: 69 IN | BODY MASS INDEX: 21.84 KG/M2

## 2024-01-29 DIAGNOSIS — N99.89 URETERAL-ILEAL LOOP ANASTOMOTIC STRICTURE: ICD-10-CM

## 2024-01-29 DIAGNOSIS — C67.9 UROTHELIAL CARCINOMA OF BLADDER: Primary | ICD-10-CM

## 2024-01-29 DIAGNOSIS — Z93.6 PRESENCE OF UROSTOMY: ICD-10-CM

## 2024-01-29 PROCEDURE — 1100F PTFALLS ASSESS-DOCD GE2>/YR: CPT | Mod: CPTII,S$GLB,, | Performed by: STUDENT IN AN ORGANIZED HEALTH CARE EDUCATION/TRAINING PROGRAM

## 2024-01-29 PROCEDURE — 99214 OFFICE O/P EST MOD 30 MIN: CPT | Mod: S$GLB,,, | Performed by: STUDENT IN AN ORGANIZED HEALTH CARE EDUCATION/TRAINING PROGRAM

## 2024-01-29 PROCEDURE — 1160F RVW MEDS BY RX/DR IN RCRD: CPT | Mod: CPTII,S$GLB,, | Performed by: STUDENT IN AN ORGANIZED HEALTH CARE EDUCATION/TRAINING PROGRAM

## 2024-01-29 PROCEDURE — 1159F MED LIST DOCD IN RCRD: CPT | Mod: CPTII,S$GLB,, | Performed by: STUDENT IN AN ORGANIZED HEALTH CARE EDUCATION/TRAINING PROGRAM

## 2024-01-29 PROCEDURE — 99999 PR PBB SHADOW E&M-EST. PATIENT-LVL III: CPT | Mod: PBBFAC,,, | Performed by: STUDENT IN AN ORGANIZED HEALTH CARE EDUCATION/TRAINING PROGRAM

## 2024-01-29 PROCEDURE — 1126F AMNT PAIN NOTED NONE PRSNT: CPT | Mod: CPTII,S$GLB,, | Performed by: STUDENT IN AN ORGANIZED HEALTH CARE EDUCATION/TRAINING PROGRAM

## 2024-01-29 PROCEDURE — 3288F FALL RISK ASSESSMENT DOCD: CPT | Mod: CPTII,S$GLB,, | Performed by: STUDENT IN AN ORGANIZED HEALTH CARE EDUCATION/TRAINING PROGRAM

## 2024-01-29 PROCEDURE — 1157F ADVNC CARE PLAN IN RCRD: CPT | Mod: CPTII,S$GLB,, | Performed by: STUDENT IN AN ORGANIZED HEALTH CARE EDUCATION/TRAINING PROGRAM

## 2024-02-01 ENCOUNTER — TELEPHONE (OUTPATIENT)
Dept: UROLOGY | Facility: CLINIC | Age: 74
End: 2024-02-01
Payer: MEDICARE

## 2024-02-06 ENCOUNTER — TELEPHONE (OUTPATIENT)
Dept: UROLOGY | Facility: CLINIC | Age: 74
End: 2024-02-06
Payer: MEDICARE

## 2024-02-08 NOTE — PROGRESS NOTES
PATIENT: Aren Bey Jr.  MRN: [id number]  DATE: 2/9/2024      Diagnosis:   1. Malignant neoplasm of overlapping sites of bladder    2. Other specified disorders involving the immune mechanism, not elsewhere classified    3. Urothelial carcinoma of bladder    4. Aneurysm of right popliteal artery    5. Lung nodule    6. Normocytic anemia    7. Hypertension, unspecified type    8. Other primary thrombophilia    9. Blood creatinine increased compared with prior measurement    10. Itching    11. Ureteral stent present    12. Chest wall pain                  Chief Complaint: Follow-up (Malignant neoplasm of overlapping sites of bladder )      Oncologic History:      Oncologic History     Oncologic Treatment     Pathology           Subjective:    Interval History:  Mr. Bey is a 73 y.o. male with HTN, Seizures, bladder cancer, h/o pulmonary embolism, who presents for bladder cancer.  Since the last clinic visit the patient had bilateral ureteral stents exchanged by Dr. Ghosh on 1/23/24.  The patient endorses new onset left upper chest pain since the end of January.  The patient states he does not have pain unless he presses on his left upper outer ribs.  The patient denies CP, cough, SOB, abdominal pain, nausea, vomiting, constipation, diarrhea.  The patient denies fever, chills, night sweats, weight loss, new lumps or bumps, easy bruising or bleeding.    Prior History:  Patient underwent transurethral resection of bladder tumor on 04/28/2023 with path showing invasive urothelial carcinoma, high-grade with involvement of muscularis propria and positive for lymphovascular invasion.  Patient underwent chemotherapy at [facility].  The patient then underwent radical cystectomy and prostatectomy on 08/15/2023 with pathology showing invasive high-grade urothelial carcinoma measuring 4.2 cm, positive lymphovascular invasion, tumor identified within the soft tissue surrounding the left distal  ureter, 4/6 positive regional lymph nodes with extranodal extension present. pT3aN2.  Also seen was acinar adenocarcinoma of the prostate grade group 1 (Winifred score 3+3=6) in 1 2 5% of prostate tissue pT2N0.  The patient's surgery was ultimately complicated by small-bowel obstruction, and pulmonary embolism.  PT recevied 1 dose of Nivolumab 9/18/23.  The patient was placed on anticoagulation for pulmonary embolism and ultimately developed a right perinephric bleed requiring embolization.  PT also developed UTI and acquired ureteral obstruction with placement of bilateral ureteral stents.  PT also developed C diff and was hospitalized for suspected recurrence at [facility] from 10/21/2023 to 10/25/2023.  Patient was discharged on fidaxomicin.   The patient saw Dr. Ghosh with Urology on 11/13/2023 for with recommendations for virtual visit in January to discuss possible operative intervention replaced stents, possible revision of ureteral ileal anastomosis pending disease progression.  It was instructed that the patient would need catheterized specimen from urostomy to rule out UTI if patient with future symptoms.  Patient underwent PET-CT on 11/16/2023 showing a new hypermetabolic subpleural nodule in the posterior right lower lobe measuring 2.2 x 1.3 cm.  The patient endorses a fever starting on the night of 11/16/2023.  Patient was prescribed Bactrim for suspected potential urinary tract infection.   The patient was admitted to the hospital from 11/17/23-11/21/23 for C diff colitis and suspected urinary tract infection the patient was discharged on fidaxomicin 200 mg b.i.d. for additional 7 days as well as 200 mg every other day for 25 days.  Patient was also discharged on doxycycline 100 mg b.i.d. for 25 days with instructions from Infectious Disease to remain on antibiotics as long as his ureteral stents were in place.   The patient underwent biopsy of nodule in the right lung on 12/08/2023 showing fibrosis and  chronic inflammation but no evidence of neoplasm or granuloma.   The patient was to have ureteral stents exchanged on 01/02/2024 but missed his appointment due to inclement weather.     Past Medical History:   Past Medical History:   Diagnosis Date    Aneurysm artery, popliteal     right leg    Bladder cancer     Hypertension     Malignant neoplasm of prostate     Seizures        Past Surgical HIstory:   Past Surgical History:   Procedure Laterality Date    PROSTATECTOMY      REMOVAL-STENT Bilateral 1/23/2024    Procedure: REMOVAL-STENT;  Surgeon: South Ghosh MD;  Location: [address];  Service: Urology;  Laterality: Bilateral;    URETERAL STENT PLACEMENT Bilateral 1/23/2024    Procedure: INSERTION, STENT, URETER;  Surgeon: South Ghosh MD;  Location: [facility] OR [address] [place] [address];  Service: Urology;  Laterality: Bilateral;       Family History:   Family History   Problem Relation Age of Onset    Heart disease Mother     Hypertension Mother        Social History:  reports that he has never smoked. He has never been exposed to tobacco smoke. He has never used smokeless tobacco. He reports that he does not currently use alcohol. He reports that he does not use drugs.    Allergies:  Review of patient's allergies indicates:   Allergen Reactions    Clopidogrel Other (See Comments)     Increased body temperature and redness        Medications:  Current Outpatient Medications   Medication Sig Dispense Refill    acetaminophen (TYLENOL) 500 MG tablet Take 500-1,000 mg by mouth every 6 (six) hours as needed (fever/pain).      ASCORBIC ACID, VITAMIN C, ORAL Take 1 tablet by mouth once daily.      atorvastatin (LIPITOR) 20 MG tablet Take 2 tablets (40 mg total) by mouth once daily. (Patient taking differently: Take 20 mg by mouth once daily.)      b complex vitamins tablet Take 1 tablet by mouth once daily.      cholecalciferol, vitamin D3, (VITAMIN D3 ORAL) Take 5,000 Units by mouth once daily.      cyanocobalamin, vitamin B-12,  "2,500 mcg Lozg       diphenoxylate-atropine 2.5-0.025 mg (LOMOTIL) 2.5-0.025 mg per tablet Take 1 tablet by mouth 4 (four) times daily as needed for Diarrhea.      ferrous sulfate (SLOW RELEASE IRON) 142 mg (45 mg iron) TbSR Take 1 tablet by mouth Daily.      hydrOXYzine HCL (ATARAX) 25 MG tablet Take 1 tablet (25 mg total) by mouth 3 (three) times daily as needed for Itching. 90 tablet 3    LIDOcaine-prilocaine (EMLA) cream Apply topically once as needed (to port access).      multivitamin with minerals tablet Take 1 tablet by mouth once daily.      triamcinolone acetonide 0.5% (KENALOG) 0.5 % Crea Apply topically 2 (two) times daily. 454 g 2    UNABLE TO FIND Take 1 capsule by mouth once daily. medication name: Colon health 80 billion      zinc gluconate 50 mg tablet Take 50 mg by mouth once daily.       No current facility-administered medications for this visit.       Review of Systems   Constitutional:  Negative for chills, diaphoresis, fatigue, fever and unexpected weight change.   Respiratory:  Negative for cough and shortness of breath.    Cardiovascular:  Negative for chest pain and palpitations.   Gastrointestinal:  Negative for abdominal pain, constipation, diarrhea, nausea and vomiting.   Musculoskeletal:         Left chest pain   Skin:  Negative for color change and rash.        Itching   Neurological:  Negative for headaches.   Hematological:  Negative for adenopathy. Does not bruise/bleed easily.       ECOG Performance Status: 1   Objective:      Vitals:   Vitals:    02/09/24 1307   BP: 117/70   BP Location: Left arm   Patient Position: Sitting   BP Method: Medium (Automatic)   Pulse: 72   Temp: 97.6 °F (36.4 °C)   TempSrc: Temporal   Weight: 66.9 kg (147 lb 7.8 oz)   Height: 5' 9" (1.753 m)                 Physical Exam  Constitutional:       General: He is not in acute distress.     Appearance: He is well-developed. He is not diaphoretic.   HENT:      Head: Normocephalic and atraumatic. "   Cardiovascular:      Rate and Rhythm: Normal rate and regular rhythm.      Heart sounds: Normal heart sounds. No murmur heard.     No friction rub. No gallop.   Pulmonary:      Effort: Pulmonary effort is normal. No respiratory distress.      Breath sounds: Normal breath sounds. No wheezing or rales.   Chest:      Chest wall: No tenderness.   Abdominal:      General: Bowel sounds are normal. There is no distension.      Palpations: Abdomen is soft. There is no mass.      Tenderness: There is no abdominal tenderness. There is no rebound.      Comments: Ileal conduit on RLQ with clear urine visualized   Musculoskeletal:      Cervical back: Normal range of motion.   Lymphadenopathy:      Cervical: No cervical adenopathy.      Upper Body:      Right upper body: No supraclavicular or axillary adenopathy.      Left upper body: No supraclavicular or axillary adenopathy.   Skin:     General: Skin is warm and dry.      Findings: No erythema or rash.   Neurological:      Mental Status: He is alert and oriented to person, place, and time.   Psychiatric:         Behavior: Behavior normal.         Laboratory Data:  Lab Visit on 02/09/2024   Component Date Value Ref Range Status    WBC 02/09/2024 7.55  3.90 - 12.70 K/uL Final    RBC 02/09/2024 3.96 (L)  4.60 - 6.20 M/uL Final    Hemoglobin 02/09/2024 12.2 (L)  14.0 - 18.0 g/dL Final    Hematocrit 02/09/2024 36.1 (L)  40.0 - 54.0 % Final    MCV 02/09/2024 91  82 - 98 fL Final    MCH 02/09/2024 30.8  27.0 - 31.0 pg Final    MCHC 02/09/2024 33.8  32.0 - 36.0 g/dL Final    RDW 02/09/2024 12.8  11.5 - 14.5 % Final    Platelets 02/09/2024 185  150 - 450 K/uL Final    MPV 02/09/2024 8.5 (L)  9.2 - 12.9 fL Final    Immature Granulocytes 02/09/2024 0.3  0.0 - 0.5 % Final    Gran # (ANC) 02/09/2024 4.6  1.8 - 7.7 K/uL Final    Immature Grans (Abs) 02/09/2024 0.02  0.00 - 0.04 K/uL Final    Comment: Mild elevation in immature granulocytes is non specific and   can be seen in a variety of  conditions including stress response,   acute inflammation, trauma and pregnancy. Correlation with other   laboratory and clinical findings is essential.      Lymph # 02/09/2024 1.7  1.0 - 4.8 K/uL Final    Mono # 02/09/2024 0.7  0.3 - 1.0 K/uL Final    Eos # 02/09/2024 0.5  0.0 - 0.5 K/uL Final    Baso # 02/09/2024 0.08  0.00 - 0.20 K/uL Final    nRBC 02/09/2024 0  0 /100 WBC Final    Gran % 02/09/2024 60.1  38.0 - 73.0 % Final    Lymph % 02/09/2024 22.4  18.0 - 48.0 % Final    Mono % 02/09/2024 9.3  4.0 - 15.0 % Final    Eosinophil % 02/09/2024 6.8  0.0 - 8.0 % Final    Basophil % 02/09/2024 1.1  0.0 - 1.9 % Final    Differential Method 02/09/2024 Automated   Final    Sodium 02/09/2024 137  136 - 145 mmol/L Final    Potassium 02/09/2024 4.7  3.5 - 5.1 mmol/L Final    Chloride 02/09/2024 105  95 - 110 mmol/L Final    CO2 02/09/2024 22 (L)  23 - 29 mmol/L Final    Glucose 02/09/2024 104  70 - 110 mg/dL Final    BUN 02/09/2024 28 (H)  8 - 23 mg/dL Final    Creatinine 02/09/2024 1.6 (H)  0.5 - 1.4 mg/dL Final    Calcium 02/09/2024 9.6  8.7 - 10.5 mg/dL Final    Total Protein 02/09/2024 7.0  6.0 - 8.4 g/dL Final    Albumin 02/09/2024 3.7  3.5 - 5.2 g/dL Final    Total Bilirubin 02/09/2024 0.6  0.1 - 1.0 mg/dL Final    Comment: For infants and newborns, interpretation of results should be based  on gestational age, weight and in agreement with clinical  observations.    Premature Infant recommended reference ranges:  Up to 24 hours.............<8.0 mg/dL  Up to 48 hours............<12.0 mg/dL  3-5 days..................<15.0 mg/dL  6-29 days.................<15.0 mg/dL      Alkaline Phosphatase 02/09/2024 75  55 - 135 U/L Final    AST 02/09/2024 25  10 - 40 U/L Final    ALT 02/09/2024 17  10 - 44 U/L Final    eGFR 02/09/2024 44.7 (A)  >60 mL/min/1.73 m^2 Final    Anion Gap 02/09/2024 10  8 - 16 mmol/L Final         Imaging:     PET/CT 11/16/23  Head/neck:     No significant abnormal hypermetabolic foci are identified  within the head and neck region. No lymphadenopathy is present.     Chest:     Since the recent CT of 10/21/2023, the bilateral pleural effusions have resolved. There is a new hypermetabolic subpleural nodule in the posterior right lower lobe which is suspicious for primary or metastatic neoplasm. The cannot be certain whether this nodule was present on the recent CT study due to the presence of a right pleural effusion, but the nodule was not present on the previous CTA of the chest on 09/10/2021. On images 130 9-141 the nodule measures 2.2 x 1.3 cm with a max SUV of 2.9. No other significant abnormal hypermetabolic foci are identified within the chest. No lymphadenopathy is present.     Abdomen/pelvis:     There are postoperative changes of a cystectomy and prostatectomy with ureteral diversion right lower quadrant ileostomy. There is no hydronephrosis. No significant abnormal hypermetabolic foci are identified within the abdomen and pelvis. No lymphadenopathy is present. The adrenal glands are normal.     Skeleton:     No significant abnormal hypermetabolic foci are identified within the skeleton. There are no findings to suggest osseous metastatic disease.       Assessment:       1. Malignant neoplasm of overlapping sites of bladder    2. Other specified disorders involving the immune mechanism, not elsewhere classified    3. Urothelial carcinoma of bladder    4. Aneurysm of right popliteal artery    5. Lung nodule    6. Normocytic anemia    7. Hypertension, unspecified type    8. Other primary thrombophilia    9. Blood creatinine increased compared with prior measurement    10. Itching    11. Ureteral stent present    12. 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  -No sign of infection  -Will monitor    Left Chest Pain - pt with pain on palpation of left upper chest  -If pain persists, will perform staging scans sooner    Pulmonary nodule - see above    Prostate Cancer - patient with acinar adenocarcinoma of the prostate grade group 1 (Sarah score 3+3=6) in 1 2 5% of prostate tissue pT2N0  -PSA 10/31/23 0.02ng/mL    Ureteral Stents - pt missed his appt for stent exchange 1/02/24 and is to reschedule the procedure with Dr Ghosh  -Stents exchanged on 1/23/24    Anorexia - on marinol  -Will monitor    Anemia - Hemoglobin 12.2g/dL on 2/09/24  -Will monitor     HTN - pt not on meds  -Stable  -Will monitor    Thrombophilia - pt with prior PE  -Pt subsequently developed a right perinephric bleed requiring embolization   -Patient with IVC filter in place  -PT was to see vascular surgery to determine when anticoagulation could be restarted  -Will monitor    Right popliteal aneurysm - pt to see vascular surgery    Increased Creatinine - creatinine up to 1.6 on 2/09/24  -Pt following with nephrology    Itchiness - possibly from immunotherapy  -Improved with Atarax    Route Chart for Scheduling    Med Onc Chart Routing      Follow up with physician 4 weeks. Pt can proceed with treatment.  Pt needs a CBC, CMP, TSH and PET/CT in 4 weeks with an appt with me the friday prior to his treatment.   Follow up with [facility]    Infusion scheduling note    Injection scheduling note    Labs    Imaging    Pharmacy appointment    Other referrals              Treatment Plan Information   OP NIVOLUMAB 480 MG Q4W   Jackson Jmienez MD   Upcoming Treatment Dates - OP NIVOLUMAB 480 MG Q4W    2/12/2024       Chemotherapy       nivolumab 480 mg in sodium chloride 0.9% 98 mL infusion  3/11/2024       Chemotherapy       nivolumab 480  mg in sodium chloride 0.9% 98 mL infusion  4/8/2024       Chemotherapy       nivolumab 480 mg in sodium chloride 0.9% 98 mL infusion  5/6/2024       Chemotherapy       nivolumab 480 mg in sodium chloride 0.9% 98 mL infusion      Jackson Jimenez MD  [facility]  Hematology and Oncology  [facility]   [address] [place] [address]   O: [phone]  F: [phone]

## 2024-02-09 ENCOUNTER — OFFICE VISIT (OUTPATIENT)
Dept: HEMATOLOGY/ONCOLOGY | Facility: CLINIC | Age: 74
End: 2024-02-09
Payer: MEDICARE

## 2024-02-09 ENCOUNTER — LAB VISIT (OUTPATIENT)
Dept: LAB | Facility: HOSPITAL | Age: 74
End: 2024-02-09
Attending: INTERNAL MEDICINE
Payer: MEDICARE

## 2024-02-09 VITALS
HEART RATE: 72 BPM | SYSTOLIC BLOOD PRESSURE: 117 MMHG | DIASTOLIC BLOOD PRESSURE: 70 MMHG | WEIGHT: 147.5 LBS | HEIGHT: 69 IN | BODY MASS INDEX: 21.84 KG/M2 | TEMPERATURE: 98 F

## 2024-02-09 DIAGNOSIS — I72.4 ANEURYSM OF RIGHT POPLITEAL ARTERY: ICD-10-CM

## 2024-02-09 DIAGNOSIS — I10 HYPERTENSION, UNSPECIFIED TYPE: ICD-10-CM

## 2024-02-09 DIAGNOSIS — D64.9 NORMOCYTIC ANEMIA: ICD-10-CM

## 2024-02-09 DIAGNOSIS — D68.59 OTHER PRIMARY THROMBOPHILIA: ICD-10-CM

## 2024-02-09 DIAGNOSIS — D89.89 OTHER SPECIFIED DISORDERS INVOLVING THE IMMUNE MECHANISM, NOT ELSEWHERE CLASSIFIED: ICD-10-CM

## 2024-02-09 DIAGNOSIS — C67.8 MALIGNANT NEOPLASM OF OVERLAPPING SITES OF BLADDER: Primary | ICD-10-CM

## 2024-02-09 DIAGNOSIS — R91.1 LUNG NODULE: ICD-10-CM

## 2024-02-09 DIAGNOSIS — L29.9 ITCHING: ICD-10-CM

## 2024-02-09 DIAGNOSIS — Z96.0 URETERAL STENT PRESENT: ICD-10-CM

## 2024-02-09 DIAGNOSIS — R79.89 BLOOD CREATININE INCREASED COMPARED WITH PRIOR MEASUREMENT: ICD-10-CM

## 2024-02-09 DIAGNOSIS — C67.9 UROTHELIAL CARCINOMA OF BLADDER: ICD-10-CM

## 2024-02-09 DIAGNOSIS — C67.8 MALIGNANT NEOPLASM OF OVERLAPPING SITES OF BLADDER: ICD-10-CM

## 2024-02-09 DIAGNOSIS — R07.89 CHEST WALL PAIN: ICD-10-CM

## 2024-02-09 LAB
ALBUMIN SERPL BCP-MCNC: 3.7 G/DL (ref 3.5–5.2)
ALP SERPL-CCNC: 75 U/L (ref 55–135)
ALT SERPL W/O P-5'-P-CCNC: 17 U/L (ref 10–44)
ANION GAP SERPL CALC-SCNC: 10 MMOL/L (ref 8–16)
AST SERPL-CCNC: 25 U/L (ref 10–40)
BASOPHILS # BLD AUTO: 0.08 K/UL (ref 0–0.2)
BASOPHILS NFR BLD: 1.1 % (ref 0–1.9)
BILIRUB SERPL-MCNC: 0.6 MG/DL (ref 0.1–1)
BUN SERPL-MCNC: 28 MG/DL (ref 8–23)
CALCIUM SERPL-MCNC: 9.6 MG/DL (ref 8.7–10.5)
CHLORIDE SERPL-SCNC: 105 MMOL/L (ref 95–110)
CO2 SERPL-SCNC: 22 MMOL/L (ref 23–29)
CREAT SERPL-MCNC: 1.6 MG/DL (ref 0.5–1.4)
DIFFERENTIAL METHOD BLD: ABNORMAL
EOSINOPHIL # BLD AUTO: 0.5 K/UL (ref 0–0.5)
EOSINOPHIL NFR BLD: 6.8 % (ref 0–8)
ERYTHROCYTE [DISTWIDTH] IN BLOOD BY AUTOMATED COUNT: 12.8 % (ref 11.5–14.5)
EST. GFR  (NO RACE VARIABLE): 44.7 ML/MIN/1.73 M^2
GLUCOSE SERPL-MCNC: 104 MG/DL (ref 70–110)
HCT VFR BLD AUTO: 36.1 % (ref 40–54)
HGB BLD-MCNC: 12.2 G/DL (ref 14–18)
IMM GRANULOCYTES # BLD AUTO: 0.02 K/UL (ref 0–0.04)
IMM GRANULOCYTES NFR BLD AUTO: 0.3 % (ref 0–0.5)
LYMPHOCYTES # BLD AUTO: 1.7 K/UL (ref 1–4.8)
LYMPHOCYTES NFR BLD: 22.4 % (ref 18–48)
MCH RBC QN AUTO: 30.8 PG (ref 27–31)
MCHC RBC AUTO-ENTMCNC: 33.8 G/DL (ref 32–36)
MCV RBC AUTO: 91 FL (ref 82–98)
MONOCYTES # BLD AUTO: 0.7 K/UL (ref 0.3–1)
MONOCYTES NFR BLD: 9.3 % (ref 4–15)
NEUTROPHILS # BLD AUTO: 4.6 K/UL (ref 1.8–7.7)
NEUTROPHILS NFR BLD: 60.1 % (ref 38–73)
NRBC BLD-RTO: 0 /100 WBC
PLATELET # BLD AUTO: 185 K/UL (ref 150–450)
PMV BLD AUTO: 8.5 FL (ref 9.2–12.9)
POTASSIUM SERPL-SCNC: 4.7 MMOL/L (ref 3.5–5.1)
PROT SERPL-MCNC: 7 G/DL (ref 6–8.4)
RBC # BLD AUTO: 3.96 M/UL (ref 4.6–6.2)
SODIUM SERPL-SCNC: 137 MMOL/L (ref 136–145)
T4 FREE SERPL-MCNC: 1.45 NG/DL (ref 0.71–1.51)
TSH SERPL DL<=0.005 MIU/L-ACNC: 0.01 UIU/ML (ref 0.4–4)
WBC # BLD AUTO: 7.55 K/UL (ref 3.9–12.7)

## 2024-02-09 PROCEDURE — 36415 COLL VENOUS BLD VENIPUNCTURE: CPT | Mod: PN | Performed by: INTERNAL MEDICINE

## 2024-02-09 PROCEDURE — G2211 COMPLEX E/M VISIT ADD ON: HCPCS | Mod: S$GLB,,, | Performed by: INTERNAL MEDICINE

## 2024-02-09 PROCEDURE — 3078F DIAST BP <80 MM HG: CPT | Mod: CPTII,S$GLB,, | Performed by: INTERNAL MEDICINE

## 2024-02-09 PROCEDURE — 3074F SYST BP LT 130 MM HG: CPT | Mod: CPTII,S$GLB,, | Performed by: INTERNAL MEDICINE

## 2024-02-09 PROCEDURE — 1126F AMNT PAIN NOTED NONE PRSNT: CPT | Mod: CPTII,S$GLB,, | Performed by: INTERNAL MEDICINE

## 2024-02-09 PROCEDURE — 1157F ADVNC CARE PLAN IN RCRD: CPT | Mod: CPTII,S$GLB,, | Performed by: INTERNAL MEDICINE

## 2024-02-09 PROCEDURE — 85025 COMPLETE CBC W/AUTO DIFF WBC: CPT | Mod: PN | Performed by: INTERNAL MEDICINE

## 2024-02-09 PROCEDURE — 3288F FALL RISK ASSESSMENT DOCD: CPT | Mod: CPTII,S$GLB,, | Performed by: INTERNAL MEDICINE

## 2024-02-09 PROCEDURE — 84439 ASSAY OF FREE THYROXINE: CPT | Performed by: INTERNAL MEDICINE

## 2024-02-09 PROCEDURE — 80053 COMPREHEN METABOLIC PANEL: CPT | Mod: PN | Performed by: INTERNAL MEDICINE

## 2024-02-09 PROCEDURE — 99999 PR PBB SHADOW E&M-EST. PATIENT-LVL III: CPT | Mod: PBBFAC,,, | Performed by: INTERNAL MEDICINE

## 2024-02-09 PROCEDURE — 99215 OFFICE O/P EST HI 40 MIN: CPT | Mod: S$GLB,,, | Performed by: INTERNAL MEDICINE

## 2024-02-09 PROCEDURE — 84443 ASSAY THYROID STIM HORMONE: CPT | Performed by: INTERNAL MEDICINE

## 2024-02-09 PROCEDURE — 1101F PT FALLS ASSESS-DOCD LE1/YR: CPT | Mod: CPTII,S$GLB,, | Performed by: INTERNAL MEDICINE

## 2024-02-09 PROCEDURE — 1159F MED LIST DOCD IN RCRD: CPT | Mod: CPTII,S$GLB,, | Performed by: INTERNAL MEDICINE

## 2024-02-09 RX ORDER — PROCHLORPERAZINE EDISYLATE 5 MG/ML
5 INJECTION INTRAMUSCULAR; INTRAVENOUS ONCE AS NEEDED
Status: CANCELLED
Start: 2024-02-12

## 2024-02-09 RX ORDER — EPINEPHRINE 0.3 MG/.3ML
0.3 INJECTION SUBCUTANEOUS ONCE AS NEEDED
Status: CANCELLED | OUTPATIENT
Start: 2024-02-12

## 2024-02-09 RX ORDER — SODIUM CHLORIDE 0.9 % (FLUSH) 0.9 %
10 SYRINGE (ML) INJECTION
Status: CANCELLED | OUTPATIENT
Start: 2024-02-12

## 2024-02-09 RX ORDER — HEPARIN 100 UNIT/ML
500 SYRINGE INTRAVENOUS
Status: CANCELLED | OUTPATIENT
Start: 2024-02-12

## 2024-02-09 RX ORDER — DIPHENHYDRAMINE HYDROCHLORIDE 50 MG/ML
50 INJECTION INTRAMUSCULAR; INTRAVENOUS ONCE AS NEEDED
Status: CANCELLED | OUTPATIENT
Start: 2024-02-12

## 2024-02-11 ENCOUNTER — PATIENT MESSAGE (OUTPATIENT)
Dept: HEMATOLOGY/ONCOLOGY | Facility: CLINIC | Age: 74
End: 2024-02-11
Payer: MEDICARE

## 2024-02-12 ENCOUNTER — DOCUMENTATION ONLY (OUTPATIENT)
Dept: INFUSION THERAPY | Facility: HOSPITAL | Age: 74
End: 2024-02-12
Payer: MEDICARE

## 2024-02-12 ENCOUNTER — INFUSION (OUTPATIENT)
Dept: INFUSION THERAPY | Facility: HOSPITAL | Age: 74
End: 2024-02-12
Attending: INTERNAL MEDICINE
Payer: MEDICARE

## 2024-02-12 VITALS
SYSTOLIC BLOOD PRESSURE: 139 MMHG | WEIGHT: 146.63 LBS | HEIGHT: 69 IN | RESPIRATION RATE: 16 BRPM | BODY MASS INDEX: 21.72 KG/M2 | OXYGEN SATURATION: 99 % | DIASTOLIC BLOOD PRESSURE: 76 MMHG | HEART RATE: 58 BPM | TEMPERATURE: 98 F

## 2024-02-12 DIAGNOSIS — C67.8 MALIGNANT NEOPLASM OF OVERLAPPING SITES OF BLADDER: Primary | ICD-10-CM

## 2024-02-12 PROCEDURE — 96413 CHEMO IV INFUSION 1 HR: CPT | Mod: PN

## 2024-02-12 PROCEDURE — 63600175 PHARM REV CODE 636 W HCPCS: Mod: PN | Performed by: INTERNAL MEDICINE

## 2024-02-12 PROCEDURE — A4216 STERILE WATER/SALINE, 10 ML: HCPCS | Mod: PN | Performed by: INTERNAL MEDICINE

## 2024-02-12 PROCEDURE — 25000003 PHARM REV CODE 250: Mod: PN | Performed by: INTERNAL MEDICINE

## 2024-02-12 RX ORDER — PROCHLORPERAZINE EDISYLATE 5 MG/ML
5 INJECTION INTRAMUSCULAR; INTRAVENOUS ONCE AS NEEDED
Status: DISCONTINUED | OUTPATIENT
Start: 2024-02-12 | End: 2024-02-12 | Stop reason: HOSPADM

## 2024-02-12 RX ORDER — HEPARIN 100 UNIT/ML
500 SYRINGE INTRAVENOUS
Status: DISCONTINUED | OUTPATIENT
Start: 2024-02-12 | End: 2024-02-12 | Stop reason: HOSPADM

## 2024-02-12 RX ORDER — SODIUM CHLORIDE 0.9 % (FLUSH) 0.9 %
10 SYRINGE (ML) INJECTION
Status: DISCONTINUED | OUTPATIENT
Start: 2024-02-12 | End: 2024-02-12 | Stop reason: HOSPADM

## 2024-02-12 RX ORDER — EPINEPHRINE 0.3 MG/.3ML
0.3 INJECTION SUBCUTANEOUS ONCE AS NEEDED
Status: DISCONTINUED | OUTPATIENT
Start: 2024-02-12 | End: 2024-02-12 | Stop reason: HOSPADM

## 2024-02-12 RX ORDER — DIPHENHYDRAMINE HYDROCHLORIDE 50 MG/ML
50 INJECTION INTRAMUSCULAR; INTRAVENOUS ONCE AS NEEDED
Status: DISCONTINUED | OUTPATIENT
Start: 2024-02-12 | End: 2024-02-12 | Stop reason: HOSPADM

## 2024-02-12 RX ADMIN — SODIUM CHLORIDE 480 MG: 9 INJECTION, SOLUTION INTRAVENOUS at 10:02

## 2024-02-12 RX ADMIN — SODIUM CHLORIDE: 9 INJECTION, SOLUTION INTRAVENOUS at 09:02

## 2024-02-12 RX ADMIN — Medication 10 ML: at 10:02

## 2024-02-12 RX ADMIN — Medication 500 UNITS: at 10:02

## 2024-02-12 NOTE — PLAN OF CARE
Problem: Adult Inpatient Plan of Care  Goal: Patient-Specific Goal (Individualized)  Outcome: Ongoing, Progressing  Flowsheets (Taken 2/12/2024 1045)  Anxieties, Fears or Concerns: none voiced  Individualized Care Needs: recliner, blanket, pillow, home computer, wife at chairside, dietician visit, education, conversation  Goal: Optimal Comfort and Wellbeing  Outcome: Ongoing, Progressing  Intervention: Provide Person-Centered Care  Flowsheets (Taken 2/12/2024 1045)  Trust Relationship/Rapport:   care explained   questions encouraged   choices provided   reassurance provided   emotional support provided   thoughts/feelings acknowledged   empathic listening provided   questions answered     Problem: Fatigue  Goal: Improved Activity Tolerance  Outcome: Ongoing, Progressing  Intervention: Promote Improved Energy  Flowsheets (Taken 2/12/2024 1045)  Fatigue Management:   fatigue-related activity identified   paced activity encouraged   frequent rest breaks encouraged  Sleep/Rest Enhancement:   therapeutic touch utilized   noise level reduced   family presence promoted   natural light exposure provided   relaxation techniques promoted  Activity Management:   Ambulated -L4   Up in chair - L3

## 2024-02-12 NOTE — PLAN OF CARE
Pt arrived to clinic today for C4D1 treatment with Opdivo infusion and tolerated well with no changes throughout therapy. Pt aware of side effects and number to call for any needs and discharged to home in NAD with wife. Pt aware of f/u appts.

## 2024-02-12 NOTE — PROGRESS NOTES
Oncology Nutrition   Chemotherapy Infusion Visit    Nutrition Follow Up   RD f/u with patient and spouse, Courtney, at chairside for day 1 of treatment. Mrs Valdez states they eat a lot of organic fruits and vegetables & only clean meat products. Pt has a garden. They have read through all nutrition handouts previously provided and denies any questions. His appetite is good. He eats well throughout the day, but a light lunch usually. Lunch is usually a fruit/vegs with Orgain protein drink. He drinks Body Armor and water daily. She states that his sodium has been a little low. RD suggested try Drip Drop for electrolyte replacement too. He lost 1-2lbs from fluid weight. No concerns or questions at this time.     Wt Readings from Last 10 Encounters:   02/12/24 66.5 kg (146 lb 9.7 oz)   02/09/24 66.9 kg (147 lb 7.8 oz)   01/29/24 66.9 kg (147 lb 7.8 oz)   01/23/24 62.6 kg (138 lb)   01/22/24 66.3 kg (146 lb 0.9 oz)   01/16/24 67.3 kg (148 lb 5.9 oz)   01/11/24 66.7 kg (147 lb 0.8 oz)   12/27/23 65.5 kg (144 lb 6.4 oz)   12/21/23 65.2 kg (143 lb 11.8 oz)   12/21/23 65.4 kg (144 lb 2.9 oz)       All other nutrition questions/concerns addressed as appropriate. Will continue to monitor prn throughout treatment.     Preethi Valerio, CUONGN, LDN, [facility]  02/12/2024  10:19 AM

## 2024-02-23 ENCOUNTER — PATIENT MESSAGE (OUTPATIENT)
Dept: HEMATOLOGY/ONCOLOGY | Facility: CLINIC | Age: 74
End: 2024-02-23
Payer: MEDICARE

## 2024-02-23 ENCOUNTER — PATIENT MESSAGE (OUTPATIENT)
Dept: FAMILY MEDICINE | Facility: CLINIC | Age: 74
End: 2024-02-23
Payer: MEDICARE

## 2024-02-26 ENCOUNTER — OFFICE VISIT (OUTPATIENT)
Dept: HEMATOLOGY/ONCOLOGY | Facility: CLINIC | Age: 74
End: 2024-02-26
Payer: MEDICARE

## 2024-02-26 VITALS
RESPIRATION RATE: 18 BRPM | SYSTOLIC BLOOD PRESSURE: 115 MMHG | DIASTOLIC BLOOD PRESSURE: 74 MMHG | BODY MASS INDEX: 21.78 KG/M2 | TEMPERATURE: 97 F | WEIGHT: 147.06 LBS | HEART RATE: 75 BPM | HEIGHT: 69 IN

## 2024-02-26 DIAGNOSIS — C67.8 MALIGNANT NEOPLASM OF OVERLAPPING SITES OF BLADDER: ICD-10-CM

## 2024-02-26 DIAGNOSIS — Z92.89 HISTORY OF IMMUNOTHERAPY: ICD-10-CM

## 2024-02-26 DIAGNOSIS — R05.9 COUGH, UNSPECIFIED TYPE: Primary | ICD-10-CM

## 2024-02-26 PROCEDURE — 1159F MED LIST DOCD IN RCRD: CPT | Mod: CPTII,S$GLB,, | Performed by: NURSE PRACTITIONER

## 2024-02-26 PROCEDURE — 99999 PR PBB SHADOW E&M-EST. PATIENT-LVL IV: CPT | Mod: PBBFAC,,, | Performed by: NURSE PRACTITIONER

## 2024-02-26 PROCEDURE — 3288F FALL RISK ASSESSMENT DOCD: CPT | Mod: CPTII,S$GLB,, | Performed by: NURSE PRACTITIONER

## 2024-02-26 PROCEDURE — 1101F PT FALLS ASSESS-DOCD LE1/YR: CPT | Mod: CPTII,S$GLB,, | Performed by: NURSE PRACTITIONER

## 2024-02-26 PROCEDURE — 3074F SYST BP LT 130 MM HG: CPT | Mod: CPTII,S$GLB,, | Performed by: NURSE PRACTITIONER

## 2024-02-26 PROCEDURE — 3078F DIAST BP <80 MM HG: CPT | Mod: CPTII,S$GLB,, | Performed by: NURSE PRACTITIONER

## 2024-02-26 PROCEDURE — 1157F ADVNC CARE PLAN IN RCRD: CPT | Mod: CPTII,S$GLB,, | Performed by: NURSE PRACTITIONER

## 2024-02-26 PROCEDURE — 99213 OFFICE O/P EST LOW 20 MIN: CPT | Mod: S$GLB,,, | Performed by: NURSE PRACTITIONER

## 2024-02-26 PROCEDURE — 1126F AMNT PAIN NOTED NONE PRSNT: CPT | Mod: CPTII,S$GLB,, | Performed by: NURSE PRACTITIONER

## 2024-02-26 NOTE — PROGRESS NOTES
"PATIENT: Aren Bey Jr.  MRN: [id number]  DATE: 2/26/2024      Diagnosis:   1. Cough, unspecified type    2. Malignant neoplasm of overlapping sites of bladder    3. History of immunotherapy      Urgent Care Oncology Visit    Date and Time: 02/26/2024 10:31 AM   Patient MRN: [id number]   Chief Complaint:   Chief Complaint   Patient presents with    Establish Care     cough on immunotherapy       Reason for Urgent Care Visit: cough  Patient Type: active chemotherapy/immunotherapy treatment  Intervention:  .move CT PET UP  Dispo:  home  UrgOnc appointment addressed via: patient called  This UrgOnc visit was in person  Time spent handling UC issue:  30 minutes    Was this virtual or in person UrgOnc visit appropriate? Yes     Chief Complaint: Establish Care (cough on immunotherapy/)      Oncology History   Malignant neoplasm of overlapping sites of bladder   10/23/2023 Initial Diagnosis    Malignant neoplasm of overlapping sites of bladder     10/31/2023 Cancer Staged    Staging form: Urinary Bladder, AJCC 8th Edition  - Pathologic: Stage IIIB (pT3, pN2, cM0)     12/21/2023 -  Chemotherapy    Treatment Summary   Plan Name: OP NIVOLUMAB 480 MG Q4W  Treatment Goal: Curative  Status: Active  Start Date: 12/21/2023  End Date: 11/18/2024 (Planned)  Provider: Jackson Jimenez MD  Chemotherapy: [No matching medication found in this treatment plan]     Prostate cancer   10/31/2023 Initial Diagnosis    Prostate cancer     10/31/2023 Cancer Staged    Staging form: Prostate, AJCC 8th Edition  - Pathologic: pT2, pN0, cM0         Subjective:    Interval History:  Mr. Bey is a 74 y.o. male with HTN, Seizures, bladder cancer, h/o pulmonary embolism, who is seen in clinic for bladder cancer.  He presents to the clinic today with his wife with c/o cough, hoarseness, SOB with increased effort & intermittent tenderness/pain in chest.  His wife reports that his cough sounds "wet" & gurgling @ night.  The patient reports no " fevers, chills, hemoptysis, abdominal discomfort, N/V, etc.  Mr. Bey is S/P 3 consecutive cycles of Nivolumab with last tx on 02/12/2024; patient is scheduled for CT PET on 03/11/24 & f/u with Dr. Jimenez.         Prior History:  Patient underwent transurethral resection of bladder tumor on 04/28/2023 with path showing invasive urothelial carcinoma, high-grade with involvement of muscularis propria and positive for lymphovascular invasion.  Patient underwent chemotherapy at [facility].  The patient then underwent radical cystectomy and prostatectomy on 08/15/2023 with pathology showing invasive high-grade urothelial carcinoma measuring 4.2 cm, positive lymphovascular invasion, tumor identified within the soft tissue surrounding the left distal ureter, 4/6 positive regional lymph nodes with extranodal extension present. pT3aN2.  Also seen was acinar adenocarcinoma of the prostate grade group 1 (Sarah score 3+3=6) in 1 2 5% of prostate tissue pT2N0.  The patient's surgery was ultimately complicated by small-bowel obstruction, and pulmonary embolism.  PT recevied 1 dose of Nivolumab 9/18/23.  The patient was placed on anticoagulation for pulmonary embolism and ultimately developed a right perinephric bleed requiring embolization.  PT also developed UTI and acquired ureteral obstruction with placement of bilateral ureteral stents.  PT also developed C diff and was hospitalized for suspected recurrence at [facility] from 10/21/2023 to 10/25/2023.  Patient was discharged on fidaxomicin.   The patient saw Dr. Ghosh with Urology on 11/13/2023 for with recommendations for virtual visit in January to discuss possible operative intervention replaced stents, possible revision of ureteral ileal anastomosis pending disease progression.  It was instructed that the patient would need catheterized specimen from urostomy to rule out UTI if patient with future symptoms.  Patient underwent PET-CT on  11/16/2023 showing a new hypermetabolic subpleural nodule in the posterior right lower lobe measuring 2.2 x 1.3 cm.  The patient endorses a fever starting on the night of 11/16/2023.  Patient was prescribed Bactrim for suspected potential urinary tract infection.   The patient was admitted to the hospital from 11/17/23-11/21/23 for C diff colitis and suspected urinary tract infection the patient was discharged on fidaxomicin 200 mg b.i.d. for additional 7 days as well as 200 mg every other day for 25 days.  Patient was also discharged on doxycycline 100 mg b.i.d. for 25 days with instructions from Infectious Disease to remain on antibiotics as long as his ureteral stents were in place.   The patient underwent biopsy of nodule in the right lung on 12/08/2023 showing fibrosis and chronic inflammation but no evidence of neoplasm or granuloma.   The patient was to have ureteral stents exchanged on 01/02/2024 but missed his appointment due to inclement weather.     Past Medical History:   Past Medical History:   Diagnosis Date    Aneurysm artery, popliteal     right leg    Bladder cancer     Hypertension     Malignant neoplasm of prostate     Seizures        Past Surgical HIstory:   Past Surgical History:   Procedure Laterality Date    PROSTATECTOMY      REMOVAL-STENT Bilateral 1/23/2024    Procedure: REMOVAL-STENT;  Surgeon: South Ghosh MD;  Location: [facility] OR [address] [place] [address];  Service: Urology;  Laterality: Bilateral;    URETERAL STENT PLACEMENT Bilateral 1/23/2024    Procedure: INSERTION, STENT, URETER;  Surgeon: South Ghosh MD;  Location: [facility] OR [address] [place] [address];  Service: Urology;  Laterality: Bilateral;       Family History:   Family History   Problem Relation Age of Onset    Heart disease Mother     Hypertension Mother        Social History:  reports that he has never smoked. He has never been exposed to tobacco smoke. He has never used smokeless tobacco. He reports that he does not currently use alcohol. He reports that he  does not use drugs.    Allergies:  Review of patient's allergies indicates:   Allergen Reactions    Clopidogrel Other (See Comments)     Increased body temperature and redness        Medications:  Current Outpatient Medications   Medication Sig Dispense Refill    acetaminophen (TYLENOL) 500 MG tablet Take 500-1,000 mg by mouth every 6 (six) hours as needed (fever/pain).      ASCORBIC ACID, VITAMIN C, ORAL Take 1 tablet by mouth once daily.      atorvastatin (LIPITOR) 20 MG tablet Take 2 tablets (40 mg total) by mouth once daily. (Patient taking differently: Take 20 mg by mouth once daily.)      b complex vitamins tablet Take 1 tablet by mouth once daily.      cholecalciferol, vitamin D3, (VITAMIN D3 ORAL) Take 5,000 Units by mouth once daily.      cyanocobalamin, vitamin B-12, 2,500 mcg Lozg       ferrous sulfate (SLOW RELEASE IRON) 142 mg (45 mg iron) TbSR Take 1 tablet by mouth Daily.      hydrOXYzine HCL (ATARAX) 25 MG tablet Take 1 tablet (25 mg total) by mouth 3 (three) times daily as needed for Itching. 90 tablet 3    LIDOcaine-prilocaine (EMLA) cream Apply topically once as needed (to port access).      multivitamin with minerals tablet Take 1 tablet by mouth once daily.      zinc gluconate 50 mg tablet Take 50 mg by mouth once daily.      diphenoxylate-atropine 2.5-0.025 mg (LOMOTIL) 2.5-0.025 mg per tablet Take 1 tablet by mouth 4 (four) times daily as needed for Diarrhea.      triamcinolone acetonide 0.5% (KENALOG) 0.5 % Crea Apply topically 2 (two) times daily. (Patient not taking: Reported on 2/26/2024) 454 g 2    UNABLE TO FIND Take 1 capsule by mouth once daily. medication name: Colon health 80 billion       No current facility-administered medications for this visit.       Review of Systems   Constitutional:  Negative for appetite change, chills, diaphoresis, fatigue, fever and unexpected weight change.   HENT:  Negative for mouth sores.    Eyes:  Negative for visual disturbance.   Respiratory:   "Negative for cough and shortness of breath.    Cardiovascular:  Negative for chest pain and palpitations.   Gastrointestinal:  Negative for abdominal pain, constipation, diarrhea, nausea and vomiting.   Genitourinary:  Negative for frequency.   Musculoskeletal:  Negative for back pain.            Skin:  Negative for color change and rash.            Neurological:  Negative for headaches.   Hematological:  Negative for adenopathy. Does not bruise/bleed easily.   Psychiatric/Behavioral:  The patient is not nervous/anxious.    All other systems reviewed and are negative.      ECOG Performance Status: 1   Objective:      Vitals:   Vitals:    02/26/24 0959   BP: 115/74   BP Location: Left arm   Patient Position: Sitting   BP Method: Medium (Automatic)   Pulse: 75   Resp: 18   Temp: 97.2 °F (36.2 °C)   TempSrc: Temporal   Weight: 66.7 kg (147 lb 0.8 oz)   Height: 5' 9" (1.753 m)     Physical Exam  Vitals reviewed.   Constitutional:       General: He is not in acute distress.     Appearance: He is well-developed. He is not diaphoretic.   HENT:      Head: Normocephalic and atraumatic.      Mouth/Throat:      Pharynx: Oropharynx is clear.   Eyes:      Conjunctiva/sclera: Conjunctivae normal.   Cardiovascular:      Rate and Rhythm: Normal rate and regular rhythm.      Heart sounds: Normal heart sounds. No murmur heard.     No friction rub. No gallop.   Pulmonary:      Effort: Pulmonary effort is normal. No respiratory distress.      Breath sounds: Decreased breath sounds present. No wheezing or rales.   Chest:      Chest wall: No tenderness.   Abdominal:      General: Bowel sounds are normal. There is no distension.      Palpations: Abdomen is soft. There is no mass.      Tenderness: There is no abdominal tenderness. There is no rebound.      Comments: Ileal conduit on RLQ with clear urine visualized   Musculoskeletal:      Cervical back: Normal range of motion and neck supple.      Right lower leg: No edema.      Left lower " leg: No edema.   Lymphadenopathy:      Cervical: No cervical adenopathy.      Upper Body:      Right upper body: No supraclavicular or axillary adenopathy.      Left upper body: No supraclavicular or axillary adenopathy.   Skin:     General: Skin is warm and dry.      Findings: No erythema or rash.   Neurological:      Mental Status: He is alert and oriented to person, place, and time.   Psychiatric:         Behavior: Behavior normal.         Laboratory Data:  No visits with results within 1 Week(s) from this visit.   Latest known visit with results is:   Lab Visit on 02/09/2024   Component Date Value Ref Range Status    WBC 02/09/2024 7.55  3.90 - 12.70 K/uL Final    RBC 02/09/2024 3.96 (L)  4.60 - 6.20 M/uL Final    Hemoglobin 02/09/2024 12.2 (L)  14.0 - 18.0 g/dL Final    Hematocrit 02/09/2024 36.1 (L)  40.0 - 54.0 % Final    MCV 02/09/2024 91  82 - 98 fL Final    MCH 02/09/2024 30.8  27.0 - 31.0 pg Final    MCHC 02/09/2024 33.8  32.0 - 36.0 g/dL Final    RDW 02/09/2024 12.8  11.5 - 14.5 % Final    Platelets 02/09/2024 185  150 - 450 K/uL Final    MPV 02/09/2024 8.5 (L)  9.2 - 12.9 fL Final    Immature Granulocytes 02/09/2024 0.3  0.0 - 0.5 % Final    Gran # (ANC) 02/09/2024 4.6  1.8 - 7.7 K/uL Final    Immature Grans (Abs) 02/09/2024 0.02  0.00 - 0.04 K/uL Final    Comment: Mild elevation in immature granulocytes is non specific and   can be seen in a variety of conditions including stress response,   acute inflammation, trauma and pregnancy. Correlation with other   laboratory and clinical findings is essential.      Lymph # 02/09/2024 1.7  1.0 - 4.8 K/uL Final    Mono # 02/09/2024 0.7  0.3 - 1.0 K/uL Final    Eos # 02/09/2024 0.5  0.0 - 0.5 K/uL Final    Baso # 02/09/2024 0.08  0.00 - 0.20 K/uL Final    nRBC 02/09/2024 0  0 /100 WBC Final    Gran % 02/09/2024 60.1  38.0 - 73.0 % Final    Lymph % 02/09/2024 22.4  18.0 - 48.0 % Final    Mono % 02/09/2024 9.3  4.0 - 15.0 % Final    Eosinophil % 02/09/2024 6.8   0.0 - 8.0 % Final    Basophil % 02/09/2024 1.1  0.0 - 1.9 % Final    Differential Method 02/09/2024 Automated   Final    Sodium 02/09/2024 137  136 - 145 mmol/L Final    Potassium 02/09/2024 4.7  3.5 - 5.1 mmol/L Final    Chloride 02/09/2024 105  95 - 110 mmol/L Final    CO2 02/09/2024 22 (L)  23 - 29 mmol/L Final    Glucose 02/09/2024 104  70 - 110 mg/dL Final    BUN 02/09/2024 28 (H)  8 - 23 mg/dL Final    Creatinine 02/09/2024 1.6 (H)  0.5 - 1.4 mg/dL Final    Calcium 02/09/2024 9.6  8.7 - 10.5 mg/dL Final    Total Protein 02/09/2024 7.0  6.0 - 8.4 g/dL Final    Albumin 02/09/2024 3.7  3.5 - 5.2 g/dL Final    Total Bilirubin 02/09/2024 0.6  0.1 - 1.0 mg/dL Final    Comment: For infants and newborns, interpretation of results should be based  on gestational age, weight and in agreement with clinical  observations.    Premature Infant recommended reference ranges:  Up to 24 hours.............<8.0 mg/dL  Up to 48 hours............<12.0 mg/dL  3-5 days..................<15.0 mg/dL  6-29 days.................<15.0 mg/dL      Alkaline Phosphatase 02/09/2024 75  55 - 135 U/L Final    AST 02/09/2024 25  10 - 40 U/L Final    ALT 02/09/2024 17  10 - 44 U/L Final    eGFR 02/09/2024 44.7 (A)  >60 mL/min/1.73 m^2 Final    Anion Gap 02/09/2024 10  8 - 16 mmol/L Final    TSH 02/09/2024 0.012 (L)  0.400 - 4.000 uIU/mL Final    Free T4 02/09/2024 1.45  0.71 - 1.51 ng/dL Final     Imaging:     PET/CT 11/16/23  Head/neck:     No significant abnormal hypermetabolic foci are identified within the head and neck region. No lymphadenopathy is present.     Chest:     Since the recent CT of 10/21/2023, the bilateral pleural effusions have resolved. There is a new hypermetabolic subpleural nodule in the posterior right lower lobe which is suspicious for primary or metastatic neoplasm. The cannot be certain whether this nodule was present on the recent CT study due to the presence of a right pleural effusion, but the nodule was not present  on the previous CTA of the chest on 09/10/2021. On images 130 9-141 the nodule measures 2.2 x 1.3 cm with a max SUV of 2.9. No other significant abnormal hypermetabolic foci are identified within the chest. No lymphadenopathy is present.     Abdomen/pelvis:     There are postoperative changes of a cystectomy and prostatectomy with ureteral diversion right lower quadrant ileostomy. There is no hydronephrosis. No significant abnormal hypermetabolic foci are identified within the abdomen and pelvis. No lymphadenopathy is present. The adrenal glands are normal.     Skeleton:     No significant abnormal hypermetabolic foci are identified within the skeleton. There are no findings to suggest osseous metastatic disease.       Assessment:       1. Cough, unspecified type    2. Malignant neoplasm of overlapping sites of bladder    3. History of immunotherapy         Plan:     Bladder Cancer - pt with stage IIIB bladder cancer pT3N2 s/p neoadjuvant chemo followed by radical cystoprostatectomy 8/15/23  -PT received one dose of Nivolumab on 9/18/23  -PET/CT suggestive of a RLL pulmonary nodule  -Biopsy on 12/08/23 negative for malignancy  -Pt to proceed with cycle 4 of Nivolumab, 3rd cycle in our clinic  -Will repeat scans prior to next visit  02/26/24 - cough with decreased BS throughout; voice changes, SOB, increased effort; plan to move CT PET up for evaluation & F/U results with Dr. Jimenez.    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  -No sign of infection  -Will monitor    Left Chest Pain - pt with pain on palpation of left upper chest  -If pain persists, will perform staging scans sooner  02/26/24:  Intermittent    Pulmonary nodule - see above    Prostate Cancer - patient with acinar adenocarcinoma of the prostate grade group 1 ([place] score 3+3=6) in 1 2 5% of prostate tissue pT2N0  -PSA 10/31/23 0.02ng/mL    Ureteral Stents - pt missed his appt for stent exchange 1/02/24 and is to reschedule the procedure  with Dr Ghosh  -Stents exchanged on 1/23/24    Anorexia - on marinol  -Will monitor    Anemia - Hemoglobin 12.2g/dL on 2/09/24  -Will monitor     HTN - pt not on meds  -Stable  -Will monitor    Thrombophilia - pt with prior PE  -Pt subsequently developed a right perinephric bleed requiring embolization   -Patient with IVC filter in place  -PT was to see vascular surgery to determine when anticoagulation could be restarted  -Will monitor    Right popliteal aneurysm - pt to see vascular surgery    Increased Creatinine - creatinine up to 1.6 on 2/09/24  -Pt following with nephrology    Itchiness - possibly from immunotherapy  -Improved with Atarax    30 minutes were spent in coordination of patient's care, record review and counseling.    Route Chart for Scheduling    Med Onc Chart Routing      Follow up with physician    Follow up with [facility]    Infusion scheduling note    Injection scheduling note    Labs    Imaging   Need CT PET moved up to 03/01 - f/u with Dr. Jimenez with results on 03/05   Pharmacy appointment    Other referrals              Treatment Plan Information   OP NIVOLUMAB 480 MG Q4W   Jackson Jimenez MD   Upcoming Treatment Dates - OP NIVOLUMAB 480 MG Q4W    3/11/2024       Chemotherapy       nivolumab 480 mg in sodium chloride 0.9% 98 mL infusion  4/8/2024       Chemotherapy       nivolumab 480 mg in sodium chloride 0.9% 98 mL infusion  5/6/2024       Chemotherapy       nivolumab 480 mg in sodium chloride 0.9% 98 mL infusion  6/3/2024       Chemotherapy       nivolumab 480 mg in sodium chloride 0.9% 98 mL infusion      Jackson Jimenez MD  [facility]  Hematology and Oncology  [facility]   [address] [facility]   [place] [address]   O: [phone]  F: [phone]

## 2024-03-01 ENCOUNTER — HOSPITAL ENCOUNTER (OUTPATIENT)
Dept: RADIOLOGY | Facility: HOSPITAL | Age: 74
Discharge: HOME | End: 2024-03-01
Attending: INTERNAL MEDICINE
Payer: MEDICARE

## 2024-03-01 DIAGNOSIS — C67.8 MALIGNANT NEOPLASM OF OVERLAPPING SITES OF BLADDER: ICD-10-CM

## 2024-03-01 LAB — GLUCOSE SERPL-MCNC: 76 MG/DL (ref 70–110)

## 2024-03-01 PROCEDURE — 78815 PET IMAGE W/CT SKULL-THIGH: CPT | Mod: TC,PS,PN

## 2024-03-01 PROCEDURE — 78815 PET IMAGE W/CT SKULL-THIGH: CPT | Mod: 26,PS,, | Performed by: RADIOLOGY

## 2024-03-01 PROCEDURE — A9552 F18 FDG: HCPCS | Mod: PN | Performed by: INTERNAL MEDICINE

## 2024-03-01 RX ORDER — FLUDEOXYGLUCOSE F18 500 MCI/ML
12 INJECTION INTRAVENOUS
Status: COMPLETED | OUTPATIENT
Start: 2024-03-01 | End: 2024-03-01

## 2024-03-01 RX ADMIN — FLUDEOXYGLUCOSE F-18 10.16 MILLICURIE: 500 INJECTION INTRAVENOUS at 07:03

## 2024-03-01 NOTE — PROGRESS NOTES
PET Imaging Questionnaire    Are you a Diabetic? Recent Blood Sugar level? No    Are you anemic? Bone Marrow Stimulation Meds? No    Have you had a CT Scan, if so when & where was your last one? Yes -     Have you had a PET Scan, if so when & where was your last one? Yes -     Chemotherapy or currently on Chemotherapy? Yes    Radiation therapy? No    Surgical History:   Past Surgical History:   Procedure Laterality Date    PROSTATECTOMY      REMOVAL-STENT Bilateral 1/23/2024    Procedure: REMOVAL-STENT;  Surgeon: South Ghosh MD;  Location: [facility] OR [address] [place] [address];  Service: Urology;  Laterality: Bilateral;    URETERAL STENT PLACEMENT Bilateral 1/23/2024    Procedure: INSERTION, STENT, URETER;  Surgeon: South Ghosh MD;  Location: [facility] OR [address] [place] [address];  Service: Urology;  Laterality: Bilateral;        Have you been fasting for at least 6 hours? Yes    Is there any chance you may be pregnant or breastfeeding? No    Assay: 12.2 MCi@:7.36   Injection Site:rt ac     Residual: 2.04 mCi@: 7.38   Technologist: Laura Boland Injected:10.16mCi

## 2024-03-04 ENCOUNTER — PATIENT MESSAGE (OUTPATIENT)
Dept: HEMATOLOGY/ONCOLOGY | Facility: CLINIC | Age: 74
End: 2024-03-04
Payer: MEDICARE

## 2024-03-04 NOTE — PROGRESS NOTES
PATIENT: Aren Bey Jr.  MRN: [id number]  DATE: 3/5/2024      Diagnosis:   1. Malignant neoplasm of overlapping sites of bladder    2. Aneurysm of right popliteal artery    3. Other specified disorders involving the immune mechanism, not elsewhere classified    4. Lung nodule    5. Normocytic anemia    6. Hypertension, unspecified type    7. Other primary thrombophilia    8. Itching    9. Ureteral stent present    10. Shortness of breath    11. Cough, unspecified type                    Chief Complaint: Malignant neoplasm of overlapping sites of bladder (1 month follow up)      Oncologic History:      Oncologic History     Oncologic Treatment     Pathology           Subjective:    Interval History:  Mr. Bey is a 74 y.o. male with HTN, Seizures, bladder cancer, h/o pulmonary embolism, who presents for bladder cancer.  Since the last clinic visit the patient underwent PET-CT on 03/01/2024 showing decrease in size of right lower lobe nodule now measuring 0.8 cm.  Patient endorses continued shortness of breath.  Patient also endorses occasional cough.  The patient endorses occasional itching.  The patient denies CP, abdominal pain, nausea, vomiting, constipation, diarrhea.  The patient denies fever, chills, night sweats, weight loss, new lumps or bumps, easy bruising or bleeding.    Prior History:  Patient underwent transurethral resection of bladder tumor on 04/28/2023 with path showing invasive urothelial carcinoma, high-grade with involvement of muscularis propria and positive for lymphovascular invasion.  Patient underwent chemotherapy at [facility].  The patient then underwent radical cystectomy and prostatectomy on 08/15/2023 with pathology showing invasive high-grade urothelial carcinoma measuring 4.2 cm, positive lymphovascular invasion, tumor identified within the soft tissue surrounding the left distal ureter, 4/6 positive regional lymph nodes with extranodal extension  present. pT3aN2.  Also seen was acinar adenocarcinoma of the prostate grade group 1 ([place] score 3+3=6) in 1 2 5% of prostate tissue pT2N0.  The patient's surgery was ultimately complicated by small-bowel obstruction, and pulmonary embolism.  PT recevied 1 dose of Nivolumab 9/18/23.  The patient was placed on anticoagulation for pulmonary embolism and ultimately developed a right perinephric bleed requiring embolization.  PT also developed UTI and acquired ureteral obstruction with placement of bilateral ureteral stents.  PT also developed C diff and was hospitalized for suspected recurrence at [facility] from 10/21/2023 to 10/25/2023.  Patient was discharged on fidaxomicin.   The patient saw Dr. Ghosh with Urology on 11/13/2023 for with recommendations for virtual visit in January to discuss possible operative intervention replaced stents, possible revision of ureteral ileal anastomosis pending disease progression.  It was instructed that the patient would need catheterized specimen from urostomy to rule out UTI if patient with future symptoms.  Patient underwent PET-CT on 11/16/2023 showing a new hypermetabolic subpleural nodule in the posterior right lower lobe measuring 2.2 x 1.3 cm.  The patient endorses a fever starting on the night of 11/16/2023.  Patient was prescribed Bactrim for suspected potential urinary tract infection.   The patient was admitted to the hospital from 11/17/23-11/21/23 for C diff colitis and suspected urinary tract infection the patient was discharged on fidaxomicin 200 mg b.i.d. for additional 7 days as well as 200 mg every other day for 25 days.  Patient was also discharged on doxycycline 100 mg b.i.d. for 25 days with instructions from Infectious Disease to remain on antibiotics as long as his ureteral stents were in place.   The patient underwent biopsy of nodule in the right lung on 12/08/2023 showing fibrosis and chronic inflammation but no evidence of neoplasm or  granuloma.   The patient was to have ureteral stents exchanged on 01/02/2024 but missed his appointment due to inclement weather.    The patient had bilateral ureteral stents exchanged by Dr. Ghosh on 1/23/24.  Past Medical History:   Past Medical History:   Diagnosis Date    Aneurysm artery, popliteal     right leg    Bladder cancer     Hypertension     Malignant neoplasm of prostate     Seizures        Past Surgical HIstory:   Past Surgical History:   Procedure Laterality Date    PROSTATECTOMY      REMOVAL-STENT Bilateral 1/23/2024    Procedure: REMOVAL-STENT;  Surgeon: South Ghosh MD;  Location: [address];  Service: Urology;  Laterality: Bilateral;    URETERAL STENT PLACEMENT Bilateral 1/23/2024    Procedure: INSERTION, STENT, URETER;  Surgeon: South Ghosh MD;  Location: [facility] OR [address] [place] [address];  Service: Urology;  Laterality: Bilateral;       Family History:   Family History   Problem Relation Age of Onset    Heart disease Mother     Hypertension Mother        Social History:  reports that he has never smoked. He has never been exposed to tobacco smoke. He has never used smokeless tobacco. He reports that he does not currently use alcohol. He reports that he does not use drugs.    Allergies:  Review of patient's allergies indicates:   Allergen Reactions    Clopidogrel Other (See Comments)     Increased body temperature and redness        Medications:  Current Outpatient Medications   Medication Sig Dispense Refill    acetaminophen (TYLENOL) 500 MG tablet Take 500-1,000 mg by mouth every 6 (six) hours as needed (fever/pain).      ASCORBIC ACID, VITAMIN C, ORAL Take 1 tablet by mouth once daily.      atorvastatin (LIPITOR) 20 MG tablet Take 2 tablets (40 mg total) by mouth once daily. (Patient taking differently: Take 20 mg by mouth once daily.)      b complex vitamins tablet Take 1 tablet by mouth once daily.      cholecalciferol, vitamin D3, (VITAMIN D3 ORAL) Take 5,000 Units by mouth once daily.       "cyanocobalamin, vitamin B-12, 2,500 mcg Loz       diphenoxylate-atropine 2.5-0.025 mg (LOMOTIL) 2.5-0.025 mg per tablet Take 1 tablet by mouth 4 (four) times daily as needed for Diarrhea.      ferrous sulfate (SLOW RELEASE IRON) 142 mg (45 mg iron) TbSR Take 1 tablet by mouth Daily.      hydrOXYzine HCL (ATARAX) 25 MG tablet Take 1 tablet (25 mg total) by mouth 3 (three) times daily as needed for Itching. 90 tablet 3    LIDOcaine-prilocaine (EMLA) cream Apply topically once as needed (to port access).      multivitamin with minerals tablet Take 1 tablet by mouth once daily.      UNABLE TO FIND Take 1 capsule by mouth once daily. medication name: Colon health 80 billion      zinc gluconate 50 mg tablet Take 50 mg by mouth once daily.       No current facility-administered medications for this visit.       Review of Systems   Constitutional:  Negative for appetite change, chills, diaphoresis, fatigue, fever and unexpected weight change.   HENT:  Negative for mouth sores.    Eyes:  Negative for visual disturbance.   Respiratory:  Positive for cough and shortness of breath.    Cardiovascular:  Negative for chest pain and palpitations.   Gastrointestinal:  Negative for abdominal pain, constipation, diarrhea, nausea and vomiting.   Genitourinary:  Negative for frequency.   Musculoskeletal:  Negative for back pain.   Skin:  Negative for color change and rash.        Itching   Neurological:  Negative for headaches.   Hematological:  Negative for adenopathy. Does not bruise/bleed easily.   Psychiatric/Behavioral:  The patient is not nervous/anxious.        ECOG Performance Status: 1   Objective:      Vitals:   Vitals:    03/05/24 1107   BP: 122/60   BP Location: Right arm   Patient Position: Sitting   BP Method: Medium (Manual)   Pulse: 65   Resp: 14   Temp: 97.6 °F (36.4 °C)   TempSrc: Temporal   SpO2: 99%   Weight: 66.1 kg (145 lb 11.6 oz)   Height: 5' 9" (1.753 m)                   Physical Exam  Constitutional:       " General: He is not in acute distress.     Appearance: He is well-developed. He is not diaphoretic.   HENT:      Head: Normocephalic and atraumatic.   Cardiovascular:      Rate and Rhythm: Normal rate and regular rhythm.      Heart sounds: Normal heart sounds. No murmur heard.     No friction rub. No gallop.   Pulmonary:      Effort: Pulmonary effort is normal. No respiratory distress.      Breath sounds: Normal breath sounds. No wheezing or rales.   Chest:      Chest wall: No tenderness.   Abdominal:      General: Bowel sounds are normal. There is no distension.      Palpations: Abdomen is soft. There is no mass.      Tenderness: There is no abdominal tenderness. There is no rebound.      Comments: Ileal conduit on RLQ with clear urine visualized   Musculoskeletal:      Cervical back: Normal range of motion.   Lymphadenopathy:      Cervical: No cervical adenopathy.      Upper Body:      Right upper body: No supraclavicular or axillary adenopathy.      Left upper body: No supraclavicular or axillary adenopathy.   Skin:     General: Skin is warm and dry.      Findings: No erythema or rash.   Neurological:      Mental Status: He is alert and oriented to person, place, and time.   Psychiatric:         Behavior: Behavior normal.         Laboratory Data:  Lab Visit on 03/05/2024   Component Date Value Ref Range Status    WBC 03/05/2024 7.71  3.90 - 12.70 K/uL Final    RBC 03/05/2024 3.93 (L)  4.60 - 6.20 M/uL Final    Hemoglobin 03/05/2024 12.1 (L)  14.0 - 18.0 g/dL Final    Hematocrit 03/05/2024 35.5 (L)  40.0 - 54.0 % Final    MCV 03/05/2024 90  82 - 98 fL Final    MCH 03/05/2024 30.8  27.0 - 31.0 pg Final    MCHC 03/05/2024 34.1  32.0 - 36.0 g/dL Final    RDW 03/05/2024 12.2  11.5 - 14.5 % Final    Platelets 03/05/2024 174  150 - 450 K/uL Final    MPV 03/05/2024 8.6 (L)  9.2 - 12.9 fL Final    Immature Granulocytes 03/05/2024 0.3  0.0 - 0.5 % Final    Gran # (ANC) 03/05/2024 4.0  1.8 - 7.7 K/uL Final    Immature Grans  (Abs) 03/05/2024 0.02  0.00 - 0.04 K/uL Final    Comment: Mild elevation in immature granulocytes is non specific and   can be seen in a variety of conditions including stress response,   acute inflammation, trauma and pregnancy. Correlation with other   laboratory and clinical findings is essential.      Lymph # 03/05/2024 1.8  1.0 - 4.8 K/uL Final    Mono # 03/05/2024 1.0  0.3 - 1.0 K/uL Final    Eos # 03/05/2024 0.9 (H)  0.0 - 0.5 K/uL Final    Baso # 03/05/2024 0.07  0.00 - 0.20 K/uL Final    nRBC 03/05/2024 0  0 /100 WBC Final    Gran % 03/05/2024 51.5  38.0 - 73.0 % Final    Lymph % 03/05/2024 23.2  18.0 - 48.0 % Final    Mono % 03/05/2024 12.6  4.0 - 15.0 % Final    Eosinophil % 03/05/2024 11.5 (H)  0.0 - 8.0 % Final    Basophil % 03/05/2024 0.9  0.0 - 1.9 % Final    Differential Method 03/05/2024 Automated   Final    Sodium 03/05/2024 137  136 - 145 mmol/L Final    Potassium 03/05/2024 5.5 (H)  3.5 - 5.1 mmol/L Final    Chloride 03/05/2024 104  95 - 110 mmol/L Final    CO2 03/05/2024 24  23 - 29 mmol/L Final    Glucose 03/05/2024 86  70 - 110 mg/dL Final    BUN 03/05/2024 28 (H)  8 - 23 mg/dL Final    Creatinine 03/05/2024 1.5 (H)  0.5 - 1.4 mg/dL Final    Calcium 03/05/2024 10.1  8.7 - 10.5 mg/dL Final    Total Protein 03/05/2024 7.1  6.0 - 8.4 g/dL Final    Albumin 03/05/2024 3.7  3.5 - 5.2 g/dL Final    Total Bilirubin 03/05/2024 0.5  0.1 - 1.0 mg/dL Final    Comment: For infants and newborns, interpretation of results should be based  on gestational age, weight and in agreement with clinical  observations.    Premature Infant recommended reference ranges:  Up to 24 hours.............<8.0 mg/dL  Up to 48 hours............<12.0 mg/dL  3-5 days..................<15.0 mg/dL  6-29 days.................<15.0 mg/dL      Alkaline Phosphatase 03/05/2024 77  55 - 135 U/L Final    AST 03/05/2024 23  10 - 40 U/L Final    ALT 03/05/2024 20  10 - 44 U/L Final    eGFR 03/05/2024 48.0 (A)  >60 mL/min/1.73 m^2 Final     Anion Gap 03/05/2024 9  8 - 16 mmol/L Final   Hospital Outpatient Visit on 03/01/2024   Component Date Value Ref Range Status    POC Glucose 03/01/2024 76  70 - 110 MG/DL Final         Imaging:     PET/CT 3/01/24  Head/neck:     No significant abnormal hypermetabolic foci are identified within the head and neck. No lymphadenopathy is present.     Chest:     A hypermetabolic subpleural 1.3 cm posterior right lower lobe nodule has decreased markedly in size and has become essentially non metabolic. The nodule underwent prior biopsy which yielded benign results. On image 148 the nodule now measures 0.8 cm compared to 1.3 cm previously. No new hypermetabolic pulmonary nodules, lymphadenopathy, pleural effusion, or pericardial effusion are present.     Abdomen/pelvis:     There are postoperative changes of cystectomy, prostatectomy, ureteral ileal conduit with right lower quadrant urostomy. There are well position bilateral ureteral stents and there is no hydronephrosis. No significant abnormal hypermetabolic foci are identified in the abdomen and pelvis. No lymphadenopathy is present. The adrenal glands are normal. An inferior vena cava filter appears in satisfactory position.     Skeleton:     No significant abnormal hypermetabolic foci are identified within the skeleton. There are no findings to suggest osseous metastatic disease.       Assessment:       1. Malignant neoplasm of overlapping sites of bladder    2. Aneurysm of right popliteal artery    3. Other specified disorders involving the immune mechanism, not elsewhere classified    4. Lung nodule    5. Normocytic anemia    6. Hypertension, unspecified type    7. Other primary thrombophilia    8. Itching    9. Ureteral stent present    10. Shortness of breath    11. Cough, unspecified type             Plan:     Bladder Cancer - pt with stage IIIB bladder cancer pT3N2 s/p neoadjuvant chemo followed by radical cystoprostatectomy 8/15/23  -PT received one dose of  Nivolumab on 9/18/23  -PET/CT suggestive of a RLL pulmonary nodule  -Biopsy on 12/08/23 negative for malignancy  -Pt to proceed with cycle 5 of Nivolumab, 4th cycle in our clinic  -Scans show no clear evidence of metastatic or recurrent disease but does show a decreasing RLL nodule previously biopsied and benign  -Visit today included increased complexity associated with the care of the episodic problem (immunotherapy) addressed and managing the longitudinal care of the patient due to the serious and/or complex managed problem(s) Nivolumab    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  -No sign of infection  -Will monitor    Pulmonary nodule - see above    Prostate Cancer - patient with acinar adenocarcinoma of the prostate grade group 1 ([place] score 3+3=6) in 1 2 5% of prostate tissue pT2N0  -PSA 10/31/23 0.02ng/mL    Ureteral Stents - pt missed his appt for stent exchange 1/02/24 and is to reschedule the procedure with Dr Ghosh  -Stents exchanged on 1/23/24    Anorexia - on marinol  -Will monitor    Anemia - Hemoglobin 12.1g/dL on 3/05/24  -Will monitor     HTN - pt not on meds  -Stable  -Will monitor    Thrombophilia - pt with prior PE  -Pt subsequently developed a right perinephric bleed requiring embolization   -Patient with IVC filter in place  -Will have pt see vascular surgery to determine if he could be started on Eliquis  -Will monitor    Right popliteal aneurysm - pt to see vascular surgery    Increased Creatinine - creatinine up to 1.6 on 2/09/24  -Pt following with nephrology    Itchiness - possibly from immunotherapy  -Pt on Atarax    Cough - likely postnasal drip  -PT to start on Allegra or Claritin daily    SOB - concerned this could be due to worsening PE  -PT to undergo CTA chest    Route Chart for Scheduling    Med Onc Chart Routing      Follow up with physician 1 week. PT needs a CBC, CMP and TSH on 3/12/24 prior to his appt with me.  Pt needs a CTA chest ASAP.  Pt needs a vascular  surgery appt ASAP.   Follow up with [facility]    Infusion scheduling note    Injection scheduling note    Labs    Imaging    Pharmacy appointment    Other referrals              Treatment Plan Information   OP NIVOLUMAB 480 MG Q4W   Jackson Jimenez MD   Upcoming Treatment Dates - OP NIVOLUMAB 480 MG Q4W    3/11/2024       Chemotherapy       nivolumab 480 mg in sodium chloride 0.9% 98 mL infusion  4/8/2024       Chemotherapy       nivolumab 480 mg in sodium chloride 0.9% 98 mL infusion  5/6/2024       Chemotherapy       nivolumab 480 mg in sodium chloride 0.9% 98 mL infusion  6/3/2024       Chemotherapy       nivolumab 480 mg in sodium chloride 0.9% 98 mL infusion      Jackson Jimenez MD  [facility]  Hematology and Oncology  [facility] [address] [facility]   [place] [id number]   O: [phone]  F: [phone]

## 2024-03-05 ENCOUNTER — TELEPHONE (OUTPATIENT)
Dept: VASCULAR SURGERY | Facility: CLINIC | Age: 74
End: 2024-03-05
Payer: MEDICARE

## 2024-03-05 ENCOUNTER — OFFICE VISIT (OUTPATIENT)
Dept: HEMATOLOGY/ONCOLOGY | Facility: CLINIC | Age: 74
End: 2024-03-05
Payer: MEDICARE

## 2024-03-05 ENCOUNTER — LAB VISIT (OUTPATIENT)
Dept: LAB | Facility: HOSPITAL | Age: 74
End: 2024-03-05
Attending: INTERNAL MEDICINE
Payer: MEDICARE

## 2024-03-05 ENCOUNTER — TELEPHONE (OUTPATIENT)
Dept: HEMATOLOGY/ONCOLOGY | Facility: CLINIC | Age: 74
End: 2024-03-05
Payer: MEDICARE

## 2024-03-05 VITALS
TEMPERATURE: 98 F | WEIGHT: 145.75 LBS | RESPIRATION RATE: 14 BRPM | OXYGEN SATURATION: 99 % | DIASTOLIC BLOOD PRESSURE: 60 MMHG | SYSTOLIC BLOOD PRESSURE: 122 MMHG | HEIGHT: 69 IN | BODY MASS INDEX: 21.59 KG/M2 | HEART RATE: 65 BPM

## 2024-03-05 DIAGNOSIS — C67.8 MALIGNANT NEOPLASM OF OVERLAPPING SITES OF BLADDER: ICD-10-CM

## 2024-03-05 DIAGNOSIS — Z96.0 URETERAL STENT PRESENT: ICD-10-CM

## 2024-03-05 DIAGNOSIS — D68.59 OTHER PRIMARY THROMBOPHILIA: ICD-10-CM

## 2024-03-05 DIAGNOSIS — R06.02 SHORTNESS OF BREATH: ICD-10-CM

## 2024-03-05 DIAGNOSIS — D89.89 OTHER SPECIFIED DISORDERS INVOLVING THE IMMUNE MECHANISM, NOT ELSEWHERE CLASSIFIED: ICD-10-CM

## 2024-03-05 DIAGNOSIS — L29.9 ITCHING: ICD-10-CM

## 2024-03-05 DIAGNOSIS — C67.8 MALIGNANT NEOPLASM OF OVERLAPPING SITES OF BLADDER: Primary | ICD-10-CM

## 2024-03-05 DIAGNOSIS — R05.9 COUGH, UNSPECIFIED TYPE: ICD-10-CM

## 2024-03-05 DIAGNOSIS — D64.9 NORMOCYTIC ANEMIA: ICD-10-CM

## 2024-03-05 DIAGNOSIS — I10 HYPERTENSION, UNSPECIFIED TYPE: ICD-10-CM

## 2024-03-05 DIAGNOSIS — R91.1 LUNG NODULE: ICD-10-CM

## 2024-03-05 DIAGNOSIS — I72.4 ANEURYSM OF RIGHT POPLITEAL ARTERY: ICD-10-CM

## 2024-03-05 LAB
ALBUMIN SERPL BCP-MCNC: 3.7 G/DL (ref 3.5–5.2)
ALP SERPL-CCNC: 77 U/L (ref 55–135)
ALT SERPL W/O P-5'-P-CCNC: 20 U/L (ref 10–44)
ANION GAP SERPL CALC-SCNC: 9 MMOL/L (ref 8–16)
AST SERPL-CCNC: 23 U/L (ref 10–40)
BASOPHILS # BLD AUTO: 0.07 K/UL (ref 0–0.2)
BASOPHILS NFR BLD: 0.9 % (ref 0–1.9)
BILIRUB SERPL-MCNC: 0.5 MG/DL (ref 0.1–1)
BUN SERPL-MCNC: 28 MG/DL (ref 8–23)
CALCIUM SERPL-MCNC: 10.1 MG/DL (ref 8.7–10.5)
CHLORIDE SERPL-SCNC: 104 MMOL/L (ref 95–110)
CO2 SERPL-SCNC: 24 MMOL/L (ref 23–29)
CREAT SERPL-MCNC: 1.5 MG/DL (ref 0.5–1.4)
DIFFERENTIAL METHOD BLD: ABNORMAL
EOSINOPHIL # BLD AUTO: 0.9 K/UL (ref 0–0.5)
EOSINOPHIL NFR BLD: 11.5 % (ref 0–8)
ERYTHROCYTE [DISTWIDTH] IN BLOOD BY AUTOMATED COUNT: 12.2 % (ref 11.5–14.5)
EST. GFR  (NO RACE VARIABLE): 48 ML/MIN/1.73 M^2
GLUCOSE SERPL-MCNC: 86 MG/DL (ref 70–110)
HCT VFR BLD AUTO: 35.5 % (ref 40–54)
HGB BLD-MCNC: 12.1 G/DL (ref 14–18)
IMM GRANULOCYTES # BLD AUTO: 0.02 K/UL (ref 0–0.04)
IMM GRANULOCYTES NFR BLD AUTO: 0.3 % (ref 0–0.5)
LYMPHOCYTES # BLD AUTO: 1.8 K/UL (ref 1–4.8)
LYMPHOCYTES NFR BLD: 23.2 % (ref 18–48)
MCH RBC QN AUTO: 30.8 PG (ref 27–31)
MCHC RBC AUTO-ENTMCNC: 34.1 G/DL (ref 32–36)
MCV RBC AUTO: 90 FL (ref 82–98)
MONOCYTES # BLD AUTO: 1 K/UL (ref 0.3–1)
MONOCYTES NFR BLD: 12.6 % (ref 4–15)
NEUTROPHILS # BLD AUTO: 4 K/UL (ref 1.8–7.7)
NEUTROPHILS NFR BLD: 51.5 % (ref 38–73)
NRBC BLD-RTO: 0 /100 WBC
PLATELET # BLD AUTO: 174 K/UL (ref 150–450)
PMV BLD AUTO: 8.6 FL (ref 9.2–12.9)
POTASSIUM SERPL-SCNC: 5.5 MMOL/L (ref 3.5–5.1)
PROT SERPL-MCNC: 7.1 G/DL (ref 6–8.4)
RBC # BLD AUTO: 3.93 M/UL (ref 4.6–6.2)
SODIUM SERPL-SCNC: 137 MMOL/L (ref 136–145)
T4 FREE SERPL-MCNC: 1.52 NG/DL (ref 0.71–1.51)
TSH SERPL DL<=0.005 MIU/L-ACNC: <0.01 UIU/ML (ref 0.4–4)
WBC # BLD AUTO: 7.71 K/UL (ref 3.9–12.7)

## 2024-03-05 PROCEDURE — 99999 PR PBB SHADOW E&M-EST. PATIENT-LVL IV: CPT | Mod: PBBFAC,,, | Performed by: INTERNAL MEDICINE

## 2024-03-05 PROCEDURE — 3078F DIAST BP <80 MM HG: CPT | Mod: CPTII,S$GLB,, | Performed by: INTERNAL MEDICINE

## 2024-03-05 PROCEDURE — 1126F AMNT PAIN NOTED NONE PRSNT: CPT | Mod: CPTII,S$GLB,, | Performed by: INTERNAL MEDICINE

## 2024-03-05 PROCEDURE — 1159F MED LIST DOCD IN RCRD: CPT | Mod: CPTII,S$GLB,, | Performed by: INTERNAL MEDICINE

## 2024-03-05 PROCEDURE — G2211 COMPLEX E/M VISIT ADD ON: HCPCS | Mod: S$GLB,,, | Performed by: INTERNAL MEDICINE

## 2024-03-05 PROCEDURE — 1157F ADVNC CARE PLAN IN RCRD: CPT | Mod: CPTII,S$GLB,, | Performed by: INTERNAL MEDICINE

## 2024-03-05 PROCEDURE — 84439 ASSAY OF FREE THYROXINE: CPT | Performed by: INTERNAL MEDICINE

## 2024-03-05 PROCEDURE — 3074F SYST BP LT 130 MM HG: CPT | Mod: CPTII,S$GLB,, | Performed by: INTERNAL MEDICINE

## 2024-03-05 PROCEDURE — 3288F FALL RISK ASSESSMENT DOCD: CPT | Mod: CPTII,S$GLB,, | Performed by: INTERNAL MEDICINE

## 2024-03-05 PROCEDURE — 84443 ASSAY THYROID STIM HORMONE: CPT | Performed by: INTERNAL MEDICINE

## 2024-03-05 PROCEDURE — 80053 COMPREHEN METABOLIC PANEL: CPT | Mod: PN | Performed by: INTERNAL MEDICINE

## 2024-03-05 PROCEDURE — 85025 COMPLETE CBC W/AUTO DIFF WBC: CPT | Mod: PN | Performed by: INTERNAL MEDICINE

## 2024-03-05 PROCEDURE — 1101F PT FALLS ASSESS-DOCD LE1/YR: CPT | Mod: CPTII,S$GLB,, | Performed by: INTERNAL MEDICINE

## 2024-03-05 PROCEDURE — 99215 OFFICE O/P EST HI 40 MIN: CPT | Mod: S$GLB,,, | Performed by: INTERNAL MEDICINE

## 2024-03-05 PROCEDURE — 36415 COLL VENOUS BLD VENIPUNCTURE: CPT | Mod: PN | Performed by: INTERNAL MEDICINE

## 2024-03-05 NOTE — TELEPHONE ENCOUNTER
----- Message from Daniela Saldana sent at 3/5/2024  1:02 PM CST -----  Type:  Patient Returning Call    Who Called:pt      Who Left Message for Patient:Nayely    Does the patient know what this is regarding?:yes    Would the patient rather a call back or a response via [facility]? Call back    Best Call Back Number:[phone]      Additional Information:      Please call Back to advise. Thanks!

## 2024-03-05 NOTE — TELEPHONE ENCOUNTER
----- Message from Sd Dodge sent at 3/5/2024 11:45 AM CST -----  Type:  Sooner Appointment Request    Caller is requesting a sooner appointment.  Caller declined first available appointment listed below.  Caller will not accept being placed on the waitlist and is requesting a message be sent to doctor.    Name of Caller:  Karyn/ Dr. Jimenez   When is the first available appointment?  New Patient  Symptoms:  Consult for surgery  Would the patient rather a call back or a response via [facility]? Call  Best Call Back Number:   Pt- [phone]  Additional Information:

## 2024-03-05 NOTE — TELEPHONE ENCOUNTER
Sent message over for the pt to get scheduled for a vascular surgeon stated that they will get the pt scheduled

## 2024-03-06 ENCOUNTER — TELEPHONE (OUTPATIENT)
Dept: VASCULAR SURGERY | Facility: CLINIC | Age: 74
End: 2024-03-06
Payer: MEDICARE

## 2024-03-06 NOTE — TELEPHONE ENCOUNTER
----- Message from Yumiko Sloan sent at 3/6/2024 12:24 PM CST -----  Regarding: Needs Medical Advice  Contact: wife at [phone]  Type: Needs Medical Advice  Who Called:  wife at [phone]    Additional Information: patient would like to discuss further the situation before the appointment on 3/13. Please call and advise. Thank you           Walk in

## 2024-03-08 ENCOUNTER — OFFICE VISIT (OUTPATIENT)
Dept: CARDIOLOGY | Facility: CLINIC | Age: 74
End: 2024-03-08
Payer: MEDICARE

## 2024-03-08 VITALS
HEIGHT: 69 IN | HEART RATE: 69 BPM | BODY MASS INDEX: 21.72 KG/M2 | WEIGHT: 146.63 LBS | SYSTOLIC BLOOD PRESSURE: 108 MMHG | DIASTOLIC BLOOD PRESSURE: 65 MMHG

## 2024-03-08 DIAGNOSIS — I25.709 CORONARY ARTERY DISEASE INVOLVING CORONARY BYPASS GRAFT OF NATIVE HEART WITH ANGINA PECTORIS: Chronic | ICD-10-CM

## 2024-03-08 DIAGNOSIS — R07.2 PRECORDIAL PAIN: ICD-10-CM

## 2024-03-08 DIAGNOSIS — I08.0 MITRAL AND AORTIC REGURGITATION: Chronic | ICD-10-CM

## 2024-03-08 DIAGNOSIS — R09.89 BRUIT OF RIGHT CAROTID ARTERY: ICD-10-CM

## 2024-03-08 DIAGNOSIS — I72.4 POPLITEAL ANEURYSM: Chronic | ICD-10-CM

## 2024-03-08 DIAGNOSIS — R06.02 SHORTNESS OF BREATH: Primary | ICD-10-CM

## 2024-03-08 PROCEDURE — 3078F DIAST BP <80 MM HG: CPT | Mod: CPTII,S$GLB,, | Performed by: INTERNAL MEDICINE

## 2024-03-08 PROCEDURE — 3288F FALL RISK ASSESSMENT DOCD: CPT | Mod: CPTII,S$GLB,, | Performed by: INTERNAL MEDICINE

## 2024-03-08 PROCEDURE — 1126F AMNT PAIN NOTED NONE PRSNT: CPT | Mod: CPTII,S$GLB,, | Performed by: INTERNAL MEDICINE

## 2024-03-08 PROCEDURE — 1157F ADVNC CARE PLAN IN RCRD: CPT | Mod: CPTII,S$GLB,, | Performed by: INTERNAL MEDICINE

## 2024-03-08 PROCEDURE — 99999 PR PBB SHADOW E&M-EST. PATIENT-LVL IV: CPT | Mod: PBBFAC,,, | Performed by: INTERNAL MEDICINE

## 2024-03-08 PROCEDURE — 1159F MED LIST DOCD IN RCRD: CPT | Mod: CPTII,S$GLB,, | Performed by: INTERNAL MEDICINE

## 2024-03-08 PROCEDURE — 99214 OFFICE O/P EST MOD 30 MIN: CPT | Mod: S$GLB,,, | Performed by: INTERNAL MEDICINE

## 2024-03-08 PROCEDURE — 3074F SYST BP LT 130 MM HG: CPT | Mod: CPTII,S$GLB,, | Performed by: INTERNAL MEDICINE

## 2024-03-08 PROCEDURE — 1101F PT FALLS ASSESS-DOCD LE1/YR: CPT | Mod: CPTII,S$GLB,, | Performed by: INTERNAL MEDICINE

## 2024-03-08 RX ORDER — ASPIRIN 81 MG/1
81 TABLET ORAL DAILY
Qty: 90 TABLET | Refills: 3 | Status: SHIPPED | OUTPATIENT
Start: 2024-03-08 | End: 2025-03-08

## 2024-03-08 RX ORDER — NITROGLYCERIN 0.4 MG/1
0.4 TABLET SUBLINGUAL EVERY 5 MIN PRN
Qty: 25 TABLET | Refills: 0 | Status: SHIPPED | OUTPATIENT
Start: 2024-03-08 | End: 2025-03-08

## 2024-03-08 NOTE — PROGRESS NOTES
Subjective:    Patient ID:  Aren Bey Jr. is a 74 y.o. 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 AFTER A FALL AR STENT,ON IMMUNOTHERAPY,  RECORDS REVIEWED, SEE ROS  Past Medical History:   Diagnosis Date    Aneurysm artery, popliteal     right leg    Bladder cancer     Hypertension     Malignant neoplasm of prostate     Seizures      Past Surgical History:   Procedure Laterality Date    PROSTATECTOMY      REMOVAL-STENT Bilateral 1/23/2024    Procedure: REMOVAL-STENT;  Surgeon: South Ghosh MD;  Location: [facility] OR [address] [place] [address];  Service: Urology;  Laterality: Bilateral;    URETERAL STENT PLACEMENT Bilateral 1/23/2024    Procedure: INSERTION, STENT, URETER;  Surgeon: South Ghosh MD;  Location: [facility] OR [address] [place] [address];  Service: Urology;  Laterality: Bilateral;     Family History   Problem Relation Age of Onset    Heart disease Mother     Hypertension Mother      Social History     Socioeconomic History    Marital status:    Occupational History    Occupation: retired   Tobacco Use    Smoking status: Never     Passive exposure: Never    Smokeless tobacco: Never   Substance and Sexual Activity    Alcohol use: Not  Currently    Drug use: Never    Sexual activity: Yes     Partners: Female     Social Determinants of Health     Financial Resource Strain: Patient Declined (11/10/2023)    Overall Financial Resource Strain (CARDIA)     Difficulty of Paying Living Expenses: Patient declined   Food Insecurity: No Food Insecurity (11/18/2023)    Hunger Vital Sign     Worried About Running Out of Food in the Last Year: Never true     Ran Out of Food in the Last Year: Never true   Transportation Needs: No Transportation Needs (11/18/2023)    PRAPARE - Transportation     Lack of Transportation (Medical): No     Lack of Transportation (Non-Medical): No   Physical Activity: Insufficiently Active (11/10/2023)    Exercise Vital Sign     Days of Exercise per Week: 7 days     Minutes of Exercise per Session: 10 min   Stress: No Stress Concern Present (11/10/2023)    [employer] - Occupational Stress Questionnaire     Feeling of Stress : Not at all   Social Connections: Unknown (11/10/2023)    Social Connection and Isolation Panel [NHANES]     Frequency of Communication with Friends and Family: More than three times a week     Frequency of Social Gatherings with Friends and Family: Patient declined     Active Member of Clubs or Organizations: Patient declined     Attends Club or Organization Meetings: Patient declined     Marital Status:    Housing Stability: Low Risk  (11/18/2023)    Housing Stability Vital Sign     Unable to Pay for Housing in the Last Year: No     Number of Places Lived in the Last Year: 2     Unstable Housing in the Last Year: No       Review of patient's allergies indicates:   Allergen Reactions    Clopidogrel Other (See Comments)     Increased body temperature and redness        Current Outpatient Medications:     acetaminophen (TYLENOL) 500 MG tablet, Take 500-1,000 mg by mouth every 6 (six) hours as needed (fever/pain)., Disp: , Rfl:     ASCORBIC ACID, VITAMIN C, ORAL, Take 1 tablet by  mouth once daily., Disp: , Rfl:     atorvastatin (LIPITOR) 20 MG tablet, Take 2 tablets (40 mg total) by mouth once daily. (Patient taking differently: Take 20 mg by mouth once daily.), Disp: , Rfl:     b complex vitamins tablet, Take 1 tablet by mouth once daily., Disp: , Rfl:     cholecalciferol, vitamin D3, (VITAMIN D3 ORAL), Take 5,000 Units by mouth once daily., Disp: , Rfl:     cyanocobalamin, vitamin B-12, 2,500 mcg Lozg, , Disp: , Rfl:     diphenoxylate-atropine 2.5-0.025 mg (LOMOTIL) 2.5-0.025 mg per tablet, Take 1 tablet by mouth 4 (four) times daily as needed for Diarrhea., Disp: , Rfl:     ferrous sulfate (SLOW RELEASE IRON) 142 mg (45 mg iron) TbSR, Take 1 tablet by mouth Daily., Disp: , Rfl:     hydrOXYzine HCL (ATARAX) 25 MG tablet, Take 1 tablet (25 mg total) by mouth 3 (three) times daily as needed for Itching., Disp: 90 tablet, Rfl: 3    LIDOcaine-prilocaine (EMLA) cream, Apply topically once as needed (to port access)., Disp: , Rfl:     multivitamin with minerals tablet, Take 1 tablet by mouth once daily., Disp: , Rfl:     UNABLE TO FIND, Take 1 capsule by mouth once daily. medication name: Colon health 80 billion, Disp: , Rfl:     zinc gluconate 50 mg tablet, Take 50 mg by mouth once daily., Disp: , Rfl:     aspirin (ECOTRIN) 81 MG EC tablet, Take 1 tablet (81 mg total) by mouth once daily., Disp: 90 tablet, Rfl: 3    nitroGLYCERIN (NITROSTAT) 0.4 MG SL tablet, Place 1 tablet (0.4 mg total) under the tongue every 5 (five) minutes as needed for Chest pain., Disp: 25 tablet, Rfl: 0    Review of Systems   Constitutional: Negative for chills, decreased appetite and diaphoresis.   HENT:  Negative for congestion and sore throat.    Eyes:  Negative for blurred vision and visual disturbance.   Cardiovascular:  Positive for chest pain, dyspnea on exertion and paroxysmal nocturnal dyspnea. Negative for irregular heartbeat, leg swelling, near-syncope, orthopnea, palpitations and syncope.   Respiratory:   "Positive for cough and shortness of breath. Negative for wheezing. Sputum production: OCCFEELS IN UPPER CHEST.   Endocrine: Negative for polyphagia and polyuria.   Hematologic/Lymphatic: Negative for adenopathy. Does not bruise/bleed easily.   Skin:  Negative for color change and rash.   Musculoskeletal:  Negative for back pain.   Gastrointestinal:  Negative for abdominal pain, diarrhea, jaundice, melena and nausea.   Genitourinary:  Negative for flank pain and hematuria.   Neurological:  Negative for brief paralysis, headaches and seizures.   Psychiatric/Behavioral:  Negative for altered mental status and depression. The patient is not nervous/anxious.    Allergic/Immunologic: Negative for hives and persistent infections.   All other systems reviewed and are negative.       Objective:      Vitals:    03/08/24 0807   BP: 108/65   Pulse: 69   Weight: 66.5 kg (146 lb 9.7 oz)   Height: 5' 9" (1.753 m)   PainSc: 0-No pain     Body mass index is 21.65 kg/m².    Physical Exam  Constitutional:       Appearance: Normal appearance.   HENT:      Head: Normocephalic and atraumatic.   Eyes:      Extraocular Movements: Extraocular movements intact.      Conjunctiva/sclera: Conjunctivae normal.   Neck:      Vascular: Carotid bruit present.   Cardiovascular:      Rate and Rhythm: Normal rate.      Pulses:           Carotid pulses are 2+ on the right side with bruit and 2+ on the left side.       Radial pulses are 2+ on the right side and 2+ on the left side.        Femoral pulses are 2+ on the right side and 2+ on the left side.       Posterior tibial pulses are 2+ on the right side and 2+ on the left side.      Heart sounds: Murmur heard.      No friction rub. No gallop.   Pulmonary:      Effort: Pulmonary effort is normal.      Breath sounds: Normal breath sounds.   Chest:          Comments: TEJ CATH  Abdominal:      Palpations: Abdomen is soft.      Tenderness: There is no abdominal tenderness.      Comments: UROSTOMY BAG "   Musculoskeletal:      Cervical back: Neck supple.      Right lower leg: No edema.      Left lower leg: No edema.   Skin:     General: Skin is warm and dry.      Capillary Refill: Capillary refill takes less than 2 seconds.   Neurological:      General: No focal deficit present.      Mental Status: He is alert and oriented to person, place, and time.   Psychiatric:         Mood and Affect: Mood normal.         Speech: Speech normal.         Behavior: Behavior normal.                 ..    Chemistry        Component Value Date/Time     03/05/2024 1055    K 5.5 (H) 03/05/2024 1055     03/05/2024 1055    CO2 24 03/05/2024 1055    BUN 28 (H) 03/05/2024 1055    CREATININE 1.5 (H) 03/05/2024 1055    GLU 86 03/05/2024 1055        Component Value Date/Time    CALCIUM 10.1 03/05/2024 1055    ALKPHOS 77 03/05/2024 1055    AST 23 03/05/2024 1055    ALT 20 03/05/2024 1055    BILITOT 0.5 03/05/2024 1055    ESTGFRAFRICA 42 10/07/2023 0752    ESTGFRAFRICA >60.0 09/11/2021 0523    EGFRNONAA >60.0 09/11/2021 0523            ..  Lab Results   Component Value Date    CHOL 150 08/08/2022    CHOL 147 12/21/2021    CHOL 169 05/19/2021     Lab Results   Component Value Date    HDL 82 08/08/2022    HDL 62 12/21/2021    HDL 77 (H) 05/19/2021     Lab Results   Component Value Date    LDLCALC 55 08/08/2022    LDLCALC 69 12/21/2021    LDLCALC 83.8 05/19/2021     Lab Results   Component Value Date    TRIG 80 08/24/2023    TRIG 67 08/08/2022    TRIG 80 12/21/2021     Lab Results   Component Value Date    CHOLHDL 45.6 05/19/2021     ..  Lab Results   Component Value Date    WBC 7.71 03/05/2024    HGB 12.1 (L) 03/05/2024    HCT 35.5 (L) 03/05/2024    MCV 90 03/05/2024     03/05/2024       Test(s) Reviewed  I have reviewed the following in detail:  [] Stress test   [x] Angiography   [] Echocardiogram   [x] Labs   [x] Other:       Assessment:         ICD-10-CM ICD-9-CM   1. Shortness of breath  R06.02 786.05   2. Precordial pain   R07.2 786.51   3. Mitral and aortic regurgitation  I08.0 396.3   4. Popliteal aneurysm  I72.4 442.3   5. Bruit of right carotid artery  R09.89 785.9   6. Coronary artery disease involving coronary bypass graft of native heart with angina pectoris  I25.709 414.05     413.9     Problem List Items Addressed This Visit          Pulmonary    Shortness of breath - Primary    Relevant Orders    Echo    Nuclear Stress - Cardiology Interpreted       Cardiac/Vascular    Popliteal aneurysm    Coronary artery disease involving coronary bypass graft of native heart with angina pectoris    Relevant Orders    Nuclear Stress - Cardiology Interpreted    Precordial pain    Relevant Orders    Echo    Nuclear Stress - Cardiology Interpreted    Mitral and aortic regurgitation    Relevant Orders    Echo    Arterial bruit        Plan:     WILL NEED FURTHER EVALUATION CHECK ECHOCARDIOGRAM NUCLEAR STRESS TEST ASSESS FOR ISCHEMIA REASSESS VALVULAR DISEASE CHECK CAROTID ULTRASOUND PATIENT DOES HAVE ANGINA TYPE SYMPTOMS SHORTNESS OF BREATH THAT IS DISABLING SOMEWHAT DOES HAVE CANCER NOW ON IMMUNOTHERAPY, NEEDS TO BE FOLLOWED ALSO BY VASCULAR BUCCAL ANEURYSM, CLINICALLY NO OVERT HEART FAILURE NO CLINICAL ARRHYTHMIA RETURN TO CLINIC IN FEW WEEKS AFTER TESTS, DISCUSSED PLAN WITH THE PATIENT AND HIS WIFE WHO GAVE MOST OF THE HISTORY      Shortness of breath  Comments:  WORKUP  Orders:  -     Ambulatory referral/consult to Cardiology  -     Echo  -     Nuclear Stress - Cardiology Interpreted; Future    Precordial pain  Comments:  WORKUP  Orders:  -     Echo  -     Nuclear Stress - Cardiology Interpreted; Future    Mitral and aortic regurgitation  -     Echo    Popliteal aneurysm    Bruit of right carotid artery  Comments:  ULTRASOUND  Orders:  -     CV Ultrasound Bilateral Doppler Carotid; Future    Coronary artery disease involving coronary bypass graft of native heart with angina pectoris  -     Nuclear Stress - Cardiology Interpreted;  Future    Other orders  -     aspirin (ECOTRIN) 81 MG EC tablet; Take 1 tablet (81 mg total) by mouth once daily.  Dispense: 90 tablet; Refill: 3  -     nitroGLYCERIN (NITROSTAT) 0.4 MG SL tablet; Place 1 tablet (0.4 mg total) under the tongue every 5 (five) minutes as needed for Chest pain.  Dispense: 25 tablet; Refill: 0    RTC Low level/low impact aerobic exercise 5x's/wk. Heart healthy diet and risk factor modification.    See labs and med orders.    Aerobic exercise 5x's/wk. Heart healthy diet and risk factor modification.    See labs and med orders.

## 2024-03-11 ENCOUNTER — HOSPITAL ENCOUNTER (OUTPATIENT)
Dept: CARDIOLOGY | Facility: HOSPITAL | Age: 74
Discharge: HOME | End: 2024-03-11
Attending: INTERNAL MEDICINE
Payer: MEDICARE

## 2024-03-11 ENCOUNTER — HOSPITAL ENCOUNTER (OUTPATIENT)
Dept: RADIOLOGY | Facility: HOSPITAL | Age: 74
Discharge: HOME | End: 2024-03-11
Attending: INTERNAL MEDICINE
Payer: MEDICARE

## 2024-03-11 VITALS — BODY MASS INDEX: 21.62 KG/M2 | HEIGHT: 69 IN | WEIGHT: 146 LBS

## 2024-03-11 DIAGNOSIS — R06.02 SHORTNESS OF BREATH: ICD-10-CM

## 2024-03-11 DIAGNOSIS — R07.2 PRECORDIAL PAIN: ICD-10-CM

## 2024-03-11 DIAGNOSIS — I25.709 CORONARY ARTERY DISEASE INVOLVING CORONARY BYPASS GRAFT OF NATIVE HEART WITH ANGINA PECTORIS: Chronic | ICD-10-CM

## 2024-03-11 LAB
CV PHARM DOSE: 0.4 MG
CV STRESS BASE HR: 58 BPM
DIASTOLIC BLOOD PRESSURE: 64 MMHG
NUC REST EJECTION FRACTION: 74
OHS CV CPX 1 MINUTE RECOVERY HEART RATE: 80 BPM
OHS CV CPX 85 PERCENT MAX PREDICTED HEART RATE MALE: 122
OHS CV CPX MAX PREDICTED HEART RATE: 144
OHS CV CPX PATIENT IS FEMALE: 0
OHS CV CPX PATIENT IS MALE: 1
OHS CV CPX PEAK DIASTOLIC BLOOD PRESSURE: 64 MMHG
OHS CV CPX PEAK HEAR RATE: 84 BPM
OHS CV CPX PEAK RATE PRESSURE PRODUCT: NORMAL
OHS CV CPX PEAK SYSTOLIC BLOOD PRESSURE: 132 MMHG
OHS CV CPX PERCENT MAX PREDICTED HEART RATE ACHIEVED: 58
OHS CV CPX RATE PRESSURE PRODUCT PRESENTING: 7656
OHS CV PHARM TIME: 909 MIN
SYSTOLIC BLOOD PRESSURE: 132 MMHG

## 2024-03-11 PROCEDURE — 93017 CV STRESS TEST TRACING ONLY: CPT | Mod: PO

## 2024-03-11 PROCEDURE — 93018 CV STRESS TEST I&R ONLY: CPT | Mod: ,,, | Performed by: INTERNAL MEDICINE

## 2024-03-11 PROCEDURE — 78452 HT MUSCLE IMAGE SPECT MULT: CPT | Mod: 26,,, | Performed by: INTERNAL MEDICINE

## 2024-03-11 PROCEDURE — 63600175 PHARM REV CODE 636 W HCPCS: Mod: PO | Performed by: INTERNAL MEDICINE

## 2024-03-11 PROCEDURE — A9502 TC99M TETROFOSMIN: HCPCS | Mod: PO | Performed by: INTERNAL MEDICINE

## 2024-03-11 PROCEDURE — 93016 CV STRESS TEST SUPVJ ONLY: CPT | Mod: ,,, | Performed by: INTERNAL MEDICINE

## 2024-03-11 PROCEDURE — 78452 HT MUSCLE IMAGE SPECT MULT: CPT | Mod: PO

## 2024-03-11 RX ORDER — REGADENOSON 0.08 MG/ML
0.4 INJECTION, SOLUTION INTRAVENOUS
Status: COMPLETED | OUTPATIENT
Start: 2024-03-11 | End: 2024-03-11

## 2024-03-11 RX ADMIN — TETROFOSMIN 33 MILLICURIE: 1.38 INJECTION, POWDER, LYOPHILIZED, FOR SOLUTION INTRAVENOUS at 09:03

## 2024-03-11 RX ADMIN — REGADENOSON 0.4 MG: 0.08 INJECTION, SOLUTION INTRAVENOUS at 09:03

## 2024-03-11 RX ADMIN — TETROFOSMIN 10.1 MILLICURIE: 1.38 INJECTION, POWDER, LYOPHILIZED, FOR SOLUTION INTRAVENOUS at 07:03

## 2024-03-11 NOTE — PROGRESS NOTES
PATIENT: Aren Bey Jr.  MRN: [id number]  DATE: 3/12/2024      Diagnosis:   1. Malignant neoplasm of overlapping sites of bladder    2. Other specified disorders involving the immune mechanism, not elsewhere classified    3. Aneurysm of right popliteal artery    4. Lung nodule    5. Normocytic anemia    6. Hypertension, unspecified type    7. Other primary thrombophilia    8. Ureteral stent present    9. Coronary artery disease, unspecified vessel or lesion type, unspecified whether angina present, unspecified whether native or transplanted heart    10. Immunodeficiency due to drug therapy                      Chief Complaint: Malignant neoplasm of overlapping sites of bladder (1 week follow up )      Oncologic History:      Oncologic History     Oncologic Treatment     Pathology           Subjective:    Interval History:  Mr. Bey is a 74 y.o. male with HTN, Seizures, bladder cancer, h/o pulmonary embolism, who presents for bladder cancer.  Since the last clinic visit the patient underwent CTA chest on 03/06/2024 showing no evidence of pulmonary embolism or pneumonitis.  The patient underwent nuclear stress test on 03/11/2024 showed abnormal myocardial perfusion with mild intensity, small size, reversible perfusion abnormality consistent with ischemia in the basal to mid inferolateral walls.  The patient continues to endorse shortness of breath with exertion.  The patient denies chest pain.  The patient also endorses cough and hoarseness in his voice.  The patient denies abdominal pain, nausea, vomiting, constipation, diarrhea.  The patient denies fever, chills, night sweats, weight loss, new lumps or bumps, easy bruising or bleeding.    Prior History:  Patient underwent transurethral resection of bladder tumor on 04/28/2023 with path showing invasive urothelial carcinoma, high-grade with involvement of muscularis propria and positive for lymphovascular invasion.  Patient underwent chemotherapy at  [facility].  The patient then underwent radical cystectomy and prostatectomy on 08/15/2023 with pathology showing invasive high-grade urothelial carcinoma measuring 4.2 cm, positive lymphovascular invasion, tumor identified within the soft tissue surrounding the left distal ureter, 4/6 positive regional lymph nodes with extranodal extension present. pT3aN2.  Also seen was acinar adenocarcinoma of the prostate grade group 1 (Franklin score 3+3=6) in 1 2 5% of prostate tissue pT2N0.  The patient's surgery was ultimately complicated by small-bowel obstruction, and pulmonary embolism.  PT recevied 1 dose of Nivolumab 9/18/23.  The patient was placed on anticoagulation for pulmonary embolism and ultimately developed a right perinephric bleed requiring embolization.  PT also developed UTI and acquired ureteral obstruction with placement of bilateral ureteral stents.  PT also developed C diff and was hospitalized for suspected recurrence at [facility] from 10/21/2023 to 10/25/2023.  Patient was discharged on fidaxomicin.   The patient saw Dr. Ghosh with Urology on 11/13/2023 for with recommendations for virtual visit in January to discuss possible operative intervention replaced stents, possible revision of ureteral ileal anastomosis pending disease progression.  It was instructed that the patient would need catheterized specimen from urostomy to rule out UTI if patient with future symptoms.  Patient underwent PET-CT on 11/16/2023 showing a new hypermetabolic subpleural nodule in the posterior right lower lobe measuring 2.2 x 1.3 cm.  The patient endorses a fever starting on the night of 11/16/2023.  Patient was prescribed Bactrim for suspected potential urinary tract infection.   The patient was admitted to the hospital from 11/17/23-11/21/23 for C diff colitis and suspected urinary tract infection the patient was discharged on fidaxomicin 200 mg b.i.d. for additional 7 days as well as 200 mg every  other day for 25 days.  Patient was also discharged on doxycycline 100 mg b.i.d. for 25 days with instructions from Infectious Disease to remain on antibiotics as long as his ureteral stents were in place.   The patient underwent biopsy of nodule in the right lung on 12/08/2023 showing fibrosis and chronic inflammation but no evidence of neoplasm or granuloma.   The patient was to have ureteral stents exchanged on 01/02/2024 but missed his appointment due to inclement weather.    The patient had bilateral ureteral stents exchanged by Dr. Ghosh on 1/23/24.   The patient underwent PET-CT on 03/01/2024 showing decrease in size of right lower lobe nodule now measuring 0.8 cm.  Past Medical History:   Past Medical History:   Diagnosis Date    Aneurysm artery, popliteal     right leg    Bladder cancer     Hypertension     Malignant neoplasm of prostate     Seizures        Past Surgical HIstory:   Past Surgical History:   Procedure Laterality Date    PROSTATECTOMY      REMOVAL-STENT Bilateral 1/23/2024    Procedure: REMOVAL-STENT;  Surgeon: South Ghosh MD;  Location: [address];  Service: Urology;  Laterality: Bilateral;    URETERAL STENT PLACEMENT Bilateral 1/23/2024    Procedure: INSERTION, STENT, URETER;  Surgeon: South Ghosh MD;  Location: [facility] OR [address] [place] [address];  Service: Urology;  Laterality: Bilateral;       Family History:   Family History   Problem Relation Age of Onset    Heart disease Mother     Hypertension Mother        Social History:  reports that he has never smoked. He has never been exposed to tobacco smoke. He has never used smokeless tobacco. He reports that he does not currently use alcohol. He reports that he does not use drugs.    Allergies:  Review of patient's allergies indicates:   Allergen Reactions    Clopidogrel Other (See Comments)     Increased body temperature and redness        Medications:  Current Outpatient Medications   Medication Sig Dispense Refill    acetaminophen (TYLENOL) 500  MG tablet Take 500-1,000 mg by mouth every 6 (six) hours as needed (fever/pain).      ASCORBIC ACID, VITAMIN C, ORAL Take 1 tablet by mouth once daily.      aspirin (ECOTRIN) 81 MG EC tablet Take 1 tablet (81 mg total) by mouth once daily. 90 tablet 3    atorvastatin (LIPITOR) 20 MG tablet Take 2 tablets (40 mg total) by mouth once daily. (Patient taking differently: Take 20 mg by mouth once daily.)      b complex vitamins tablet Take 1 tablet by mouth once daily.      cholecalciferol, vitamin D3, (VITAMIN D3 ORAL) Take 5,000 Units by mouth once daily.      cyanocobalamin, vitamin B-12, 2,500 mcg Lozg       diphenoxylate-atropine 2.5-0.025 mg (LOMOTIL) 2.5-0.025 mg per tablet Take 1 tablet by mouth 4 (four) times daily as needed for Diarrhea.      ferrous sulfate (SLOW RELEASE IRON) 142 mg (45 mg iron) TbSR Take 1 tablet by mouth Daily.      hydrOXYzine HCL (ATARAX) 25 MG tablet Take 1 tablet (25 mg total) by mouth 3 (three) times daily as needed for Itching. 90 tablet 3    LIDOcaine-prilocaine (EMLA) cream Apply topically once as needed (to port access).      multivitamin with minerals tablet Take 1 tablet by mouth once daily.      nitroGLYCERIN (NITROSTAT) 0.4 MG SL tablet Place 1 tablet (0.4 mg total) under the tongue every 5 (five) minutes as needed for Chest pain. 25 tablet 0    UNABLE TO FIND Take 1 capsule by mouth once daily. medication name: Colon health 80 billion      zinc gluconate 50 mg tablet Take 50 mg by mouth once daily.       No current facility-administered medications for this visit.     Facility-Administered Medications Ordered in Other Visits   Medication Dose Route Frequency Provider Last Rate Last Admin    diphenhydrAMINE injection 50 mg  50 mg Intravenous Once PRN Jackson Jimenez MD        EPINEPHrine (EPIPEN) 0.3 mg/0.3 mL pen injection 0.3 mg  0.3 mg Intramuscular Once PRN Jackson Jimenez MD        heparin, porcine (PF) 100 unit/mL injection flush 500 Units  500 Units Intravenous PRN  "Jackson Jimenez MD   500 Units at 03/12/24 1055    hydrocortisone sodium succinate injection 100 mg  100 mg Intravenous Once PRN Jackson Jimenez MD        sodium chloride 0.9% 250 mL flush bag   Intravenous PRN Jackson Jimenez MD 25 mL/hr at 03/12/24 0950 New Bag at 03/12/24 0950       Review of Systems   Constitutional:  Negative for appetite change, chills, diaphoresis, fatigue, fever and unexpected weight change.   HENT:  Positive for voice change. Negative for mouth sores.    Eyes:  Negative for visual disturbance.   Respiratory:  Positive for cough and shortness of breath.    Cardiovascular:  Negative for chest pain and palpitations.   Gastrointestinal:  Negative for abdominal pain, constipation, diarrhea, nausea and vomiting.   Genitourinary:  Negative for frequency.   Musculoskeletal:  Negative for back pain.   Skin:  Negative for color change and rash.   Neurological:  Negative for headaches.   Hematological:  Negative for adenopathy. Does not bruise/bleed easily.   Psychiatric/Behavioral:  The patient is not nervous/anxious.        ECOG Performance Status: 1   Objective:      Vitals:   Vitals:    03/12/24 0904   BP: 116/70   BP Location: Right arm   Patient Position: Sitting   BP Method: Medium (Manual)   Pulse: (!) 53   Resp: 14   Temp: 97 °F (36.1 °C)   TempSrc: Temporal   SpO2: 98%   Weight: 66.3 kg (146 lb 2.6 oz)   Height: 5' 9" (1.753 m)                     Physical Exam  Constitutional:       General: He is not in acute distress.     Appearance: He is well-developed. He is not diaphoretic.   HENT:      Head: Normocephalic and atraumatic.   Cardiovascular:      Rate and Rhythm: Normal rate and regular rhythm.      Heart sounds: Normal heart sounds. No murmur heard.     No friction rub. No gallop.   Pulmonary:      Effort: Pulmonary effort is normal. No respiratory distress.      Breath sounds: Normal breath sounds. No wheezing or rales.   Chest:      Chest wall: No tenderness.   Abdominal:      " General: Bowel sounds are normal. There is no distension.      Palpations: Abdomen is soft. There is no mass.      Tenderness: There is no abdominal tenderness. There is no rebound.      Comments: Ileal conduit on RLQ with clear urine visualized   Musculoskeletal:      Cervical back: Normal range of motion.   Lymphadenopathy:      Cervical: No cervical adenopathy.      Upper Body:      Right upper body: No supraclavicular or axillary adenopathy.      Left upper body: No supraclavicular or axillary adenopathy.   Skin:     General: Skin is warm and dry.      Findings: No erythema or rash.   Neurological:      Mental Status: He is alert and oriented to person, place, and time.   Psychiatric:         Behavior: Behavior normal.         Laboratory Data:  Lab Visit on 03/11/2024   Component Date Value Ref Range Status    WBC 03/11/2024 6.06  3.90 - 12.70 K/uL Final    RBC 03/11/2024 3.69 (L)  4.60 - 6.20 M/uL Final    Hemoglobin 03/11/2024 11.2 (L)  14.0 - 18.0 g/dL Final    Hematocrit 03/11/2024 32.6 (L)  40.0 - 54.0 % Final    MCV 03/11/2024 88  82 - 98 fL Final    MCH 03/11/2024 30.4  27.0 - 31.0 pg Final    MCHC 03/11/2024 34.4  32.0 - 36.0 g/dL Final    RDW 03/11/2024 12.0  11.5 - 14.5 % Final    Platelets 03/11/2024 187  150 - 450 K/uL Final    MPV 03/11/2024 8.4 (L)  9.2 - 12.9 fL Final    Immature Granulocytes 03/11/2024 0.2  0.0 - 0.5 % Final    Gran # (ANC) 03/11/2024 2.8  1.8 - 7.7 K/uL Final    Immature Grans (Abs) 03/11/2024 0.01  0.00 - 0.04 K/uL Final    Comment: Mild elevation in immature granulocytes is non specific and   can be seen in a variety of conditions including stress response,   acute inflammation, trauma and pregnancy. Correlation with other   laboratory and clinical findings is essential.      Lymph # 03/11/2024 1.7  1.0 - 4.8 K/uL Final    Mono # 03/11/2024 0.7  0.3 - 1.0 K/uL Final    Eos # 03/11/2024 0.8 (H)  0.0 - 0.5 K/uL Final    Baso # 03/11/2024 0.07  0.00 - 0.20 K/uL Final    nRBC  03/11/2024 0  0 /100 WBC Final    Gran % 03/11/2024 45.6  38.0 - 73.0 % Final    Lymph % 03/11/2024 28.5  18.0 - 48.0 % Final    Mono % 03/11/2024 12.0  4.0 - 15.0 % Final    Eosinophil % 03/11/2024 12.5 (H)  0.0 - 8.0 % Final    Basophil % 03/11/2024 1.2  0.0 - 1.9 % Final    Differential Method 03/11/2024 Automated   Final    Sodium 03/11/2024 139  136 - 145 mmol/L Final    Potassium 03/11/2024 4.5  3.5 - 5.1 mmol/L Final    Chloride 03/11/2024 109  95 - 110 mmol/L Final    CO2 03/11/2024 21 (L)  23 - 29 mmol/L Final    Glucose 03/11/2024 91  70 - 110 mg/dL Final    BUN 03/11/2024 34 (H)  8 - 23 mg/dL Final    Creatinine 03/11/2024 1.8 (H)  0.5 - 1.4 mg/dL Final    Calcium 03/11/2024 9.7  8.7 - 10.5 mg/dL Final    Total Protein 03/11/2024 6.7  6.0 - 8.4 g/dL Final    Albumin 03/11/2024 3.6  3.5 - 5.2 g/dL Final    Total Bilirubin 03/11/2024 0.4  0.1 - 1.0 mg/dL Final    Comment: For infants and newborns, interpretation of results should be based  on gestational age, weight and in agreement with clinical  observations.    Premature Infant recommended reference ranges:  Up to 24 hours.............<8.0 mg/dL  Up to 48 hours............<12.0 mg/dL  3-5 days..................<15.0 mg/dL  6-29 days.................<15.0 mg/dL      Alkaline Phosphatase 03/11/2024 79  55 - 135 U/L Final    AST 03/11/2024 25  10 - 40 U/L Final    ALT 03/11/2024 24  10 - 44 U/L Final    eGFR 03/11/2024 39 (A)  >60 mL/min/1.73 m^2 Final    Anion Gap 03/11/2024 9  8 - 16 mmol/L Final    TSH 03/11/2024 <0.010 (L)  0.400 - 4.000 uIU/mL Final    Free T4 03/11/2024 1.69 (H)  0.71 - 1.51 ng/dL Final   Hospital Outpatient Visit on 03/11/2024   Component Date Value Ref Range Status    85% Max Predicted HR 03/11/2024 122   Final    Max Predicted HR 03/11/2024 144   Final    [facility] CV CPX PATIENT IS MALE 03/11/2024 1.0   Final    [facility] CV CPX PATIENT IS FEMALE 03/11/2024 0.0   Final    HR at rest 03/11/2024 58  bpm Final    Systolic blood pressure  03/11/2024 132  mmHg Final    Diastolic blood pressure 03/11/2024 64  mmHg Final    RPP 03/11/2024 7,656   Final    Peak HR 03/11/2024 84.0  bpm Final    Peak Systolic BP 03/11/2024 132  mmHg Final    Peak Diatolic BP 03/11/2024 64  mmHg Final    Peak RPP 03/11/2024 11,088   Final    % Max HR Achieved 03/11/2024 58   Final    1 Minute Recovery HR 03/11/2024 80  bpm Final    dose 03/11/2024 0.4  mg Final    Pharm Time 03/11/2024 909  min Final    Nuc Rest EF 03/11/2024 74   Final         Imaging:     PET/CT 3/01/24  Head/neck:     No significant abnormal hypermetabolic foci are identified within the head and neck. No lymphadenopathy is present.     Chest:     A hypermetabolic subpleural 1.3 cm posterior right lower lobe nodule has decreased markedly in size and has become essentially non metabolic. The nodule underwent prior biopsy which yielded benign results. On image 148 the nodule now measures 0.8 cm compared to 1.3 cm previously. No new hypermetabolic pulmonary nodules, lymphadenopathy, pleural effusion, or pericardial effusion are present.     Abdomen/pelvis:     There are postoperative changes of cystectomy, prostatectomy, ureteral ileal conduit with right lower quadrant urostomy. There are well position bilateral ureteral stents and there is no hydronephrosis. No significant abnormal hypermetabolic foci are identified in the abdomen and pelvis. No lymphadenopathy is present. The adrenal glands are normal. An inferior vena cava filter appears in satisfactory position.     Skeleton:     No significant abnormal hypermetabolic foci are identified within the skeleton. There are no findings to suggest osseous metastatic disease.       Assessment:       1. Malignant neoplasm of overlapping sites of bladder    2. Other specified disorders involving the immune mechanism, not elsewhere classified    3. Aneurysm of right popliteal artery    4. Lung nodule    5. Normocytic anemia    6. Hypertension, unspecified type     7. Other primary thrombophilia    8. Ureteral stent present    9. Coronary artery disease, unspecified vessel or lesion type, unspecified whether angina present, unspecified whether native or transplanted heart    10. Immunodeficiency due to drug therapy               Plan:     Bladder Cancer - pt with stage IIIB bladder cancer pT3N2 s/p neoadjuvant chemo followed by radical cystoprostatectomy 8/15/23  -PT received one dose of Nivolumab on 9/18/23  -PET/CT suggestive of a RLL pulmonary nodule  -Biopsy on 12/08/23 negative for malignancy  -Pt to proceed with cycle 5 of Nivolumab, 4th cycle in our clinic  -Scans show no clear evidence of metastatic or recurrent disease but does show a decreasing RLL nodule previously biopsied and benign  -Visit today included increased complexity associated with the care of the episodic problem (immunotherapy) addressed and managing the longitudinal care of the patient due to the serious and/or complex managed problem(s) Nivolumab    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  -No sign of infection  -Will monitor    CAD - pt with prior bypass surgery  -Cardiac stress test on 3/11/24 was abnormal with mild intensity, small size, reversible perfusion abnormality consistent with ischemia in the basal to mid inferolateral walls  -Pt to follow up with cardiologist    Pulmonary nodule - see above    Prostate Cancer - patient with acinar adenocarcinoma of the prostate grade group 1 (Weems score 3+3=6) in 1 2 5% of prostate tissue pT2N0  -PSA 10/31/23 0.02ng/mL    Ureteral Stents - pt missed his appt for stent exchange 1/02/24 and is to reschedule the procedure with Dr Ghosh  -Stents exchanged on 1/23/24    Anorexia - on marinol  -Will monitor    Anemia - Hemoglobin 11.2g/dL on 3/11/24  -Will monitor     HTN - pt not on meds  -Stable  -Will monitor    Thrombophilia - pt with prior PE  -Pt subsequently developed a right perinephric bleed requiring embolization   -Patient with  IVC filter in place  -Pt following with Dr Claire  -Vascular surgery to determine if he could be started on Eliquis  -Will monitor    Right popliteal aneurysm - pt to see vascular surgery    Increased Creatinine - creatinine up to 1.8 on 3/11/24  -Pt following with nephrology    Route Chart for Scheduling    Med Onc Chart Routing      Follow up with physician 4 weeks. Pt can proceed with treatment.  Pt needs a CBC, CMP and TSH in 4 weeks with an appt with [facility] day before treatment.   Follow up with [facility]    Infusion scheduling note    Injection scheduling note    Labs    Imaging    Pharmacy appointment    Other referrals              Treatment Plan Information   OP NIVOLUMAB 480 MG Q4W   Jackson Jimenez MD   Upcoming Treatment Dates - OP NIVOLUMAB 480 MG Q4W    4/8/2024       Chemotherapy       nivolumab 480 mg in sodium chloride 0.9% 98 mL infusion  5/6/2024       Chemotherapy       nivolumab 480 mg in sodium chloride 0.9% 98 mL infusion  6/3/2024       Chemotherapy       nivolumab 480 mg in sodium chloride 0.9% 98 mL infusion  7/1/2024       Chemotherapy       nivolumab 480 mg in sodium chloride 0.9% 98 mL infusion      Jackson Jimenez MD  [facility]  Hematology and Oncology  [facility]   [address] [place] [address]   O: [phone]  F: [phone]

## 2024-03-12 ENCOUNTER — INFUSION (OUTPATIENT)
Dept: INFUSION THERAPY | Facility: HOSPITAL | Age: 74
End: 2024-03-12
Attending: INTERNAL MEDICINE
Payer: MEDICARE

## 2024-03-12 ENCOUNTER — OFFICE VISIT (OUTPATIENT)
Dept: HEMATOLOGY/ONCOLOGY | Facility: CLINIC | Age: 74
End: 2024-03-12
Payer: MEDICARE

## 2024-03-12 VITALS
SYSTOLIC BLOOD PRESSURE: 143 MMHG | RESPIRATION RATE: 14 BRPM | HEIGHT: 69 IN | OXYGEN SATURATION: 98 % | BODY MASS INDEX: 21.65 KG/M2 | TEMPERATURE: 97 F | DIASTOLIC BLOOD PRESSURE: 76 MMHG | HEART RATE: 63 BPM | WEIGHT: 146.19 LBS

## 2024-03-12 VITALS
BODY MASS INDEX: 21.65 KG/M2 | TEMPERATURE: 97 F | HEART RATE: 53 BPM | WEIGHT: 146.19 LBS | RESPIRATION RATE: 14 BRPM | DIASTOLIC BLOOD PRESSURE: 70 MMHG | HEIGHT: 69 IN | SYSTOLIC BLOOD PRESSURE: 116 MMHG | OXYGEN SATURATION: 98 %

## 2024-03-12 DIAGNOSIS — I25.10 CORONARY ARTERY DISEASE, UNSPECIFIED VESSEL OR LESION TYPE, UNSPECIFIED WHETHER ANGINA PRESENT, UNSPECIFIED WHETHER NATIVE OR TRANSPLANTED HEART: ICD-10-CM

## 2024-03-12 DIAGNOSIS — R91.1 LUNG NODULE: ICD-10-CM

## 2024-03-12 DIAGNOSIS — Z96.0 URETERAL STENT PRESENT: ICD-10-CM

## 2024-03-12 DIAGNOSIS — D68.59 OTHER PRIMARY THROMBOPHILIA: ICD-10-CM

## 2024-03-12 DIAGNOSIS — C67.8 MALIGNANT NEOPLASM OF OVERLAPPING SITES OF BLADDER: Primary | ICD-10-CM

## 2024-03-12 DIAGNOSIS — D64.9 NORMOCYTIC ANEMIA: ICD-10-CM

## 2024-03-12 DIAGNOSIS — Z79.899 IMMUNODEFICIENCY DUE TO DRUG THERAPY: ICD-10-CM

## 2024-03-12 DIAGNOSIS — I10 HYPERTENSION, UNSPECIFIED TYPE: ICD-10-CM

## 2024-03-12 DIAGNOSIS — D89.89 OTHER SPECIFIED DISORDERS INVOLVING THE IMMUNE MECHANISM, NOT ELSEWHERE CLASSIFIED: ICD-10-CM

## 2024-03-12 DIAGNOSIS — D84.821 IMMUNODEFICIENCY DUE TO DRUG THERAPY: ICD-10-CM

## 2024-03-12 DIAGNOSIS — I72.4 ANEURYSM OF RIGHT POPLITEAL ARTERY: ICD-10-CM

## 2024-03-12 PROCEDURE — 1126F AMNT PAIN NOTED NONE PRSNT: CPT | Mod: CPTII,S$GLB,, | Performed by: INTERNAL MEDICINE

## 2024-03-12 PROCEDURE — 99999 PR PBB SHADOW E&M-EST. PATIENT-LVL IV: CPT | Mod: PBBFAC,,, | Performed by: INTERNAL MEDICINE

## 2024-03-12 PROCEDURE — 1101F PT FALLS ASSESS-DOCD LE1/YR: CPT | Mod: CPTII,S$GLB,, | Performed by: INTERNAL MEDICINE

## 2024-03-12 PROCEDURE — 96413 CHEMO IV INFUSION 1 HR: CPT | Mod: PN

## 2024-03-12 PROCEDURE — 1157F ADVNC CARE PLAN IN RCRD: CPT | Mod: CPTII,S$GLB,, | Performed by: INTERNAL MEDICINE

## 2024-03-12 PROCEDURE — 25000003 PHARM REV CODE 250: Mod: PN | Performed by: INTERNAL MEDICINE

## 2024-03-12 PROCEDURE — 3074F SYST BP LT 130 MM HG: CPT | Mod: CPTII,S$GLB,, | Performed by: INTERNAL MEDICINE

## 2024-03-12 PROCEDURE — G2211 COMPLEX E/M VISIT ADD ON: HCPCS | Mod: S$GLB,,, | Performed by: INTERNAL MEDICINE

## 2024-03-12 PROCEDURE — 63600175 PHARM REV CODE 636 W HCPCS: Mod: PN | Performed by: INTERNAL MEDICINE

## 2024-03-12 PROCEDURE — 3288F FALL RISK ASSESSMENT DOCD: CPT | Mod: CPTII,S$GLB,, | Performed by: INTERNAL MEDICINE

## 2024-03-12 PROCEDURE — 3078F DIAST BP <80 MM HG: CPT | Mod: CPTII,S$GLB,, | Performed by: INTERNAL MEDICINE

## 2024-03-12 PROCEDURE — 99215 OFFICE O/P EST HI 40 MIN: CPT | Mod: S$GLB,,, | Performed by: INTERNAL MEDICINE

## 2024-03-12 RX ORDER — EPINEPHRINE 0.3 MG/.3ML
0.3 INJECTION SUBCUTANEOUS ONCE AS NEEDED
Status: DISCONTINUED | OUTPATIENT
Start: 2024-03-12 | End: 2024-03-12 | Stop reason: HOSPADM

## 2024-03-12 RX ORDER — DIPHENHYDRAMINE HYDROCHLORIDE 50 MG/ML
50 INJECTION INTRAMUSCULAR; INTRAVENOUS ONCE AS NEEDED
Status: DISCONTINUED | OUTPATIENT
Start: 2024-03-12 | End: 2024-03-12 | Stop reason: HOSPADM

## 2024-03-12 RX ORDER — HEPARIN 100 UNIT/ML
500 SYRINGE INTRAVENOUS
Status: CANCELLED | OUTPATIENT
Start: 2024-03-12

## 2024-03-12 RX ORDER — SODIUM CHLORIDE 0.9 % (FLUSH) 0.9 %
10 SYRINGE (ML) INJECTION
Status: CANCELLED | OUTPATIENT
Start: 2024-03-12

## 2024-03-12 RX ORDER — PROCHLORPERAZINE EDISYLATE 5 MG/ML
5 INJECTION INTRAMUSCULAR; INTRAVENOUS ONCE AS NEEDED
Status: CANCELLED
Start: 2024-03-12

## 2024-03-12 RX ORDER — DIPHENHYDRAMINE HYDROCHLORIDE 50 MG/ML
50 INJECTION INTRAMUSCULAR; INTRAVENOUS ONCE AS NEEDED
Status: CANCELLED | OUTPATIENT
Start: 2024-03-12

## 2024-03-12 RX ORDER — EPINEPHRINE 0.3 MG/.3ML
0.3 INJECTION SUBCUTANEOUS ONCE AS NEEDED
Status: CANCELLED | OUTPATIENT
Start: 2024-03-12

## 2024-03-12 RX ORDER — HEPARIN 100 UNIT/ML
500 SYRINGE INTRAVENOUS
Status: DISCONTINUED | OUTPATIENT
Start: 2024-03-12 | End: 2024-03-12 | Stop reason: HOSPADM

## 2024-03-12 RX ADMIN — Medication 500 UNITS: at 10:03

## 2024-03-12 RX ADMIN — SODIUM CHLORIDE 480 MG: 9 INJECTION, SOLUTION INTRAVENOUS at 10:03

## 2024-03-12 RX ADMIN — SODIUM CHLORIDE: 9 INJECTION, SOLUTION INTRAVENOUS at 09:03

## 2024-03-12 NOTE — PLAN OF CARE
Pt arrived to clinic today for Opdivo infusion and tolerated well. No changes throughout therapy. Pt aware of follow up appointments and side effects of drugs. Discharged to home. NAD.

## 2024-03-15 ENCOUNTER — HOSPITAL ENCOUNTER (OUTPATIENT)
Dept: CARDIOLOGY | Facility: HOSPITAL | Age: 74
Discharge: HOME | End: 2024-03-15
Attending: INTERNAL MEDICINE
Payer: MEDICARE

## 2024-03-15 VITALS — BODY MASS INDEX: 21.62 KG/M2 | HEIGHT: 69 IN | WEIGHT: 146 LBS

## 2024-03-15 DIAGNOSIS — R09.89 BRUIT OF RIGHT CAROTID ARTERY: ICD-10-CM

## 2024-03-15 LAB
ASCENDING AORTA: 3.51 CM
AV INDEX (PROSTH): 0.76
AV MEAN GRADIENT: 3 MMHG
AV PEAK GRADIENT: 6 MMHG
AV REGURGITATION PRESSURE HALF TIME: 445.72 MS
AV VALVE AREA BY VELOCITY RATIO: 2.8 CM²
AV VALVE AREA: 3.1 CM²
AV VELOCITY RATIO: 0.69
BSA FOR ECHO PROCEDURE: 1.8 M2
CV ECHO LV RWT: 0.4 CM
DOP CALC AO PEAK VEL: 1.18 M/S
DOP CALC AO VTI: 26.7 CM
DOP CALC LVOT AREA: 4.1 CM2
DOP CALC LVOT DIAMETER: 2.28 CM
DOP CALC LVOT PEAK VEL: 0.81 M/S
DOP CALC LVOT STROKE VOLUME: 82.84 CM3
DOP CALCLVOT PEAK VEL VTI: 20.3 CM
E WAVE DECELERATION TIME: 254.28 MSEC
E/A RATIO: 1.01
E/E' RATIO: 8.75 M/S
ECHO LV POSTERIOR WALL: 0.89 CM (ref 0.6–1.1)
EJECTION FRACTION: 50 %
FRACTIONAL SHORTENING: 26 % (ref 28–44)
INTERVENTRICULAR SEPTUM: 0.89 CM (ref 0.6–1.1)
LEFT ARM DIASTOLIC BLOOD PRESSURE: 70 MMHG
LEFT ARM SYSTOLIC BLOOD PRESSURE: 116 MMHG
LEFT ATRIUM SIZE: 4.03 CM
LEFT ATRIUM VOLUME INDEX MOD: 33.5 ML/M2
LEFT ATRIUM VOLUME MOD: 60.6 CM3
LEFT CBA DIAS: 26 CM/S
LEFT CBA SYS: 148 CM/S
LEFT CCA DIST DIAS: 20 CM/S
LEFT CCA DIST SYS: 89 CM/S
LEFT CCA MID DIAS: 20 CM/S
LEFT CCA MID SYS: 96 CM/S
LEFT CCA PROX DIAS: 18 CM/S
LEFT CCA PROX SYS: 96 CM/S
LEFT ECA DIAS: 16 CM/S
LEFT ECA SYS: 83 CM/S
LEFT ICA DIST DIAS: 32 CM/S
LEFT ICA DIST SYS: 121 CM/S
LEFT ICA MID DIAS: 38 CM/S
LEFT ICA MID SYS: 137 CM/S
LEFT ICA PROX DIAS: 14 CM/S
LEFT ICA PROX SYS: 89 CM/S
LEFT INTERNAL DIMENSION IN SYSTOLE: 3.31 CM (ref 2.1–4)
LEFT VENTRICLE DIASTOLIC VOLUME INDEX: 49.71 ML/M2
LEFT VENTRICLE DIASTOLIC VOLUME: 89.97 ML
LEFT VENTRICLE MASS INDEX: 71 G/M2
LEFT VENTRICLE SYSTOLIC VOLUME INDEX: 24.5 ML/M2
LEFT VENTRICLE SYSTOLIC VOLUME: 44.3 ML
LEFT VENTRICULAR INTERNAL DIMENSION IN DIASTOLE: 4.45 CM (ref 3.5–6)
LEFT VENTRICULAR MASS: 128.46 G
LEFT VERTEBRAL DIAS: 12 CM/S
LEFT VERTEBRAL SYS: 55 CM/S
LV LATERAL E/E' RATIO: 5.83 M/S
LV SEPTAL E/E' RATIO: 17.5 M/S
LVOT MG: 1.34 MMHG
LVOT MV: 0.53 CM/S
MV PEAK A VEL: 0.69 M/S
MV PEAK E VEL: 0.7 M/S
OHS CV CAROTID RIGHT ICA EDV HIGHEST: 36
OHS CV CAROTID ULTRASOUND LEFT ICA/CCA RATIO: 1.54
OHS CV CAROTID ULTRASOUND RIGHT ICA/CCA RATIO: 1.14
OHS CV PV CAROTID LEFT HIGHEST CCA: 96
OHS CV PV CAROTID LEFT HIGHEST ICA: 137
OHS CV PV CAROTID RIGHT HIGHEST CCA: 98
OHS CV PV CAROTID RIGHT HIGHEST ICA: 112
OHS CV US CAROTID LEFT HIGHEST EDV: 38
PISA AR MAX VEL: 4.29 M/S
PISA TR MAX VEL: 2.08 M/S
PULM VEIN S/D RATIO: 1.1
PV PEAK D VEL: 0.61 M/S
PV PEAK S VEL: 0.67 M/S
RA PRESSURE ESTIMATED: 3 MMHG
RIGHT ARM DIASTOLIC BLOOD PRESSURE: 70 MMHG
RIGHT ARM SYSTOLIC BLOOD PRESSURE: 116 MMHG
RIGHT CBA DIAS: 23 CM/S
RIGHT CBA SYS: 83 CM/S
RIGHT CCA DIST DIAS: 21 CM/S
RIGHT CCA DIST SYS: 98 CM/S
RIGHT CCA MID DIAS: 20 CM/S
RIGHT CCA MID SYS: 93 CM/S
RIGHT CCA PROX DIAS: 21 CM/S
RIGHT CCA PROX SYS: 91 CM/S
RIGHT ECA DIAS: 10 CM/S
RIGHT ECA SYS: 101 CM/S
RIGHT ICA DIST DIAS: 23 CM/S
RIGHT ICA DIST SYS: 69 CM/S
RIGHT ICA MID DIAS: 36 CM/S
RIGHT ICA MID SYS: 112 CM/S
RIGHT ICA PROX DIAS: 12 CM/S
RIGHT ICA PROX SYS: 57 CM/S
RIGHT VENTRICULAR END-DIASTOLIC DIMENSION: 3.9 CM
RIGHT VENTRICULAR LENGTH IN DIASTOLE (APICAL 4-CHAMBER VIEW): 6.85 CM
RIGHT VERTEBRAL DIAS: 14 CM/S
RIGHT VERTEBRAL SYS: 61 CM/S
RV MID DIAMA: 2.28 CM
RV TB RVSP: 5 MMHG
RV TISSUE DOPPLER FREE WALL SYSTOLIC VELOCITY 1 (APICAL 4 CHAMBER VIEW): 6.72 CM/S
SINUS: 3.57 CM
STJ: 3.34 CM
TDI LATERAL: 0.12 M/S
TDI SEPTAL: 0.04 M/S
TDI: 0.08 M/S
TR MAX PG: 17 MMHG
TRICUSPID ANNULAR PLANE SYSTOLIC EXCURSION: 1.18 CM
TV REST PULMONARY ARTERY PRESSURE: 20 MMHG
Z-SCORE OF LEFT VENTRICULAR DIMENSION IN END DIASTOLE: -1.18
Z-SCORE OF LEFT VENTRICULAR DIMENSION IN END SYSTOLE: 0.53

## 2024-03-15 PROCEDURE — 93306 TTE W/DOPPLER COMPLETE: CPT | Mod: 26,,, | Performed by: INTERNAL MEDICINE

## 2024-03-15 PROCEDURE — 93306 TTE W/DOPPLER COMPLETE: CPT | Mod: PO

## 2024-03-15 PROCEDURE — 93880 EXTRACRANIAL BILAT STUDY: CPT | Mod: PO

## 2024-03-15 PROCEDURE — 93880 EXTRACRANIAL BILAT STUDY: CPT | Mod: 26,,, | Performed by: INTERNAL MEDICINE

## 2024-03-18 ENCOUNTER — PATIENT MESSAGE (OUTPATIENT)
Dept: CARDIOLOGY | Facility: CLINIC | Age: 74
End: 2024-03-18
Payer: MEDICARE

## 2024-03-21 ENCOUNTER — OFFICE VISIT (OUTPATIENT)
Dept: VASCULAR SURGERY | Facility: CLINIC | Age: 74
End: 2024-03-21
Payer: MEDICARE

## 2024-03-21 VITALS
WEIGHT: 147.38 LBS | HEART RATE: 62 BPM | HEIGHT: 69 IN | BODY MASS INDEX: 21.83 KG/M2 | DIASTOLIC BLOOD PRESSURE: 72 MMHG | SYSTOLIC BLOOD PRESSURE: 120 MMHG

## 2024-03-21 DIAGNOSIS — I72.4 ANEURYSM OF RIGHT POPLITEAL ARTERY: Primary | ICD-10-CM

## 2024-03-21 DIAGNOSIS — I72.4 POPLITEAL ANEURYSM: Primary | ICD-10-CM

## 2024-03-21 PROCEDURE — 1101F PT FALLS ASSESS-DOCD LE1/YR: CPT | Mod: CPTII,S$GLB,, | Performed by: THORACIC SURGERY (CARDIOTHORACIC VASCULAR SURGERY)

## 2024-03-21 PROCEDURE — 3288F FALL RISK ASSESSMENT DOCD: CPT | Mod: CPTII,S$GLB,, | Performed by: THORACIC SURGERY (CARDIOTHORACIC VASCULAR SURGERY)

## 2024-03-21 PROCEDURE — 3078F DIAST BP <80 MM HG: CPT | Mod: CPTII,S$GLB,, | Performed by: THORACIC SURGERY (CARDIOTHORACIC VASCULAR SURGERY)

## 2024-03-21 PROCEDURE — 1157F ADVNC CARE PLAN IN RCRD: CPT | Mod: CPTII,S$GLB,, | Performed by: THORACIC SURGERY (CARDIOTHORACIC VASCULAR SURGERY)

## 2024-03-21 PROCEDURE — 99204 OFFICE O/P NEW MOD 45 MIN: CPT | Mod: S$GLB,,, | Performed by: THORACIC SURGERY (CARDIOTHORACIC VASCULAR SURGERY)

## 2024-03-21 PROCEDURE — 1126F AMNT PAIN NOTED NONE PRSNT: CPT | Mod: CPTII,S$GLB,, | Performed by: THORACIC SURGERY (CARDIOTHORACIC VASCULAR SURGERY)

## 2024-03-21 PROCEDURE — 99999 PR PBB SHADOW E&M-EST. PATIENT-LVL III: CPT | Mod: PBBFAC,,, | Performed by: THORACIC SURGERY (CARDIOTHORACIC VASCULAR SURGERY)

## 2024-03-21 PROCEDURE — 1160F RVW MEDS BY RX/DR IN RCRD: CPT | Mod: CPTII,S$GLB,, | Performed by: THORACIC SURGERY (CARDIOTHORACIC VASCULAR SURGERY)

## 2024-03-21 PROCEDURE — 3074F SYST BP LT 130 MM HG: CPT | Mod: CPTII,S$GLB,, | Performed by: THORACIC SURGERY (CARDIOTHORACIC VASCULAR SURGERY)

## 2024-03-21 PROCEDURE — 1159F MED LIST DOCD IN RCRD: CPT | Mod: CPTII,S$GLB,, | Performed by: THORACIC SURGERY (CARDIOTHORACIC VASCULAR SURGERY)

## 2024-03-21 NOTE — PROGRESS NOTES
This patient was referred to the office with a popliteal artery aneurysm.  It is relatively small.  He has not symptomatic at the present time.  Apparently he had thrombosis of 1 of the arteries in the right lower extremity within the last couple of years required thrombolytic therapy.  The details are not available.  He is quite asymptomatic at this point without any claudication or pain in the extremity.  He has history of multiple surgeries including cystectomy for bladder cancer.  He is presently taking chemotherapy as well.  Medicines are part of the epic record  On exam vital signs are stable.  Pupils are equal and round reactive to light.  Neck is supple.  Chest is equal breath sounds.  Heart is in a regular rate and rhythm.  Abdomen is benign.  Perfusion to the legs and feet seems to be satisfactory.  He has palpable pedal pulses.  He has no appreciable popliteal aneurysm on exam.    Recommendation is for arterial duplex of the extremities to see the present size of the aneurysm and whether any intervention is warranted.

## 2024-03-27 ENCOUNTER — TELEPHONE (OUTPATIENT)
Dept: FAMILY MEDICINE | Facility: CLINIC | Age: 74
End: 2024-03-27
Payer: MEDICARE

## 2024-03-27 NOTE — TELEPHONE ENCOUNTER
----- Message from Shani Davidsylviamin sent at 3/27/2024 10:19 AM CDT -----  Type: Needs Medical Advice  Who Called:  pt wife  Symptoms (please be specific):    How long has patient had these symptoms:    Pharmacy name and phone #:    Best Call Back Number: [phone]    Additional Information: pt is returning your call for   Please call back to advise

## 2024-03-27 NOTE — TELEPHONE ENCOUNTER
Pt wife states pt has been having SOB . Pt has brought this to the attention of Dr Braxton, he said that pt is ok , mild abnormalities . Pt went back to old cardiologist in [place] and he reviewed the testing done here at [facility]. He recommended angiogram , sched for April 5th . Pt cardiologist ( Dr Sung) wants pt seen for abnormal thyroid labs that were done by Dr Jimenez . Appt sched 4/3.--lp

## 2024-03-27 NOTE — TELEPHONE ENCOUNTER
----- Message from Huma Lainez, Patient Care Assistant sent at 3/27/2024  9:42 AM CDT -----  Regarding: appointment  Contact: pt's wife Courtney  Type:  Sooner Appointment Request    Caller is requesting a sooner appointment.  Caller declined first available appointment listed below.  Caller will not accept being placed on the waitlist and is requesting a message be sent to doctor.    Name of Caller:  pt's wife Courtney    When is the first available appointment?  2024    Symptoms:  f/u     Would the patient rather a call back or a response via [facility]? Callback     Best Call Back Number:  [phone] (home)     Additional Information:  please call pt's wife Courtney to advise. Thanks!

## 2024-04-01 ENCOUNTER — HOSPITAL ENCOUNTER (OUTPATIENT)
Dept: RADIOLOGY | Facility: HOSPITAL | Age: 74
Discharge: HOME | End: 2024-04-01
Attending: THORACIC SURGERY (CARDIOTHORACIC VASCULAR SURGERY)
Payer: MEDICARE

## 2024-04-01 ENCOUNTER — PATIENT MESSAGE (OUTPATIENT)
Dept: VASCULAR SURGERY | Facility: CLINIC | Age: 74
End: 2024-04-01
Payer: MEDICARE

## 2024-04-01 DIAGNOSIS — I72.4 POPLITEAL ANEURYSM: ICD-10-CM

## 2024-04-01 PROCEDURE — 93922 UPR/L XTREMITY ART 2 LEVELS: CPT | Mod: 26,,, | Performed by: RADIOLOGY

## 2024-04-01 PROCEDURE — 93925 LOWER EXTREMITY STUDY: CPT | Mod: 26,,, | Performed by: RADIOLOGY

## 2024-04-01 PROCEDURE — 93922 UPR/L XTREMITY ART 2 LEVELS: CPT | Mod: TC,PO

## 2024-04-01 NOTE — TELEPHONE ENCOUNTER
Forwarded message to Mrs Caldwell and advised per Dr Claire, The US is ok. No need for concern. Can have his study with cardiologist. I advised that Dr Claire wants to repeat another US in 6-8 months, and they will receive a recall when it's time to schedule the test. Understanding verbalized on all instructions given.

## 2024-04-02 ENCOUNTER — INFUSION (OUTPATIENT)
Dept: INFUSION THERAPY | Facility: HOSPITAL | Age: 74
End: 2024-04-02
Attending: INTERNAL MEDICINE
Payer: MEDICARE

## 2024-04-02 ENCOUNTER — DOCUMENTATION ONLY (OUTPATIENT)
Dept: INFUSION THERAPY | Facility: HOSPITAL | Age: 74
End: 2024-04-02

## 2024-04-02 ENCOUNTER — OFFICE VISIT (OUTPATIENT)
Dept: HEMATOLOGY/ONCOLOGY | Facility: CLINIC | Age: 74
End: 2024-04-02
Payer: MEDICARE

## 2024-04-02 VITALS
HEART RATE: 78 BPM | HEIGHT: 69 IN | TEMPERATURE: 98 F | BODY MASS INDEX: 21.87 KG/M2 | DIASTOLIC BLOOD PRESSURE: 62 MMHG | SYSTOLIC BLOOD PRESSURE: 92 MMHG | WEIGHT: 147.69 LBS | RESPIRATION RATE: 18 BRPM

## 2024-04-02 VITALS
TEMPERATURE: 98 F | OXYGEN SATURATION: 96 % | BODY MASS INDEX: 21.81 KG/M2 | SYSTOLIC BLOOD PRESSURE: 90 MMHG | HEART RATE: 85 BPM | WEIGHT: 147.69 LBS | DIASTOLIC BLOOD PRESSURE: 63 MMHG

## 2024-04-02 DIAGNOSIS — I25.10 CORONARY ARTERY DISEASE, UNSPECIFIED VESSEL OR LESION TYPE, UNSPECIFIED WHETHER ANGINA PRESENT, UNSPECIFIED WHETHER NATIVE OR TRANSPLANTED HEART: ICD-10-CM

## 2024-04-02 DIAGNOSIS — I10 HYPERTENSION, UNSPECIFIED TYPE: ICD-10-CM

## 2024-04-02 DIAGNOSIS — C67.8 MALIGNANT NEOPLASM OF OVERLAPPING SITES OF BLADDER: Primary | ICD-10-CM

## 2024-04-02 DIAGNOSIS — R91.1 LUNG NODULE: ICD-10-CM

## 2024-04-02 DIAGNOSIS — D84.821 IMMUNODEFICIENCY DUE TO DRUG THERAPY: ICD-10-CM

## 2024-04-02 DIAGNOSIS — D68.59 OTHER PRIMARY THROMBOPHILIA: ICD-10-CM

## 2024-04-02 DIAGNOSIS — Z96.0 URETERAL STENT PRESENT: ICD-10-CM

## 2024-04-02 DIAGNOSIS — D64.9 NORMOCYTIC ANEMIA: ICD-10-CM

## 2024-04-02 DIAGNOSIS — D89.89 OTHER SPECIFIED DISORDERS INVOLVING THE IMMUNE MECHANISM, NOT ELSEWHERE CLASSIFIED: ICD-10-CM

## 2024-04-02 DIAGNOSIS — I72.4 ANEURYSM OF RIGHT POPLITEAL ARTERY: ICD-10-CM

## 2024-04-02 DIAGNOSIS — Z79.899 IMMUNODEFICIENCY DUE TO DRUG THERAPY: ICD-10-CM

## 2024-04-02 PROBLEM — N99.524: Status: ACTIVE | Noted: 2024-04-02

## 2024-04-02 PROCEDURE — 3074F SYST BP LT 130 MM HG: CPT | Mod: CPTII,S$GLB,,

## 2024-04-02 PROCEDURE — 1157F ADVNC CARE PLAN IN RCRD: CPT | Mod: CPTII,S$GLB,,

## 2024-04-02 PROCEDURE — A4216 STERILE WATER/SALINE, 10 ML: HCPCS | Mod: PN | Performed by: STUDENT IN AN ORGANIZED HEALTH CARE EDUCATION/TRAINING PROGRAM

## 2024-04-02 PROCEDURE — 25000003 PHARM REV CODE 250: Mod: PN | Performed by: STUDENT IN AN ORGANIZED HEALTH CARE EDUCATION/TRAINING PROGRAM

## 2024-04-02 PROCEDURE — 63600175 PHARM REV CODE 636 W HCPCS: Mod: PN | Performed by: STUDENT IN AN ORGANIZED HEALTH CARE EDUCATION/TRAINING PROGRAM

## 2024-04-02 PROCEDURE — 1160F RVW MEDS BY RX/DR IN RCRD: CPT | Mod: CPTII,S$GLB,,

## 2024-04-02 PROCEDURE — 96523 IRRIG DRUG DELIVERY DEVICE: CPT | Mod: PN

## 2024-04-02 PROCEDURE — 3288F FALL RISK ASSESSMENT DOCD: CPT | Mod: CPTII,S$GLB,,

## 2024-04-02 PROCEDURE — G2211 COMPLEX E/M VISIT ADD ON: HCPCS | Mod: S$GLB,,,

## 2024-04-02 PROCEDURE — 99999 PR PBB SHADOW E&M-EST. PATIENT-LVL IV: CPT | Mod: PBBFAC,,,

## 2024-04-02 PROCEDURE — 99214 OFFICE O/P EST MOD 30 MIN: CPT | Mod: S$GLB,,,

## 2024-04-02 PROCEDURE — 1101F PT FALLS ASSESS-DOCD LE1/YR: CPT | Mod: CPTII,S$GLB,,

## 2024-04-02 PROCEDURE — 1159F MED LIST DOCD IN RCRD: CPT | Mod: CPTII,S$GLB,,

## 2024-04-02 PROCEDURE — 1126F AMNT PAIN NOTED NONE PRSNT: CPT | Mod: CPTII,S$GLB,,

## 2024-04-02 PROCEDURE — 3078F DIAST BP <80 MM HG: CPT | Mod: CPTII,S$GLB,,

## 2024-04-02 RX ORDER — SODIUM CHLORIDE 0.9 % (FLUSH) 0.9 %
10 SYRINGE (ML) INJECTION
Status: CANCELLED | OUTPATIENT
Start: 2024-04-08

## 2024-04-02 RX ORDER — HEPARIN 100 UNIT/ML
500 SYRINGE INTRAVENOUS
Status: DISCONTINUED | OUTPATIENT
Start: 2024-04-02 | End: 2024-04-02

## 2024-04-02 RX ORDER — PROCHLORPERAZINE EDISYLATE 5 MG/ML
5 INJECTION INTRAMUSCULAR; INTRAVENOUS ONCE AS NEEDED
Status: DISCONTINUED | OUTPATIENT
Start: 2024-04-02 | End: 2024-04-02

## 2024-04-02 RX ORDER — SODIUM CHLORIDE 0.9 % (FLUSH) 0.9 %
10 SYRINGE (ML) INJECTION
Status: DISCONTINUED | OUTPATIENT
Start: 2024-04-02 | End: 2024-04-02

## 2024-04-02 RX ORDER — EPINEPHRINE 0.3 MG/.3ML
0.3 INJECTION SUBCUTANEOUS ONCE AS NEEDED
Status: CANCELLED | OUTPATIENT
Start: 2024-04-09

## 2024-04-02 RX ORDER — HEPARIN 100 UNIT/ML
500 SYRINGE INTRAVENOUS
Status: CANCELLED | OUTPATIENT
Start: 2024-04-08

## 2024-04-02 RX ORDER — PROCHLORPERAZINE EDISYLATE 5 MG/ML
5 INJECTION INTRAMUSCULAR; INTRAVENOUS ONCE AS NEEDED
Status: CANCELLED
Start: 2024-04-08

## 2024-04-02 RX ORDER — DIPHENHYDRAMINE HYDROCHLORIDE 50 MG/ML
50 INJECTION INTRAMUSCULAR; INTRAVENOUS ONCE AS NEEDED
Status: CANCELLED | OUTPATIENT
Start: 2024-04-09

## 2024-04-02 RX ADMIN — Medication 500 UNITS: at 10:04

## 2024-04-02 RX ADMIN — Medication 10 ML: at 10:04

## 2024-04-02 NOTE — PROGRESS NOTES
Oncology Nutrition   Chemotherapy Infusion Visit    Nutrition Follow Up   RD met with pt and pt's spouse, Courtney, at chairside. Pt was unfortunately booked for his infusion a week too soon and is being rescheduled for next Monday. Pt's spouse is concerned with pt's current diet for weight gain and would like to discuss at next infusion. RD agreed.     Wt Readings from Last 10 Encounters:   04/02/24 67 kg (147 lb 11.3 oz)   04/02/24 67 kg (147 lb 11.3 oz)   03/21/24 66.8 kg (147 lb 6 oz)   03/15/24 66.2 kg (146 lb)   03/12/24 66.3 kg (146 lb 2.6 oz)   03/12/24 66.3 kg (146 lb 2.6 oz)   03/11/24 66.2 kg (146 lb)   03/08/24 66.5 kg (146 lb 9.7 oz)   03/05/24 66.1 kg (145 lb 11.6 oz)   02/26/24 66.7 kg (147 lb 0.8 oz)       All other nutrition questions/concerns addressed as appropriate. Will continue to monitor prn throughout treatment.     Yoana Watts, CALIN, LDN  04/02/2024  3:36 PM

## 2024-04-02 NOTE — PROGRESS NOTES
PATIENT: Aren Bey Jr.  MRN: [id number]  DATE: 4/2/2024      Diagnosis:   1. Malignant neoplasm of overlapping sites of bladder    2. Other specified disorders involving the immune mechanism, not elsewhere classified    3. Aneurysm of right popliteal artery    4. Lung nodule    5. Normocytic anemia    6. Hypertension, unspecified type    7. Other primary thrombophilia    8. Ureteral stent present    9. Coronary artery disease, unspecified vessel or lesion type, unspecified whether angina present, unspecified whether native or transplanted heart    10. Immunodeficiency due to drug therapy        Chief Complaint: Follow up (malignant neoplasm of overlapping sites of bladder)      Subjective:    Interval History: Mr. Bey is a 74 y.o. male who returns for follow up for review of labs and clearance for c5 nivolumab. Pt reports intermittent sob with exertion with throat clearing and non productive cough. Denies fever, chills, palpitations, swelling, new lumps, bumps, n/v, constipation, bruising, bleeding.  Angiogram scheduled 4/5/2024 at [facility] in  with Dr. Sung.     Oncologic History:   Oncology History   Malignant neoplasm of overlapping sites of bladder   10/23/2023 Initial Diagnosis    Malignant neoplasm of overlapping sites of bladder     10/31/2023 Cancer Staged    Staging form: Urinary Bladder, AJCC 8th Edition  - Pathologic: Stage IIIB (pT3, pN2, cM0)     12/21/2023 -  Chemotherapy    Treatment Summary   Plan Name: OP NIVOLUMAB 480 MG Q4W  Treatment Goal: Curative  Status: Active  Start Date: 12/21/2023  End Date: 11/18/2024 (Planned)  Provider: Jackson Jimenez MD  Chemotherapy: [No matching medication found in this treatment plan]     Prostate cancer   10/31/2023 Initial Diagnosis    Prostate cancer     10/31/2023 Cancer Staged    Staging form: Prostate, AJCC 8th Edition  - Pathologic: pT2, pN0, cM0         Past Medical History:   Past Medical History:   Diagnosis Date     Aneurysm artery, popliteal     right leg    Bladder cancer     Hypertension     Malignant neoplasm of prostate     Seizures        Past Surgical HIstory:   Past Surgical History:   Procedure Laterality Date    PROSTATECTOMY      REMOVAL-STENT Bilateral 1/23/2024    Procedure: REMOVAL-STENT;  Surgeon: South Ghosh MD;  Location: [facility] OR [address] [place] [address];  Service: Urology;  Laterality: Bilateral;    URETERAL STENT PLACEMENT Bilateral 1/23/2024    Procedure: INSERTION, STENT, URETER;  Surgeon: South Ghosh MD;  Location: [facility] OR [address] [place] [address];  Service: Urology;  Laterality: Bilateral;       Family History:   Family History   Problem Relation Age of Onset    Heart disease Mother     Hypertension Mother        Social History:  reports that he has never smoked. He has never been exposed to tobacco smoke. He has never used smokeless tobacco. He reports that he does not currently use alcohol. He reports that he does not use drugs.    Allergies:  Review of patient's allergies indicates:   Allergen Reactions    Clopidogrel Other (See Comments)     Increased body temperature and redness        Medications:  Current Outpatient Medications   Medication Sig Dispense Refill    acetaminophen (TYLENOL) 500 MG tablet Take 500-1,000 mg by mouth every 6 (six) hours as needed (fever/pain).      ASCORBIC ACID, VITAMIN C, ORAL Take 1 tablet by mouth once daily.      aspirin (ECOTRIN) 81 MG EC tablet Take 1 tablet (81 mg total) by mouth once daily. 90 tablet 3    atorvastatin (LIPITOR) 20 MG tablet Take 2 tablets (40 mg total) by mouth once daily. (Patient taking differently: Take 20 mg by mouth once daily.)      b complex vitamins tablet Take 1 tablet by mouth once daily.      cholecalciferol, vitamin D3, (VITAMIN D3 ORAL) Take 5,000 Units by mouth once daily.      cyanocobalamin, vitamin B-12, 2,500 mcg Lozg       diphenoxylate-atropine 2.5-0.025 mg (LOMOTIL) 2.5-0.025 mg per tablet Take 1 tablet by mouth 4 (four) times daily as needed for  Diarrhea.      ferrous sulfate (SLOW RELEASE IRON) 142 mg (45 mg iron) TbSR Take 1 tablet by mouth Daily.      hydrOXYzine HCL (ATARAX) 25 MG tablet Take 1 tablet (25 mg total) by mouth 3 (three) times daily as needed for Itching. 90 tablet 3    LIDOcaine-prilocaine (EMLA) cream Apply topically once as needed (to port access).      multivitamin with minerals tablet Take 1 tablet by mouth once daily.      nitroGLYCERIN (NITROSTAT) 0.4 MG SL tablet Place 1 tablet (0.4 mg total) under the tongue every 5 (five) minutes as needed for Chest pain. 25 tablet 0    UNABLE TO FIND Take 1 capsule by mouth once daily. medication name: Colon health 80 billion      zinc gluconate 50 mg tablet Take 50 mg by mouth once daily.       No current facility-administered medications for this visit.     Facility-Administered Medications Ordered in Other Visits   Medication Dose Route Frequency Provider Last Rate Last Admin    nivolumab 480 mg in sodium chloride 0.9% 98 mL infusion  480 mg Intravenous 1 time in Clinic/HOD Rocio Stack MD        prochlorperazine injection Soln 5 mg  5 mg Intravenous Once PRN Rocio Stack MD        sodium chloride 0.9% 250 mL flush bag   Intravenous PRN Rocio Stack MD           Review of Systems   Constitutional:  Negative for appetite change and unexpected weight change.   HENT:  Negative for mouth sores.    Eyes:  Negative for visual disturbance.   Respiratory:  Positive for cough and shortness of breath.    Cardiovascular:  Positive for chest pain (Muscle tenderness). Negative for palpitations and leg swelling.   Gastrointestinal:  Negative for abdominal pain and diarrhea.   Genitourinary:  Negative for frequency.   Musculoskeletal:  Negative for back pain.   Skin:  Positive for rash.   Neurological:  Negative for headaches.   Hematological:  Negative for adenopathy.   Psychiatric/Behavioral:  The patient is nervous/anxious.        Objective:      Vitals:   Vitals:    04/02/24 0837   BP:  90/63   BP Location: Right arm   Patient Position: Sitting   BP Method: Medium (Automatic)   Pulse: 85   Temp: 98 °F (36.7 °C)   TempSrc: Temporal   SpO2: 96%   Weight: 67 kg (147 lb 11.3 oz)     BMI: Body mass index is 21.81 kg/m².    Physical Exam  Constitutional:       Appearance: Normal appearance.   HENT:      Head: Normocephalic.      Nose: Nose normal.      Mouth/Throat:      Mouth: Mucous membranes are moist.      Pharynx: Oropharynx is clear.   Eyes:      Pupils: Pupils are equal, round, and reactive to light.   Cardiovascular:      Rate and Rhythm: Normal rate and regular rhythm.      Heart sounds: Normal heart sounds.   Pulmonary:      Effort: Pulmonary effort is normal.      Breath sounds: Normal breath sounds.   Abdominal:      General: Bowel sounds are normal.   Musculoskeletal:         General: Normal range of motion.      Cervical back: Normal range of motion.   Skin:     General: Skin is warm and dry.   Neurological:      Mental Status: He is oriented to person, place, and time.   Psychiatric:         Mood and Affect: Mood normal.         Behavior: Behavior normal.         Laboratory Data:  Recent Results (from the past 24 hour(s))   CMP    Collection Time: 04/01/24 11:15 AM   Result Value Ref Range    Sodium 135 (L) 136 - 145 mmol/L    Potassium 4.2 3.5 - 5.1 mmol/L    Chloride 104 95 - 110 mmol/L    CO2 23 23 - 29 mmol/L    Glucose 106 70 - 110 mg/dL    BUN 26 (H) 8 - 23 mg/dL    Creatinine 1.5 (H) 0.5 - 1.4 mg/dL    Calcium 10.1 8.7 - 10.5 mg/dL    Total Protein 6.1 6.0 - 8.4 g/dL    Albumin 3.3 (L) 3.5 - 5.2 g/dL    Total Bilirubin 0.4 0.1 - 1.0 mg/dL    Alkaline Phosphatase 90 55 - 135 U/L    AST 30 10 - 40 U/L    ALT 27 10 - 44 U/L    eGFR 48.0 (A) >60 mL/min/1.73 m^2    Anion Gap 8 8 - 16 mmol/L   TSH    Collection Time: 04/01/24 11:15 AM   Result Value Ref Range    TSH <0.010 (L) 0.400 - 4.000 uIU/mL   T4, Free    Collection Time: 04/01/24 11:15 AM   Result Value Ref Range    Free T4 1.32  0.71 - 1.51 ng/dL   CBC w/ DIFF    Collection Time: 04/01/24 11:16 AM   Result Value Ref Range    WBC 5.59 3.90 - 12.70 K/uL    RBC 3.90 (L) 4.60 - 6.20 M/uL    Hemoglobin 11.6 (L) 14.0 - 18.0 g/dL    Hematocrit 33.8 (L) 40.0 - 54.0 %    MCV 87 82 - 98 fL    MCH 29.7 27.0 - 31.0 pg    MCHC 34.3 32.0 - 36.0 g/dL    RDW 12.2 11.5 - 14.5 %    Platelets 191 150 - 450 K/uL    MPV 9.0 (L) 9.2 - 12.9 fL    Immature Granulocytes 0.2 0.0 - 0.5 %    Gran # (ANC) 1.8 1.8 - 7.7 K/uL    Immature Grans (Abs) 0.01 0.00 - 0.04 K/uL    Lymph # 1.9 1.0 - 4.8 K/uL    Mono # 0.7 0.3 - 1.0 K/uL    Eos # 1.1 (H) 0.0 - 0.5 K/uL    Baso # 0.07 0.00 - 0.20 K/uL    nRBC 0 0 /100 WBC    Gran % 31.6 (L) 38.0 - 73.0 %    Lymph % 34.7 18.0 - 48.0 %    Mono % 13.1 4.0 - 15.0 %    Eosinophil % 19.1 (H) 0.0 - 8.0 %    Basophil % 1.3 0.0 - 1.9 %    Differential Method Automated           Imaging:   US ARTERIAL LOWER EXTREMITY BILAT WITH ERIC (XPD)  4/1/2024    CLINICAL HISTORY:  Aneurysm of artery of lower extremity     TECHNIQUE:  Ankle-brachial indices were calculated following measurement of the brachial systolic as well as the posterior tibial and dorsalis pedis systolic pressures.  Additionally, real-time ultrasound as well as Doppler spectral waveform analysis and color flow imaging was performed of the large vessels of both lower extremities.     COMPARISON:  None.     FINDINGS:  Right lower extremity: Waveforms are triphasic.  Mild atherosclerotic changes of the arteries of the right lower extremity with short segment occlusion or high-grade stenosis at the level of the proximal anterior tibial artery.     Ankle brachial index is  1.4     CFA 83 cm/sec     Prox SFA 88 cm/sec     Mid SFA 75 cm/sec     Dist SFA 63 cm/sec     Pop A 84 cm/sec.  Fusiform mild aneurysmal dilatation of the popliteal artery measuring 1.8 x 1.7 cm and 2.8 cm in length with mural wall thrombus.  No hemodynamically significant stenosis identified.     BARRY 50 cm/sec.   There is some reversal of flow seen.      cm/sec     DPA 60 cm/sec.  Reversal of flow is seen.     Left lower extremity: Waveforms are triphasic.  There is mild atherosclerosis throughout the arteries in the right lower extremity.     Ankle brachial index is 1.3     CFA 88 cm/sec     Prox SFA 72 cm/sec     Mid SFA 82 cm/sec     Dist SFA 65 cm/sec     Pop A 52 cm/sec     BARRY 86 cm/sec      cm/sec      cm/sec     Impression:     1. Fusiform aneurysmal dilatation of the popliteal artery on the right measuring 1.7 x 1.8 cm with mural wall thrombus.     2. ERIC = 1.4 on the right and 1.3 on the left.     3.  Short segment focal occlusion or high-grade stenosis of the proximal right anterior tibial artery.  No other hemodynamically significant stenosis identified.  ______________________________________________________  CTA CHEST NON CORONARY (PE STUDIES)  3/6/2024     CLINICAL HISTORY:  Shortness of breath     TECHNIQUE:  Axial CT images were obtained through the chest after IV administration of 80 cc Omnipaque 350 contrast.  MIP, coronal and sagittal reconstructions submitted and interpreted.  Automated exposure control utilized.     COMPARISON:  PET-CT 03/01/2024     FINDINGS:  No filling defects are in the main pulmonary artery or segmental branches.     Redemonstrated scattered areas of pleuroparenchymal scarring.  Scattered pulmonary cysts are unchanged.  No focal consolidation, pneumothorax, or pneumothorax.     Heart is normal size.  Redemonstrated right-sided chest port with tip in the SVC.  Coronary artery calcifications noted.  Dilated ascending thoracic aorta measuring 4.1 cm in diameter.  Central airways are clear.     No pathologically enlarged mediastinal or axillary lymph nodes.     Postsurgical changes of sternotomy.  Unchanged compression fracture deformity of the T11 vertebral body.  No acute osseous findings.     Splenic granulomas are noted.  Nonspecific bilateral perinephric  stranding.  Partially visualized infrarenal IVC filter.  Partially visualize double-J ureteral stents are noted.     Impression:     No evidence of pulmonary artery thrombo embolic disease.  Other chronic findings as above.  ____________________________________________________  NM PET CT FDG SKULL BASE TO MID THIGH  3/1/2024     CLINICAL HISTORY:  Bladder cancer, invasive, assess treatment response;  Malignant neoplasm of overlapping sites of bladder     74-year-old male with a history of bladder cancer.  The patient is status post TURBT followed by radical cystectomy and prostatectomy.  Patient is status post chemotherapy and immunotherapy.     TECHNIQUE:  Following IV injection of 10.16 mCi F-18 FDG, 3D PET imaging was performed from the skull base to the mid thighs.  A noncontrast non breath hold CT was obtained in conjunction with the PET scan for attenuation correction and anatomic correlation.  PET-CT fusion images were generated.     COMPARISON:  11/16/2023     FINDINGS:  Head/neck:     No significant abnormal hypermetabolic foci are identified within the head and neck.  No lymphadenopathy is present.     Chest:     A hypermetabolic subpleural 1.3 cm posterior right lower lobe nodule has decreased markedly in size and has become essentially non metabolic.  The nodule underwent prior biopsy which yielded benign results.  On image 148 the nodule now measures 0.8 cm compared to 1.3 cm previously.  No new hypermetabolic pulmonary nodules, lymphadenopathy, pleural effusion, or pericardial effusion are present.     Abdomen/pelvis:     There are postoperative changes of cystectomy, prostatectomy, ureteral ileal conduit with right lower quadrant urostomy.  There are well position bilateral ureteral stents and there is no hydronephrosis.  No significant abnormal hypermetabolic foci are identified in the abdomen and pelvis.  No lymphadenopathy is present.  The adrenal glands are normal.  An inferior vena cava filter  appears in satisfactory position.     Skeleton:     No significant abnormal hypermetabolic foci are identified within the skeleton.  There are no findings to suggest osseous metastatic disease.     Impression:     1. Status post cystectomy and prostatectomy with no evidence of residual or recurrent neoplasm and no evidence of metastatic disease.  2. Interval shrinkage of a posterior right lower lobe pulmonary nodule which has become essentially non metabolic and is biopsy-proven benign.  3. Well-positioned functioning bilateral ureteral stents.      Assessment:       1. Malignant neoplasm of overlapping sites of bladder    2. Other specified disorders involving the immune mechanism, not elsewhere classified    3. Aneurysm of right popliteal artery    4. Lung nodule    5. Normocytic anemia    6. Hypertension, unspecified type    7. Other primary thrombophilia    8. Ureteral stent present    9. Coronary artery disease, unspecified vessel or lesion type, unspecified whether angina present, unspecified whether native or transplanted heart    10. Immunodeficiency due to drug therapy           Plan:   Bladder Cancer   - pt with stage IIIB bladder cancer pT3N2 s/p neoadjuvant chemo followed by radical cystoprostatectomy 8/15/23  -PT received one dose of Nivolumab on 9/18/23  -PET/CT suggestive of a RLL pulmonary nodule  -Biopsy on 12/08/23 negative for malignancy  -Pt to proceed with cycle 5 of Nivolumab, 4th cycle in our clinic  -Scans show no clear evidence of metastatic or recurrent disease but does show a decreasing RLL nodule previously biopsied and benign  -Visit today included increased complexity associated with the care of the episodic problem (immunotherapy) addressed and managing the longitudinal care of the patient due to the serious and/or complex managed problem(s) Nivolumab     Immunodeficiency due to drug  - due to cancer treatment  -No sign of infection  -Will monitor     CAD   - pt with prior bypass  surgery  -Cardiac stress test on 3/11/24 was abnormal with mild intensity, small size, reversible perfusion abnormality consistent with ischemia in the basal to mid inferolateral walls  -Pt to follow up with cardiologist     Pulmonary nodule   - see above     Prostate Cancer   - patient with acinar adenocarcinoma of the prostate grade group 1 (Sarah score 3+3=6) in 1 2 5% of prostate tissue pT2N0  -PSA 10/31/23 0.02ng/mL     Ureteral Stents   - pt missed his appt for stent exchange 1/02/24 and is to reschedule the procedure with Dr Ghosh  -Stents exchanged on 1/23/24     Anorexia   - on marinol  -Will monitor     Anemia   - Hemoglobin 11.6g/dL on 4/2/24  -Will monitor      HTN   - pt not on meds  -Stable  -Will monitor     Thrombophilia   - pt with prior PE  -Pt subsequently developed a right perinephric bleed requiring embolization   -Patient with IVC filter in place  -Pt following with Dr Claire  -Vascular surgery to determine if he could be started on Eliquis  -Will monitor     Right popliteal aneurysm   - pt saw vascular surgery 3/21/2024  -arterial US 4/1/2024     Increased Creatinine - creatinine improved to 1.5 on 4/1/2024 from 1.8 on 3/11/24  -Pt following with nephrology      Med Onc Chart Routing      Follow up with physician 4 weeks. With Dr. Jimenez and labs the day before. Pt request late morning visit. Treatment on 4/30/2024   Follow up with [facility]    Infusion scheduling note   Ok to proceed with treatment today.   Injection scheduling note    Labs    Imaging    Pharmacy appointment    Other referrals                  Plan was discussed with the patient at length, and he verbalized understanding. Aren was given an opportunity to ask questions that were answered to his satisfaction, and he was advised to call in the interval if any problems or questions arise.    Assessment/Plan reviewed and approved by Dr Jimenez     30 minutes were spent in coordination of patient's care, record review and  counseling.    LARRY Virk, FNP-C  Hematology & Oncology

## 2024-04-03 ENCOUNTER — PATIENT MESSAGE (OUTPATIENT)
Dept: FAMILY MEDICINE | Facility: CLINIC | Age: 74
End: 2024-04-03

## 2024-04-03 ENCOUNTER — OFFICE VISIT (OUTPATIENT)
Dept: FAMILY MEDICINE | Facility: CLINIC | Age: 74
End: 2024-04-03
Payer: MEDICARE

## 2024-04-03 VITALS
RESPIRATION RATE: 20 BRPM | HEART RATE: 82 BPM | DIASTOLIC BLOOD PRESSURE: 52 MMHG | BODY MASS INDEX: 21.94 KG/M2 | HEIGHT: 69 IN | OXYGEN SATURATION: 99 % | WEIGHT: 148.13 LBS | SYSTOLIC BLOOD PRESSURE: 94 MMHG

## 2024-04-03 DIAGNOSIS — Z79.899 DRUG THERAPY: ICD-10-CM

## 2024-04-03 DIAGNOSIS — E78.5 DYSLIPIDEMIA: ICD-10-CM

## 2024-04-03 DIAGNOSIS — Z12.5 PROSTATE CANCER SCREENING: ICD-10-CM

## 2024-04-03 DIAGNOSIS — N18.9 CHRONIC KIDNEY DISEASE, UNSPECIFIED CKD STAGE: ICD-10-CM

## 2024-04-03 DIAGNOSIS — Z23 IMMUNIZATION DUE: Primary | ICD-10-CM

## 2024-04-03 DIAGNOSIS — I25.709 CORONARY ARTERY DISEASE INVOLVING CORONARY BYPASS GRAFT OF NATIVE HEART WITH ANGINA PECTORIS: ICD-10-CM

## 2024-04-03 DIAGNOSIS — Z86.711 HISTORY OF PULMONARY EMBOLUS (PE): ICD-10-CM

## 2024-04-03 DIAGNOSIS — C67.9 MALIGNANT NEOPLASM OF URINARY BLADDER, UNSPECIFIED SITE: ICD-10-CM

## 2024-04-03 DIAGNOSIS — I10 ESSENTIAL HYPERTENSION: ICD-10-CM

## 2024-04-03 DIAGNOSIS — C61 PROSTATE CANCER: ICD-10-CM

## 2024-04-03 DIAGNOSIS — N39.0 RECURRENT UTI: ICD-10-CM

## 2024-04-03 DIAGNOSIS — I95.9 HYPOTENSION, UNSPECIFIED HYPOTENSION TYPE: ICD-10-CM

## 2024-04-03 DIAGNOSIS — Z87.898 HISTORY OF SEIZURE: ICD-10-CM

## 2024-04-03 DIAGNOSIS — Z93.6 PRESENCE OF UROSTOMY: ICD-10-CM

## 2024-04-03 PROCEDURE — 99214 OFFICE O/P EST MOD 30 MIN: CPT | Mod: S$GLB,,, | Performed by: FAMILY MEDICINE

## 2024-04-03 PROCEDURE — G2211 COMPLEX E/M VISIT ADD ON: HCPCS | Mod: S$GLB,,, | Performed by: FAMILY MEDICINE

## 2024-04-03 PROCEDURE — 99999 PR PBB SHADOW E&M-EST. PATIENT-LVL V: CPT | Mod: PBBFAC,,, | Performed by: FAMILY MEDICINE

## 2024-04-03 PROCEDURE — 1157F ADVNC CARE PLAN IN RCRD: CPT | Mod: CPTII,S$GLB,, | Performed by: FAMILY MEDICINE

## 2024-04-03 PROCEDURE — 1126F AMNT PAIN NOTED NONE PRSNT: CPT | Mod: CPTII,S$GLB,, | Performed by: FAMILY MEDICINE

## 2024-04-03 PROCEDURE — 3078F DIAST BP <80 MM HG: CPT | Mod: CPTII,S$GLB,, | Performed by: FAMILY MEDICINE

## 2024-04-03 PROCEDURE — 3074F SYST BP LT 130 MM HG: CPT | Mod: CPTII,S$GLB,, | Performed by: FAMILY MEDICINE

## 2024-04-03 PROCEDURE — 3288F FALL RISK ASSESSMENT DOCD: CPT | Mod: CPTII,S$GLB,, | Performed by: FAMILY MEDICINE

## 2024-04-03 PROCEDURE — 1101F PT FALLS ASSESS-DOCD LE1/YR: CPT | Mod: CPTII,S$GLB,, | Performed by: FAMILY MEDICINE

## 2024-04-03 PROCEDURE — 1159F MED LIST DOCD IN RCRD: CPT | Mod: CPTII,S$GLB,, | Performed by: FAMILY MEDICINE

## 2024-04-03 RX ORDER — MULTIVITAMIN
1 TABLET ORAL EVERY MORNING
COMMUNITY
End: 2024-04-04 | Stop reason: CLARIF

## 2024-04-03 NOTE — PROGRESS NOTES
THIS DOCUMENT WAS MADE IN PART WITH VOICE RECOGNITION SOFTWARE.  OCCASIONALLY THIS SOFTWARE WILL MISINTERPRET WORDS OR PHRASES.    Assessment and Plan:    1. Immunization due        2. Drug therapy        3. Prostate cancer screening        4. History of seizure        5. Essential hypertension        6. Dyslipidemia        7. Coronary artery disease involving coronary bypass graft of native heart with angina pectoris        8. Presence of urostomy        9. Prostate cancer        10. Malignant neoplasm of urinary bladder, unspecified site        11. History of pulmonary embolus (PE)        12. Recurrent UTI        13. Chronic kidney disease, unspecified CKD stage        14. Hypotension, unspecified hypotension type  ACTH    Cortisol, 8AM    Luteinizing hormone    Follicle stimulating hormone    Prolactin    Insulin-like growth factor    Comprehensive metabolic panel        His current immunotherapy has multiple potential side effects that he is experiencing symptoms of   Noted that it could affect the pituitary gland axis, will check pituitary hormones as above, suspicious of hypercortisolism.  This may be contributing to the hypotension he is experiencing   In the meantime, will reach out to Cardiology to see his thoughts on initiating midodrine after his heart catheterization later this week.    EConsult to Endocrinology after getting the pituitary hormone results back further guidance    Also suspect it may be contributing to his cough though we will try to address potential GERD symptoms by taking Pepcid 40 mg b.i.d..  Patient wife suspect she may have some of this at home and will let me know, I will send prescription if not.      ______________________________________________________________________  Subjective:    Chief Complaint:  Chief Complaint   Patient presents with    Hypothyroidism     Angiogram Friday, fatigue,bad cough since January.    Shortness of Breath        HPI:  Aren is a 74 y.o. year old      Depressed TSH  Previous TSH levels undetectable   2/3 previous T4 levels mildly elevated   This is a common side effect of his current immunotherapy that he is undergoing for his cancer diagnosis    Cardiovascular   Had bilateral carotid ultrasound, nuclear stress test, echocardiogram since prior visit, establish with new cardiology as well   Negative test for the most part with the exception of mild bilateral carotid artery disease noted  Currently on statin drug   In the meantime, patient has been experiencing significant drop in blood pressures, fatigue and occasional dizziness with      Cough / SOB  Duration : 3 months   Position changes.  Worse at night when lying down   Has occasional hiccup after eating as well denies any fransico heartburn symptoms at this time  Previous echocardiogram unremarkable  Previous lung imaging done recently was unremarkable as well for interstitial lung disease         Chronic kidney disease , 3a  Baseline creatinine 1.4-1.5  Nephrology : MD Aron      History of Hypertension  Rx-none   Normotensive in clinic      Dyslipidemia + CAD + carotid artery stenosis  S/p CABG x 4 (11/2021)  Rx-atorvastatin 20 mg  Prev rx : metoprolol (bradycardia)   Cardiology : MD Pineda --->  MD Fox ----> MD Pineda   US : bilateral carotid- BILATERAL DISEASE NOTED LESS THAN 50%  NST-March 2024-negative     Right Popliteal artery aneurysm   Vasc: MD Lalo --->  MD Alethea   F/u yearly with US : stable x 3 year      History of pulmonary embolism   August 2023   No current anticoagulation     Bladder cancer -invasive urothelial carcinoma  Oncology - MD Tony   Urology- MD Augie   Previous treatment-chemotherapy, surgery  Currently undergoing immunotherapy  GOAL: CURE  Status post radical cystectomy, prostatectomy August 2023 with urostomy placement  Positive lymphovascular invasion, soft tissue invasion  Status post ureteral stenting     Recurrent UTI  Following the urostomy /  bladder resection      History C diff colitis   November 2023  Currently taking probiotics      Prostate cancer   Oncology- MD Tony  S/p prostatectomy      Mild anemia, normocytic   Taking Iron / B complex     Generalized Pruritus   Rx : Prn hydroxyzine, Kenalog, Cerave   Prev rx : Zyrtec (ineffective)    Hyperthyroidism  Previous TSH undetectable, T4 normal     Past Medical History:  Past Medical History:   Diagnosis Date    Aneurysm artery, popliteal     right leg    Bladder cancer     Hypertension     Malignant neoplasm of prostate     Seizures        Past Surgical History:  Past Surgical History:   Procedure Laterality Date    PROSTATECTOMY      REMOVAL-STENT Bilateral 1/23/2024    Procedure: REMOVAL-STENT;  Surgeon: South Ghosh MD;  Location: [facility] OR [address] [place] [address];  Service: Urology;  Laterality: Bilateral;    URETERAL STENT PLACEMENT Bilateral 1/23/2024    Procedure: INSERTION, STENT, URETER;  Surgeon: South Ghosh MD;  Location: [facility] OR [address] [place] [address];  Service: Urology;  Laterality: Bilateral;       Family History:  Family History   Problem Relation Age of Onset    Heart disease Mother     Hypertension Mother        Social History:  Social History     Socioeconomic History    Marital status:    Occupational History    Occupation: retired   Tobacco Use    Smoking status: Never     Passive exposure: Never    Smokeless tobacco: Never   Substance and Sexual Activity    Alcohol use: Not Currently    Drug use: Never    Sexual activity: Yes     Partners: Female     Social Determinants of Health     Financial Resource Strain: Patient Declined (11/10/2023)    Overall Financial Resource Strain (CARDIA)     Difficulty of Paying Living Expenses: Patient declined   Food Insecurity: No Food Insecurity (11/18/2023)    Hunger Vital Sign     Worried About Running Out of Food in the Last Year: Never true     Ran Out of Food in the Last Year: Never true   Transportation Needs: No Transportation Needs (11/18/2023)    PRAPARE -  Transportation     Lack of Transportation (Medical): No     Lack of Transportation (Non-Medical): No   Physical Activity: Insufficiently Active (11/10/2023)    Exercise Vital Sign     Days of Exercise per Week: 7 days     Minutes of Exercise per Session: 10 min   Stress: No Stress Concern Present (11/10/2023)    [employer] - Occupational Stress Questionnaire     Feeling of Stress : Not at all   Social Connections: Unknown (11/10/2023)    Social Connection and Isolation Panel [NHANES]     Frequency of Communication with Friends and Family: More than three times a week     Frequency of Social Gatherings with Friends and Family: Patient declined     Active Member of Clubs or Organizations: Patient declined     Attends Club or Organization Meetings: Patient declined     Marital Status:    Housing Stability: Low Risk  (11/18/2023)    Housing Stability Vital Sign     Unable to Pay for Housing in the Last Year: No     Number of Places Lived in the Last Year: 2     Unstable Housing in the Last Year: No       Medications:  Current Outpatient Medications on File Prior to Visit   Medication Sig Dispense Refill    acetaminophen (TYLENOL) 500 MG tablet Take 500-1,000 mg by mouth every 6 (six) hours as needed (fever/pain).      ASCORBIC ACID, VITAMIN C, ORAL Take 1 tablet by mouth once daily.      aspirin (ECOTRIN) 81 MG EC tablet Take 1 tablet (81 mg total) by mouth once daily. 90 tablet 3    atorvastatin (LIPITOR) 20 MG tablet Take 2 tablets (40 mg total) by mouth once daily. (Patient taking differently: Take 20 mg by mouth once daily.)      b complex vitamins tablet Take 1 tablet by mouth once daily.      cholecalciferol, vitamin D3, (VITAMIN D3 ORAL) Take 5,000 Units by mouth once daily.      cyanocobalamin, vitamin B-12, 2,500 mcg Lozg       diphenoxylate-atropine 2.5-0.025 mg (LOMOTIL) 2.5-0.025 mg per tablet Take 1 tablet by mouth 4 (four) times daily as needed for Diarrhea.       "ferrous sulfate (SLOW RELEASE IRON) 142 mg (45 mg iron) TbSR Take 1 tablet by mouth Daily.      hydrOXYzine HCL (ATARAX) 25 MG tablet Take 1 tablet (25 mg total) by mouth 3 (three) times daily as needed for Itching. 90 tablet 3    LIDOcaine-prilocaine (EMLA) cream Apply topically once as needed (to port access).      multivitamin with folic acid 400 mcg Tab Take 1 tablet by mouth every morning.      multivitamin with minerals tablet Take 1 tablet by mouth once daily.      nitroGLYCERIN (NITROSTAT) 0.4 MG SL tablet Place 1 tablet (0.4 mg total) under the tongue every 5 (five) minutes as needed for Chest pain. 25 tablet 0    UNABLE TO FIND Take 1 capsule by mouth once daily. medication name: Colon health 80 billion      zinc gluconate 50 mg tablet Take 50 mg by mouth once daily.       No current facility-administered medications on file prior to visit.       Allergies:  Clopidogrel    Immunizations:  Immunization History   Administered Date(s) Administered    Influenza (FLUAD) - Trivalent - Adjuvanted - PF (65+) 11/12/2019    Influenza - High Dose - PF (65 years and older) 10/21/2015    Influenza - Quadrivalent - High Dose - PF (65 years and older) 09/24/2020, 09/15/2021    Influenza - Quadrivalent - PF *Preferred* (6 months and older) 10/11/2022    Influenza A (H5N1) 2013 Monovalent 11/27/2018    Pneumococcal Conjugate - 13 Valent 03/17/2020    Pneumococcal Polysaccharide - 23 Valent 03/26/2021    Tdap 01/01/2016    Zoster 10/21/2015    Zoster Recombinant 10/12/2020, 01/29/2021       Review of Systems:  Review of Systems   All other systems reviewed and are negative.      Objective:    Vitals:  Vitals:    04/03/24 1051   BP: (!) 94/52   Pulse: 82   Resp: 20   SpO2: 99%   Weight: 67.2 kg (148 lb 2.4 oz)   Height: 5' 9" (1.753 m)   PainSc: 0-No pain       Physical Exam  Vitals reviewed.   Constitutional:       General: He is not in acute distress.  HENT:      Head: Normocephalic and atraumatic.   Eyes:      Pupils: " Pupils are equal, round, and reactive to light.   Cardiovascular:      Rate and Rhythm: Normal rate and regular rhythm.      Heart sounds: No murmur heard.     No friction rub.   Pulmonary:      Effort: Pulmonary effort is normal.      Breath sounds: Normal breath sounds.   Abdominal:      General: Bowel sounds are normal. There is no distension.      Palpations: Abdomen is soft.      Tenderness: There is no abdominal tenderness.   Musculoskeletal:      Cervical back: Neck supple.   Skin:     General: Skin is warm and dry.      Findings: No rash.   Psychiatric:         Behavior: Behavior normal.           Javon Shelley MD  Family Medicine

## 2024-04-04 ENCOUNTER — TELEPHONE (OUTPATIENT)
Dept: INFECTIOUS DISEASES | Facility: CLINIC | Age: 74
End: 2024-04-04
Payer: MEDICARE

## 2024-04-04 PROBLEM — I95.9 HYPOTENSION: Chronic | Status: ACTIVE | Noted: 2024-04-04

## 2024-04-04 PROBLEM — K52.1 DIARRHEA DUE TO DRUG: Status: ACTIVE | Noted: 2024-04-04

## 2024-04-04 PROBLEM — Z86.718 HISTORY OF THROMBOEMBOLISM: Status: ACTIVE | Noted: 2024-04-04

## 2024-04-04 PROBLEM — R19.7 DIARRHEA: Status: ACTIVE | Noted: 2024-04-04

## 2024-04-04 PROBLEM — I82.90 VTE (VENOUS THROMBOEMBOLISM): Status: ACTIVE | Noted: 2024-04-04

## 2024-04-04 PROBLEM — C67.9 MALIGNANT NEOPLASM OF URINARY BLADDER: Chronic | Status: ACTIVE | Noted: 2023-10-23

## 2024-04-04 PROBLEM — I95.9 HYPOTENSION: Status: ACTIVE | Noted: 2024-04-04

## 2024-04-04 PROBLEM — R11.2 NAUSEA AND VOMITING: Status: ACTIVE | Noted: 2024-04-04

## 2024-04-04 PROBLEM — Z71.89 ACP (ADVANCE CARE PLANNING): Status: ACTIVE | Noted: 2021-10-21

## 2024-04-04 PROBLEM — R63.0 POOR APPETITE: Status: ACTIVE | Noted: 2024-04-04

## 2024-04-04 RX ORDER — FAMOTIDINE 40 MG/1
40 TABLET, FILM COATED ORAL 2 TIMES DAILY
Qty: 60 TABLET | Refills: 11 | Status: SHIPPED | OUTPATIENT
Start: 2024-04-04 | End: 2024-04-04 | Stop reason: CLARIF

## 2024-04-04 NOTE — TELEPHONE ENCOUNTER
----- Message from Jason Richardson sent at 4/4/2024  1:45 PM CDT -----  Regarding: advice  Contact: Courtney pearson  Type: Needs Medical Advice  Who Called:  Courtney wife   Symptoms (please be specific):    How long has patient had these symptoms:    Pharmacy name and phone #:    Best Call Back Number: [phone]  Additional Information: wife would like for the provider to do a consult with the pt will he is admitted to the hospital. He is located at [facility] ED in room 7. I haven't been admitted yet due to not having a room available. Please call to advice . Thanks

## 2024-04-04 NOTE — TELEPHONE ENCOUNTER
Returned call to pt's wife, informed that patient is indeed on the ID referral list, meaning ID has been consulted for her . Wife voiced understanding and much appreciation

## 2024-04-05 PROBLEM — D49.4 NEOPLASM, BLADDER: Status: ACTIVE | Noted: 2023-10-23

## 2024-04-05 PROBLEM — A49.8 CLOSTRIDIUM DIFFICILE INFECTION: Status: ACTIVE | Noted: 2024-04-05

## 2024-04-06 PROBLEM — E27.40 ADRENAL INSUFFICIENCY: Status: ACTIVE | Noted: 2024-04-04

## 2024-04-07 PROBLEM — I95.9 HYPOTENSION: Status: ACTIVE | Noted: 2024-04-07

## 2024-04-07 PROBLEM — R53.1 GENERAL WEAKNESS: Status: ACTIVE | Noted: 2024-04-07

## 2024-04-07 PROBLEM — E05.90 HYPERTHYROIDISM: Status: ACTIVE | Noted: 2024-04-07

## 2024-04-07 PROBLEM — R41.0 ACUTE DELIRIUM: Status: ACTIVE | Noted: 2024-04-07

## 2024-04-07 PROBLEM — E27.3 ADRENAL INSUFFICIENCY DUE TO CANCER THERAPY: Status: ACTIVE | Noted: 2024-04-04

## 2024-04-08 ENCOUNTER — TELEPHONE (OUTPATIENT)
Dept: HEMATOLOGY/ONCOLOGY | Facility: CLINIC | Age: 74
End: 2024-04-08
Payer: MEDICARE

## 2024-04-08 NOTE — TELEPHONE ENCOUNTER
Notified infusion to cx  treatment for tomorrow as in hospital. Patient has specific questions for Dr Jimenez and his in on hospital rounds this week so advised wife he will be there around 2-5 this afternoon.

## 2024-04-08 NOTE — TELEPHONE ENCOUNTER
----- Message from Karyn Hinds MA sent at 4/8/2024  9:06 AM CDT -----  Regarding: RE: vinny  Can you take of this he wants to speak with a nurse    ----- Message -----  From: Kelly Hidalgo, Patient Care Assistant  Sent: 4/8/2024   9:03 AM CDT  To: [facility]  Pool  Subject: vinny                                            Type: Needs Medical Advice  Who Called:  joe  Al Call Back Number: [phone]    Additional Information: joe states he needs to rescheduled his 4/9 labs and provider appointment , and has some questions for nurse ,please call to further discuss thank you .

## 2024-04-11 ENCOUNTER — TELEPHONE (OUTPATIENT)
Dept: NEUROLOGY | Facility: CLINIC | Age: 74
End: 2024-04-11
Payer: MEDICARE

## 2024-04-11 NOTE — TELEPHONE ENCOUNTER
----- Message from Alexander Douglas sent at 4/11/2024 10:22 AM CDT -----  Regarding: Hosp f/u  Contact: NOMAN MORELAND JR. [[id number]]  Type:  Sooner Appointment Request    Caller is requesting a sooner appointment.  .    Name of Caller:  SCHUYLER Cervantes    When is the first available appointment?  None in time    Symptoms:  Hosp f/u 2 week, [facility], DC 4/11, DX Delirium    Would the patient rather a call back or a response via [facility]? Call    Best Call Back Number:  [phone] (home)     Additional Information:  Please call to advise.

## 2024-04-12 ENCOUNTER — TELEPHONE (OUTPATIENT)
Dept: HEMATOLOGY/ONCOLOGY | Facility: CLINIC | Age: 74
End: 2024-04-12
Payer: MEDICARE

## 2024-04-12 ENCOUNTER — TELEPHONE (OUTPATIENT)
Dept: VASCULAR SURGERY | Facility: CLINIC | Age: 74
End: 2024-04-12
Payer: MEDICARE

## 2024-04-12 NOTE — TELEPHONE ENCOUNTER
----- Message from Servando Claire MD sent at 4/1/2024  3:47 PM CDT -----  This is okay.  Repeat in 6-8 months.

## 2024-04-12 NOTE — TELEPHONE ENCOUNTER
----- Message from Karyn Hinds MA sent at 4/11/2024  3:01 PM CDT -----  Regarding: RE: hospital f/u  Is this appt ok for this pt to keep it on 04/30 or is that to far out   ----- Message -----  From: Fabi Aiekn NP  Sent: 4/11/2024  12:18 PM CDT  To: [facility]  Pool  Subject: hospital f/u                                     This patient will dc home today. He is scheduled for 4/30. He may need a sooner appt for hospital f/u. Labs CBC, CMP, TSH. Thank you

## 2024-04-17 DIAGNOSIS — I72.4 ANEURYSM OF RIGHT POPLITEAL ARTERY: ICD-10-CM

## 2024-04-17 DIAGNOSIS — I72.4 POPLITEAL ANEURYSM: Primary | ICD-10-CM

## 2024-04-17 NOTE — TELEPHONE ENCOUNTER
Spoke with   and advised of US results and instructed that a recall has been placed in the chart to repeat the study in 6-8 months, and that they will receive a letter in the mail when it's time to schedule. Understanding verbalized.

## 2024-04-18 ENCOUNTER — OFFICE VISIT (OUTPATIENT)
Dept: NEUROLOGY | Facility: CLINIC | Age: 74
End: 2024-04-18
Payer: MEDICARE

## 2024-04-18 VITALS
DIASTOLIC BLOOD PRESSURE: 69 MMHG | BODY MASS INDEX: 21.37 KG/M2 | SYSTOLIC BLOOD PRESSURE: 112 MMHG | HEART RATE: 62 BPM | WEIGHT: 144.75 LBS

## 2024-04-18 DIAGNOSIS — R41.0 ACUTE DELIRIUM: Primary | ICD-10-CM

## 2024-04-18 PROBLEM — Z87.898 HISTORY OF SEIZURE: Status: RESOLVED | Noted: 2021-09-10 | Resolved: 2024-04-18

## 2024-04-18 PROCEDURE — 99215 OFFICE O/P EST HI 40 MIN: CPT | Mod: S$GLB,,, | Performed by: NURSE PRACTITIONER

## 2024-04-18 PROCEDURE — 1126F AMNT PAIN NOTED NONE PRSNT: CPT | Mod: CPTII,S$GLB,, | Performed by: NURSE PRACTITIONER

## 2024-04-18 PROCEDURE — 1111F DSCHRG MED/CURRENT MED MERGE: CPT | Mod: CPTII,S$GLB,, | Performed by: NURSE PRACTITIONER

## 2024-04-18 PROCEDURE — 3078F DIAST BP <80 MM HG: CPT | Mod: CPTII,S$GLB,, | Performed by: NURSE PRACTITIONER

## 2024-04-18 PROCEDURE — 3074F SYST BP LT 130 MM HG: CPT | Mod: CPTII,S$GLB,, | Performed by: NURSE PRACTITIONER

## 2024-04-18 PROCEDURE — 1101F PT FALLS ASSESS-DOCD LE1/YR: CPT | Mod: CPTII,S$GLB,, | Performed by: NURSE PRACTITIONER

## 2024-04-18 PROCEDURE — 1159F MED LIST DOCD IN RCRD: CPT | Mod: CPTII,S$GLB,, | Performed by: NURSE PRACTITIONER

## 2024-04-18 PROCEDURE — 3288F FALL RISK ASSESSMENT DOCD: CPT | Mod: CPTII,S$GLB,, | Performed by: NURSE PRACTITIONER

## 2024-04-18 PROCEDURE — 99999 PR PBB SHADOW E&M-EST. PATIENT-LVL IV: CPT | Mod: PBBFAC,,, | Performed by: NURSE PRACTITIONER

## 2024-04-18 PROCEDURE — 1157F ADVNC CARE PLAN IN RCRD: CPT | Mod: CPTII,S$GLB,, | Performed by: NURSE PRACTITIONER

## 2024-04-18 NOTE — ASSESSMENT & PLAN NOTE
Testing / notes from recent hospitalization reviewed with pt and wife  - CTH unremarkable   - likely had metabolic encephalopathy in the setting of hypotension, acute infection, compounded by likely SE of steroids and disruption in sleep  - His symptoms resolved prior to dc and have not recurred in the home setting  - Pt and wife deny any cognitive symptoms prior to this recent event. At baseline, he is fully functional / independent. His neurological exam is intact today. No indication for further testing at this time.   - Discussed Seroquel with pt and wife. May try to wean, instructions discussed. May or may not have steroid induced insomnia, etc in the event of weaning, however.

## 2024-04-18 NOTE — PATIENT INSTRUCTIONS
Follow up as needed if you have any recurrence of symptoms or neurological concerns as we discussed.

## 2024-04-18 NOTE — PROGRESS NOTES
NEUROLOGY  Outpatient Consultation Visit     [facility]  [address] [facility], [place] [address]  [phone] (office) / [phone] (fax)    Patient Name:  Aren Bey Jr.  :  1950  MR #:  [id number]  Acct #:  [id number]    Date of  Visit: 2024    Other Physicians:  Javon Shelley MD (Primary Care Physician)      CHIEF COMPLAINT: Altered Mental Status      HISTORY OF PRESENT ILLNESS:  Aren Bey Jr. is a 74 y.o. R-handed male seen in consultation for delirium per Hospital, [facility]*    Medical history is significant for bladder/prostate cancer, maintained on chemo/immunotherapy, s/p urostomy, CAD s/p CABGx4, CKD, hx of recurrent UTI, HLD    Here today with his wife, who assists with history.     Presented to ED at [facility] 24 with weakness and hypotension in the setting of diarrhea, decreased PO intake. Diagnosed with C diff colitis, presumed secondary adrenal insufficiency r/t immunotherapy. Developed delirium during stay (hallucinations).     Wife has video of him reaching for things, describing visual hallucinations to her. He recalls seeing feathers on the wall or floating near him. Began after starting Cortef for the adrenal insufficiency. Wife requested neurology consult, but never saw neurologist, which she is very frustrated about. CTH was obtained and interpreted as negative. He improved with decreasing the dose of the steroids and low dose Seroquel at night.     Has not had any hallucinations since dc. He continues in 20 mg qAM of Cortef since dc. Has not been successful in setting up endocrine follow up. BP has been stable. Sleeping well using 12.5 mg of Seroquel.     Notes indicate hx of seizures. This was actually syncope in the setting CAD, subsequent CABG done. He was evaluated by neurology, Dr. Hyde, at that time who did not feel that he had seizures.     Having cough, SOB since January. Was to have elective LHC before recent  admission. They are working to reschedule this now that he is home and BP is improved.     Diagnosed with cancer approximately 1 year ago. Reports that he is on immunotherapy and in remission. They are concerned about staying on immunotherapy in light of recent diagnosis of adrenal insufficiency.   They are frustrated with ambiguity of situation. One providers says this, another says something different.     At baseline, he is independent with ADLs / iADLs. [place]. No cognitive concerns per pt or very attentive wife.    Allergies:  Review of patient's allergies indicates:   Allergen Reactions    Clopidogrel Other (See Comments)     Increased body temperature and redness        Current Medications:  Current Outpatient Medications   Medication Sig Dispense Refill    acetaminophen (TYLENOL) 500 MG tablet Take 500-1,000 mg by mouth every 6 (six) hours as needed (fever/pain).      ASCORBIC ACID, VITAMIN C, ORAL Take 1 tablet by mouth once daily.      aspirin (ECOTRIN) 81 MG EC tablet Take 1 tablet (81 mg total) by mouth once daily. 90 tablet 3    atorvastatin (LIPITOR) 40 MG tablet Take 40 mg by mouth once daily.      b complex vitamins tablet Take 1 tablet by mouth once daily.      cholecalciferol, vitamin D3, 125 mcg (5,000 unit) Tab Take 5,000 Units by mouth once daily.      cyanocobalamin, vitamin B-12, (VITAMIN B-12) 2,500 mcg Subl Take 2,500 mcg by mouth once daily.      ferrous sulfate (SLOW RELEASE IRON) 142 mg (45 mg iron) TbSR Take 45 mg by mouth Daily.      hydrocortisone (CORTEF) 20 MG Tab Take 1 tablet (20 mg total) by mouth once daily. 30 tablet 1    LIDOcaine-prilocaine (EMLA) cream Apply topically once as needed (to port access).      methIMAzole (TAPAZOLE) 5 MG Tab Take 1 tablet (5 mg total) by mouth once daily. 30 tablet 1    multivitamin with minerals tablet Take 1 tablet by mouth once daily.      nitroGLYCERIN (NITROSTAT) 0.4 MG SL tablet Place 1 tablet (0.4 mg total) under the  tongue every 5 (five) minutes as needed for Chest pain. 25 tablet 0    QUEtiapine (SEROQUEL) 25 MG Tab Take 1 tablet (25 mg total) by mouth every evening. Take 0.5 tabs at night. (12.5mg) 30 tablet 0    vancomycin (VANCOCIN) 125 MG capsule Take 1 capsule (125 mg total) by mouth 2 (two) times a day. for 7 days 14 capsule 0    [START ON 4/21/2024] vancomycin (VANCOCIN) 125 MG capsule Take 1 capsule (125 mg total) by mouth Daily. for 7 days 7 capsule 0    [START ON 4/28/2024] vancomycin (VANCOCIN) 125 MG capsule Take 1 capsule (125 mg total) by mouth every other day. for 14 days 7 capsule 0    zinc gluconate 50 mg tablet Take 50 mg by mouth once daily.      hydrOXYzine HCL (ATARAX) 25 MG tablet Take 1 tablet (25 mg total) by mouth 3 (three) times daily as needed for Itching. (Patient not taking: Reported on 4/18/2024) 90 tablet 3     No current facility-administered medications for this visit.       Past Medical History:  Past Medical History:   Diagnosis Date    Aneurysm artery, popliteal     right leg    Bladder cancer     Hypertension     Malignant neoplasm of prostate     Seizures        Past Surgical History:  Past Surgical History:   Procedure Laterality Date    PROSTATECTOMY      REMOVAL-STENT Bilateral 1/23/2024    Procedure: REMOVAL-STENT;  Surgeon: South Ghosh MD;  Location: [address];  Service: Urology;  Laterality: Bilateral;    URETERAL STENT PLACEMENT Bilateral 1/23/2024    Procedure: INSERTION, STENT, URETER;  Surgeon: South Ghosh MD;  Location: [address];  Service: Urology;  Laterality: Bilateral;       Family History:  family history includes Heart disease in his mother; Hypertension in his mother.    Social History:   reports that he has never smoked. He has never been exposed to tobacco smoke. He has never used smokeless tobacco. He reports that he does not currently use alcohol. He reports that he does not use drugs.      REVIEW OF SYSTEMS:  As per HPI    PHYSICAL EXAM:  /69  (BP Location: Right arm, Patient Position: Sitting, BP Method: Medium (Automatic))   Pulse 62   Wt 65.6 kg (144 lb 11.7 oz)   BMI 21.37 kg/m²     General: Well groomed. No acute distress.  Pulmonary: Normal effort and rate.   Musculoskeletal: No obvious joint deformities, moves all extremities well.  Extremities: No clubbing, cyanosis or edema.     Neurological exam:  Mental status: Awake and alert.  Oriented to person, place, time and situation. Recent and remote memory appear to be intact.  Fund of knowledge normal. Normal attention and concentration.   Speech/Language: Fluent and appropriate. No dysarthria or aphasia on conversation. Able to follow complex commands.   Cranial nerves (II-XII): Visual fields full. Pupils equal round (not rested for light reactivity), extraocular movements intact, no ptosis, no nystagmus. Facial sensation and symmetry intact bilaterally. Hearing grossly intact. Palate deferred.  Shoulder shrug normal bilaterally. Normal tongue protrusion.   Motor: 5 out of 5 strength throughout the upper and lower extremities bilaterally. Normal bulk and tone. No abnormal movements noted. No drift appreciated.   Sensation: Intact to light touch.  DTR: 2+ at the knees and biceps bilaterally.  Coordination: Finger-nose-finger testing intact bilaterally. MAURICIO normal bilaterally. No tremor.   Gait: Normal gait.    DIAGNOSTIC DATA:  I have personally reviewed provider notes, labs and imaging made available to me today.     Imaging:  CT 4/7/24:  Impression:  1. No acute intracranial abnormality is visualized.    MRI brain w wo 9/2021:  IMPRESSION:  1. No acute intracranial abnormality.  2. Generalized cerebral atrophy.  3. Paranasal sinus disease, with bilateral maxillary sinus air-fluid levels suggesting acute sinusitis.    EEG 9/2021:  IMPRESSION: Normal routine EEG study in the awake state. No interictal epileptiform discharges and no seizures were recorded     Cardiac:  EKG  "4/2024:  NSR    Labs:  Lab Results   Component Value Date    WBC 9.23 04/10/2024    HGB 10.0 (L) 04/10/2024    HCT 29.0 (L) 04/10/2024     04/10/2024    MCV 86 04/10/2024    RDW 12.6 04/10/2024     Lab Results   Component Value Date     (L) 04/10/2024    K 3.7 04/10/2024     04/10/2024    CO2 22 04/10/2024    BUN 24 (H) 04/10/2024    CREATININE 1.29 04/10/2024    GLU 91 04/10/2024    CALCIUM 9.6 04/10/2024    MG 1.9 04/10/2024    PHOS 4.8 (H) 04/04/2024     Lab Results   Component Value Date    PROT 6.0 04/10/2024    ALBUMIN 3.1 (L) 04/10/2024    BILITOT 0.3 04/10/2024    AST 68 (H) 04/10/2024    ALKPHOS 93 04/10/2024    ALT 69 (H) 04/10/2024     Lab Results   Component Value Date    INR 1.1 04/04/2024     Lab Results   Component Value Date    CHOL 150 08/08/2022    HDL 82 08/08/2022    LDLCALC 55 08/08/2022    TRIG 80 08/24/2023    CHOLHDL 45.6 05/19/2021     Lab Results   Component Value Date    HGBA1C 5.2 11/03/2021      No results found for: "QUTEITLM57"  No results found for: "FOLATE"  Lab Results   Component Value Date    TSH <0.015 (L) 04/06/2024       ASSESSMENT & PLAN:  Aren Bey Jr. is a 74 y.o. R-handed male seen in consultation for delirium.     Problem List Items Addressed This Visit          Neuro    Acute delirium - Primary    Current Assessment & Plan     Testing / notes from recent hospitalization reviewed with pt and wife  - CTH unremarkable   - likely had metabolic encephalopathy in the setting of hypotension, acute infection, compounded by likely SE of steroids and disruption in sleep  - His symptoms resolved prior to dc and have not recurred in the home setting  - Pt and wife deny any cognitive symptoms prior to this recent event. At baseline, he is fully functional / independent. His neurological exam is intact today. No indication for further testing at this time.   - Discussed Seroquel with pt and wife. May try to wean, instructions discussed. May or may not " have steroid induced insomnia, etc in the event of weaning, however.                Follow up: PRN    I spent a total of 60 minutes on the day of the visit.    This includes face to face time with the patient, as well as non-face to face time preparing for and completing the visit (review of prior diagnostic testing and clinical notes, obtaining or reviewing history, documenting clinical information in the EMR, independently interpreting and communicating results to the patient/family and coordinating ongoing care).       I appreciate the opportunity to participate in the care of this patient. Please feel free to contact me with any concerns or questions.       Octavia Nunez, ACNPC-AG  [facility]  [address] [place] [address]

## 2024-04-22 PROBLEM — N39.0 RECURRENT UTI: Status: RESOLVED | Noted: 2024-01-22 | Resolved: 2024-04-22

## 2024-04-25 ENCOUNTER — OFFICE VISIT (OUTPATIENT)
Dept: FAMILY MEDICINE | Facility: CLINIC | Age: 74
End: 2024-04-25
Payer: MEDICARE

## 2024-04-25 VITALS
SYSTOLIC BLOOD PRESSURE: 112 MMHG | HEIGHT: 69 IN | WEIGHT: 145.31 LBS | BODY MASS INDEX: 21.52 KG/M2 | DIASTOLIC BLOOD PRESSURE: 66 MMHG

## 2024-04-25 DIAGNOSIS — Z92.89 HISTORY OF IMMUNOTHERAPY: ICD-10-CM

## 2024-04-25 DIAGNOSIS — I25.709 CORONARY ARTERY DISEASE INVOLVING CORONARY BYPASS GRAFT OF NATIVE HEART WITH ANGINA PECTORIS: ICD-10-CM

## 2024-04-25 DIAGNOSIS — N18.9 CHRONIC KIDNEY DISEASE, UNSPECIFIED CKD STAGE: ICD-10-CM

## 2024-04-25 DIAGNOSIS — E27.3 ADRENAL INSUFFICIENCY DUE TO CANCER THERAPY: ICD-10-CM

## 2024-04-25 DIAGNOSIS — A49.8 CLOSTRIDIUM DIFFICILE INFECTION: ICD-10-CM

## 2024-04-25 DIAGNOSIS — C61 PROSTATE CANCER: ICD-10-CM

## 2024-04-25 DIAGNOSIS — C67.8 MALIGNANT NEOPLASM OF OVERLAPPING SITES OF BLADDER: ICD-10-CM

## 2024-04-25 DIAGNOSIS — Z09 HOSPITAL DISCHARGE FOLLOW-UP: Primary | ICD-10-CM

## 2024-04-25 DIAGNOSIS — K52.1 DIARRHEA DUE TO DRUG: ICD-10-CM

## 2024-04-25 PROCEDURE — 99999 PR PBB SHADOW E&M-EST. PATIENT-LVL IV: CPT | Mod: PBBFAC,,, | Performed by: NURSE PRACTITIONER

## 2024-04-25 PROCEDURE — 1111F DSCHRG MED/CURRENT MED MERGE: CPT | Mod: CPTII,S$GLB,, | Performed by: NURSE PRACTITIONER

## 2024-04-25 PROCEDURE — 1159F MED LIST DOCD IN RCRD: CPT | Mod: CPTII,S$GLB,, | Performed by: NURSE PRACTITIONER

## 2024-04-25 PROCEDURE — 3074F SYST BP LT 130 MM HG: CPT | Mod: CPTII,S$GLB,, | Performed by: NURSE PRACTITIONER

## 2024-04-25 PROCEDURE — 3078F DIAST BP <80 MM HG: CPT | Mod: CPTII,S$GLB,, | Performed by: NURSE PRACTITIONER

## 2024-04-25 PROCEDURE — 1101F PT FALLS ASSESS-DOCD LE1/YR: CPT | Mod: CPTII,S$GLB,, | Performed by: NURSE PRACTITIONER

## 2024-04-25 PROCEDURE — 99214 OFFICE O/P EST MOD 30 MIN: CPT | Mod: S$GLB,,, | Performed by: NURSE PRACTITIONER

## 2024-04-25 PROCEDURE — 1160F RVW MEDS BY RX/DR IN RCRD: CPT | Mod: CPTII,S$GLB,, | Performed by: NURSE PRACTITIONER

## 2024-04-25 PROCEDURE — 3288F FALL RISK ASSESSMENT DOCD: CPT | Mod: CPTII,S$GLB,, | Performed by: NURSE PRACTITIONER

## 2024-04-25 PROCEDURE — 1126F AMNT PAIN NOTED NONE PRSNT: CPT | Mod: CPTII,S$GLB,, | Performed by: NURSE PRACTITIONER

## 2024-04-25 PROCEDURE — 1157F ADVNC CARE PLAN IN RCRD: CPT | Mod: CPTII,S$GLB,, | Performed by: NURSE PRACTITIONER

## 2024-04-25 NOTE — PROGRESS NOTES
THIS DOCUMENT WAS MADE IN PART WITH VOICE RECOGNITION SOFTWARE.  OCCASIONALLY THIS SOFTWARE WILL MISINTERPRET WORDS OR PHRASES.     Assessment and Plan:    Hospital discharge follow-up  Comments:  Stable  Improved    Clostridium difficile infection    Adrenal insufficiency due to cancer therapy  -     Ambulatory referral/consult to Endocrinology; Future; Expected date: 05/02/2024    Malignant neoplasm of overlapping sites of bladder    Prostate cancer    Coronary artery disease involving coronary bypass graft of native heart with angina pectoris    Chronic kidney disease, unspecified CKD stage    Diarrhea due to drug    History of immunotherapy       Hospital course and diagnostic studies reviewed in detail with patient during today's visit.  Patient advised to keep follow up with specialists.  Referral placed to Endocrinology-Dr. Lynn.  Advised patient to let us know if he has any problems scheduling.  Emergent conditions/ER precautions discussed.        Follow up in about 27 days (around 5/22/2024) for Follow-up with PCP already scheduled.   ______________________________________________________________________  Subjective:    Chief Complaint:  Hospital follow up    HPI:  Aren is a 74 y.o. year old male with PMHx of bladder/prostate cancer, s/p rsxn, urostomy, chemo and on immunotherapy, CAD s/p CABGx4, CKD, hx of recurrent UTI, HLPD, among other conditions; here for hospital follow up.  Patient was admitted to [facility]- discharged on April 11th.  He was admitted after presenting to the ER on April 4th complaining of diarrhea, weakness and hypotension.  He was found to be positive for C diff. treated with vancomycin.  Endocrinology was consulted for adrenal insufficiency.  Heme Onc was also consulted for possible immunotherapy side effects.  Patient was discharged to follow up with Endocrinology, heme Onc, neurology and PCP.  Patient reports that diarrhea has resolved and he is feeling much  better.  Patient was started on Seroquel 25 mg at night to help with delirium while in the hospital.  Hydrocortisone dose was decreased while in hospital.  Patient is no longer having episodes of delirium.  Spouse reports that she has been slowly were weaning patient off of medication- currently taking 12.5 mg.  She is concerned that is contributing to his dry mouth.  Patient has been staying well hydrated.  Spouse reports clear yellowish urine noted in urine bag.       Neurology follow up: 4/18/24- evaluation of acute delirium- likely metabolic encephalopathy in setting of hypotension, acute infection, compounded by likely SE of steroids and disruptions sleep    Heme Onc follow up: 4/30/24    Spouse reports she has been unable to schedule follow up with Endocrinology- Dr. Lynn.  She states that she was advised to have PCP place referral.       See hospital course below    Hospital Course:   Patient was admitted to the hospital medicine service with complaints of weakness and hypotension along with diarrhea.  Patient was placed on IV fluids. Stool studies were sent and noted to be positive for C diff. ID was consulted.  Patient was placed on oral vancomycin.  Endocrinology was consulted for possible adrenal insufficiency.  Heme-Onc was also consulted given concerns for possible immunotherapy side effects.  Cosyntropin stim test was ordered confirming adrenal insufficiency.  Patient was placed on hydrocortisone.  Patient is started developing acute delirium.  Hydrocortisone dose was decreased.  He he was started on a low-dose of Seroquel.  Neurology was consulted.  CT head did not show any acute intracranial abnormalities.  Acute delirium was noted to clinically improve.  He will be continued on oral vancomycin with a long taper.  He was eventually discharged home in stable condition with outpatient follow up.     Chronic kidney disease , 3a  Baseline creatinine 1.4-1.5  Nephrology : MD Aron      History of  Hypertension  Rx-none   Normotensive in clinic      Dyslipidemia + CAD + carotid artery stenosis  S/p CABG x 4 (11/2021)  Rx-atorvastatin 20 mg  Prev rx : metoprolol (bradycardia)   Cardiology : MD Pineda --->  MD Fox ----> MD Pineda   US : bilateral carotid- BILATERAL DISEASE NOTED LESS THAN 50%  NST-March 2024-negative     Right Popliteal artery aneurysm   Vasc: MD Lalo --->  MD Alethea   F/u yearly with US : stable x 3 year      History of pulmonary embolism   August 2023   No current anticoagulation     Bladder cancer -invasive urothelial carcinoma  Oncology - MD Tony   Urology- MD Augie   Previous treatment-chemotherapy, surgery  Currently undergoing immunotherapy  GOAL: CURE  Status post radical cystectomy, prostatectomy August 2023 with urostomy placement  Positive lymphovascular invasion, soft tissue invasion  Status post ureteral stenting     Recurrent UTI  Following the urostomy / bladder resection      History C diff colitis   November 2023  Currently taking probiotics      Prostate cancer   Oncology- MD Tony  S/p prostatectomy      Mild anemia, normocytic   Taking Iron / B complex     Generalized Pruritus   Rx : Prn hydroxyzine, Kenalog, Cerave   Prev rx : Zyrtec (ineffective)     Hyperthyroidism  Previous TSH undetectable, T4 normal     Medications:  Current Outpatient Medications on File Prior to Visit   Medication Sig Dispense Refill    acetaminophen (TYLENOL) 500 MG tablet Take 500-1,000 mg by mouth every 6 (six) hours as needed (fever/pain).      ASCORBIC ACID, VITAMIN C, ORAL Take 1 tablet by mouth once daily.      aspirin (ECOTRIN) 81 MG EC tablet Take 1 tablet (81 mg total) by mouth once daily. 90 tablet 3    atorvastatin (LIPITOR) 40 MG tablet Take 40 mg by mouth once daily.      b complex vitamins tablet Take 1 tablet by mouth once daily.      cholecalciferol, vitamin D3, 125 mcg (5,000 unit) Tab Take 5,000 Units by mouth once daily.      cyanocobalamin, vitamin B-12,  (VITAMIN B-12) 2,500 mcg Subl Take 2,500 mcg by mouth once daily.      ferrous sulfate (SLOW RELEASE IRON) 142 mg (45 mg iron) TbSR Take 45 mg by mouth Daily.      hydrocortisone (CORTEF) 20 MG Tab Take 1 tablet (20 mg total) by mouth once daily. 30 tablet 1    LIDOcaine-prilocaine (EMLA) cream Apply topically once as needed (to port access).      methIMAzole (TAPAZOLE) 5 MG Tab Take 1 tablet (5 mg total) by mouth once daily. 30 tablet 1    multivitamin with minerals tablet Take 1 tablet by mouth once daily.      nitroGLYCERIN (NITROSTAT) 0.4 MG SL tablet Place 1 tablet (0.4 mg total) under the tongue every 5 (five) minutes as needed for Chest pain. 25 tablet 0    QUEtiapine (SEROQUEL) 25 MG Tab Take 1 tablet (25 mg total) by mouth every evening. Take 0.5 tabs at night. (12.5mg) 30 tablet 0    vancomycin (VANCOCIN) 125 MG capsule Take 1 capsule (125 mg total) by mouth Daily. for 7 days 7 capsule 0    [START ON 4/28/2024] vancomycin (VANCOCIN) 125 MG capsule Take 1 capsule (125 mg total) by mouth every other day. for 14 days 7 capsule 0    zinc gluconate 50 mg tablet Take 50 mg by mouth once daily.      hydrOXYzine HCL (ATARAX) 25 MG tablet Take 1 tablet (25 mg total) by mouth 3 (three) times daily as needed for Itching. (Patient not taking: Reported on 4/18/2024) 90 tablet 3     No current facility-administered medications on file prior to visit.       Review of Systems:  Review of Systems   Constitutional:  Negative for chills, fatigue and fever.   HENT:  Negative for congestion and rhinorrhea.    Respiratory:  Negative for cough and shortness of breath.    Cardiovascular:  Negative for chest pain, palpitations and leg swelling.   Gastrointestinal:  Negative for abdominal pain, constipation, diarrhea, nausea and vomiting.   Genitourinary:  Negative for hematuria.   Skin:  Negative for rash.   Neurological:  Negative for dizziness, weakness, light-headedness and headaches.       Past Medical History:  Past Medical  "History:   Diagnosis Date    Aneurysm artery, popliteal     right leg    Bladder cancer     Hypertension     Malignant neoplasm of prostate     Seizures        Objective:    Vitals:  Vitals:    04/25/24 1032   BP: 112/66   Weight: 65.9 kg (145 lb 4.5 oz)   Height: 5' 9" (1.753 m)   PainSc: 0-No pain       Physical Exam  Vitals reviewed.   Constitutional:       General: He is not in acute distress.  HENT:      Head: Normocephalic and atraumatic.   Eyes:      Pupils: Pupils are equal, round, and reactive to light.   Cardiovascular:      Rate and Rhythm: Normal rate and regular rhythm.      Heart sounds: No murmur heard.     No friction rub.   Pulmonary:      Effort: Pulmonary effort is normal.      Breath sounds: Normal breath sounds.   Abdominal:      General: Bowel sounds are normal. There is no distension.      Palpations: Abdomen is soft.      Tenderness: There is no abdominal tenderness. There is no right CVA tenderness or left CVA tenderness.   Musculoskeletal:      Cervical back: Neck supple.   Skin:     General: Skin is warm and dry.      Findings: No rash.   Neurological:      Mental Status: He is alert and oriented to person, place, and time.   Psychiatric:         Mood and Affect: Mood normal.         Behavior: Behavior normal.         Thought Content: Thought content normal.         Data:         Lab Results   Component Value Date    WBC 9.23 04/10/2024    HGB 10.0 (L) 04/10/2024    HCT 29.0 (L) 04/10/2024    MCV 86 04/10/2024     04/10/2024      CMP  Sodium   Date Value Ref Range Status   04/10/2024 134 (L) 136 - 145 mmol/L Final     Potassium   Date Value Ref Range Status   04/10/2024 3.7 3.5 - 5.1 mmol/L Final     Comment:     Anion Gap reference range revised on 4/28/2023     Chloride   Date Value Ref Range Status   04/10/2024 109 95 - 110 mmol/L Final     CO2   Date Value Ref Range Status   04/10/2024 22 22 - 31 mmol/L Final     Glucose   Date Value Ref Range Status   04/10/2024 91 70 - 110 " mg/dL Final     Comment:     The ADA recommends the following guidelines for fasting glucose:    Normal:       less than 100 mg/dL    Prediabetes:  100 mg/dL to 125 mg/dL    Diabetes:     126 mg/dL or higher       BUN   Date Value Ref Range Status   04/10/2024 24 (H) 9 - 21 mg/dL Final     Creatinine   Date Value Ref Range Status   04/10/2024 1.29 0.50 - 1.40 mg/dL Final     Calcium   Date Value Ref Range Status   04/10/2024 9.6 8.4 - 10.2 mg/dL Final     Total Protein   Date Value Ref Range Status   04/10/2024 6.0 6.0 - 8.4 g/dL Final     Albumin   Date Value Ref Range Status   04/10/2024 3.1 (L) 3.5 - 5.2 g/dL Final     Total Bilirubin   Date Value Ref Range Status   04/10/2024 0.3 0.2 - 1.3 mg/dL Final     Alkaline Phosphatase   Date Value Ref Range Status   04/10/2024 93 38 - 145 U/L Final     AST   Date Value Ref Range Status   04/10/2024 68 (H) 17 - 59 U/L Final     ALT   Date Value Ref Range Status   04/10/2024 69 (H) 0 - 50 U/L Final     Anion Gap   Date Value Ref Range Status   04/10/2024 3 (L) 5 - 12 mmol/L Final     Comment:     Anion Gap reference range revised on 4/28/2023     eGFR   Date Value Ref Range Status   04/10/2024 57 (A) >60 mL/min/1.73 m^2 Final        Medical history reviewed, Medications reconciled.              GIFTY WilsonP-C  Family Medicine

## 2024-04-29 ENCOUNTER — LAB VISIT (OUTPATIENT)
Dept: LAB | Facility: HOSPITAL | Age: 74
End: 2024-04-29
Attending: INTERNAL MEDICINE
Payer: MEDICARE

## 2024-04-29 DIAGNOSIS — D89.89 OTHER SPECIFIED DISORDERS INVOLVING THE IMMUNE MECHANISM, NOT ELSEWHERE CLASSIFIED: ICD-10-CM

## 2024-04-29 DIAGNOSIS — C67.8 MALIGNANT NEOPLASM OF OVERLAPPING SITES OF BLADDER: ICD-10-CM

## 2024-04-29 LAB
ALBUMIN SERPL BCP-MCNC: 3.8 G/DL (ref 3.5–5.2)
ALP SERPL-CCNC: 73 U/L (ref 55–135)
ALT SERPL W/O P-5'-P-CCNC: 33 U/L (ref 10–44)
ANION GAP SERPL CALC-SCNC: 11 MMOL/L (ref 8–16)
AST SERPL-CCNC: 36 U/L (ref 10–40)
BASOPHILS # BLD AUTO: 0.06 K/UL (ref 0–0.2)
BASOPHILS NFR BLD: 1 % (ref 0–1.9)
BILIRUB SERPL-MCNC: 0.4 MG/DL (ref 0.1–1)
BUN SERPL-MCNC: 16 MG/DL (ref 8–23)
CALCIUM SERPL-MCNC: 9.6 MG/DL (ref 8.7–10.5)
CHLORIDE SERPL-SCNC: 104 MMOL/L (ref 95–110)
CO2 SERPL-SCNC: 22 MMOL/L (ref 23–29)
CREAT SERPL-MCNC: 1.7 MG/DL (ref 0.5–1.4)
DIFFERENTIAL METHOD BLD: ABNORMAL
EOSINOPHIL # BLD AUTO: 0.4 K/UL (ref 0–0.5)
EOSINOPHIL NFR BLD: 6.3 % (ref 0–8)
ERYTHROCYTE [DISTWIDTH] IN BLOOD BY AUTOMATED COUNT: 14.3 % (ref 11.5–14.5)
EST. GFR  (NO RACE VARIABLE): 41.3 ML/MIN/1.73 M^2
GLUCOSE SERPL-MCNC: 80 MG/DL (ref 70–110)
HCT VFR BLD AUTO: 37.3 % (ref 40–54)
HGB BLD-MCNC: 12.7 G/DL (ref 14–18)
IMM GRANULOCYTES # BLD AUTO: 0.01 K/UL (ref 0–0.04)
IMM GRANULOCYTES NFR BLD AUTO: 0.2 % (ref 0–0.5)
LYMPHOCYTES # BLD AUTO: 1.2 K/UL (ref 1–4.8)
LYMPHOCYTES NFR BLD: 19.2 % (ref 18–48)
MCH RBC QN AUTO: 30 PG (ref 27–31)
MCHC RBC AUTO-ENTMCNC: 34 G/DL (ref 32–36)
MCV RBC AUTO: 88 FL (ref 82–98)
MONOCYTES # BLD AUTO: 0.4 K/UL (ref 0.3–1)
MONOCYTES NFR BLD: 6 % (ref 4–15)
NEUTROPHILS # BLD AUTO: 4.3 K/UL (ref 1.8–7.7)
NEUTROPHILS NFR BLD: 67.3 % (ref 38–73)
NRBC BLD-RTO: 0 /100 WBC
PLATELET # BLD AUTO: 231 K/UL (ref 150–450)
PMV BLD AUTO: 9 FL (ref 9.2–12.9)
POTASSIUM SERPL-SCNC: 4.8 MMOL/L (ref 3.5–5.1)
PROT SERPL-MCNC: 7 G/DL (ref 6–8.4)
RBC # BLD AUTO: 4.24 M/UL (ref 4.6–6.2)
SODIUM SERPL-SCNC: 137 MMOL/L (ref 136–145)
T4 FREE SERPL-MCNC: 0.46 NG/DL (ref 0.71–1.51)
TSH SERPL DL<=0.005 MIU/L-ACNC: 9.3 UIU/ML (ref 0.4–4)
WBC # BLD AUTO: 6.31 K/UL (ref 3.9–12.7)

## 2024-04-29 PROCEDURE — 84439 ASSAY OF FREE THYROXINE: CPT

## 2024-04-29 PROCEDURE — 36415 COLL VENOUS BLD VENIPUNCTURE: CPT | Mod: PN

## 2024-04-29 PROCEDURE — 80053 COMPREHEN METABOLIC PANEL: CPT | Mod: PN

## 2024-04-29 PROCEDURE — 85025 COMPLETE CBC W/AUTO DIFF WBC: CPT | Mod: PN

## 2024-04-29 PROCEDURE — 84443 ASSAY THYROID STIM HORMONE: CPT

## 2024-04-29 NOTE — PROGRESS NOTES
PATIENT: Aren Bey Jr.  MRN: [id number]  DATE: 4/30/2024      Diagnosis:   1. Malignant neoplasm of overlapping sites of bladder    2. Immunodeficiency due to drug therapy    3. Adrenal insufficiency    4. Hyperthyroidism    5. Aneurysm of right popliteal artery    6. Lung nodule    7. Normocytic anemia    8. Other primary thrombophilia    9. Ureteral stent present    10. Coronary artery disease, unspecified vessel or lesion type, unspecified whether angina present, unspecified whether native or transplanted heart    11. Blood creatinine increased compared with prior measurement      Chief Complaint: Malignant neoplasm of overlapping sites of bladder (1 month follow up )      Oncologic History:      Oncologic History     Oncologic Treatment     Pathology           Subjective:    Interval History:  Mr. Bey is a 74 y.o. male with HTN, Seizures, bladder cancer, h/o pulmonary embolism, who presents for bladder cancer.  Since the last clinic visit the patient was admitted to the hospital from 04/04/2024 until 04/11/2024 with initial complaint of weakness, hypotensio, and diarrhea and found to have adrenal insufficiency, hypothyroidism, and C diff. the patient was started on methimazole for hyperthyroidism, hydrocortisone for the adrenal insufficiency, and oral vancomycin for C diff.     Currently the patient endorses improvement in his diarrhea.  The patient states his urine remains clear and that he has not noticed any flank pain, fever or chills.  The patient was to see Dr. Ghosh for consideration of ureteral stent exchange. The patient denies CP, cough, SOB, abdominal pain, nausea, vomiting, constipation.  The patient denies night sweats, weight loss, new lumps or bumps, easy bruising or bleeding.    Prior History:  Patient underwent transurethral resection of bladder tumor on 04/28/2023 with path showing invasive urothelial carcinoma, high-grade with involvement of muscularis propria and positive for  lymphovascular invasion.  Patient underwent chemotherapy at [facility].  The patient then underwent radical cystectomy and prostatectomy on 08/15/2023 with pathology showing invasive high-grade urothelial carcinoma measuring 4.2 cm, positive lymphovascular invasion, tumor identified within the soft tissue surrounding the left distal ureter, 4/6 positive regional lymph nodes with extranodal extension present. pT3aN2.  Also seen was acinar adenocarcinoma of the prostate grade group 1 (Sarah score 3+3=6) in 1 2 5% of prostate tissue pT2N0.  The patient's surgery was ultimately complicated by small-bowel obstruction, and pulmonary embolism.  PT recevied 1 dose of Nivolumab 9/18/23.  The patient was placed on anticoagulation for pulmonary embolism and ultimately developed a right perinephric bleed requiring embolization.  PT also developed UTI and acquired ureteral obstruction with placement of bilateral ureteral stents.  PT also developed C diff and was hospitalized for suspected recurrence at [facility] from 10/21/2023 to 10/25/2023.  Patient was discharged on fidaxomicin.   The patient saw Dr. Ghosh with Urology on 11/13/2023 for with recommendations for virtual visit in January to discuss possible operative intervention replaced stents, possible revision of ureteral ileal anastomosis pending disease progression.  It was instructed that the patient would need catheterized specimen from urostomy to rule out UTI if patient with future symptoms.  Patient underwent PET-CT on 11/16/2023 showing a new hypermetabolic subpleural nodule in the posterior right lower lobe measuring 2.2 x 1.3 cm.  The patient endorses a fever starting on the night of 11/16/2023.  Patient was prescribed Bactrim for suspected potential urinary tract infection.   The patient was admitted to the hospital from 11/17/23-11/21/23 for C diff colitis and suspected urinary tract infection the patient was discharged on fidaxomicin  200 mg b.i.d. for additional 7 days as well as 200 mg every other day for 25 days.  Patient was also discharged on doxycycline 100 mg b.i.d. for 25 days with instructions from Infectious Disease to remain on antibiotics as long as his ureteral stents were in place.   The patient underwent biopsy of nodule in the right lung on 12/08/2023 showing fibrosis and chronic inflammation but no evidence of neoplasm or granuloma.   The patient was to have ureteral stents exchanged on 01/02/2024 but missed his appointment due to inclement weather.    The patient had bilateral ureteral stents exchanged by Dr. Ghosh on 1/23/24.   The patient underwent PET-CT on 03/01/2024 showing decrease in size of right lower lobe nodule now measuring 0.8 cm.   The patient underwent CTA chest on 03/06/2024 showing no evidence of pulmonary embolism or pneumonitis.  The patient underwent nuclear stress test on 03/11/2024 showed abnormal myocardial perfusion with mild intensity, small size, reversible perfusion abnormality consistent with ischemia in the basal to mid inferolateral walls.    Past Medical History:   Past Medical History:   Diagnosis Date    Aneurysm artery, popliteal     right leg    Bladder cancer     Hypertension     Malignant neoplasm of prostate     Seizures        Past Surgical HIstory:   Past Surgical History:   Procedure Laterality Date    PROSTATECTOMY      REMOVAL-STENT Bilateral 1/23/2024    Procedure: REMOVAL-STENT;  Surgeon: South Ghosh MD;  Location: [facility] OR [address] [place] [address];  Service: Urology;  Laterality: Bilateral;    URETERAL STENT PLACEMENT Bilateral 1/23/2024    Procedure: INSERTION, STENT, URETER;  Surgeon: South Ghosh MD;  Location: [facility] OR [address] [place] [address];  Service: Urology;  Laterality: Bilateral;       Family History:   Family History   Problem Relation Name Age of Onset    Heart disease Mother      Hypertension Mother         Social History:  reports that he has never smoked. He has never been exposed to tobacco smoke.  He has never used smokeless tobacco. He reports that he does not currently use alcohol. He reports that he does not use drugs.    Allergies:  Review of patient's allergies indicates:   Allergen Reactions    Clopidogrel Other (See Comments)     Increased body temperature and redness        Medications:  Current Outpatient Medications   Medication Sig Dispense Refill    acetaminophen (TYLENOL) 500 MG tablet Take 500-1,000 mg by mouth every 6 (six) hours as needed (fever/pain).      ASCORBIC ACID, VITAMIN C, ORAL Take 1 tablet by mouth once daily.      aspirin (ECOTRIN) 81 MG EC tablet Take 1 tablet (81 mg total) by mouth once daily. 90 tablet 3    atorvastatin (LIPITOR) 40 MG tablet Take 40 mg by mouth once daily.      b complex vitamins tablet Take 1 tablet by mouth once daily.      cholecalciferol, vitamin D3, 125 mcg (5,000 unit) Tab Take 5,000 Units by mouth once daily.      cyanocobalamin, vitamin B-12, (VITAMIN B-12) 2,500 mcg Subl Take 2,500 mcg by mouth once daily.      ferrous sulfate (SLOW RELEASE IRON) 142 mg (45 mg iron) TbSR Take 45 mg by mouth Daily.      hydrocortisone (CORTEF) 20 MG Tab Take 1 tablet (20 mg total) by mouth once daily. 30 tablet 1    LIDOcaine-prilocaine (EMLA) cream Apply topically once as needed (to port access).      methIMAzole (TAPAZOLE) 5 MG Tab Take 1 tablet (5 mg total) by mouth once daily. 30 tablet 1    multivitamin with minerals tablet Take 1 tablet by mouth once daily.      nitroGLYCERIN (NITROSTAT) 0.4 MG SL tablet Place 1 tablet (0.4 mg total) under the tongue every 5 (five) minutes as needed for Chest pain. 25 tablet 0    QUEtiapine (SEROQUEL) 25 MG Tab Take 1 tablet (25 mg total) by mouth every evening. Take 0.5 tabs at night. (12.5mg) 30 tablet 0    vancomycin (VANCOCIN) 125 MG capsule Take 1 capsule (125 mg total) by mouth every other day. for 14 days 7 capsule 0    zinc gluconate 50 mg tablet Take 50 mg by mouth once daily.      hydrOXYzine HCL (ATARAX) 25 MG tablet  "Take 1 tablet (25 mg total) by mouth 3 (three) times daily as needed for Itching. (Patient not taking: Reported on 4/18/2024) 90 tablet 3     No current facility-administered medications for this visit.       Review of Systems   Constitutional:  Negative for chills, diaphoresis, fatigue, fever and unexpected weight change.   Respiratory:  Negative for cough and shortness of breath.    Cardiovascular:  Negative for chest pain and palpitations.   Gastrointestinal:  Negative for abdominal pain, constipation, diarrhea, nausea and vomiting.   Genitourinary:  Negative for difficulty urinating, dysuria and flank pain.        Urine clear   Skin:  Negative for color change and rash.   Neurological:  Negative for headaches.   Hematological:  Negative for adenopathy. Does not bruise/bleed easily.       ECOG Performance Status: 1   Objective:      Vitals:   Vitals:    04/30/24 0849   BP: 118/70   BP Location: Left arm   Patient Position: Sitting   BP Method: Medium (Manual)   Pulse: 60   Resp: 16   Temp: 97 °F (36.1 °C)   TempSrc: Temporal   SpO2: 95%   Weight: 66.6 kg (146 lb 13.2 oz)   Height: 5' 9" (1.753 m)                       Physical Exam  Constitutional:       General: He is not in acute distress.     Appearance: He is well-developed. He is not diaphoretic.   HENT:      Head: Normocephalic and atraumatic.   Cardiovascular:      Rate and Rhythm: Normal rate and regular rhythm.      Heart sounds: Normal heart sounds. No murmur heard.     No friction rub. No gallop.   Pulmonary:      Effort: Pulmonary effort is normal. No respiratory distress.      Breath sounds: Normal breath sounds. No wheezing or rales.   Chest:      Chest wall: No tenderness.   Abdominal:      General: Bowel sounds are normal. There is no distension.      Palpations: Abdomen is soft. There is no mass.      Tenderness: There is no abdominal tenderness. There is no rebound.      Comments: Ileal conduit on RLQ with clear urine visualized "   Musculoskeletal:      Cervical back: Normal range of motion.   Lymphadenopathy:      Cervical: No cervical adenopathy.      Upper Body:      Right upper body: No supraclavicular or axillary adenopathy.      Left upper body: No supraclavicular or axillary adenopathy.   Skin:     General: Skin is warm and dry.      Findings: No erythema or rash.   Neurological:      Mental Status: He is alert and oriented to person, place, and time.   Psychiatric:         Behavior: Behavior normal.         Laboratory Data:  Lab Visit on 04/29/2024   Component Date Value Ref Range Status    WBC 04/29/2024 6.31  3.90 - 12.70 K/uL Final    RBC 04/29/2024 4.24 (L)  4.60 - 6.20 M/uL Final    Hemoglobin 04/29/2024 12.7 (L)  14.0 - 18.0 g/dL Final    Hematocrit 04/29/2024 37.3 (L)  40.0 - 54.0 % Final    MCV 04/29/2024 88  82 - 98 fL Final    MCH 04/29/2024 30.0  27.0 - 31.0 pg Final    MCHC 04/29/2024 34.0  32.0 - 36.0 g/dL Final    RDW 04/29/2024 14.3  11.5 - 14.5 % Final    Platelets 04/29/2024 231  150 - 450 K/uL Final    MPV 04/29/2024 9.0 (L)  9.2 - 12.9 fL Final    Immature Granulocytes 04/29/2024 0.2  0.0 - 0.5 % Final    Gran # (ANC) 04/29/2024 4.3  1.8 - 7.7 K/uL Final    Immature Grans (Abs) 04/29/2024 0.01  0.00 - 0.04 K/uL Final    Comment: Mild elevation in immature granulocytes is non specific and   can be seen in a variety of conditions including stress response,   acute inflammation, trauma and pregnancy. Correlation with other   laboratory and clinical findings is essential.      Lymph # 04/29/2024 1.2  1.0 - 4.8 K/uL Final    Mono # 04/29/2024 0.4  0.3 - 1.0 K/uL Final    Eos # 04/29/2024 0.4  0.0 - 0.5 K/uL Final    Baso # 04/29/2024 0.06  0.00 - 0.20 K/uL Final    nRBC 04/29/2024 0  0 /100 WBC Final    Gran % 04/29/2024 67.3  38.0 - 73.0 % Final    Lymph % 04/29/2024 19.2  18.0 - 48.0 % Final    Mono % 04/29/2024 6.0  4.0 - 15.0 % Final    Eosinophil % 04/29/2024 6.3  0.0 - 8.0 % Final    Basophil % 04/29/2024 1.0  0.0  - 1.9 % Final    Differential Method 04/29/2024 Automated   Final    Sodium 04/29/2024 137  136 - 145 mmol/L Final    Potassium 04/29/2024 4.8  3.5 - 5.1 mmol/L Final    Chloride 04/29/2024 104  95 - 110 mmol/L Final    CO2 04/29/2024 22 (L)  23 - 29 mmol/L Final    Glucose 04/29/2024 80  70 - 110 mg/dL Final    BUN 04/29/2024 16  8 - 23 mg/dL Final    Creatinine 04/29/2024 1.7 (H)  0.5 - 1.4 mg/dL Final    Calcium 04/29/2024 9.6  8.7 - 10.5 mg/dL Final    Total Protein 04/29/2024 7.0  6.0 - 8.4 g/dL Final    Albumin 04/29/2024 3.8  3.5 - 5.2 g/dL Final    Total Bilirubin 04/29/2024 0.4  0.1 - 1.0 mg/dL Final    Comment: For infants and newborns, interpretation of results should be based  on gestational age, weight and in agreement with clinical  observations.    Premature Infant recommended reference ranges:  Up to 24 hours.............<8.0 mg/dL  Up to 48 hours............<12.0 mg/dL  3-5 days..................<15.0 mg/dL  6-29 days.................<15.0 mg/dL      Alkaline Phosphatase 04/29/2024 73  55 - 135 U/L Final    AST 04/29/2024 36  10 - 40 U/L Final    ALT 04/29/2024 33  10 - 44 U/L Final    eGFR 04/29/2024 41.3 (A)  >60 mL/min/1.73 m^2 Final    Anion Gap 04/29/2024 11  8 - 16 mmol/L Final    TSH 04/29/2024 9.297 (H)  0.400 - 4.000 uIU/mL Final    Free T4 04/29/2024 0.46 (L)  0.71 - 1.51 ng/dL Final         Imaging:     PET/CT 3/01/24  Head/neck:     No significant abnormal hypermetabolic foci are identified within the head and neck. No lymphadenopathy is present.     Chest:     A hypermetabolic subpleural 1.3 cm posterior right lower lobe nodule has decreased markedly in size and has become essentially non metabolic. The nodule underwent prior biopsy which yielded benign results. On image 148 the nodule now measures 0.8 cm compared to 1.3 cm previously. No new hypermetabolic pulmonary nodules, lymphadenopathy, pleural effusion, or pericardial effusion are present.     Abdomen/pelvis:     There are  postoperative changes of cystectomy, prostatectomy, ureteral ileal conduit with right lower quadrant urostomy. There are well position bilateral ureteral stents and there is no hydronephrosis. No significant abnormal hypermetabolic foci are identified in the abdomen and pelvis. No lymphadenopathy is present. The adrenal glands are normal. An inferior vena cava filter appears in satisfactory position.     Skeleton:     No significant abnormal hypermetabolic foci are identified within the skeleton. There are no findings to suggest osseous metastatic disease.       Assessment:       1. Malignant neoplasm of overlapping sites of bladder    2. Immunodeficiency due to drug therapy    3. Adrenal insufficiency    4. Hyperthyroidism    5. Aneurysm of right popliteal artery    6. Lung nodule    7. Normocytic anemia    8. Other primary thrombophilia    9. Ureteral stent present    10. Coronary artery disease, unspecified vessel or lesion type, unspecified whether angina present, unspecified whether native or transplanted heart    11. Blood creatinine increased compared with prior measurement                 Plan:     Bladder Cancer - pt with stage IIIB bladder cancer pT3N2 s/p neoadjuvant chemo followed by radical cystoprostatectomy 8/15/23  -PT received one dose of Nivolumab on 9/18/23  -PET/CT suggestive of a RLL pulmonary nodule  -Biopsy on 12/08/23 negative for malignancy  -Pt to proceed with cycle 5 of Nivolumab, 4th cycle in our clinic  -Scans show no clear evidence of metastatic or recurrent disease but does show a decreasing RLL nodule previously biopsied and benign  -Visit today included increased complexity associated with the care of the episodic problem (immunotherapy) addressed and managing the longitudinal care of the patient due to the serious and/or complex managed problem(s) Nivolumab    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  -No sign of infection  -Will monitor    Hyperthyroidism - Pt with  hyperthyroidism on methimazole  -TSH now elevated and T4 low  -Pt to follow up with endocrinology   -Case discussed with Dr Xiong and will stop methimazole currently and check TSH with next labs.    Adrenal Insufficiency - pt with positive Cosyntropin stim test in the hospital  -PT on Hydrocortisone 20mg daily  -Will have the patient follow up with endocrinology    CAD - pt with prior bypass surgery  -Cardiac stress test on 3/11/24 was abnormal with mild intensity, small size, reversible perfusion abnormality consistent with ischemia in the basal to mid inferolateral walls  -Pt to follow up with cardiologist    Pulmonary nodule - see above    Prostate Cancer - patient with acinar adenocarcinoma of the prostate grade group 1 (Sarah score 3+3=6) in 1 2 5% of prostate tissue pT2N0  -PSA 10/31/23 0.02ng/mL  -Will monitor    Ureteral Stents - Stents exchanged last on 1/23/24  -Pt to follow up with Urology    Anorexia - on marinol  -Will monitor    Anemia - Hemoglobin 12.7g/dL on 4/29/24  -Will monitor     Thrombophilia - pt with prior PE  -Pt subsequently developed a right perinephric bleed requiring embolization   -Patient with IVC filter in place  -Pt following with Dr Claire  -Vascular surgery to determine if he could be started on Eliquis  -Will monitor    Right popliteal aneurysm - pt to see vascular surgery    Increased Creatinine - creatinine up to 1.7 on 4/29/24  -Pt following with nephrology  -Will schedule follow up appt    Route Chart for Scheduling    Med Onc Chart Routing      Follow up with physician 4 weeks. the patient can proceed with treatment.  Pt needs an urgent follow up with Dr Olivares in urology for ureteral stent exchange.  Pt needs an urgent return appt with nephrology.  Pt needs a STAT appt with Dr Tyrese azuln endocrinology for adrenal insufficiency.  Pt needs a CBC, CMP and TSH in 4 weeks with an appt with me the day before treatment.   Follow up with [facility]    Infusion scheduling note     Injection scheduling note    Labs    Imaging    Pharmacy appointment    Other referrals              Treatment Plan Information   OP NIVOLUMAB 480 MG Q4W   Jackson Jimenez MD   Upcoming Treatment Dates - OP NIVOLUMAB 480 MG Q4W    4/9/2024       Chemotherapy       nivolumab 480 mg in sodium chloride 0.9% 98 mL infusion  5/13/2024       Chemotherapy       nivolumab 480 mg in sodium chloride 0.9% 98 mL infusion  6/10/2024       Chemotherapy       nivolumab 480 mg in sodium chloride 0.9% 98 mL infusion  7/8/2024       Chemotherapy       nivolumab 480 mg in sodium chloride 0.9% 98 mL infusion    Therapy Plan Information  dextrose 5 % (D5W) 100 mL flush bag  Intravenous, Once  heparin, porcine (PF) 100 unit/mL injection flush 500 Units  500 Units, Intravenous, PRN  alteplase injection 2 mg  2 mg, Intra-Catheter, PRN      Jackson Jimenez MD  [facility]  Hematology and Oncology  [facility]   [address] [place] [address]   O: [phone]  F: [phone]

## 2024-04-30 ENCOUNTER — TELEPHONE (OUTPATIENT)
Dept: ENDOCRINOLOGY | Facility: CLINIC | Age: 74
End: 2024-04-30
Payer: MEDICARE

## 2024-04-30 ENCOUNTER — OFFICE VISIT (OUTPATIENT)
Dept: HEMATOLOGY/ONCOLOGY | Facility: CLINIC | Age: 74
End: 2024-04-30
Payer: MEDICARE

## 2024-04-30 ENCOUNTER — INFUSION (OUTPATIENT)
Dept: INFUSION THERAPY | Facility: HOSPITAL | Age: 74
End: 2024-04-30
Attending: INTERNAL MEDICINE
Payer: MEDICARE

## 2024-04-30 ENCOUNTER — DOCUMENTATION ONLY (OUTPATIENT)
Dept: INFUSION THERAPY | Facility: HOSPITAL | Age: 74
End: 2024-04-30

## 2024-04-30 ENCOUNTER — TELEPHONE (OUTPATIENT)
Dept: NEPHROLOGY | Facility: CLINIC | Age: 74
End: 2024-04-30
Payer: MEDICARE

## 2024-04-30 ENCOUNTER — TELEPHONE (OUTPATIENT)
Dept: UROLOGY | Facility: CLINIC | Age: 74
End: 2024-04-30
Payer: MEDICARE

## 2024-04-30 ENCOUNTER — TELEPHONE (OUTPATIENT)
Dept: HEMATOLOGY/ONCOLOGY | Facility: CLINIC | Age: 74
End: 2024-04-30

## 2024-04-30 VITALS
WEIGHT: 146.81 LBS | RESPIRATION RATE: 16 BRPM | HEIGHT: 69 IN | OXYGEN SATURATION: 95 % | SYSTOLIC BLOOD PRESSURE: 118 MMHG | DIASTOLIC BLOOD PRESSURE: 70 MMHG | TEMPERATURE: 97 F | HEART RATE: 60 BPM | BODY MASS INDEX: 21.74 KG/M2

## 2024-04-30 VITALS
DIASTOLIC BLOOD PRESSURE: 84 MMHG | TEMPERATURE: 97 F | HEART RATE: 68 BPM | BODY MASS INDEX: 21.74 KG/M2 | HEIGHT: 69 IN | WEIGHT: 146.81 LBS | OXYGEN SATURATION: 95 % | SYSTOLIC BLOOD PRESSURE: 128 MMHG | RESPIRATION RATE: 17 BRPM

## 2024-04-30 DIAGNOSIS — R79.89 BLOOD CREATININE INCREASED COMPARED WITH PRIOR MEASUREMENT: ICD-10-CM

## 2024-04-30 DIAGNOSIS — D68.59 OTHER PRIMARY THROMBOPHILIA: ICD-10-CM

## 2024-04-30 DIAGNOSIS — I72.4 ANEURYSM OF RIGHT POPLITEAL ARTERY: ICD-10-CM

## 2024-04-30 DIAGNOSIS — Z79.899 IMMUNODEFICIENCY DUE TO DRUG THERAPY: ICD-10-CM

## 2024-04-30 DIAGNOSIS — E27.40 ADRENAL INSUFFICIENCY: ICD-10-CM

## 2024-04-30 DIAGNOSIS — C67.8 MALIGNANT NEOPLASM OF OVERLAPPING SITES OF BLADDER: Primary | ICD-10-CM

## 2024-04-30 DIAGNOSIS — D84.821 IMMUNODEFICIENCY DUE TO DRUG THERAPY: ICD-10-CM

## 2024-04-30 DIAGNOSIS — Z96.0 URETERAL STENT PRESENT: ICD-10-CM

## 2024-04-30 DIAGNOSIS — R91.1 LUNG NODULE: ICD-10-CM

## 2024-04-30 DIAGNOSIS — I25.10 CORONARY ARTERY DISEASE, UNSPECIFIED VESSEL OR LESION TYPE, UNSPECIFIED WHETHER ANGINA PRESENT, UNSPECIFIED WHETHER NATIVE OR TRANSPLANTED HEART: ICD-10-CM

## 2024-04-30 DIAGNOSIS — E05.90 HYPERTHYROIDISM: ICD-10-CM

## 2024-04-30 DIAGNOSIS — D64.9 NORMOCYTIC ANEMIA: ICD-10-CM

## 2024-04-30 PROCEDURE — 1101F PT FALLS ASSESS-DOCD LE1/YR: CPT | Mod: CPTII,S$GLB,, | Performed by: INTERNAL MEDICINE

## 2024-04-30 PROCEDURE — 3074F SYST BP LT 130 MM HG: CPT | Mod: CPTII,S$GLB,, | Performed by: INTERNAL MEDICINE

## 2024-04-30 PROCEDURE — 3288F FALL RISK ASSESSMENT DOCD: CPT | Mod: CPTII,S$GLB,, | Performed by: INTERNAL MEDICINE

## 2024-04-30 PROCEDURE — G2211 COMPLEX E/M VISIT ADD ON: HCPCS | Mod: S$GLB,,, | Performed by: INTERNAL MEDICINE

## 2024-04-30 PROCEDURE — 1126F AMNT PAIN NOTED NONE PRSNT: CPT | Mod: CPTII,S$GLB,, | Performed by: INTERNAL MEDICINE

## 2024-04-30 PROCEDURE — 25000003 PHARM REV CODE 250: Mod: PN | Performed by: INTERNAL MEDICINE

## 2024-04-30 PROCEDURE — 3078F DIAST BP <80 MM HG: CPT | Mod: CPTII,S$GLB,, | Performed by: INTERNAL MEDICINE

## 2024-04-30 PROCEDURE — 1157F ADVNC CARE PLAN IN RCRD: CPT | Mod: CPTII,S$GLB,, | Performed by: INTERNAL MEDICINE

## 2024-04-30 PROCEDURE — 1111F DSCHRG MED/CURRENT MED MERGE: CPT | Mod: CPTII,S$GLB,, | Performed by: INTERNAL MEDICINE

## 2024-04-30 PROCEDURE — 99215 OFFICE O/P EST HI 40 MIN: CPT | Mod: S$GLB,,, | Performed by: INTERNAL MEDICINE

## 2024-04-30 PROCEDURE — 99999 PR PBB SHADOW E&M-EST. PATIENT-LVL V: CPT | Mod: PBBFAC,,, | Performed by: INTERNAL MEDICINE

## 2024-04-30 PROCEDURE — 63600175 PHARM REV CODE 636 W HCPCS: Mod: JZ,JG,PN | Performed by: INTERNAL MEDICINE

## 2024-04-30 PROCEDURE — 96413 CHEMO IV INFUSION 1 HR: CPT | Mod: PN

## 2024-04-30 PROCEDURE — 1159F MED LIST DOCD IN RCRD: CPT | Mod: CPTII,S$GLB,, | Performed by: INTERNAL MEDICINE

## 2024-04-30 RX ORDER — DIPHENHYDRAMINE HYDROCHLORIDE 50 MG/ML
50 INJECTION INTRAMUSCULAR; INTRAVENOUS ONCE AS NEEDED
Status: DISCONTINUED | OUTPATIENT
Start: 2024-04-30 | End: 2024-04-30 | Stop reason: HOSPADM

## 2024-04-30 RX ORDER — EPINEPHRINE 0.3 MG/.3ML
0.3 INJECTION SUBCUTANEOUS ONCE AS NEEDED
Status: DISCONTINUED | OUTPATIENT
Start: 2024-04-30 | End: 2024-04-30 | Stop reason: HOSPADM

## 2024-04-30 RX ORDER — PROCHLORPERAZINE EDISYLATE 5 MG/ML
5 INJECTION INTRAMUSCULAR; INTRAVENOUS ONCE AS NEEDED
Status: DISCONTINUED | OUTPATIENT
Start: 2024-04-30 | End: 2024-04-30 | Stop reason: HOSPADM

## 2024-04-30 RX ORDER — HEPARIN 100 UNIT/ML
500 SYRINGE INTRAVENOUS
Status: CANCELLED | OUTPATIENT
Start: 2024-04-30

## 2024-04-30 RX ORDER — SODIUM CHLORIDE 0.9 % (FLUSH) 0.9 %
10 SYRINGE (ML) INJECTION
Status: DISCONTINUED | OUTPATIENT
Start: 2024-04-30 | End: 2024-04-30 | Stop reason: HOSPADM

## 2024-04-30 RX ORDER — PROCHLORPERAZINE EDISYLATE 5 MG/ML
5 INJECTION INTRAMUSCULAR; INTRAVENOUS ONCE AS NEEDED
Status: CANCELLED | OUTPATIENT
Start: 2024-04-30

## 2024-04-30 RX ORDER — DIPHENHYDRAMINE HYDROCHLORIDE 50 MG/ML
50 INJECTION INTRAMUSCULAR; INTRAVENOUS ONCE AS NEEDED
Status: CANCELLED | OUTPATIENT
Start: 2024-04-30

## 2024-04-30 RX ORDER — SODIUM CHLORIDE 0.9 % (FLUSH) 0.9 %
10 SYRINGE (ML) INJECTION
Status: CANCELLED | OUTPATIENT
Start: 2024-04-30

## 2024-04-30 RX ORDER — EPINEPHRINE 0.3 MG/.3ML
0.3 INJECTION SUBCUTANEOUS ONCE AS NEEDED
Status: CANCELLED | OUTPATIENT
Start: 2024-04-30

## 2024-04-30 RX ORDER — HEPARIN 100 UNIT/ML
500 SYRINGE INTRAVENOUS
Status: DISCONTINUED | OUTPATIENT
Start: 2024-04-30 | End: 2024-04-30 | Stop reason: HOSPADM

## 2024-04-30 RX ADMIN — SODIUM CHLORIDE: 9 INJECTION, SOLUTION INTRAVENOUS at 09:04

## 2024-04-30 RX ADMIN — Medication 500 UNITS: at 10:04

## 2024-04-30 RX ADMIN — SODIUM CHLORIDE 480 MG: 9 INJECTION, SOLUTION INTRAVENOUS at 10:04

## 2024-04-30 NOTE — PLAN OF CARE
Problem: Adult Inpatient Plan of Care  Goal: Plan of Care Review  Outcome: Progressing  Flowsheets (Taken 4/30/2024 1100)  Plan of Care Reviewed With: patient     Patient tolerated Opdivo without incident. Port flushed with blood return, heparin locked, and de-accessed. AVS provided per portal;schedule reviewed. Ambulates per self; accompanied by spouse upon discharge.   No acute distress noted.  Next appointment 5/28/24.    Patient has been referred to nephrology to address his kidney function and an endocrinologist to manage his thyroid levels.    Assisted patient with adding his spouse, Courtney, as his proxy on [facility] portal.

## 2024-04-30 NOTE — TELEPHONE ENCOUNTER
----- Message from Yenni Manzanares sent at 4/30/2024  2:24 PM CDT -----  Type: Needs Medical Advice  Who Called:  rich, wife  Best Call Back Number: [phone] (home)   Additional Information: needs urgent appt for stent exchange

## 2024-04-30 NOTE — TELEPHONE ENCOUNTER
Informed the pt to contact Dr Olivares office to schedule an appt for urology due to the pt is already establish pt. Also inform pt to contact their nephrology to get an appt. Pt needs a STAT appt with Dr Xiong endocrinology for adrenal insufficiency called the office to get the pt establish and I inform them that I was calling from the cancer center the  Rina stated that she sent a message to the provider office due to the doctor is booked out for the year and she put for the office to contact the pt ASAP to get I'm schedule.

## 2024-04-30 NOTE — TELEPHONE ENCOUNTER
----- Message from Jen Garcia sent at 4/30/2024  9:40 AM CDT -----  Regarding: KIDNEYS NOT DOING WELL  Contact: self  Type: Sooner Appointment Request        Caller is requesting a sooner appointment. Caller declined first available appointment listed below. Caller will not accept being placed on the waitlist and is requesting a message be sent to doctor.        Name of Caller: Patient   Best Call Back Number: [phone]  Additional Information:  I have a pt that wants to get in to be seen Pt is at [facility] now getting treatment . Pt kidneys are not doing good and pt wife is saying he wont be able to wait until July to be seen wants to get in sooner as a net pt if possible. Thanks

## 2024-04-30 NOTE — PROGRESS NOTES
ONCOLOGY NUTRITION   FOLLOW UP VISIT        Aren Bey Jr. is a 74 y.o. male.  DATE: 04/30/2024        Oncology Diagnosis: Prostate and Bladder     REFERRAL FROM:   [] Integrative Oncology   [] Med/Heme Oncology  [] Radiation Oncology  [] Surgical Oncology   [] Infusion Nurse    [x] Routine Nutrition follow up    TREATMENT PLAN:   [] Full treatment plan pending  [] Chemotherapy  [x] Immunotherapy  [] Radiation  [] Concurrent  [] Surgery  [] Treatment complete/post-treatment    ANTHROPOMETRICS:  Wt Readings from Last 10 Encounters:   04/30/24 66.6 kg (146 lb 13.2 oz)   04/30/24 66.6 kg (146 lb 13.2 oz)   04/25/24 65.9 kg (145 lb 4.5 oz)   04/18/24 65.6 kg (144 lb 11.7 oz)   04/15/24 63 kg (139 lb)   04/04/24 66.9 kg (147 lb 7.8 oz)   04/03/24 67.2 kg (148 lb 2.4 oz)   04/02/24 67 kg (147 lb 11.3 oz)   04/02/24 67 kg (147 lb 11.3 oz)   03/21/24 66.8 kg (147 lb 6 oz)      Weight Changes: has been stable    PHYSICAL EXAM:  Muscle Wasting Observed:  [] No Deficit   [x] Mild Deficit   [] Moderate   [] Severe    INTAKE:  [x] PO Intake [] TF Intake  Current Diet: regular diet  Dietary Patterns:  Eating meals/snacks as tolerated  [] Oral nutritional supplements:     SYMPTOMS/COMPLAINTS:   [] No nutritional concerns at current  [] Diarrhea                    [] Constipation           [] Nausea                 [] Vomiting                [] Indigestion                [] Reflux              [] Poor Appetite            [] Anorexia                 [] Early Satiety         [] Gas                       [] Bloating                     [] Dry Mouth    [] Mucositis                   [] Mouth Sores           [] Poor Dentition      [] Difficulty chewing  [] Difficulty Swallowing   [] Pain with swallowing [] Change in taste      [] Change in smell   [] Pain (general)       [] Fatigue                      [] Sleep issues    [] Weight loss  [x] other, please specify- adrenal insufficiency    Nutrition Re-Assessment Risk: low to  moderate    [x] Labs reviewed   [x] Meds reviewed    Education Provided:   [] No Education Needed at this time  [] Diarrhea                                              [] Constipation                          [] Nausea/Vomiting  [] Mucositis                [] Dry Mouth    [] Dealing with changes in Taste/Smell  [] Dealing with Poor Appetite   [] Soft/moist Diet      [] Weight Loss/Gain     [] Weight Maintenance                           [] Indigestion/GERD                 [] Gas/Bloating          [x] Foods High/ Low in specific nutrients [] Increasing Calories/Protein   [] Milkshake/Smoothies Recipes   [] Nutrition Supplements                        [] Increasing Fluid Intakes         [] Foods that fight cancer    [] Evidence bases resources                 [] Fermented Foods/Probiotics  [] Mediterranean/Plant Based Diet     [] Other, specify                                   [] Handouts provided      [] Samples provided     RD NOTE:  RD met with pt and pt's spouse, Courtney, at chairside during infusion tx. RD following up from previous visit.  Pt recently went to ED with complaints of hypotension, weakness, and diarrhea. The pt was found to have C Diff, adrenal insufficiency, and hypothyroidism. Pt was treated for all 3. Spouse concern for pt's diet, inquiring about endocrine insufficiency diet information. RD encouraged pt to reduce the amount of saturated fat intake. Discussed talking to endocrinologist about these concerns.   Pt states he drinks a lot of water. It is so much that he has had to reduce his intake. His sodium and K have recently been low. Pt spouse states, she and pt choose to lead a whole foods lifestyle. They do not eat anything out of a can or box.         RD Goals:   [x] Weight stable                  [] Weight gain                      [] Weight Loss                               [x] Continue adequate Kcal/protein   [] Increase Kcal/protein      [] Adjust Tube-feeding Rx   [] Tolerate Tube  Feedings             [] Increase tube feedings to goal     [] Tolerate Supplements     [] Symptom Improvement   [] Understand nutrition Education  [] Offer supportive visits   [] other, please specify    RECOMMENDATIONS:  Reduce the amount of saturated and trans fats  Continue current lifestyle food choices   Continue current fluid intake for proper hydration    Follow up: PRN throughout tx    Yoana Watts RDN, LDN  04/30/2024  2:56 PM

## 2024-04-30 NOTE — TELEPHONE ENCOUNTER
----- Message from Rina Holly sent at 4/30/2024  9:27 AM CDT -----  Contact: cancer center  Type:  Sooner Apoointment Request    Caller is requesting a sooner appointment.  Caller declined first available appointment listed below.  Caller will not accept being placed on the waitlist and is requesting a message be sent to doctor.    Name of Caller:cancer center    When is the first available appointment? None    Symptoms: referral from Dr Jimenez for E03.9 (ICD-10-CM) - Hypothyroidism, unspecified type    Would the patient rather a call back or a response via [facility]? Call back    Best Call Back Number:[phone]    Additional Information: Dr Jimenez wants him seen as soon as possible    Please call to schedule  Thanks

## 2024-04-30 NOTE — TELEPHONE ENCOUNTER
Wife was called back and offered a June 4 virtual visit with Dr. Sharp. She refused and is in [place] next Thursday for another appointment so I scheduled them with Dr. Solorzano to review recent labs.

## 2024-04-30 NOTE — PLAN OF CARE
Problem: Adult Inpatient Plan of Care  Goal: Patient-Specific Goal (Individualized)  Outcome: Progressing  Flowsheets (Taken 4/30/2024 0943)  Individualized Care Needs: recliner, wife at chairside  Anxieties, Fears or Concerns:   thyroid levels are abnormal   creatinine clearance is reduced     Problem: Fatigue  Goal: Improved Activity Tolerance  Intervention: Promote Improved Energy  Flowsheets (Taken 4/30/2024 0943)  Sleep/Rest Enhancement:   natural light exposure provided   noise level reduced  Activity Management:   Ambulated -L4   Ambulated in [place] - L4

## 2024-05-01 ENCOUNTER — PATIENT MESSAGE (OUTPATIENT)
Dept: UROLOGY | Facility: CLINIC | Age: 74
End: 2024-05-01
Payer: MEDICARE

## 2024-05-01 ENCOUNTER — TELEPHONE (OUTPATIENT)
Dept: UROLOGY | Facility: CLINIC | Age: 74
End: 2024-05-01
Payer: MEDICARE

## 2024-05-01 DIAGNOSIS — C67.9 UROTHELIAL CARCINOMA OF BLADDER: Primary | ICD-10-CM

## 2024-05-01 DIAGNOSIS — N13.30 HYDRONEPHROSIS: ICD-10-CM

## 2024-05-01 NOTE — TELEPHONE ENCOUNTER
Called patient and spoke to patient's wife, Ms Valdez. Informed her that Dr Olivares will be doing patient's bilateral stent exchange at [facility]. Confirmed surgery date on the phone with Ms Courtney. Informed her that patient will need to complete pre op. Patient's wife verbalized understanding. My chart message also sent with these details, per patient's wife request.

## 2024-05-03 ENCOUNTER — TELEPHONE (OUTPATIENT)
Dept: NEPHROLOGY | Facility: CLINIC | Age: 74
End: 2024-05-03
Payer: MEDICARE

## 2024-05-03 ENCOUNTER — OFFICE VISIT (OUTPATIENT)
Dept: FAMILY MEDICINE | Facility: CLINIC | Age: 74
End: 2024-05-03
Payer: MEDICARE

## 2024-05-03 VITALS
SYSTOLIC BLOOD PRESSURE: 120 MMHG | HEART RATE: 70 BPM | BODY MASS INDEX: 22 KG/M2 | HEIGHT: 69 IN | WEIGHT: 148.5 LBS | DIASTOLIC BLOOD PRESSURE: 70 MMHG

## 2024-05-03 DIAGNOSIS — R41.82 ALTERED MENTAL STATUS, UNSPECIFIED ALTERED MENTAL STATUS TYPE: ICD-10-CM

## 2024-05-03 DIAGNOSIS — N17.9 AKI (ACUTE KIDNEY INJURY): Primary | ICD-10-CM

## 2024-05-03 DIAGNOSIS — E27.49 SECONDARY ADRENAL INSUFFICIENCY: Primary | ICD-10-CM

## 2024-05-03 DIAGNOSIS — A04.72 C. DIFFICILE COLITIS: ICD-10-CM

## 2024-05-03 PROCEDURE — 3078F DIAST BP <80 MM HG: CPT | Mod: CPTII,S$GLB,, | Performed by: FAMILY MEDICINE

## 2024-05-03 PROCEDURE — 1101F PT FALLS ASSESS-DOCD LE1/YR: CPT | Mod: CPTII,S$GLB,, | Performed by: FAMILY MEDICINE

## 2024-05-03 PROCEDURE — 99999 PR PBB SHADOW E&M-EST. PATIENT-LVL IV: CPT | Mod: PBBFAC,,, | Performed by: FAMILY MEDICINE

## 2024-05-03 PROCEDURE — 1157F ADVNC CARE PLAN IN RCRD: CPT | Mod: CPTII,S$GLB,, | Performed by: FAMILY MEDICINE

## 2024-05-03 PROCEDURE — 1126F AMNT PAIN NOTED NONE PRSNT: CPT | Mod: CPTII,S$GLB,, | Performed by: FAMILY MEDICINE

## 2024-05-03 PROCEDURE — 3288F FALL RISK ASSESSMENT DOCD: CPT | Mod: CPTII,S$GLB,, | Performed by: FAMILY MEDICINE

## 2024-05-03 PROCEDURE — 3074F SYST BP LT 130 MM HG: CPT | Mod: CPTII,S$GLB,, | Performed by: FAMILY MEDICINE

## 2024-05-03 PROCEDURE — 1159F MED LIST DOCD IN RCRD: CPT | Mod: CPTII,S$GLB,, | Performed by: FAMILY MEDICINE

## 2024-05-03 PROCEDURE — 99214 OFFICE O/P EST MOD 30 MIN: CPT | Mod: S$GLB,,, | Performed by: FAMILY MEDICINE

## 2024-05-03 PROCEDURE — 1111F DSCHRG MED/CURRENT MED MERGE: CPT | Mod: CPTII,S$GLB,, | Performed by: FAMILY MEDICINE

## 2024-05-03 NOTE — PROGRESS NOTES
THIS DOCUMENT WAS MADE IN PART WITH VOICE RECOGNITION SOFTWARE.  OCCASIONALLY THIS SOFTWARE WILL MISINTERPRET WORDS OR PHRASES.    Assessment and Plan:    1. Secondary adrenal insufficiency        2. Altered mental status, unspecified altered mental status type        3. C. difficile colitis          C diff resolved     Blood pressure, energy level much improved with correction of adrenal insufficiency  Previously hyperthyroid, now hypothyroid   Has appointment with endocrinology for Monday to establish     Chronic kidney disease with acute worsening   Recent creatinine 1.7 with baseline range from 1.2-1.5   Has upcoming appointment with Nephrology, suspect this relates to immunotherapy for cancer diagnosis     Keep follow-up with me every 3 months      ______________________________________________________________________  Subjective:    Chief Complaint:  Chief Complaint   Patient presents with    Follow-up        HPI:  Aren is a 74 y.o. year old     Presented to ED at [facility]/4/[facility] with weakness and hypotension in the setting of diarrhea, decreased PO intake. Diagnosed with C diff colitis, presumed secondary adrenal insufficiency r/t immunotherapy. Developed delirium during stay (hallucinations).     Discharged with Cortef 20 mg, Seroquel 12.5 mg q.h.s.    Today, reports stools back to normal, denies any further symptoms of C diff colitis     Patient concerns include elevated TSH from previous hyper thyroid profile, elevated creatinine to 1.7  Has appointments with Nephrology and Endocrinology upcoming    Otherwise, patient says he feels great.  Has been working out in the yd.  Energy level much improved with for placement of cortisol    Chronic kidney disease , 3a  Baseline creatinine 1.4-1.5  Nephrology : MD Aron    Secondary adrenal insufficiency  Rx-Cortef 20 mg q.a.m.      History of Hypertension  Rx-none   Normotensive in clinic      Dyslipidemia + CAD + carotid artery stenosis  S/p CABG x 4  (11/2021)  Rx-atorvastatin 20 mg  Prev rx : metoprolol (bradycardia)   Cardiology : MD Pineda --->  MD Fox ----> MD Pineda   US : bilateral carotid- BILATERAL DISEASE NOTED LESS THAN 50%  NST-March 2024-negative     Right Popliteal artery aneurysm   Vasc: MD Lalo --->  MD Alethea   F/u yearly with US : stable x 3 year      History of pulmonary embolism   August 2023   No current anticoagulation     Bladder cancer -invasive urothelial carcinoma  Oncology - MD Tony   Urology- MD Augie   Previous treatment-chemotherapy, surgery  Currently undergoing immunotherapy  GOAL: CURE  Status post radical cystectomy, prostatectomy August 2023 with urostomy placement  Positive lymphovascular invasion, soft tissue invasion  Status post ureteral stenting     Recurrent UTI  Following the urostomy / bladder resection      History C diff colitis   November 2023  Currently taking probiotics      Prostate cancer   Oncology- MD Tony  S/p prostatectomy      Mild anemia, normocytic   Taking Iron / B complex     Generalized Pruritus   Rx : Prn hydroxyzine, Kenalog, Cerave   Prev rx : Zyrtec (ineffective)     Hyperthyroidism  Previous TSH undetectable, T4 normal     Past Medical History:  Past Medical History:   Diagnosis Date    Aneurysm artery, popliteal     right leg    Bladder cancer     Hypertension     Malignant neoplasm of prostate     Seizures        Past Surgical History:  Past Surgical History:   Procedure Laterality Date    PROSTATECTOMY      REMOVAL-STENT Bilateral 1/23/2024    Procedure: REMOVAL-STENT;  Surgeon: South Ghosh MD;  Location: [facility] OR [address] [place] [address];  Service: Urology;  Laterality: Bilateral;    URETERAL STENT PLACEMENT Bilateral 1/23/2024    Procedure: INSERTION, STENT, URETER;  Surgeon: South Ghosh MD;  Location: [facility] OR [address] [place] [address];  Service: Urology;  Laterality: Bilateral;       Family History:  Family History   Problem Relation Name Age of Onset    Heart disease Mother      Hypertension Mother          Social History:  Social History     Socioeconomic History    Marital status:    Occupational History    Occupation: retired   Tobacco Use    Smoking status: Never     Passive exposure: Never    Smokeless tobacco: Never   Substance and Sexual Activity    Alcohol use: Not Currently    Drug use: Never    Sexual activity: Yes     Partners: Female     Social Determinants of Health     Financial Resource Strain: Low Risk  (3/27/2024)    [facility]    Overall Financial Resource Strain (CARDIA)     Difficulty of Paying Living Expenses: Not hard at all   Food Insecurity: No Food Insecurity (4/7/2024)    Hunger Vital Sign     Worried About Running Out of Food in the Last Year: Never true     Ran Out of Food in the Last Year: Never true   Transportation Needs: No Transportation Needs (4/7/2024)    PRAPARE - Transportation     Lack of Transportation (Medical): No     Lack of Transportation (Non-Medical): No   Physical Activity: Inactive (3/27/2024)    [facility]    Exercise Vital Sign     Days of Exercise per Week: 0 days     Minutes of Exercise per Session: 0 min   Stress: No Stress Concern Present (3/27/2024)    [facility]    [employer] [place] [employer] - Occupational Stress Questionnaire     Feeling of Stress : Not at all   Housing Stability: Low Risk  (4/7/2024)    Housing Stability Vital Sign     Unable to Pay for Housing in the Last Year: No     Number of Places Lived in the  Last Year: 1     Unstable Housing in the Last Year: No       Medications:  Current Outpatient Medications on File Prior to Visit   Medication Sig Dispense Refill    acetaminophen (TYLENOL) 500 MG tablet Take 500-1,000 mg by mouth every 6 (six) hours as needed (fever/pain).      ASCORBIC ACID, VITAMIN C, ORAL Take 1 tablet by mouth once daily.      aspirin (ECOTRIN) 81 MG EC tablet Take 1 tablet (81 mg total) by mouth once daily. 90 tablet 3    atorvastatin (LIPITOR) 40 MG tablet Take 40 mg by mouth once daily.      b complex vitamins tablet Take 1 tablet by mouth once daily.      cholecalciferol, vitamin D3, 125 mcg (5,000 unit) Tab Take 5,000 Units by mouth once daily.      cyanocobalamin, vitamin B-12, (VITAMIN B-12) 2,500 mcg Subl Take 2,500 mcg by mouth once daily.      ferrous sulfate (SLOW RELEASE IRON) 142 mg (45 mg iron) TbSR Take 45 mg by mouth Daily.      hydrocortisone (CORTEF) 20 MG Tab Take 1 tablet (20 mg total) by mouth once daily. 30 tablet 1    hydrOXYzine HCL (ATARAX) 25 MG tablet Take 1 tablet (25 mg total) by mouth 3 (three) times daily as needed for Itching. 90 tablet 3    LIDOcaine-prilocaine (EMLA) cream Apply topically once as needed (to port access).      multivitamin with minerals tablet Take 1 tablet by mouth once daily.      QUEtiapine (SEROQUEL) 25 MG Tab Take 1 tablet (25 mg total) by mouth every evening. Take 0.5 tabs at night. (12.5mg) 30 tablet 0    vancomycin (VANCOCIN) 125 MG capsule Take 1 capsule (125 mg total) by mouth every other day. for 14 days 7 capsule 0    zinc gluconate 50 mg tablet Take 50 mg by mouth once daily.      nitroGLYCERIN (NITROSTAT) 0.4 MG SL tablet Place 1 tablet (0.4 mg total) under the tongue every 5 (five) minutes as needed for Chest pain. (Patient not taking: Reported on 5/3/2024) 25 tablet 0     No current facility-administered medications on file prior to visit.       Allergies:  Clopidogrel    Immunizations:  Immunization History   Administered Date(s)  "Administered    Influenza (FLUAD) - Trivalent - Adjuvanted - PF (65+) 11/12/2019    Influenza - High Dose - PF (65 years and older) 10/21/2015    Influenza - Quadrivalent - High Dose - PF (65 years and older) 09/24/2020, 09/15/2021    Influenza - Quadrivalent - PF *Preferred* (6 months and older) 10/11/2022    Influenza A (H5N1) 2013 Monovalent 11/27/2018    Pneumococcal Conjugate - 13 Valent 03/17/2020    Pneumococcal Polysaccharide - 23 Valent 03/26/2021    Tdap 01/01/2016    Zoster 10/21/2015    Zoster Recombinant 10/12/2020, 01/29/2021       Review of Systems:  Review of Systems   All other systems reviewed and are negative.      Objective:    Vitals:  Vitals:    05/03/24 1301   BP: 120/70   Pulse: 70   Weight: 67.4 kg (148 lb 7.7 oz)   Height: 5' 9" (1.753 m)   PainSc: 0-No pain       Physical Exam  Vitals reviewed.   Constitutional:       General: He is not in acute distress.  HENT:      Head: Normocephalic and atraumatic.   Eyes:      Pupils: Pupils are equal, round, and reactive to light.   Cardiovascular:      Rate and Rhythm: Normal rate and regular rhythm.      Heart sounds: No murmur heard.     No friction rub.   Pulmonary:      Effort: Pulmonary effort is normal.      Breath sounds: Normal breath sounds.   Abdominal:      General: Bowel sounds are normal. There is no distension.      Palpations: Abdomen is soft.      Tenderness: There is no abdominal tenderness.   Musculoskeletal:      Cervical back: Neck supple.   Skin:     General: Skin is warm and dry.      Findings: No rash.   Psychiatric:         Behavior: Behavior normal.           Javon Shelley MD  Family Medicine      "

## 2024-05-06 ENCOUNTER — OFFICE VISIT (OUTPATIENT)
Dept: ENDOCRINOLOGY | Facility: CLINIC | Age: 74
End: 2024-05-06
Payer: MEDICARE

## 2024-05-06 ENCOUNTER — TELEPHONE (OUTPATIENT)
Dept: ENDOCRINOLOGY | Facility: CLINIC | Age: 74
End: 2024-05-06

## 2024-05-06 ENCOUNTER — PATIENT MESSAGE (OUTPATIENT)
Dept: ENDOCRINOLOGY | Facility: CLINIC | Age: 74
End: 2024-05-06

## 2024-05-06 VITALS
BODY MASS INDEX: 22 KG/M2 | SYSTOLIC BLOOD PRESSURE: 122 MMHG | HEART RATE: 56 BPM | OXYGEN SATURATION: 99 % | WEIGHT: 148.5 LBS | DIASTOLIC BLOOD PRESSURE: 62 MMHG | HEIGHT: 69 IN

## 2024-05-06 DIAGNOSIS — D49.4 NEOPLASM, BLADDER: ICD-10-CM

## 2024-05-06 DIAGNOSIS — E27.40 ADRENAL INSUFFICIENCY: Primary | ICD-10-CM

## 2024-05-06 DIAGNOSIS — E05.90 HYPERTHYROIDISM: ICD-10-CM

## 2024-05-06 PROCEDURE — 3074F SYST BP LT 130 MM HG: CPT | Mod: CPTII,S$GLB,, | Performed by: INTERNAL MEDICINE

## 2024-05-06 PROCEDURE — 1157F ADVNC CARE PLAN IN RCRD: CPT | Mod: CPTII,S$GLB,, | Performed by: INTERNAL MEDICINE

## 2024-05-06 PROCEDURE — 3288F FALL RISK ASSESSMENT DOCD: CPT | Mod: CPTII,S$GLB,, | Performed by: INTERNAL MEDICINE

## 2024-05-06 PROCEDURE — 1159F MED LIST DOCD IN RCRD: CPT | Mod: CPTII,S$GLB,, | Performed by: INTERNAL MEDICINE

## 2024-05-06 PROCEDURE — 1160F RVW MEDS BY RX/DR IN RCRD: CPT | Mod: CPTII,S$GLB,, | Performed by: INTERNAL MEDICINE

## 2024-05-06 PROCEDURE — 99999 PR PBB SHADOW E&M-EST. PATIENT-LVL V: CPT | Mod: PBBFAC,,, | Performed by: INTERNAL MEDICINE

## 2024-05-06 PROCEDURE — 3078F DIAST BP <80 MM HG: CPT | Mod: CPTII,S$GLB,, | Performed by: INTERNAL MEDICINE

## 2024-05-06 PROCEDURE — 1126F AMNT PAIN NOTED NONE PRSNT: CPT | Mod: CPTII,S$GLB,, | Performed by: INTERNAL MEDICINE

## 2024-05-06 PROCEDURE — 1101F PT FALLS ASSESS-DOCD LE1/YR: CPT | Mod: CPTII,S$GLB,, | Performed by: INTERNAL MEDICINE

## 2024-05-06 PROCEDURE — 1111F DSCHRG MED/CURRENT MED MERGE: CPT | Mod: CPTII,S$GLB,, | Performed by: INTERNAL MEDICINE

## 2024-05-06 PROCEDURE — 99204 OFFICE O/P NEW MOD 45 MIN: CPT | Mod: S$GLB,,, | Performed by: INTERNAL MEDICINE

## 2024-05-06 RX ORDER — COSYNTROPIN 0.25 MG/ML
0.25 INJECTION, POWDER, FOR SOLUTION INTRAMUSCULAR; INTRAVENOUS
OUTPATIENT
Start: 2024-05-06

## 2024-05-06 RX ORDER — SODIUM CHLORIDE 0.9 % (FLUSH) 0.9 %
3 SYRINGE (ML) INJECTION
OUTPATIENT
Start: 2024-05-06

## 2024-05-06 RX ORDER — HYDROCORTISONE SODIUM SUCCINATE 100 MG/2ML
100 INJECTION, POWDER, FOR SOLUTION INTRAMUSCULAR; INTRAVENOUS ONCE
Qty: 1 EACH | Refills: 0 | Status: SHIPPED | OUTPATIENT
Start: 2024-05-06 | End: 2024-05-06

## 2024-05-06 RX ORDER — HYDROCORTISONE 20 MG/1
20 TABLET ORAL DAILY
Qty: 100 TABLET | Refills: 3 | Status: SHIPPED | OUTPATIENT
Start: 2024-05-06

## 2024-05-06 NOTE — PATIENT INSTRUCTIONS
Adrenal Insufficiency Management   How do I prevent an adrenal crisis?   Get the flu shot every year.   Take your medications as prescribed.   Keep up to date with doctor appointments, including regular checkups.     The symptoms of an adrenal insufficiency crisis can include   Extreme weakness   Mental confusion   Extreme drowsiness, in advanced cases slipping towards a coma   Pronounced dizziness   Nausea and/or vomiting   Severe headache   Abnormal heart rate - either too fast or too slow   Abnormally low blood pressure   Feeling extremely cold   Possible fever   Possible abdominal tenderness     What do I do when I am sick?   Coughs, colds, or other minor illness with oral temperature less than 100 degrees F   ? You do not need to increase your oral steroid dose.   Serious illnesses with fever 100-101.9 degrees F (such as the flu)   ? Double your normal steroid dose and contact your doctor.   ? If your fever is 102 degrees F or higher, triple your normal dose and contact your doctor.   ? If you are taking fludrocortisone, you do not have to increase your steroid dose during illness.   Diarrhea lasting more than a day   ? Double your steroid dose.   Nausea and vomiting   ? Take anti-nausea medication.   ? Take an extra dose of steroid to see if you can keep it down for more than 30 minutes.   ? If you are unable to keep the steroid dose down for more than 30 minutes, give yourself an intramuscular (IM) steroid injection and report to the emergency room.   ? Be sure someone is around the house in case your condition worsens.   Surgery   ? Dental surgery or minor surgery requiring only local anesthesia: You do not need to increase your steroid dose.   ? Moderately stressful surgery (barium enema, endoscopy, cataract surgery, major oral surgery): You should receive a hydrocortisone 100 mg IV before the procedure.   ? Major surgery (, heart operation): You should receive a hydrocortisone 100 mg IV  before the procedure and then every 8 hours for 48-72 hours.     [id number]   Page 1 of 2   Last updated 08/2011   Adrenal Insufficiency Management   When should I give myself an emergency injection?   As a general rule, persons with adrenal insufficiency should give themselves an emergency dose of injectable steroid if they vomit more than once.   Do not wait until you are too weak and confused to give yourself the injection. Keep ahead of your illness.   If you are able to swallow medication and keep it down at least 30 minutes, an injection may not be necessary.   If you are unable to keep the steroid down for 30 minutes, you will need an emergency injection as soon as possible.   If you are experiencing a rapid deterioration in your condition (such as severe physical shock), you will need an emergency injection as soon as possible.   If you are seriously injured, you may need a stress dose of injectable steroids to stabilize your blood pressure before vomiting occurs.   Once you have given yourself an injection, seek immediate medical help. The injection medication is intended to stabilize your condition until you can get emergency medical help, not as a replacement for medical care.     Steroid Injections   1. Dexamethasone (Cortastat, Cortistat LA, Cortastat 10, Dexasone LA)   a. 4 mg / 1 mL vial   b. 3 mL syringes   c. [place]     Store at room temperature. Keep out of the light.   2. Hydrocortisone (Solu-Cortef)   a. Solu-Cortef 100 mg   b. 1 ampule of water   c. 3 mL syringe   d. [place]     More stable in high temperatures. Must be mixed with water before administration.   All patients with adrenal insufficiency should wear a MedicAlert bracelet or tag that identifies their condition as follows: Adrenal Insufficiency. Steroid Dependent.   [id number]   Page 2 of 2   Last updated 08/2011

## 2024-05-06 NOTE — PROGRESS NOTES
CHIEF COMPLAINT:  Adrenal insufficiency/hyperthyroidism  74 y.o. old being seen as a new patient.  Patient with bladder cancer.  Recently discovered to have adrenal insufficiency and hyperthyroidism. Given Nivolumab (Opdivo) 9/2023.  He has had a CT of the head as well as a PET scan.  On Hydrocortisone 20 mg daily. On steroids since April 2024. Was hypotensive at the time.  No n/V. No history of steroids. No head trauma. States had difficulty taking an afternoon dose. Not lightheaded when standing. Staying well hydrated.   He was started on methimazole but then became hypothyroid and methimazole stopped after 4/29/24. Was seeing Dr. Lynn. NO palpitations. NO tremors.       PAST MEDICAL HISTORY/PAST SURGICAL HISTORY:  Reviewed in [facility]    SOCIAL HISTORY: Reviewed in [facility]    FAMILY HISTORY:  No known thyroid disease. No known pituitary disorders.     MEDICATIONS/ALLERGIES: The patient's MedCard has been updated and reviewed.            PE:    GENERAL: Well developed, well nourished.  NECK: Supple, trachea midline, no palpable thyroid nodules  CHEST: Resp even and unlabored, CTA bilateral.  CARDIAC: RRR, S1, S2 heard, no murmurs, rubs, S3, or S4    LABS/Radiology   Latest Reference Range & Units 04/04/24 15:56 04/05/24 10:14 04/05/24 16:37 04/06/24 06:17   Cortisol ug/dL 0.71 2.60     Cortisol, 60 Min ug/dL   12.3    ACTH 0 - 46 pg/mL 6      TSH 0.400 - 4.000 uIU/mL <0.015 (L)   <0.015 (L)   T3, Free 2.77 - 5.27 pg/mL    5.11   Free T4 0.78 - 2.19 ng/dL 2.72 (H)      Thyroid-Stim IG Quantitative <=0.54 IU/L    <0.10   (L): Data is abnormally low  (H): Data is abnormally high     Latest Reference Range & Units 04/29/24 10:42   TSH 0.400 - 4.000 uIU/mL 9.297 (H)   Free T4 0.71 - 1.51 ng/dL 0.46 (L)   (H): Data is abnormally high  (L): Data is abnormally low    ASSESSMENT/PLAN:  1. Adrenal insufficiency-currently on an immune checkpoint inhibitor (Opdivo).  Started on steroids.  Has a difficult time taking afternoon  doses of hydrocortisone.  Therefore taking once daily.  I would like to repeat a cosyntropin stimulation test on him.  His albumin was low when he did his initial test.  However, his baseline cortisol was very low at 1.2.  Also hypotensive at the time.  Discussed sick day precautions.  Discussed wearing a medical alert bracelet.  Gave information in AVS.  Discussed holding hydrocortisone the day prior to cosyntropin stimulation test and restarting after the test complete    2.  Hyperthyroidism-possibly due to immune check point inhibitor.  She was on methimazole for short period of time then became hypothyroid.  Methimazole stopped based on labs 4/29/2024.  Repeat labs when he goes in for labs on May 27th.  They will let us know if there is a plan to give him contrast/IV dye which can contain a lot of iodine.    3.  Bladder cancer-currently on immune checkpoint inhibitor (Opdivo).       FOLLOWUP  Needs Cosyntropin stim test  Add TRAB to 5/27 labs  F/U 4 months- ok to double.

## 2024-05-07 ENCOUNTER — PATIENT MESSAGE (OUTPATIENT)
Dept: ENDOCRINOLOGY | Facility: CLINIC | Age: 74
End: 2024-05-07
Payer: MEDICARE

## 2024-05-08 NOTE — TELEPHONE ENCOUNTER
That should be ok.   Can consider adding a lower dose of steroids in early afternoon if needed.   Continue to check. If systolic goes < 90 then can double steroid dose

## 2024-05-09 ENCOUNTER — TELEPHONE (OUTPATIENT)
Dept: ENDOCRINOLOGY | Facility: CLINIC | Age: 74
End: 2024-05-09
Payer: MEDICARE

## 2024-05-09 ENCOUNTER — OFFICE VISIT (OUTPATIENT)
Dept: NEPHROLOGY | Facility: CLINIC | Age: 74
End: 2024-05-09
Payer: MEDICARE

## 2024-05-09 VITALS
WEIGHT: 149.69 LBS | HEART RATE: 74 BPM | DIASTOLIC BLOOD PRESSURE: 60 MMHG | HEIGHT: 69 IN | SYSTOLIC BLOOD PRESSURE: 100 MMHG | BODY MASS INDEX: 22.17 KG/M2

## 2024-05-09 DIAGNOSIS — E87.1 HYPONATREMIA: ICD-10-CM

## 2024-05-09 DIAGNOSIS — N18.31 STAGE 3A CHRONIC KIDNEY DISEASE: Primary | ICD-10-CM

## 2024-05-09 DIAGNOSIS — N17.9 AKI (ACUTE KIDNEY INJURY): ICD-10-CM

## 2024-05-09 DIAGNOSIS — I10 ESSENTIAL HYPERTENSION: Primary | ICD-10-CM

## 2024-05-09 DIAGNOSIS — E27.40 ADRENAL INSUFFICIENCY: ICD-10-CM

## 2024-05-09 PROCEDURE — 1126F AMNT PAIN NOTED NONE PRSNT: CPT | Mod: CPTII,S$GLB,, | Performed by: INTERNAL MEDICINE

## 2024-05-09 PROCEDURE — 3074F SYST BP LT 130 MM HG: CPT | Mod: CPTII,S$GLB,, | Performed by: INTERNAL MEDICINE

## 2024-05-09 PROCEDURE — 3078F DIAST BP <80 MM HG: CPT | Mod: CPTII,S$GLB,, | Performed by: INTERNAL MEDICINE

## 2024-05-09 PROCEDURE — 99999 PR PBB SHADOW E&M-EST. PATIENT-LVL IV: CPT | Mod: PBBFAC,,, | Performed by: INTERNAL MEDICINE

## 2024-05-09 PROCEDURE — 1111F DSCHRG MED/CURRENT MED MERGE: CPT | Mod: CPTII,S$GLB,, | Performed by: INTERNAL MEDICINE

## 2024-05-09 PROCEDURE — 1159F MED LIST DOCD IN RCRD: CPT | Mod: CPTII,S$GLB,, | Performed by: INTERNAL MEDICINE

## 2024-05-09 PROCEDURE — 1157F ADVNC CARE PLAN IN RCRD: CPT | Mod: CPTII,S$GLB,, | Performed by: INTERNAL MEDICINE

## 2024-05-09 PROCEDURE — 99215 OFFICE O/P EST HI 40 MIN: CPT | Mod: S$GLB,,, | Performed by: INTERNAL MEDICINE

## 2024-05-09 PROCEDURE — 3288F FALL RISK ASSESSMENT DOCD: CPT | Mod: CPTII,S$GLB,, | Performed by: INTERNAL MEDICINE

## 2024-05-09 PROCEDURE — 1101F PT FALLS ASSESS-DOCD LE1/YR: CPT | Mod: CPTII,S$GLB,, | Performed by: INTERNAL MEDICINE

## 2024-05-09 PROCEDURE — 1160F RVW MEDS BY RX/DR IN RCRD: CPT | Mod: CPTII,S$GLB,, | Performed by: INTERNAL MEDICINE

## 2024-05-09 RX ORDER — HYDROCORTISONE 5 MG/1
TABLET ORAL
Qty: 30 TABLET | Refills: 6 | Status: SHIPPED | OUTPATIENT
Start: 2024-05-09

## 2024-05-09 NOTE — PROGRESS NOTES
"Subjective:       Patient ID: Aren Bey Jr. is a 74 y.o. male.    Chief Complaint: acute kidney injury    HPI    He presents to clinic today for routine follow-up.  He was initially evaluated by Dr. Sharp for acute kidney injury several months ago.  Since his last office visit he developed C diff enterocolitis.  He remains on oral vancomycin however states that his diarrhea has resolved.  He also carries diagnosis of hypoadrenalism in his currently managed by an endocrinologist.  His laboratory studies and medications were reviewed.  All Nephrology related questions were answered to his satisfaction.      Review of Systems   Constitutional: Negative.    HENT: Negative.     Respiratory: Negative.     Cardiovascular: Negative.    Gastrointestinal: Negative.    Genitourinary: Negative.    Musculoskeletal: Negative.    Skin: Negative.        /60   Pulse 74   Ht 5' 9" (1.753 m)   Wt 67.9 kg (149 lb 11.1 oz)   BMI 22.11 kg/m²     Lab Results   Component Value Date    WBC 9.14 05/08/2024    HGB 11.7 (L) 05/08/2024    HCT 35.6 (L) 05/08/2024    MCV 89 05/08/2024     05/08/2024      BMP  Lab Results   Component Value Date     (L) 05/08/2024    K 5.1 05/08/2024    CL 98 05/08/2024    CO2 21 (L) 05/08/2024    BUN 18 05/08/2024    CREATININE 1.46 (H) 05/08/2024    CALCIUM 9.1 05/08/2024    ANIONGAP 8 05/08/2024    ESTGFRAFRICA 42 10/07/2023    EGFRNONAA >60.0 09/11/2021     CMP  Sodium   Date Value Ref Range Status   05/08/2024 127 (L) 136 - 145 mmol/L Final     Potassium   Date Value Ref Range Status   05/08/2024 5.1 3.5 - 5.1 mmol/L Final     Comment:     Anion Gap reference range revised on 4/28/2023     Chloride   Date Value Ref Range Status   05/08/2024 98 95 - 110 mmol/L Final     CO2   Date Value Ref Range Status   05/08/2024 21 (L) 22 - 31 mmol/L Final     Glucose   Date Value Ref Range Status   05/08/2024 108 70 - 110 mg/dL Final     Comment:     The ADA recommends the following " guidelines for fasting glucose:    Normal:       less than 100 mg/dL    Prediabetes:  100 mg/dL to 125 mg/dL    Diabetes:     126 mg/dL or higher       BUN   Date Value Ref Range Status   05/08/2024 18 9 - 21 mg/dL Final     Creatinine   Date Value Ref Range Status   05/08/2024 1.46 (H) 0.50 - 1.40 mg/dL Final     Calcium   Date Value Ref Range Status   05/08/2024 9.1 8.4 - 10.2 mg/dL Final     Total Protein   Date Value Ref Range Status   04/29/2024 7.0 6.0 - 8.4 g/dL Final     Albumin   Date Value Ref Range Status   04/29/2024 3.8 3.5 - 5.2 g/dL Final     Total Bilirubin   Date Value Ref Range Status   04/29/2024 0.4 0.1 - 1.0 mg/dL Final     Comment:     For infants and newborns, interpretation of results should be based  on gestational age, weight and in agreement with clinical  observations.    Premature Infant recommended reference ranges:  Up to 24 hours.............<8.0 mg/dL  Up to 48 hours............<12.0 mg/dL  3-5 days..................<15.0 mg/dL  6-29 days.................<15.0 mg/dL       Alkaline Phosphatase   Date Value Ref Range Status   04/29/2024 73 55 - 135 U/L Final     AST   Date Value Ref Range Status   04/29/2024 36 10 - 40 U/L Final     ALT   Date Value Ref Range Status   04/29/2024 33 10 - 44 U/L Final     Anion Gap   Date Value Ref Range Status   05/08/2024 8 5 - 12 mmol/L Final     Comment:     Anion Gap reference range revised on 4/28/2023     eGFR if    Date Value Ref Range Status   09/11/2021 >60.0 >60 mL/min/1.73 m^2 Final     eGFR    Date Value Ref Range Status   10/07/2023 42 mL/min/1.73mSq Final     Comment:     In accordance with NKF-ASN Task Force recommendation, calculation based on the Chronic Kidney Disease Epidemiology Collaboration (CKD-EPI) equation without adjustment for race. eGFR adjusted for gender and age and calculated in ml/min/1.73mSquared. eGFR cannot be calculated if patient is under 18 years of age.     Reference Range:   >= 60  ml/min/1.73mSquared.     eGFR if non    Date Value Ref Range Status   09/11/2021 >60.0 >60 mL/min/1.73 m^2 Final     Comment:     Calculation used to obtain the estimated glomerular filtration  rate (eGFR) is the CKD-EPI equation.        Current Outpatient Medications on File Prior to Visit   Medication Sig Dispense Refill    acetaminophen (TYLENOL) 500 MG tablet Take 500-1,000 mg by mouth every 6 (six) hours as needed (fever/pain).      ASCORBIC ACID, VITAMIN C, ORAL Take 1 tablet by mouth once daily.      aspirin (ECOTRIN) 81 MG EC tablet Take 1 tablet (81 mg total) by mouth once daily. 90 tablet 3    atorvastatin (LIPITOR) 40 MG tablet Take 40 mg by mouth once daily.      b complex vitamins tablet Take 1 tablet by mouth once daily.      [START ON 5/13/2024] chlorhexidine (PERIDEX) 0.12 % solution Use as directed 15 mLs in the mouth or throat 2 (two) times daily.      cholecalciferol, vitamin D3, 125 mcg (5,000 unit) Tab Take 5,000 Units by mouth once daily.      cyanocobalamin, vitamin B-12, (VITAMIN B-12) 2,500 mcg Subl Take 2,500 mcg by mouth once daily.      ferrous sulfate (SLOW RELEASE IRON) 142 mg (45 mg iron) TbSR Take 45 mg by mouth Daily.      hydrocortisone (CORTEF) 20 MG Tab Take 1 tablet (20 mg total) by mouth once daily. Will need 10 extra tablet for stress dosing 100 tablet 3    LIDOcaine-prilocaine (EMLA) cream Apply topically once as needed (to port access).      multivitamin with minerals tablet Take 1 tablet by mouth once daily.      [START ON 5/13/2024] mupirocin (BACTROBAN) 2 % ointment Apply topically 2 (two) times daily.      nitroGLYCERIN (NITROSTAT) 0.4 MG SL tablet Place 1 tablet (0.4 mg total) under the tongue every 5 (five) minutes as needed for Chest pain. 25 tablet 0    sodium chloride 0.9% SolP 50 mL with nivolumab 40 mg/4 mL Soln Inject into the vein every 30 days.      vancomycin (VANCOCIN) 125 MG capsule Take 1 capsule (125 mg total) by mouth every other day. for  14 days 7 capsule 0    zinc gluconate 50 mg tablet Take 50 mg by mouth once daily.      [DISCONTINUED] hydrOXYzine HCL (ATARAX) 25 MG tablet Take 1 tablet (25 mg total) by mouth 3 (three) times daily as needed for Itching. (Patient not taking: Reported on 5/8/2024) 90 tablet 3    [DISCONTINUED] QUEtiapine (SEROQUEL) 25 MG Tab Take 1 tablet (25 mg total) by mouth every evening. Take 0.5 tabs at night. (12.5mg) (Patient not taking: Reported on 5/8/2024) 30 tablet 0     No current facility-administered medications on file prior to visit.            Objective:            Physical Exam  Constitutional:       Appearance: Normal appearance.   HENT:      Head: Normocephalic and atraumatic.   Eyes:      General: No scleral icterus.     Extraocular Movements: Extraocular movements intact.      Pupils: Pupils are equal, round, and reactive to light.   Pulmonary:      Effort: Pulmonary effort is normal.      Breath sounds: No stridor.   Musculoskeletal:      Right lower leg: No edema.      Left lower leg: No edema.   Skin:     General: Skin is warm and dry.   Neurological:      General: No focal deficit present.      Mental Status: He is alert and oriented to person, place, and time.   Psychiatric:         Mood and Affect: Mood normal.         Behavior: Behavior normal.       Assessment:       1. Stage 3a chronic kidney disease    2. RAOUL (acute kidney injury)    3. Hyponatremia    4. Adrenal insufficiency        Plan:       1. Baseline creatinine normally runs between about 0.9 and 1.1.  For the past several months he is ranged between 1.5 and 1.6.  He has had recent C diff enterocolitis.  I suspect that this fluctuation in creatinine is hemodynamic in nature secondary to GI losses of water.  He reports that his diarrhea has resolved.    Creatinine has decreased from a peak of 1.7 down to 1.46 on most recent laboratory studies.    There are no urine studies for examination.  Will plan to order a UA and PC ratio prior to his  next office visit.    2. Increase in creatinine 1.7 is likely hemodynamic in nature from GI water losses due to C diff enterocolitis.  Creatinine has improved to 1.4 on most recent laboratory studies.  Will continue to monitor.    3. Serum sodium has dropped to 127.  He states that he drinks quite a lot of water in an attempt to flush his system.  I have asked him to limit his free water intake to less than 2 L a day.  Will plan to recheck a chemistry on the 15th to follow his serum sodium.  He is planning to undergo ureteral stent exchange mid next week.  Ideally we would like to get his serum sodium up to around 130.    I suspect that his hyponatremia secondary to hypoadrenalism.    4. Has history of hypoadrenalism.  Currently being managed by an endocrinologist.      Proximally 40 minutes was spent in face-to-face conversation and chart review.      Kash Solorzano MD

## 2024-05-09 NOTE — TELEPHONE ENCOUNTER
Dr. Xiong has decided to hold off on doing the cosyntropin stim test until his Na improves. I will cancel 5/13 appt. Thanks!

## 2024-05-09 NOTE — TELEPHONE ENCOUNTER
Let pt know that I spoke with his nephrologist.   I see his Na decreased from before.   Lets hold off on doing the cosyntropin stim test until his Na improves    I know he had difficulty taking a 2nd dose of steroids. But we may need to start a very low dose of Hydrocortisone in afternoon. Can do Hydrocortisone 5 mg in early afternoon on top of his Hydrocortisone in AM.   Can take around 2-3 PM so will have less impact on sleep  Will see what Na is based on labs 5/27

## 2024-05-09 NOTE — TELEPHONE ENCOUNTER
Pt and wife advised of Dr. Xiong's msg. They will try to start taking 5 mg hydrocortisone early afternoon and see how he tolerates it. I will msg infusion center to let them know will be holding off on cstim for now. They verb understanding.

## 2024-05-10 ENCOUNTER — PATIENT MESSAGE (OUTPATIENT)
Dept: NEPHROLOGY | Facility: CLINIC | Age: 74
End: 2024-05-10
Payer: MEDICARE

## 2024-05-10 ENCOUNTER — TELEPHONE (OUTPATIENT)
Dept: UROLOGY | Facility: CLINIC | Age: 74
End: 2024-05-10
Payer: MEDICARE

## 2024-05-11 PROBLEM — N12 PYELONEPHRITIS: Status: ACTIVE | Noted: 2024-05-11

## 2024-05-13 ENCOUNTER — PATIENT MESSAGE (OUTPATIENT)
Dept: PALLIATIVE MEDICINE | Facility: CLINIC | Age: 74
End: 2024-05-13
Payer: MEDICARE

## 2024-05-13 ENCOUNTER — TELEPHONE (OUTPATIENT)
Dept: INFECTIOUS DISEASES | Facility: CLINIC | Age: 74
End: 2024-05-13
Payer: MEDICARE

## 2024-05-13 ENCOUNTER — TELEPHONE (OUTPATIENT)
Dept: UROLOGY | Facility: CLINIC | Age: 74
End: 2024-05-13
Payer: MEDICARE

## 2024-05-13 ENCOUNTER — PATIENT MESSAGE (OUTPATIENT)
Dept: FAMILY MEDICINE | Facility: CLINIC | Age: 74
End: 2024-05-13
Payer: MEDICARE

## 2024-05-13 DIAGNOSIS — A49.8 CLOSTRIDIUM DIFFICILE INFECTION: ICD-10-CM

## 2024-05-13 DIAGNOSIS — N12 PYELONEPHRITIS: Primary | ICD-10-CM

## 2024-05-13 PROBLEM — B96.20 BACTEREMIA DUE TO ESCHERICHIA COLI: Status: ACTIVE | Noted: 2024-05-13

## 2024-05-13 PROBLEM — R78.81 BACTEREMIA DUE TO ESCHERICHIA COLI: Status: ACTIVE | Noted: 2024-05-13

## 2024-05-13 NOTE — TELEPHONE ENCOUNTER
Spoke with Ilene , advised ok per patient cardiologist ok to hold Aspirin 5 days prior to surgery per Dr Braxton, pt cardiologist. Nurse Ilene expressed understanding.

## 2024-05-13 NOTE — TELEPHONE ENCOUNTER
----- Message from Rina Holly sent at 5/13/2024  3:34 PM CDT -----  Contact: [facility]  Type:  Sooner Apoointment Request    Caller is requesting a sooner appointment.  Caller declined first available appointment listed below.  Caller will not accept being placed on the waitlist and is requesting a message be sent to doctor.    Name of Caller: [facility]    When is the first available appointment? None    Symptoms: hosp follow up - 1 week discharged today  5/13    Would the patient rather a call back or a response via [facility]? Call back    Best Call Back Number:[phone]    Additional Information: please call to schedule  Thanks

## 2024-05-13 NOTE — TELEPHONE ENCOUNTER
Sorry to hear that. I spoke with Dr. Olivares and gave him recs on steroid dosing. Will just get a one time IV dose on day of surgery that they will handle

## 2024-05-14 ENCOUNTER — PATIENT MESSAGE (OUTPATIENT)
Dept: ENDOCRINOLOGY | Facility: CLINIC | Age: 74
End: 2024-05-14
Payer: MEDICARE

## 2024-05-14 ENCOUNTER — OFFICE VISIT (OUTPATIENT)
Dept: PALLIATIVE MEDICINE | Facility: CLINIC | Age: 74
End: 2024-05-14
Payer: MEDICARE

## 2024-05-14 ENCOUNTER — PATIENT MESSAGE (OUTPATIENT)
Dept: INFECTIOUS DISEASES | Facility: CLINIC | Age: 74
End: 2024-05-14
Payer: MEDICARE

## 2024-05-14 DIAGNOSIS — Z71.89 ADVANCE CARE PLANNING: ICD-10-CM

## 2024-05-14 DIAGNOSIS — C67.8 MALIGNANT NEOPLASM OF OVERLAPPING SITES OF BLADDER: ICD-10-CM

## 2024-05-14 DIAGNOSIS — Z51.5 PALLIATIVE CARE BY SPECIALIST: Primary | ICD-10-CM

## 2024-05-14 DIAGNOSIS — C61 PROSTATE CANCER: ICD-10-CM

## 2024-05-14 PROCEDURE — 99215 OFFICE O/P EST HI 40 MIN: CPT | Mod: 95,,, | Performed by: STUDENT IN AN ORGANIZED HEALTH CARE EDUCATION/TRAINING PROGRAM

## 2024-05-14 PROCEDURE — 1123F ACP DISCUSS/DSCN MKR DOCD: CPT | Mod: CPTII,95,, | Performed by: STUDENT IN AN ORGANIZED HEALTH CARE EDUCATION/TRAINING PROGRAM

## 2024-05-14 PROCEDURE — 1111F DSCHRG MED/CURRENT MED MERGE: CPT | Mod: CPTII,95,, | Performed by: STUDENT IN AN ORGANIZED HEALTH CARE EDUCATION/TRAINING PROGRAM

## 2024-05-14 RX ORDER — VANCOMYCIN HYDROCHLORIDE 125 MG/1
125 CAPSULE ORAL 2 TIMES DAILY
Qty: 24 CAPSULE | Refills: 0 | Status: SHIPPED | OUTPATIENT
Start: 2024-05-20 | End: 2024-06-01

## 2024-05-14 RX ORDER — CIPROFLOXACIN 500 MG/1
500 TABLET ORAL 2 TIMES DAILY
Qty: 10 TABLET | Refills: 0 | Status: SHIPPED | OUTPATIENT
Start: 2024-05-20 | End: 2024-05-25

## 2024-05-14 NOTE — TELEPHONE ENCOUNTER
Per Susan Ceballos PA-C   Hosp F/U Plan:  procedure with Dr. Olivares tomorrow 5/15/24 for stent exchange.   We would like to make sure he gets:   PO Cipro for 10 days from stent exchange (EOC 5/25)   PO Vancomycin for same length as cipro PLUS 1 week after (EOC 6/1).   3. Currently, both of his prescriptions are through 5/20 so they need to be extended, Susan will send additional rx days of both scripts to pharmacy.   4. VV 5/23 at 1030 am scheduled to discuss per wife's request.     Discussed above in detail with pt's wife, wife voiced understanding and much appreciation.

## 2024-05-14 NOTE — TELEPHONE ENCOUNTER
I am okay with doing this hospital follow up virtually- appointment will have to be longer than 15 minutes.

## 2024-05-14 NOTE — PROGRESS NOTES
The patient location is: [place]    Visit type: audiovisual    Face to Face time with patient: 30  50 minutes of total time spent on the encounter, which includes face to face time and non-face to face time preparing to see the patient (eg, review of tests), Obtaining and/or reviewing separately obtained history, Documenting clinical information in the electronic or other health record, Independently interpreting results (not separately reported) and communicating results to the patient/family/caregiver, or Care coordination (not separately reported).         Each patient to whom he or she provides medical services by telemedicine is:  (1) informed of the relationship between the physician and patient and the respective role of any other health care provider with respect to management of the patient; and (2) notified that he or she may decline to receive medical services by telemedicine and may withdraw from such care at any time.    Notes:       Virtual Visit  [facility]        ASSESSMENT/PLAN:     Plan/Recommendations:  Diagnoses and all orders for this visit:    Palliative care by specialist  Patient is independent ADLS, well supported by wife, no in home needs identified today  Patient's goal at this time is to avoid hospitalization, remain independent and spend time with his family.   ECOG 2   Palliative care RN to follow up with triage contact at [facility] for patient and his wife  Pall care RN follow up 1 month for support regarding medication administration     Malignant neoplasm of overlapping sites of bladder  Prostate cancer  Patient under management of Dr Jimenez  Continue to follow    Advance care planning  Patient with advance directive on file however wife would like to complete more specific living will regarding their healthcare wishes.   [facility] clinic to send information regarding living will to patient's home to be completed     Follow up: 2 months with me, 1 month with palliative  care RN    SUBJECTIVE:     History of Present Illness:  Patient is a 73 y.o. year old male with h/o bladder cancer under the management of Dr Jimenez. He has had a complicated  course since diagnosis, he was seen by Palliative Care while inpatient at [facility] [place] [facility] for Cdiff, he is referred for continued support with symptom management and [facility] discussion        Cancer History   Muscle invasive bladder cancer s/p radical cystectomy w/ ileal loop urinary diversion on 8/15/2023 showing pT3aN2 disease. He did have neoadjuvant chemotherapy. The patient's surgery was ultimately complicated by small-bowel obstruction, and pulmonary embolism.  PT recevied 1 dose of Nivolumab 9/18/23.  The patient was placed on anticoagulation for pulmonary embolism and ultimately developed a right perinephric bleed requiring embolization. He is currently not on anticoagulation and does IVC filter.  PT also developed UTI and acquired ureteral obstruction with placement of bilateral ureteral stents.  PT also developed C diff and was hospitalized for suspected recurrence at [facility] from 10/21/2023 to 10/25/2023.  Patient was discharged on fidaxomicin.       Palliative Discussion:  Hospital follow up. Wife, Courtney has watched palliative care videos on website. Was admitted at [facility] for 2 days, discharged yesterday. Courtney states that he has a high tolerance for pain. Previously admitted for UTI and cdiff infection for 8 days. He now feels great though.     Wife was asked to give steroid shot in the leg but felt that she was not taught how to do it. They feel out of all 10 doctors they have only Dr. Jimenez and Dr. Claire have given an emergency cell phone number. They need more guidance overall. She wants more support on how to administer medications and when to call for help. He either is totally fine and suddenly can be in a crisis. Crisis meaning high blood pressure and fevers.     He has tender chest to touch. He denies pain  "otherwise. Denies nausea, vomiting, diarrhea, constipation. Sleeps through the night. Appetite is appropriate. "I eat too much". He is an organic peterson. They do not each out of boxes. His son is a gandhi. He previously was having shortness of breath when diagnosed with adrenal insufficiency, now improved.     11/2023:  Patient  seen virtually today, for follow up, last seen prior to week of thanksgiving and patient had opted to take a break from his treatment so as not to be sick when his family came to [facility]. Wife is present and assisting with history, patient doing well, she reports difficult engaging with new PCP, patient has preference for a male doctor only. She has secured him an appt with [facility] but it is far out. Patient denies any emergent needs .  Since last visit, they have been seen by Oncology, his notes indicates : -  -PET/CT suggestive of a RLL pulmonary nodule  -PT will need biopsy prior to initiating treatment  -Would treat with Pembrolizumab if pt with metastatic disease    Patient also to have removal of urethral stents in December. Wife reports she is unsure why patient needs to see Palliative Care, reminded that our role is supportive, we can manage any symptoms as well as connect him to the resources here at the [facility] to include support groups, Psychologist, PT, Nutritionists , SW and Integrative Oncology Patient can see us as often our as little as he chooses.    Discussed goals, patient  enjoys fishing and gardening and is hoping to be able to do this again some day. His wife would also like to have more time with him at home.     ROS:  Review of Systems   Constitutional:  Negative for appetite change and unexpected weight change.   HENT:  Negative for mouth sores.    Eyes:  Negative for visual disturbance.   Respiratory:  Negative for cough and shortness of breath.    Cardiovascular:  Positive for chest pain.   Gastrointestinal:  Negative for abdominal pain and diarrhea. "   Genitourinary:  Negative for frequency.   Musculoskeletal:  Negative for back pain.   Skin:  Negative for rash.   Neurological:  Negative for headaches.   Hematological:  Negative for adenopathy.   Psychiatric/Behavioral:  The patient is not nervous/anxious.        Past Medical History:   Diagnosis Date    Adrenal insufficiency 05/2024    Aneurysm artery, popliteal 2018    right leg    Bladder cancer     Coronary artery disease     cabg x 4 11/2021    Dyslipidemia     H/O Clostridium difficile infection     Hx pulmonary embolism     8/2023    Hypertension     Hyperthyroidism 05/2024    Immunotherapy     nivolomab / monthly md manuel    Malignant neoplasm of prostate     Presence of urostomy     Seizures     remote hx  last ~2007    SOB (shortness of breath)      Past Surgical History:   Procedure Laterality Date    CORONARY ARTERY BYPASS GRAFT      11/2021    CREATION, ILEAL CONDUIT  08/2023    HERNIA REPAIR      umbilical    PROSTATECTOMY      PTA, POPLITEAL      REMOVAL-STENT Bilateral 01/23/2024    Procedure: REMOVAL-STENT;  Surgeon: South Ghosh MD;  Location: [facility] OR [address] [place] [address];  Service: Urology;  Laterality: Bilateral;    URETERAL STENT PLACEMENT Bilateral 01/23/2024    Procedure: INSERTION, STENT, URETER;  Surgeon: South Ghosh MD;  Location: [facility] OR [address] [place] [address];  Service: Urology;  Laterality: Bilateral;     Family History   Problem Relation Name Age of Onset    Heart disease Mother      Hypertension Mother      Peripheral vascular disease Father 72      Review of patient's allergies indicates:   Allergen Reactions    Clopidogrel Other (See Comments)     Increased body temperature and redness      Social Determinants of Health     Tobacco Use: Low Risk  (5/9/2024)    Patient History     Smoking Tobacco Use: Never     Smokeless Tobacco Use: Never     Passive Exposure: Never   Alcohol Use: Not At Risk (3/27/2024)    Received from [facility] and Its Subsidiaries and  Affiliates, [facility]    AUDIT-C     Frequency of Alcohol Consumption: Never     Average Number of Drinks: Patient does not drink     Frequency of Binge Drinking: Never   Financial Resource Strain: Low Risk  (3/27/2024)    Received from [facility] and Its Subsidiaries and Affiliates, [facility]    Overall Financial Resource Strain (CARDIA)     Difficulty of Paying Living Expenses: Not hard at all   Food Insecurity: No Food Insecurity (5/13/2024)    Hunger Vital Sign     Worried About Running Out of Food in the Last Year: Never true     Ran Out of Food in the Last Year: Never true   Transportation Needs: No Transportation Needs (5/13/2024)    TRANSPORTATION NEEDS     Transportation : No   Physical Activity: Inactive (3/27/2024)    Received from [facility] and Its Subsidiaries and Affiliates, [facility]    Exercise Vital Sign     Days of Exercise per Week: 0 days     Minutes of Exercise per Session: 0 min   Stress: No Stress Concern Present (3/27/2024)    Received from [facility] and Its Subsidiaries and Affiliates, [facility]    [employer] [place] [employer] - Occupational Stress Questionnaire     Feeling of Stress : Not at all   Housing Stability: Not on file (5/13/2024)   Depression: Low Risk  (2/26/2024)    Depression     Last PHQ-4: Flowsheet Data: 0   Utilities: Not At Risk (3/27/2024)    Received from [facility] and Its Subsidiaries and Affiliates, [facility] and Its Subsidiaries and Affiliates    [facility] Utilities      Threatened with loss of utilities: No   Health Literacy: Adequate Health Literacy (3/27/2024)    [facility] and Its Subsidiaries and Affiliates     Health Literacy     Frequency of need for help with medical instructions: Never   Social Isolation: Not on file      The Modified Caregiver Strain Index (MCSI) -   No data recorded   No data recorded   No data recorded   No data recorded   No data recorded   No data recorded   No data recorded   No data recorded   No data recorded   No data recorded   No data recorded   No data recorded   No data recorded   No data recorded       OBJECTIVE:     Physical Exam:  Vitals:    Physical Exam  Pulmonary:      Effort: Pulmonary effort is normal.   Musculoskeletal:         General: Normal range of motion.   Skin:     Coloration: Skin is not jaundiced or pale.   Neurological:      Mental Status: He is alert and oriented to person, place, and time.   Psychiatric:         Mood and Affect: Mood normal.         Behavior: Behavior normal.         Thought Content: Thought content normal.         Judgment: Judgment normal.           Review of Symptoms      Symptom Assessment (ESAS 0-10 Scale)  Pain:  0  Dyspnea:  0  Anxiety:  0  Nausea:  0  Depression:  0  Anorexia:  0  Fatigue:  0  Insomnia:  0  Restlessness:  0  Agitation:  0     Diarrhea:  Positive    Anxiety:  Is not nervous/anxious    ECOG Performance Status rdGrdrrdarddrderd:rd rd3rd Psychosocial/Cultural:   See Palliative Psychosocial Note: Yes  **Primary  to Follow**  Palliative Care  Consult: No     Time-Based Charting:  Yes  Chart Review: 20 minutes  Symptom Assessment: 30 minutes    Total Time Spent: 50 minutes      Advance Care Planning   Advance Directives:   Living Will: Yes        Copy on chart: Yes    Medical Power of : Yes      Decision Making:  Patient answered questions  Goals of Care: What is most important right now is to focus on  curative/life-prolongation (regardless of treatment burdens). Accordingly, we have decided that the best plan to meet the patient's goals includes continuing with treatment.                Medications:    Current Outpatient Medications:     acetaminophen (TYLENOL) 500 MG tablet, Take 500-1,000 mg by mouth every 6 (six) hours as needed (fever/pain)., Disp: , Rfl:     ASCORBIC ACID, VITAMIN C, ORAL, Take 1 tablet by mouth once daily., Disp: , Rfl:     aspirin (ECOTRIN) 81 MG EC tablet, Take 1 tablet (81 mg total) by mouth once daily., Disp: 90 tablet, Rfl: 3    atorvastatin (LIPITOR) 40 MG tablet, Take 40 mg by mouth once daily., Disp: , Rfl:     b complex vitamins tablet, Take 1 tablet by mouth once daily., Disp: , Rfl:     chlorhexidine (PERIDEX) 0.12 % solution, Use as directed 15 mLs in the mouth or throat 2 (two) times daily., Disp: , Rfl:     cholecalciferol, vitamin D3, 125 mcg (5,000 unit) Tab, Take 5,000 Units by mouth once daily., Disp: , Rfl:     ciprofloxacin HCl (CIPRO) 500 MG tablet, Take 1 tablet (500 mg total) by mouth every 12 (twelve) hours. for 7 days, Disp: 14 tablet, Rfl: 0    [START ON 5/20/2024] ciprofloxacin HCl (CIPRO) 500 MG tablet, Take 1 tablet (500 mg total) by mouth 2 (two) times daily. for 5 days, Disp: 10 tablet, Rfl: 0    cyanocobalamin, vitamin B-12, (VITAMIN B-12) 2,500 mcg Subl, Take 2,500 mcg by mouth once daily., Disp: , Rfl:     ferrous sulfate (SLOW RELEASE IRON) 142 mg (45 mg iron) TbSR, Take 45 mg by mouth Daily., Disp: , Rfl:     hydrocortisone (CORTEF) 20 MG Tab, Take 1 tablet (20 mg total) by mouth once daily. Will need 10 extra tablet for stress dosing, Disp: 100 tablet, Rfl: 3    hydrocortisone (CORTEF) 5 MG Tab, Take 20 mg in AM and 5 mg tablet in early afternoon (Patient taking differently: Take 5 mg by mouth after lunch.), Disp: 30 tablet, Rfl: 6    Lactobacillus rhamnosus GG (CULTURELLE) 10 billion cell capsule, Take 1 capsule by mouth once daily., Disp: 30 capsule,  Rfl: 0    LIDOcaine-prilocaine (EMLA) cream, Apply topically once as needed (to port access)., Disp: , Rfl:     multivitamin with minerals tablet, Take 1 tablet by mouth once daily., Disp: , Rfl:     mupirocin (BACTROBAN) 2 % ointment, Apply topically 2 (two) times daily., Disp: , Rfl:     nitroGLYCERIN (NITROSTAT) 0.4 MG SL tablet, Place 1 tablet (0.4 mg total) under the tongue every 5 (five) minutes as needed for Chest pain., Disp: 25 tablet, Rfl: 0    sodium chloride 0.9% SolP 50 mL with nivolumab 40 mg/4 mL Soln, Inject into the vein every 30 days., Disp: , Rfl:     vancomycin (VANCOCIN) 125 MG capsule, Take 1 capsule (125 mg total) by mouth every 12 (twelve) hours. for 7 days, Disp: 14 capsule, Rfl: 0    [START ON 5/20/2024] vancomycin (VANCOCIN) 125 MG capsule, Take 1 capsule (125 mg total) by mouth 2 (two) times a day. for 12 days, Disp: 24 capsule, Rfl: 0    zinc gluconate 50 mg tablet, Take 50 mg by mouth once daily., Disp: , Rfl:   No current facility-administered medications for this visit.    Labs:  CBC:   WBC   Date Value Ref Range Status   05/12/2024 11.80 3.90 - 12.70 K/uL Final     Hemoglobin   Date Value Ref Range Status   05/12/2024 11.2 (L) 14.0 - 18.0 g/dL Final     Hematocrit   Date Value Ref Range Status   05/12/2024 33.4 (L) 40.0 - 54.0 % Final     MCV   Date Value Ref Range Status   05/12/2024 89 82 - 98 fL Final     Platelets   Date Value Ref Range Status   05/12/2024 249 150 - 450 K/uL Final       LFT:   Lab Results   Component Value Date    AST 59 05/11/2024    ALKPHOS 83 05/11/2024    BILITOT 0.4 05/11/2024       Albumin:   Albumin   Date Value Ref Range Status   05/11/2024 4.2 3.5 - 5.2 g/dL Final     Protein:   Total Protein   Date Value Ref Range Status   05/11/2024 7.4 6.0 - 8.4 g/dL Final       Radiology:None      Signature: Donavan Mohan DO

## 2024-05-14 NOTE — TELEPHONE ENCOUNTER
We usually make hospital follow ups in person. Please review request and records and advise if this can be changed to virtual or if he needs to be seen in person.

## 2024-05-17 ENCOUNTER — TELEPHONE (OUTPATIENT)
Dept: CARDIOLOGY | Facility: CLINIC | Age: 74
End: 2024-05-17
Payer: MEDICARE

## 2024-05-17 NOTE — TELEPHONE ENCOUNTER
----- Message from Marques Braxton MD sent at 5/17/2024  3:55 PM CDT -----  Slightly abnormal stress test, consider angiography needs follow-up I do not see any appointment follow-up for him

## 2024-05-17 NOTE — TELEPHONE ENCOUNTER
Spoke with wife regarding follow up visit and recommendations from Dr. Braxton. Spouse stated patient has had a lot going on and they have decided to return to previous Cardiologist in [place] and she stated they appreciated Dr. Braxton's time.

## 2024-05-21 ENCOUNTER — TELEMEDICINE (OUTPATIENT)
Dept: FAMILY MEDICINE | Facility: CLINIC | Age: 74
End: 2024-05-21
Payer: MEDICARE

## 2024-05-21 DIAGNOSIS — N39.0 COMPLICATED UTI (URINARY TRACT INFECTION): ICD-10-CM

## 2024-05-21 DIAGNOSIS — Z09 HOSPITAL DISCHARGE FOLLOW-UP: Primary | ICD-10-CM

## 2024-05-21 DIAGNOSIS — C67.8 MALIGNANT NEOPLASM OF OVERLAPPING SITES OF BLADDER: ICD-10-CM

## 2024-05-21 NOTE — PROGRESS NOTES
THIS DOCUMENT WAS MADE IN PART WITH VOICE RECOGNITION SOFTWARE.  OCCASIONALLY THIS SOFTWARE WILL MISINTERPRET WORDS OR PHRASES.     Assessment and Plan:  ***      ______________________________________________________________________  Subjective:    Chief Complaint:  ***    HPI:  Aren is a 74 y.o. year old male being seen today in virtual visit consultation to discuss ***    The patient location is:  Patient Home   The chief complaint leading to consultation is: ***  Visit type: Virtual visit with synchronous audio and video  Total time spent with patient: ***  Each patient to whom he or she provides medical services by telemedicine is:  (1) informed of the relationship between the physician and patient and the respective role of any other health care provider with respect to management of the patient; and (2) notified that he or she may decline to receive medical services by telemedicine and may withdraw from such care at any time.    ***Do not forget to ask about vitals      Medications:  Current Outpatient Medications on File Prior to Visit   Medication Sig Dispense Refill    acetaminophen (TYLENOL) 500 MG tablet Take 500-1,000 mg by mouth every 6 (six) hours as needed (fever/pain).      ASCORBIC ACID, VITAMIN C, ORAL Take 1 tablet by mouth once daily.      aspirin (ECOTRIN) 81 MG EC tablet Take 1 tablet (81 mg total) by mouth once daily. 90 tablet 3    atorvastatin (LIPITOR) 40 MG tablet Take 40 mg by mouth once daily.      b complex vitamins tablet Take 1 tablet by mouth once daily.      cholecalciferol, vitamin D3, 125 mcg (5,000 unit) Tab Take 5,000 Units by mouth once daily.      [] ciprofloxacin HCl (CIPRO) 500 MG tablet Take 1 tablet (500 mg total) by mouth every 12 (twelve) hours. for 7 days 14 tablet 0    ciprofloxacin HCl (CIPRO) 500 MG tablet Take 1 tablet (500 mg total) by mouth 2 (two) times daily. for 5 days 10 tablet 0    cyanocobalamin, vitamin B-12, (VITAMIN B-12) 2,500 mcg Subl Take  2,500 mcg by mouth once daily.      ferrous sulfate (SLOW RELEASE IRON) 142 mg (45 mg iron) TbSR Take 45 mg by mouth Daily.      hydrocortisone (CORTEF) 20 MG Tab Take 1 tablet (20 mg total) by mouth once daily. Will need 10 extra tablet for stress dosing 100 tablet 3    hydrocortisone (CORTEF) 5 MG Tab Take 20 mg in AM and 5 mg tablet in early afternoon (Patient taking differently: Take 5 mg by mouth after lunch.) 30 tablet 6    Lactobacillus rhamnosus GG (CULTURELLE) 10 billion cell capsule Take 1 capsule by mouth once daily. 30 capsule 0    LIDOcaine-prilocaine (EMLA) cream Apply topically once as needed (to port access).      multivitamin with minerals tablet Take 1 tablet by mouth once daily.      nitroGLYCERIN (NITROSTAT) 0.4 MG SL tablet Place 1 tablet (0.4 mg total) under the tongue every 5 (five) minutes as needed for Chest pain. 25 tablet 0    sodium chloride 0.9% SolP 50 mL with nivolumab 40 mg/4 mL Soln Inject into the vein every 30 days.      [] vancomycin (VANCOCIN) 125 MG capsule Take 1 capsule (125 mg total) by mouth every 12 (twelve) hours. for 7 days 14 capsule 0    vancomycin (VANCOCIN) 125 MG capsule Take 1 capsule (125 mg total) by mouth 2 (two) times a day. for 12 days 24 capsule 0    zinc gluconate 50 mg tablet Take 50 mg by mouth once daily.       No current facility-administered medications on file prior to visit.       Review of Systems:  Review of Systems    Past Medical History:  Past Medical History:   Diagnosis Date    Adrenal insufficiency 2024    Aneurysm artery, popliteal 2018    right leg    Bladder cancer     Coronary artery disease     cabg x 4 2021    Dyslipidemia     H/O Clostridium difficile infection     Hx pulmonary embolism     2023    Hypertension     Hyperthyroidism 2024    Immunotherapy     nivolomab / monthly md manuel    Malignant neoplasm of prostate     Presence of urostomy     Seizures     remote hx  last ~    SOB (shortness of breath)   "      Objective:    Vitals:  There were no vitals filed for this visit.    Physical Exam    Data:  {Blank multiple:70656::"No previous labs, imaging, or notes available.","Have requested records from previous providers","Previous *** reviewed and pertinent for ***."}      Medical history reviewed, Medications reconciled.      Visit Summary:      Megha Stuart, GIFTYP-C  Family Medicine    "

## 2024-05-21 NOTE — PROGRESS NOTES
THIS DOCUMENT WAS MADE IN PART WITH VOICE RECOGNITION SOFTWARE.  OCCASIONALLY THIS SOFTWARE WILL MISINTERPRET WORDS OR PHRASES.     Assessment and Plan:    Hospital discharge follow-up    Malignant neoplasm of overlapping sites of bladder    Complicated UTI (urinary tract infection)    Hospital course and diagnostic studies reviewed in detail during today's visit with patient and his spouse  Patient advised to keep follow up with specialists.  Complete antibiotics as prescribed.  Emergent conditions/ER precautions discussed.      Keep f/u with Dr. Shelley in 8/8/24.  ______________________________________________________________________  Subjective:    Chief Complaint:  Hospital follow up    HPI:  Aren is a 74 y.o. year old male being seen today in virtual visit consultation to discuss hospital follow up.  Patient is being treated for bladder cancer since August of last year, post radical cystectomy with ileal loop urinary diversion/following neoadjavant chemo, currently on nivolumab monthly, follows Dr. Jimenez, with bilateral ureter stents and recurrent UTI.  Admitted for UTI on 5/11/24. Pt is doing much better. He is fishing and working out in yard. Still on Vanc and cipro- tolerating well    Ileal conduit Stents replaced on 5/15/24-  Some insomnia takes Seroquel  as needed-         See hospital course below  Hospital Course:   Pt was admitted with UTI and is now on IV Maxipime.  Urine c/s (+) GNR.    He has no diarrhea and his Cdiff was 4/4/23.  I will d/c Oral Vancomycin.  Pt has hx of Bladder Ca s/p Cystectomy and has urostomy (on Nivolumab monthly).  S/p B/L ureter stents.    Continue to monitor.    5/13 evaluated bu ID can treat outpatient with oral Cipro with 7 more days of ABX increase Vancocin to BID for 7 days with probiotic Exchange stents with Dr Olivares in 2 days already scheduled           The patient location is:  Patient Home   The chief complaint leading to consultation is:  Hospital follow up  Visit  type: Virtual visit with synchronous audio and video  Total time spent with patient: 30  Each patient to whom he or she provides medical services by telemedicine is:  (1) informed of the relationship between the physician and patient and the respective role of any other health care provider with respect to management of the patient; and (2) notified that he or she may decline to receive medical services by telemedicine and may withdraw from such care at any time.          Medications:  Current Outpatient Medications on File Prior to Visit   Medication Sig Dispense Refill    acetaminophen (TYLENOL) 500 MG tablet Take 500-1,000 mg by mouth every 6 (six) hours as needed (fever/pain).      ASCORBIC ACID, VITAMIN C, ORAL Take 1 tablet by mouth once daily.      aspirin (ECOTRIN) 81 MG EC tablet Take 1 tablet (81 mg total) by mouth once daily. 90 tablet 3    atorvastatin (LIPITOR) 40 MG tablet Take 40 mg by mouth once daily.      b complex vitamins tablet Take 1 tablet by mouth once daily.      cholecalciferol, vitamin D3, 125 mcg (5,000 unit) Tab Take 5,000 Units by mouth once daily.      [] ciprofloxacin HCl (CIPRO) 500 MG tablet Take 1 tablet (500 mg total) by mouth every 12 (twelve) hours. for 7 days 14 tablet 0    ciprofloxacin HCl (CIPRO) 500 MG tablet Take 1 tablet (500 mg total) by mouth 2 (two) times daily. for 5 days 10 tablet 0    cyanocobalamin, vitamin B-12, (VITAMIN B-12) 2,500 mcg Subl Take 2,500 mcg by mouth once daily.      ferrous sulfate (SLOW RELEASE IRON) 142 mg (45 mg iron) TbSR Take 45 mg by mouth Daily.      hydrocortisone (CORTEF) 20 MG Tab Take 1 tablet (20 mg total) by mouth once daily. Will need 10 extra tablet for stress dosing 100 tablet 3    hydrocortisone (CORTEF) 5 MG Tab Take 20 mg in AM and 5 mg tablet in early afternoon (Patient taking differently: Take 5 mg by mouth after lunch.) 30 tablet 6    Lactobacillus rhamnosus GG (CULTURELLE) 10 billion cell capsule Take 1 capsule by  mouth once daily. 30 capsule 0    LIDOcaine-prilocaine (EMLA) cream Apply topically once as needed (to port access).      multivitamin with minerals tablet Take 1 tablet by mouth once daily.      nitroGLYCERIN (NITROSTAT) 0.4 MG SL tablet Place 1 tablet (0.4 mg total) under the tongue every 5 (five) minutes as needed for Chest pain. 25 tablet 0    sodium chloride 0.9% SolP 50 mL with nivolumab 40 mg/4 mL Soln Inject into the vein every 30 days.      [] vancomycin (VANCOCIN) 125 MG capsule Take 1 capsule (125 mg total) by mouth every 12 (twelve) hours. for 7 days 14 capsule 0    vancomycin (VANCOCIN) 125 MG capsule Take 1 capsule (125 mg total) by mouth 2 (two) times a day. for 12 days 24 capsule 0    zinc gluconate 50 mg tablet Take 50 mg by mouth once daily.       No current facility-administered medications on file prior to visit.       Review of Systems:  Review of Systems   Constitutional:  Positive for fatigue. Negative for chills, fever and unexpected weight change.   Respiratory:  Negative for cough and shortness of breath.    Cardiovascular:  Negative for chest pain, palpitations and leg swelling.   Gastrointestinal:  Negative for abdominal distention, abdominal pain and vomiting.   Genitourinary:  Negative for flank pain.       Past Medical History:  Past Medical History:   Diagnosis Date    Adrenal insufficiency 2024    Aneurysm artery, popliteal 2018    right leg    Bladder cancer     Coronary artery disease     cabg x 4 2021    Dyslipidemia     H/O Clostridium difficile infection     Hx pulmonary embolism     2023    Hypertension     Hyperthyroidism 2024    Immunotherapy     nivolomab / monthly md manuel    Malignant neoplasm of prostate     Presence of urostomy     Seizures     remote hx  last ~    SOB (shortness of breath)        Objective:    Vitals:  119/72, HR: 84, Temp 89.3    Physical Exam  Constitutional:       General: He is not in acute distress.     Appearance: Normal  appearance.   HENT:      Head: Normocephalic.      Nose: Nose normal.   Cardiovascular:      Rate and Rhythm: Normal rate.   Pulmonary:      Effort: Pulmonary effort is normal. No respiratory distress.   Neurological:      Mental Status: He is alert and oriented to person, place, and time.   Psychiatric:         Mood and Affect: Mood normal.         Behavior: Behavior normal.         Thought Content: Thought content normal.         Data:    Lab Results   Component Value Date    WBC 11.80 05/12/2024    HGB 11.2 (L) 05/12/2024    HCT 33.4 (L) 05/12/2024    MCV 89 05/12/2024     05/12/2024        CMP  Sodium   Date Value Ref Range Status   05/13/2024 135 (L) 136 - 145 mmol/L Final     Potassium   Date Value Ref Range Status   05/13/2024 3.3 (L) 3.5 - 5.1 mmol/L Final     Comment:     Anion Gap reference range revised on 4/28/2023     Chloride   Date Value Ref Range Status   05/13/2024 105 95 - 110 mmol/L Final     CO2   Date Value Ref Range Status   05/13/2024 21 (L) 22 - 31 mmol/L Final     Glucose   Date Value Ref Range Status   05/13/2024 119 (H) 70 - 110 mg/dL Final     Comment:     The ADA recommends the following guidelines for fasting glucose:    Normal:       less than 100 mg/dL    Prediabetes:  100 mg/dL to 125 mg/dL    Diabetes:     126 mg/dL or higher       BUN   Date Value Ref Range Status   05/13/2024 28 (H) 9 - 21 mg/dL Final     Creatinine   Date Value Ref Range Status   05/13/2024 1.45 (H) 0.50 - 1.40 mg/dL Final     Calcium   Date Value Ref Range Status   05/13/2024 8.7 8.4 - 10.2 mg/dL Final     Total Protein   Date Value Ref Range Status   05/11/2024 7.4 6.0 - 8.4 g/dL Final     Albumin   Date Value Ref Range Status   05/11/2024 4.2 3.5 - 5.2 g/dL Final     Total Bilirubin   Date Value Ref Range Status   05/11/2024 0.4 0.2 - 1.3 mg/dL Final     Alkaline Phosphatase   Date Value Ref Range Status   05/11/2024 83 38 - 145 U/L Final     AST   Date Value Ref Range Status   05/11/2024 59 17 - 59 U/L  Final     ALT   Date Value Ref Range Status   05/11/2024 57 (H) 0 - 50 U/L Final     Anion Gap   Date Value Ref Range Status   05/13/2024 9 5 - 12 mmol/L Final     Comment:     Anion Gap reference range revised on 4/28/2023     eGFR   Date Value Ref Range Status   05/13/2024 50 (A) >60 mL/min/1.73 m^2 Final          Medical history reviewed, Medications reconciled.            Megha Stuart, FNP-C  Family Medicine

## 2024-05-22 ENCOUNTER — TELEPHONE (OUTPATIENT)
Dept: HEMATOLOGY/ONCOLOGY | Facility: CLINIC | Age: 74
End: 2024-05-22
Payer: MEDICARE

## 2024-05-22 DIAGNOSIS — E03.9 HYPOTHYROIDISM, UNSPECIFIED TYPE: Primary | ICD-10-CM

## 2024-05-22 RX ORDER — LEVOTHYROXINE SODIUM 25 UG/1
25 TABLET ORAL
Qty: 30 TABLET | Refills: 11 | Status: SHIPPED | OUTPATIENT
Start: 2024-05-22 | End: 2025-05-22

## 2024-05-22 NOTE — TELEPHONE ENCOUNTER
I called the patient and with his wife and informed her that the patient was hypothyroid on his last TSH and T4 testing.  I instructed her I would start the patient on levothyroxine 25 mcg daily.  I instructed her that it may take up to 3 months before seeing a change in his thyroid function tests.  She expressed understanding.  All questions were answered to satisfaction.    Jackson Jimenez MD  [facility]  Hematology and Oncology  [facility]   [address]   [place] [address]   O: [phone]  F: [phone]

## 2024-05-23 ENCOUNTER — OFFICE VISIT (OUTPATIENT)
Dept: INFECTIOUS DISEASES | Facility: CLINIC | Age: 74
End: 2024-05-23
Payer: MEDICARE

## 2024-05-23 DIAGNOSIS — A49.8 CLOSTRIDIUM DIFFICILE INFECTION: ICD-10-CM

## 2024-05-23 DIAGNOSIS — N12 PYELONEPHRITIS: Primary | ICD-10-CM

## 2024-05-23 PROCEDURE — 1159F MED LIST DOCD IN RCRD: CPT | Mod: CPTII,95,, | Performed by: PHYSICIAN ASSISTANT

## 2024-05-23 PROCEDURE — 1160F RVW MEDS BY RX/DR IN RCRD: CPT | Mod: CPTII,95,, | Performed by: PHYSICIAN ASSISTANT

## 2024-05-23 PROCEDURE — 1111F DSCHRG MED/CURRENT MED MERGE: CPT | Mod: CPTII,95,, | Performed by: PHYSICIAN ASSISTANT

## 2024-05-23 PROCEDURE — 99213 OFFICE O/P EST LOW 20 MIN: CPT | Mod: 95,,, | Performed by: PHYSICIAN ASSISTANT

## 2024-05-23 PROCEDURE — 1157F ADVNC CARE PLAN IN RCRD: CPT | Mod: CPTII,95,, | Performed by: PHYSICIAN ASSISTANT

## 2024-05-23 NOTE — PROGRESS NOTES
The patient location is: home  The chief complaint leading to consultation is: follow-up    Visit type: audiovisual    Face to Face time with patient: 11 mins  20 minutes of total time spent on the encounter, which includes face to face time and non-face to face time preparing to see the patient (eg, review of tests), Obtaining and/or reviewing separately obtained history, Documenting clinical information in the electronic or other health record, Independently interpreting results (not separately reported) and communicating results to the patient/family/caregiver, or Care coordination (not separately reported).         Each patient to whom he or she provides medical services by telemedicine is:  (1) informed of the relationship between the physician and patient and the respective role of any other health care provider with respect to management of the patient; and (2) notified that he or she may decline to receive medical services by telemedicine and may withdraw from such care at any time.    [facility]  Infectious Disease        Patient ID: Aren Bey Jr. is a 74 y.o. male.    Chief Complaint: Follow-up      Aren was seen today for follow-up.    Diagnoses and all orders for this visit:    Pyelonephritis    Clostridium difficile infection         Greater than 15 minutes was spent on this encounter, which included: review of recent encounters, review and interpretation of labs/images, obtaining pertinent history, performing a physical examination, counseling and educating the patient/family/caregiver, ordering medications/tests, documenting in the electronic health record, and coordinating care with necessary providers.    Interval HPI:   5/23/24 - Virtual ID clinic f/u. Patient on call with his wife. They both report he is doing fabulously. He is about to go cut his grass outside once we are finished with our call. He had his bilateral stents exchanged with Dr. Olivares without  complications. He has nearly completed the perioperative Cipro. Remains on PO vancomycin taper but denies any diarrhea. He feels back to normal without complaints.     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    - last hospitalization, patient received prolonged fidaxomicin which improved his symptoms.  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 Forester Costello is a 74 y.o. male with a hx of  invasive bladder cancer s/p radical cystectomy w/ ileal loop urinary diversion on 8/15/2023 with Dr. Artis Pinon showing pT3aN2 disease. H Surgery c/b small-bowel obstruction and pulmonary embolism, right perinephric bleed requiring embolization and IVC filter,  anastomotic stricture with bilateral ureteral stents, recurrent UTIs and severe Cdiff.  His last stent exchange was 1/23/2024, and he is scheduled for ureteral stent exchange 5/15/2024.        He was admitted to the hospital on 5/11 with acute onset of  rigors and chills and dark urine. He had recently restricted fluid intake at the advice of endocrinology. On presentation to the hospital, he had a temp of 102, pyuria and bacteruria. Blood cultures grew E.coli, ESBL however urine culture is contaminated. Urology has been consulted and plans for stent exchange on 5/15.       Past Medical History:   Diagnosis Date    Adrenal insufficiency 05/2024    Aneurysm artery, popliteal 2018    right leg    Bladder cancer     Coronary artery disease     cabg x 4 11/2021    Dyslipidemia     H/O Clostridium difficile infection     Hx pulmonary embolism     8/2023    Hypertension     Hyperthyroidism 05/2024    Immunotherapy     nivolomab / monthly md kaleb    Malignant neoplasm of prostate     Presence of urostomy     Seizures     remote hx  last ~2007    SOB (shortness of breath)        Past Surgical History:   Procedure Laterality Date    CORONARY ARTERY BYPASS GRAFT      11/2021    CREATION, ILEAL CONDUIT  08/2023    ENDOSCOPY OF ILEAL CONDUIT Bilateral 5/15/2024    Procedure: ENDOSCOPY, ILEAL CONDUIT - Bilateral Stent Exchange  Bilateral Retrograde Pyelogram;  Surgeon: Manuelito Olivares MD;  Location: [facility] OR;  Service: Urology;  Laterality: Bilateral;    HERNIA REPAIR      umbilical    PROSTATECTOMY      PTA, POPLITEAL      REMOVAL-STENT Bilateral 01/23/2024    Procedure: REMOVAL-STENT;  Surgeon: South Ghosh MD;  Location: [facility] OR [address] [place] [address];  Service: Urology;  Laterality: Bilateral;    URETERAL STENT PLACEMENT Bilateral 01/23/2024    Procedure: INSERTION, STENT, URETER;  Surgeon: South Ghosh MD;  Location: [facility] OR [address] [place] [address];  Service: Urology;  Laterality: Bilateral;       Family History   Problem Relation Name Age of Onset    Heart disease Mother      Hypertension Mother      Peripheral vascular disease Father 72        Social History     Socioeconomic History    Marital status:    Occupational History    Occupation: retired   Tobacco Use    Smoking status: Never      Passive exposure: Never    Smokeless tobacco: Never   Substance and Sexual Activity    Alcohol use: Not Currently    Drug use: Never    Sexual activity: Yes     Partners: Female     Social Determinants of Health     Financial Resource Strain: Low Risk  (3/27/2024)    [facility], [facility]    Overall Financial Resource Strain (CARDIA)     Difficulty of Paying Living Expenses: Not hard at all   Food Insecurity: No Food Insecurity (5/13/2024)    Hunger Vital Sign     Worried About Running Out of Food in the Last Year: Never true     Ran Out of Food in the Last Year: Never true   Transportation Needs: No Transportation Needs (5/13/2024)    TRANSPORTATION NEEDS     Transportation : No   Physical Activity: Inactive (3/27/2024)    [facility], [facility]    Exercise Vital Sign     Days of Exercise per Week: 0 days     Minutes of Exercise per Session: 0 min   Stress: No Stress Concern Present (3/27/2024)    [facility], [facility]    [facility] - Occupational Stress Questionnaire     Feeling of Stress : Not at all   Housing Stability: Low Risk  (5/13/2024)    Housing Stability Vital Sign     Unable to Pay for Housing in the Last Year: No     Homeless in the Last Year: No       Review of patient's allergies indicates:   Allergen Reactions    Clopidogrel Other (See Comments)     Increased body temperature and redness        Current Outpatient Medications   Medication Instructions    acetaminophen (TYLENOL)  500-1,000 mg, Oral, Every 6 hours PRN    ASCORBIC ACID, VITAMIN C, ORAL 1 tablet, Oral, Daily    aspirin (ECOTRIN) 81 mg, Oral, Daily    atorvastatin (LIPITOR) 40 mg, Oral, Daily    b complex vitamins tablet 1 tablet, Oral, Daily    cholecalciferol (vitamin D3) 5,000 Units, Oral, Daily    cyanocobalamin (vitamin B-12) (VITAMIN B-12) 2,500 mcg, Oral, Daily    ferrous sulfate (SLOW RELEASE IRON) 45 mg, Oral, Daily    hydrocortisone (CORTEF) 5 MG Tab Take 20 mg in AM and 5 mg tablet in early afternoon    hydrocortisone (CORTEF) 20 mg, Oral, Daily, Will need 10 extra tablet for stress dosing    Lactobacillus rhamnosus GG (CULTURELLE) 10 billion cell capsule 1 capsule, Oral, Daily    levothyroxine (SYNTHROID) 25 mcg, Oral, Before breakfast    LIDOcaine-prilocaine (EMLA) cream Topical (Top), Once as needed    multivitamin with minerals tablet 1 tablet, Oral, Daily    nitroGLYCERIN (NITROSTAT) 0.4 mg, Sublingual, Every 5 min PRN    sodium chloride 0.9% SolP 50 mL with nivolumab 40 mg/4 mL Soln Intravenous, Every 30 days    vancomycin (VANCOCIN) 125 mg, Oral, 2 times daily    zinc gluconate 50 mg, Oral, Daily         Review of Systems   Constitutional:  Negative for activity change, appetite change, chills, fatigue and fever.   HENT:  Negative for congestion, mouth sores, sinus pain and sore throat.    Eyes:  Negative for photophobia and visual disturbance.   Respiratory:  Negative for cough, chest tightness, shortness of breath and wheezing.    Cardiovascular:  Negative for chest pain, palpitations and leg swelling.   Gastrointestinal:  Negative for abdominal pain, diarrhea, nausea and vomiting.   Genitourinary:  Negative for dysuria, flank pain, hematuria and urgency.   Musculoskeletal:  Negative for arthralgias, myalgias, neck pain and neck stiffness.   Skin:  Negative for color change and rash.   Neurological:  Negative for dizziness, seizures and headaches.   Psychiatric/Behavioral:  Negative for confusion.             Objective:     There were no vitals filed for this visit.           Physical Exam  Constitutional:       General: He is not in acute distress.     Appearance: Normal appearance. He is well-developed. He is not diaphoretic.   HENT:      Head: Normocephalic and atraumatic.      Right Ear: External ear normal.      Left Ear: External ear normal.      Nose: Nose normal.   Eyes:      Extraocular Movements: Extraocular movements intact.      Conjunctiva/sclera: Conjunctivae normal.   Pulmonary:      Effort: Pulmonary effort is normal. No respiratory distress.   Musculoskeletal:      Cervical back: Normal range of motion.   Skin:     Coloration: Skin is not jaundiced.   Neurological:      Mental Status: He is alert and oriented to person, place, and time.           CrCl cannot be calculated (Unknown ideal weight.).      Microbiology Results (last 7 days)       ** No results found for the last 168 hours. **              Significant Labs: All pertinent labs within the past 24 hours have been reviewed.     Significant Imaging: I have reviewed all relevant and available imaging results/findings within the past 24 hours.      Plan -- see top of note

## 2024-05-26 NOTE — PROGRESS NOTES
PATIENT: Aren Bey Jr.  MRN: [id number]  DATE: 5/27/2024      Diagnosis:   1. Malignant neoplasm of overlapping sites of bladder    2. Other specified disorders involving the immune mechanism, not elsewhere classified    3. Immunodeficiency due to drug therapy    4. Adrenal insufficiency    5. Hypothyroidism, unspecified type    6. Aneurysm of right popliteal artery    7. Lung nodule    8. Normocytic anemia    9. Other primary thrombophilia    10. Ureteral stent present    11. Coronary artery disease, unspecified vessel or lesion type, unspecified whether angina present, unspecified whether native or transplanted heart    12. Stage 3a chronic kidney disease        Chief Complaint: Malignant neoplasm of overlapping sites of bladder (1 month follow up)      Oncologic History:      Oncologic History     Oncologic Treatment     Pathology           Subjective:    Interval History:  Mr. Bey is a 74 y.o. male with HTN, Seizures, bladder cancer, h/o pulmonary embolism, who presents for bladder cancer.  Since the last clinic visit the patient was admitted hospital from 05/11/2024 until 05/13/2024 for urinary tract infection.  Blood cultures grew out E coli.  The patient was sent home on Cipro to complete 7 additional days of treatment.  The patient was also sent home on oral vancomycin for prevention of C diff. patient underwent bilateral ureteral stent exchange on 05/15/2024 under the care of Dr. Olivares.  The patient denies CP, cough, SOB, abdominal pain, nausea, vomiting, constipation, diarrhea.  The patient denies fever, chills, night sweats, weight loss, new lumps or bumps, easy bruising or bleeding.    Prior History:  Patient underwent transurethral resection of bladder tumor on 04/28/2023 with path showing invasive urothelial carcinoma, high-grade with involvement of muscularis propria and positive for lymphovascular invasion.  Patient underwent chemotherapy at [facility].  The  patient then underwent radical cystectomy and prostatectomy on 08/15/2023 with pathology showing invasive high-grade urothelial carcinoma measuring 4.2 cm, positive lymphovascular invasion, tumor identified within the soft tissue surrounding the left distal ureter, 4/6 positive regional lymph nodes with extranodal extension present. pT3aN2.  Also seen was acinar adenocarcinoma of the prostate grade group 1 (Jackson score 3+3=6) in 1 2 5% of prostate tissue pT2N0.  The patient's surgery was ultimately complicated by small-bowel obstruction, and pulmonary embolism.  PT recevied 1 dose of Nivolumab 9/18/23.  The patient was placed on anticoagulation for pulmonary embolism and ultimately developed a right perinephric bleed requiring embolization.  PT also developed UTI and acquired ureteral obstruction with placement of bilateral ureteral stents.  PT also developed C diff and was hospitalized for suspected recurrence at [facility] from 10/21/2023 to 10/25/2023.  Patient was discharged on fidaxomicin.   The patient saw Dr. Ghosh with Urology on 11/13/2023 for with recommendations for virtual visit in January to discuss possible operative intervention replaced stents, possible revision of ureteral ileal anastomosis pending disease progression.  It was instructed that the patient would need catheterized specimen from urostomy to rule out UTI if patient with future symptoms.  Patient underwent PET-CT on 11/16/2023 showing a new hypermetabolic subpleural nodule in the posterior right lower lobe measuring 2.2 x 1.3 cm.  The patient endorses a fever starting on the night of 11/16/2023.  Patient was prescribed Bactrim for suspected potential urinary tract infection.   The patient was admitted to the hospital from 11/17/23-11/21/23 for C diff colitis and suspected urinary tract infection the patient was discharged on fidaxomicin 200 mg b.i.d. for additional 7 days as well as 200 mg every other day for 25 days.  Patient was also  discharged on doxycycline 100 mg b.i.d. for 25 days with instructions from Infectious Disease to remain on antibiotics as long as his ureteral stents were in place.   The patient underwent biopsy of nodule in the right lung on 12/08/2023 showing fibrosis and chronic inflammation but no evidence of neoplasm or granuloma.   The patient was to have ureteral stents exchanged on 01/02/2024 but missed his appointment due to inclement weather.    The patient had bilateral ureteral stents exchanged by Dr. Ghosh on 1/23/24.   The patient underwent PET-CT on 03/01/2024 showing decrease in size of right lower lobe nodule now measuring 0.8 cm.   The patient underwent CTA chest on 03/06/2024 showing no evidence of pulmonary embolism or pneumonitis.  The patient underwent nuclear stress test on 03/11/2024 showed abnormal myocardial perfusion with mild intensity, small size, reversible perfusion abnormality consistent with ischemia in the basal to mid inferolateral walls.   The patient was admitted to the hospital from 04/04/2024 until 04/11/2024 with initial complaint of weakness, hypotensio, and diarrhea and found to have adrenal insufficiency, hypothyroidism, and C diff.    Past Medical History:   Past Medical History:   Diagnosis Date    Adrenal insufficiency 05/2024    Aneurysm artery, popliteal 2018    right leg    Bladder cancer     Coronary artery disease     cabg x 4 11/2021    Dyslipidemia     H/O Clostridium difficile infection     Hx pulmonary embolism     8/2023    Hypertension     Hyperthyroidism 05/2024    Immunotherapy     nivolomab / monthly md manuel    Malignant neoplasm of prostate     Presence of urostomy     Seizures     remote hx  last ~2007    SOB (shortness of breath)        Past Surgical HIstory:   Past Surgical History:   Procedure Laterality Date    CORONARY ARTERY BYPASS GRAFT      11/2021    CREATION, ILEAL CONDUIT  08/2023    ENDOSCOPY OF ILEAL CONDUIT Bilateral 5/15/2024    Procedure: ENDOSCOPY,  ILEAL CONDUIT - Bilateral Stent Exchange  Bilateral Retrograde Pyelogram;  Surgeon: Manuelito Olivares MD;  Location: [facility] OR;  Service: Urology;  Laterality: Bilateral;    HERNIA REPAIR      umbilical    PROSTATECTOMY      PTA, POPLITEAL      REMOVAL-STENT Bilateral 01/23/2024    Procedure: REMOVAL-STENT;  Surgeon: South Ghosh MD;  Location: [facility] OR 1ST FLR;  Service: Urology;  Laterality: Bilateral;    URETERAL STENT PLACEMENT Bilateral 01/23/2024    Procedure: INSERTION, STENT, URETER;  Surgeon: South Ghosh MD;  Location: [facility] OR 1ST FLR;  Service: Urology;  Laterality: Bilateral;       Family History:   Family History   Problem Relation Name Age of Onset    Heart disease Mother      Hypertension Mother      Peripheral vascular disease Father 72        Social History:  reports that he has never smoked. He has never been exposed to tobacco smoke. He has never used smokeless tobacco. He reports that he does not currently use alcohol. He reports that he does not use drugs.    Allergies:  Review of patient's allergies indicates:   Allergen Reactions    Clopidogrel Other (See Comments)     Increased body temperature and redness        Medications:  Current Outpatient Medications   Medication Sig Dispense Refill    acetaminophen (TYLENOL) 500 MG tablet Take 500-1,000 mg by mouth every 6 (six) hours as needed (fever/pain).      ASCORBIC ACID, VITAMIN C, ORAL Take 1 tablet by mouth once daily.      aspirin (ECOTRIN) 81 MG EC tablet Take 1 tablet (81 mg total) by mouth once daily. 90 tablet 3    atorvastatin (LIPITOR) 40 MG tablet Take 40 mg by mouth once daily.      b complex vitamins tablet Take 1 tablet by mouth once daily.      cholecalciferol, vitamin D3, 125 mcg (5,000 unit) Tab Take 5,000 Units by mouth once daily.      cyanocobalamin, vitamin B-12, (VITAMIN B-12) 2,500 mcg Subl Take 2,500 mcg by mouth once daily.      ferrous sulfate (SLOW RELEASE IRON) 142 mg (45 mg iron) TbSR Take 45 mg by mouth Daily.       hydrocortisone (CORTEF) 20 MG Tab Take 1 tablet (20 mg total) by mouth once daily. Will need 10 extra tablet for stress dosing 100 tablet 3    hydrocortisone (CORTEF) 5 MG Tab Take 20 mg in AM and 5 mg tablet in early afternoon (Patient taking differently: Take 5 mg by mouth after lunch.) 30 tablet 6    Lactobacillus rhamnosus GG (CULTURELLE) 10 billion cell capsule Take 1 capsule by mouth once daily. 30 capsule 0    levothyroxine (SYNTHROID) 25 MCG tablet Take 1 tablet (25 mcg total) by mouth before breakfast. 30 tablet 11    LIDOcaine-prilocaine (EMLA) cream Apply topically once as needed (to port access).      multivitamin with minerals tablet Take 1 tablet by mouth once daily.      nitroGLYCERIN (NITROSTAT) 0.4 MG SL tablet Place 1 tablet (0.4 mg total) under the tongue every 5 (five) minutes as needed for Chest pain. 25 tablet 0    sodium chloride 0.9% SolP 50 mL with nivolumab 40 mg/4 mL Soln Inject into the vein every 30 days.      vancomycin (VANCOCIN) 125 MG capsule Take 1 capsule (125 mg total) by mouth 2 (two) times a day. for 12 days 24 capsule 0    zinc gluconate 50 mg tablet Take 50 mg by mouth once daily.       No current facility-administered medications for this visit.       Review of Systems   Constitutional:  Negative for appetite change, chills, diaphoresis, fatigue, fever and unexpected weight change.   Eyes:  Negative for visual disturbance.   Respiratory:  Negative for cough and shortness of breath.    Cardiovascular:  Negative for chest pain and palpitations.   Gastrointestinal:  Negative for abdominal pain, constipation, diarrhea, nausea and vomiting.   Genitourinary:  Negative for difficulty urinating, dysuria and flank pain.   Musculoskeletal:  Negative for back pain.   Skin:  Negative for color change and rash.   Neurological:  Negative for headaches.   Hematological:  Negative for adenopathy. Does not bruise/bleed easily.   Psychiatric/Behavioral:  The patient is not nervous/anxious.   "      ECOG Performance Status: 1   Objective:      Vitals:   Vitals:    05/27/24 0918   BP: 118/80   BP Location: Right arm   Patient Position: Sitting   BP Method: Medium (Manual)   Pulse: (!) 52   Resp: 12   Temp: 96.3 °F (35.7 °C)   TempSrc: Temporal   SpO2: 96%   Weight: 68.8 kg (151 lb 10.8 oz)   Height: 5' 9" (1.753 m)                         Physical Exam  Constitutional:       General: He is not in acute distress.     Appearance: He is well-developed. He is not diaphoretic.   HENT:      Head: Normocephalic and atraumatic.   Cardiovascular:      Rate and Rhythm: Normal rate and regular rhythm.      Heart sounds: Normal heart sounds. No murmur heard.     No friction rub. No gallop.   Pulmonary:      Effort: Pulmonary effort is normal. No respiratory distress.      Breath sounds: Normal breath sounds. No wheezing or rales.   Chest:      Chest wall: No tenderness.   Abdominal:      General: Bowel sounds are normal. There is no distension.      Palpations: Abdomen is soft. There is no mass.      Tenderness: There is no abdominal tenderness. There is no rebound.   Musculoskeletal:      Cervical back: Normal range of motion.   Lymphadenopathy:      Cervical: No cervical adenopathy.      Upper Body:      Right upper body: No supraclavicular or axillary adenopathy.      Left upper body: No supraclavicular or axillary adenopathy.   Skin:     General: Skin is warm and dry.      Findings: No erythema or rash.   Neurological:      Mental Status: He is alert and oriented to person, place, and time.   Psychiatric:         Behavior: Behavior normal.         Laboratory Data:  Lab Visit on 05/27/2024   Component Date Value Ref Range Status    WBC 05/27/2024 7.88  3.90 - 12.70 K/uL Final    RBC 05/27/2024 4.24 (L)  4.60 - 6.20 M/uL Final    Hemoglobin 05/27/2024 12.8 (L)  14.0 - 18.0 g/dL Final    Hematocrit 05/27/2024 37.8 (L)  40.0 - 54.0 % Final    MCV 05/27/2024 89  82 - 98 fL Final    MCH 05/27/2024 30.2  27.0 - 31.0 pg " Final    MCHC 05/27/2024 33.9  32.0 - 36.0 g/dL Final    RDW 05/27/2024 15.9 (H)  11.5 - 14.5 % Final    Platelets 05/27/2024 237  150 - 450 K/uL Final    MPV 05/27/2024 8.5 (L)  9.2 - 12.9 fL Final    Immature Granulocytes 05/27/2024 0.3  0.0 - 0.5 % Final    Gran # (ANC) 05/27/2024 4.6  1.8 - 7.7 K/uL Final    Immature Grans (Abs) 05/27/2024 0.02  0.00 - 0.04 K/uL Final    Comment: Mild elevation in immature granulocytes is non specific and   can be seen in a variety of conditions including stress response,   acute inflammation, trauma and pregnancy. Correlation with other   laboratory and clinical findings is essential.      Lymph # 05/27/2024 2.4  1.0 - 4.8 K/uL Final    Mono # 05/27/2024 0.4  0.3 - 1.0 K/uL Final    Eos # 05/27/2024 0.4  0.0 - 0.5 K/uL Final    Baso # 05/27/2024 0.14  0.00 - 0.20 K/uL Final    nRBC 05/27/2024 0  0 /100 WBC Final    Gran % 05/27/2024 57.7  38.0 - 73.0 % Final    Lymph % 05/27/2024 30.5  18.0 - 48.0 % Final    Mono % 05/27/2024 4.6  4.0 - 15.0 % Final    Eosinophil % 05/27/2024 5.1  0.0 - 8.0 % Final    Basophil % 05/27/2024 1.8  0.0 - 1.9 % Final    Differential Method 05/27/2024 Automated   Final    Sodium 05/27/2024 139  136 - 145 mmol/L Final    Potassium 05/27/2024 4.7  3.5 - 5.1 mmol/L Final    Chloride 05/27/2024 106  95 - 110 mmol/L Final    CO2 05/27/2024 22 (L)  23 - 29 mmol/L Final    Glucose 05/27/2024 106  70 - 110 mg/dL Final    BUN 05/27/2024 27 (H)  8 - 23 mg/dL Final    Creatinine 05/27/2024 2.0 (H)  0.5 - 1.4 mg/dL Final    Calcium 05/27/2024 9.6  8.7 - 10.5 mg/dL Final    Total Protein 05/27/2024 7.3  6.0 - 8.4 g/dL Final    Albumin 05/27/2024 3.9  3.5 - 5.2 g/dL Final    Total Bilirubin 05/27/2024 0.5  0.1 - 1.0 mg/dL Final    Comment: For infants and newborns, interpretation of results should be based  on gestational age, weight and in agreement with clinical  observations.    Premature Infant recommended reference ranges:  Up to 24 hours.............<8.0  mg/dL  Up to 48 hours............<12.0 mg/dL  3-5 days..................<15.0 mg/dL  6-29 days.................<15.0 mg/dL      Alkaline Phosphatase 05/27/2024 66  55 - 135 U/L Final    AST 05/27/2024 37  10 - 40 U/L Final    ALT 05/27/2024 45 (H)  10 - 44 U/L Final    eGFR 05/27/2024 34.0 (A)  >60 mL/min/1.73 m^2 Final    Anion Gap 05/27/2024 11  8 - 16 mmol/L Final         Imaging:     PET/CT 3/01/24  Head/neck:     No significant abnormal hypermetabolic foci are identified within the head and neck. No lymphadenopathy is present.     Chest:     A hypermetabolic subpleural 1.3 cm posterior right lower lobe nodule has decreased markedly in size and has become essentially non metabolic. The nodule underwent prior biopsy which yielded benign results. On image 148 the nodule now measures 0.8 cm compared to 1.3 cm previously. No new hypermetabolic pulmonary nodules, lymphadenopathy, pleural effusion, or pericardial effusion are present.     Abdomen/pelvis:     There are postoperative changes of cystectomy, prostatectomy, ureteral ileal conduit with right lower quadrant urostomy. There are well position bilateral ureteral stents and there is no hydronephrosis. No significant abnormal hypermetabolic foci are identified in the abdomen and pelvis. No lymphadenopathy is present. The adrenal glands are normal. An inferior vena cava filter appears in satisfactory position.     Skeleton:     No significant abnormal hypermetabolic foci are identified within the skeleton. There are no findings to suggest osseous metastatic disease.       Assessment:       1. Malignant neoplasm of overlapping sites of bladder    2. Other specified disorders involving the immune mechanism, not elsewhere classified    3. Immunodeficiency due to drug therapy    4. Adrenal insufficiency    5. Hypothyroidism, unspecified type    6. Aneurysm of right popliteal artery    7. Lung nodule    8. Normocytic anemia    9. Other primary thrombophilia    10.  Ureteral stent present    11. Coronary artery disease, unspecified vessel or lesion type, unspecified whether angina present, unspecified whether native or transplanted heart    12. Stage 3a chronic kidney disease                   Plan:     Bladder Cancer - pt with stage IIIB bladder cancer pT3N2 s/p neoadjuvant chemo followed by radical cystoprostatectomy 8/15/23  -PT received one dose of Nivolumab on 9/18/23  -PET/CT suggestive of a RLL pulmonary nodule  -Biopsy on 12/08/23 negative for malignancy  -Pt to proceed with cycle 6 of Nivolumab  -Will repeat PET/CT prior to next appt  -Scans show no clear evidence of metastatic or recurrent disease but does show a decreasing RLL nodule previously biopsied and benign  -Visit today included increased complexity associated with the care of the episodic problem (immunotherapy) addressed and managing the longitudinal care of the patient due to the serious and/or complex managed problem(s) Nivolumab    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  -No sign of infection  -Will monitor    Hypothyroidism - Pt with hyperthyroidism on methimazole  -TSH now elevated and T4 low  -Pt to follow up with endocrinology   -Case discussed with Dr Xiong and will stop methimazole currently and check TSH with next labs.    Adrenal Insufficiency - pt with positive Cosyntropin stim test in the hospital  -PT on Hydrocortisone 20mg daily  -Will have the patient follow up with endocrinology    Hypothyroidism - Pt started on synthroid last week  -Likely from immunotherapy  -TSH and T4 from today pending  -Will monitor    CAD - pt with prior bypass surgery  -Cardiac stress test on 3/11/24 was abnormal with mild intensity, small size, reversible perfusion abnormality consistent with ischemia in the basal to mid inferolateral walls  -Pt to follow up with cardiologist    Pulmonary nodule - see above    Prostate Cancer - patient with acinar adenocarcinoma of the prostate grade group 1  (Sarah score 3+3=6) in 1 2 5% of prostate tissue pT2N0  -PSA 10/31/23 0.02ng/mL  -Will monitor    Ureteral Stents - Stents exchanged last on 5/15/24 under the care of Dr Olivares  -Pt to follow up with Urology    Anemia - Hemoglobin 12.8g/dL on 5/27/24  -Stable  -Will monitor     Thrombophilia - pt with prior PE  -Pt subsequently developed a right perinephric bleed requiring embolization   -Patient with IVC filter in place  -Pt following with Dr Claire  -Vascular surgery to determine if he could be started on Eliquis  -Will monitor    Right popliteal aneurysm - pt to see vascular surgery    CKDIII - creatinine up to 2.0 on 5/27/24  -Pt following with nephrology  -Will monitor    Route Chart for Scheduling    Med Onc Chart Routing      Follow up with physician 4 weeks. Pt can proceed with treatment.  Pt needs a PET/CT, CBC, CMP and TSH on 6/21/24.  Pt needs an appt with me on 6/24/24.   Follow up with [facility]    Infusion scheduling note    Injection scheduling note    Labs    Imaging    Pharmacy appointment    Other referrals              Treatment Plan Information   OP NIVOLUMAB 480 MG Q4W   Jackson Jimenez MD   Upcoming Treatment Dates - OP NIVOLUMAB 480 MG Q4W    5/27/2024       Chemotherapy       nivolumab 480 mg in sodium chloride 0.9% 98 mL infusion  6/24/2024       Chemotherapy       nivolumab 480 mg in sodium chloride 0.9% 98 mL infusion  7/22/2024       Chemotherapy       nivolumab 480 mg in sodium chloride 0.9% 98 mL infusion  8/19/2024       Chemotherapy       nivolumab 480 mg in sodium chloride 0.9% 98 mL infusion    Therapy Plan Information  BEZLOTOXUMAB (ZINPLAVA) ONE DOSE  dextrose 5 % (D5W) 100 mL flush bag  Intravenous, Once  heparin, porcine (PF) 100 unit/mL injection flush 500 Units  500 Units, Intravenous, PRN  alteplase injection 2 mg  2 mg, Intra-Catheter, PRN    COSYNTROPIN: TRADITIONAL ACTH-CORTISOL STIMULATION TEST  Medications  cosyntropin injection 0.25 mg  0.25 mg, Intravenous,  Once  Flushes  sodium chloride 0.9% flush 3 mL  3 mL, Intravenous, Once      Jackson Jimenez MD  [facility]  Hematology and Oncology  [facility]   [address] [place] [address]   O: [phone]  F: [phone]

## 2024-05-27 ENCOUNTER — OFFICE VISIT (OUTPATIENT)
Dept: HEMATOLOGY/ONCOLOGY | Facility: CLINIC | Age: 74
End: 2024-05-27
Payer: MEDICARE

## 2024-05-27 ENCOUNTER — LAB VISIT (OUTPATIENT)
Dept: LAB | Facility: HOSPITAL | Age: 74
End: 2024-05-27
Attending: INTERNAL MEDICINE
Payer: MEDICARE

## 2024-05-27 VITALS
HEART RATE: 52 BPM | TEMPERATURE: 96 F | SYSTOLIC BLOOD PRESSURE: 118 MMHG | WEIGHT: 151.69 LBS | RESPIRATION RATE: 12 BRPM | BODY MASS INDEX: 22.47 KG/M2 | HEIGHT: 69 IN | DIASTOLIC BLOOD PRESSURE: 80 MMHG | OXYGEN SATURATION: 96 %

## 2024-05-27 DIAGNOSIS — I25.10 CORONARY ARTERY DISEASE, UNSPECIFIED VESSEL OR LESION TYPE, UNSPECIFIED WHETHER ANGINA PRESENT, UNSPECIFIED WHETHER NATIVE OR TRANSPLANTED HEART: ICD-10-CM

## 2024-05-27 DIAGNOSIS — D64.9 NORMOCYTIC ANEMIA: ICD-10-CM

## 2024-05-27 DIAGNOSIS — E27.40 ADRENAL INSUFFICIENCY: ICD-10-CM

## 2024-05-27 DIAGNOSIS — Z96.0 URETERAL STENT PRESENT: ICD-10-CM

## 2024-05-27 DIAGNOSIS — R91.1 LUNG NODULE: ICD-10-CM

## 2024-05-27 DIAGNOSIS — N18.31 STAGE 3A CHRONIC KIDNEY DISEASE: ICD-10-CM

## 2024-05-27 DIAGNOSIS — C67.8 MALIGNANT NEOPLASM OF OVERLAPPING SITES OF BLADDER: ICD-10-CM

## 2024-05-27 DIAGNOSIS — E05.90 HYPERTHYROIDISM: ICD-10-CM

## 2024-05-27 DIAGNOSIS — D84.821 IMMUNODEFICIENCY DUE TO DRUG THERAPY: ICD-10-CM

## 2024-05-27 DIAGNOSIS — C67.8 MALIGNANT NEOPLASM OF OVERLAPPING SITES OF BLADDER: Primary | ICD-10-CM

## 2024-05-27 DIAGNOSIS — E03.9 HYPOTHYROIDISM, UNSPECIFIED TYPE: ICD-10-CM

## 2024-05-27 DIAGNOSIS — D89.89 OTHER SPECIFIED DISORDERS INVOLVING THE IMMUNE MECHANISM, NOT ELSEWHERE CLASSIFIED: ICD-10-CM

## 2024-05-27 DIAGNOSIS — I72.4 ANEURYSM OF RIGHT POPLITEAL ARTERY: ICD-10-CM

## 2024-05-27 DIAGNOSIS — Z79.899 IMMUNODEFICIENCY DUE TO DRUG THERAPY: ICD-10-CM

## 2024-05-27 DIAGNOSIS — D68.59 OTHER PRIMARY THROMBOPHILIA: ICD-10-CM

## 2024-05-27 LAB
ALBUMIN SERPL BCP-MCNC: 3.9 G/DL (ref 3.5–5.2)
ALP SERPL-CCNC: 66 U/L (ref 55–135)
ALT SERPL W/O P-5'-P-CCNC: 45 U/L (ref 10–44)
ANION GAP SERPL CALC-SCNC: 11 MMOL/L (ref 8–16)
AST SERPL-CCNC: 37 U/L (ref 10–40)
BASOPHILS # BLD AUTO: 0.14 K/UL (ref 0–0.2)
BASOPHILS NFR BLD: 1.8 % (ref 0–1.9)
BILIRUB SERPL-MCNC: 0.5 MG/DL (ref 0.1–1)
BUN SERPL-MCNC: 27 MG/DL (ref 8–23)
CALCIUM SERPL-MCNC: 9.6 MG/DL (ref 8.7–10.5)
CHLORIDE SERPL-SCNC: 106 MMOL/L (ref 95–110)
CO2 SERPL-SCNC: 22 MMOL/L (ref 23–29)
CREAT SERPL-MCNC: 2 MG/DL (ref 0.5–1.4)
DIFFERENTIAL METHOD BLD: ABNORMAL
EOSINOPHIL # BLD AUTO: 0.4 K/UL (ref 0–0.5)
EOSINOPHIL NFR BLD: 5.1 % (ref 0–8)
ERYTHROCYTE [DISTWIDTH] IN BLOOD BY AUTOMATED COUNT: 15.9 % (ref 11.5–14.5)
EST. GFR  (NO RACE VARIABLE): 34 ML/MIN/1.73 M^2
GLUCOSE SERPL-MCNC: 106 MG/DL (ref 70–110)
HCT VFR BLD AUTO: 37.8 % (ref 40–54)
HGB BLD-MCNC: 12.8 G/DL (ref 14–18)
IMM GRANULOCYTES # BLD AUTO: 0.02 K/UL (ref 0–0.04)
IMM GRANULOCYTES NFR BLD AUTO: 0.3 % (ref 0–0.5)
LYMPHOCYTES # BLD AUTO: 2.4 K/UL (ref 1–4.8)
LYMPHOCYTES NFR BLD: 30.5 % (ref 18–48)
MCH RBC QN AUTO: 30.2 PG (ref 27–31)
MCHC RBC AUTO-ENTMCNC: 33.9 G/DL (ref 32–36)
MCV RBC AUTO: 89 FL (ref 82–98)
MONOCYTES # BLD AUTO: 0.4 K/UL (ref 0.3–1)
MONOCYTES NFR BLD: 4.6 % (ref 4–15)
NEUTROPHILS # BLD AUTO: 4.6 K/UL (ref 1.8–7.7)
NEUTROPHILS NFR BLD: 57.7 % (ref 38–73)
NRBC BLD-RTO: 0 /100 WBC
PLATELET # BLD AUTO: 237 K/UL (ref 150–450)
PMV BLD AUTO: 8.5 FL (ref 9.2–12.9)
POTASSIUM SERPL-SCNC: 4.7 MMOL/L (ref 3.5–5.1)
PROT SERPL-MCNC: 7.3 G/DL (ref 6–8.4)
RBC # BLD AUTO: 4.24 M/UL (ref 4.6–6.2)
SODIUM SERPL-SCNC: 139 MMOL/L (ref 136–145)
T4 FREE SERPL-MCNC: <0.42 NG/DL (ref 0.71–1.51)
TSH SERPL DL<=0.005 MIU/L-ACNC: 157.92 UIU/ML (ref 0.4–4)
WBC # BLD AUTO: 7.88 K/UL (ref 3.9–12.7)

## 2024-05-27 PROCEDURE — 85025 COMPLETE CBC W/AUTO DIFF WBC: CPT | Mod: PN | Performed by: INTERNAL MEDICINE

## 2024-05-27 PROCEDURE — 83520 IMMUNOASSAY QUANT NOS NONAB: CPT | Performed by: INTERNAL MEDICINE

## 2024-05-27 PROCEDURE — 1159F MED LIST DOCD IN RCRD: CPT | Mod: CPTII,S$GLB,, | Performed by: INTERNAL MEDICINE

## 2024-05-27 PROCEDURE — G2211 COMPLEX E/M VISIT ADD ON: HCPCS | Mod: S$GLB,,, | Performed by: INTERNAL MEDICINE

## 2024-05-27 PROCEDURE — 3079F DIAST BP 80-89 MM HG: CPT | Mod: CPTII,S$GLB,, | Performed by: INTERNAL MEDICINE

## 2024-05-27 PROCEDURE — 3288F FALL RISK ASSESSMENT DOCD: CPT | Mod: CPTII,S$GLB,, | Performed by: INTERNAL MEDICINE

## 2024-05-27 PROCEDURE — 1126F AMNT PAIN NOTED NONE PRSNT: CPT | Mod: CPTII,S$GLB,, | Performed by: INTERNAL MEDICINE

## 2024-05-27 PROCEDURE — 1101F PT FALLS ASSESS-DOCD LE1/YR: CPT | Mod: CPTII,S$GLB,, | Performed by: INTERNAL MEDICINE

## 2024-05-27 PROCEDURE — 84443 ASSAY THYROID STIM HORMONE: CPT | Performed by: INTERNAL MEDICINE

## 2024-05-27 PROCEDURE — 99215 OFFICE O/P EST HI 40 MIN: CPT | Mod: S$GLB,,, | Performed by: INTERNAL MEDICINE

## 2024-05-27 PROCEDURE — 99999 PR PBB SHADOW E&M-EST. PATIENT-LVL IV: CPT | Mod: PBBFAC,,, | Performed by: INTERNAL MEDICINE

## 2024-05-27 PROCEDURE — 1111F DSCHRG MED/CURRENT MED MERGE: CPT | Mod: CPTII,S$GLB,, | Performed by: INTERNAL MEDICINE

## 2024-05-27 PROCEDURE — 84439 ASSAY OF FREE THYROXINE: CPT | Performed by: INTERNAL MEDICINE

## 2024-05-27 PROCEDURE — 1157F ADVNC CARE PLAN IN RCRD: CPT | Mod: CPTII,S$GLB,, | Performed by: INTERNAL MEDICINE

## 2024-05-27 PROCEDURE — 3074F SYST BP LT 130 MM HG: CPT | Mod: CPTII,S$GLB,, | Performed by: INTERNAL MEDICINE

## 2024-05-27 PROCEDURE — 80053 COMPREHEN METABOLIC PANEL: CPT | Mod: PN | Performed by: INTERNAL MEDICINE

## 2024-05-27 RX ORDER — SODIUM CHLORIDE 0.9 % (FLUSH) 0.9 %
10 SYRINGE (ML) INJECTION
Status: CANCELLED | OUTPATIENT
Start: 2024-05-28

## 2024-05-27 RX ORDER — PROCHLORPERAZINE EDISYLATE 5 MG/ML
5 INJECTION INTRAMUSCULAR; INTRAVENOUS ONCE AS NEEDED
Status: CANCELLED
Start: 2024-05-28

## 2024-05-27 RX ORDER — EPINEPHRINE 0.3 MG/.3ML
0.3 INJECTION SUBCUTANEOUS ONCE AS NEEDED
Status: CANCELLED | OUTPATIENT
Start: 2024-05-28

## 2024-05-27 RX ORDER — DIPHENHYDRAMINE HYDROCHLORIDE 50 MG/ML
50 INJECTION INTRAMUSCULAR; INTRAVENOUS ONCE AS NEEDED
Status: CANCELLED | OUTPATIENT
Start: 2024-05-28

## 2024-05-27 RX ORDER — HEPARIN 100 UNIT/ML
500 SYRINGE INTRAVENOUS
Status: CANCELLED | OUTPATIENT
Start: 2024-05-28

## 2024-05-28 ENCOUNTER — INFUSION (OUTPATIENT)
Dept: INFUSION THERAPY | Facility: HOSPITAL | Age: 74
End: 2024-05-28
Attending: INTERNAL MEDICINE
Payer: MEDICARE

## 2024-05-28 VITALS
RESPIRATION RATE: 16 BRPM | HEIGHT: 69 IN | DIASTOLIC BLOOD PRESSURE: 74 MMHG | SYSTOLIC BLOOD PRESSURE: 156 MMHG | TEMPERATURE: 98 F | BODY MASS INDEX: 22.47 KG/M2 | WEIGHT: 151.69 LBS | HEART RATE: 66 BPM

## 2024-05-28 DIAGNOSIS — C67.8 MALIGNANT NEOPLASM OF OVERLAPPING SITES OF BLADDER: Primary | ICD-10-CM

## 2024-05-28 PROCEDURE — 96413 CHEMO IV INFUSION 1 HR: CPT | Mod: PN

## 2024-05-28 PROCEDURE — 25000003 PHARM REV CODE 250: Mod: PN | Performed by: INTERNAL MEDICINE

## 2024-05-28 PROCEDURE — A4216 STERILE WATER/SALINE, 10 ML: HCPCS | Mod: PN | Performed by: INTERNAL MEDICINE

## 2024-05-28 PROCEDURE — 63600175 PHARM REV CODE 636 W HCPCS: Mod: JZ,JG,PN | Performed by: INTERNAL MEDICINE

## 2024-05-28 RX ORDER — EPINEPHRINE 0.3 MG/.3ML
0.3 INJECTION SUBCUTANEOUS ONCE AS NEEDED
Status: DISCONTINUED | OUTPATIENT
Start: 2024-05-28 | End: 2024-05-28 | Stop reason: HOSPADM

## 2024-05-28 RX ORDER — SODIUM CHLORIDE 0.9 % (FLUSH) 0.9 %
10 SYRINGE (ML) INJECTION
Status: DISCONTINUED | OUTPATIENT
Start: 2024-05-28 | End: 2024-05-28 | Stop reason: HOSPADM

## 2024-05-28 RX ORDER — PROCHLORPERAZINE EDISYLATE 5 MG/ML
5 INJECTION INTRAMUSCULAR; INTRAVENOUS ONCE AS NEEDED
Status: DISCONTINUED | OUTPATIENT
Start: 2024-05-28 | End: 2024-05-28 | Stop reason: HOSPADM

## 2024-05-28 RX ORDER — DIPHENHYDRAMINE HYDROCHLORIDE 50 MG/ML
50 INJECTION INTRAMUSCULAR; INTRAVENOUS ONCE AS NEEDED
Status: DISCONTINUED | OUTPATIENT
Start: 2024-05-28 | End: 2024-05-28 | Stop reason: HOSPADM

## 2024-05-28 RX ORDER — HEPARIN 100 UNIT/ML
500 SYRINGE INTRAVENOUS
Status: DISCONTINUED | OUTPATIENT
Start: 2024-05-28 | End: 2024-05-28 | Stop reason: HOSPADM

## 2024-05-28 RX ADMIN — SODIUM CHLORIDE: 9 INJECTION, SOLUTION INTRAVENOUS at 10:05

## 2024-05-28 RX ADMIN — SODIUM CHLORIDE 480 MG: 9 INJECTION, SOLUTION INTRAVENOUS at 10:05

## 2024-05-28 RX ADMIN — Medication 10 ML: at 09:05

## 2024-05-28 RX ADMIN — SODIUM CHLORIDE: 9 INJECTION, SOLUTION INTRAVENOUS at 09:05

## 2024-05-28 RX ADMIN — Medication 500 UNITS: at 10:05

## 2024-05-28 NOTE — PLAN OF CARE
Problem: Fatigue  Goal: Improved Activity Tolerance  Outcome: Progressing     Problem: Adult Inpatient Plan of Care  Goal: Plan of Care Review  Outcome: Met   Tolerated infusion well today Able to be d/c to home  Discharge instructions given with copy of avs and pt ambulated to private vehicle without difficulty NAD

## 2024-05-29 ENCOUNTER — DOCUMENTATION ONLY (OUTPATIENT)
Dept: INFUSION THERAPY | Facility: HOSPITAL | Age: 74
End: 2024-05-29
Payer: MEDICARE

## 2024-05-29 ENCOUNTER — OFFICE VISIT (OUTPATIENT)
Dept: NEPHROLOGY | Facility: CLINIC | Age: 74
End: 2024-05-29
Payer: MEDICARE

## 2024-05-29 VITALS — DIASTOLIC BLOOD PRESSURE: 68 MMHG | SYSTOLIC BLOOD PRESSURE: 118 MMHG | HEART RATE: 64 BPM | OXYGEN SATURATION: 97 %

## 2024-05-29 DIAGNOSIS — E27.40 ADRENAL INSUFFICIENCY: ICD-10-CM

## 2024-05-29 DIAGNOSIS — N18.32 STAGE 3B CHRONIC KIDNEY DISEASE: Primary | ICD-10-CM

## 2024-05-29 DIAGNOSIS — E87.1 HYPONATREMIA: ICD-10-CM

## 2024-05-29 LAB — TSH RECEP AB SER-ACNC: <1.1 IU/L (ref 0–1.75)

## 2024-05-29 PROCEDURE — 3074F SYST BP LT 130 MM HG: CPT | Mod: CPTII,S$GLB,, | Performed by: INTERNAL MEDICINE

## 2024-05-29 PROCEDURE — 3078F DIAST BP <80 MM HG: CPT | Mod: CPTII,S$GLB,, | Performed by: INTERNAL MEDICINE

## 2024-05-29 PROCEDURE — 99999 PR PBB SHADOW E&M-EST. PATIENT-LVL III: CPT | Mod: PBBFAC,,, | Performed by: INTERNAL MEDICINE

## 2024-05-29 PROCEDURE — 1159F MED LIST DOCD IN RCRD: CPT | Mod: CPTII,S$GLB,, | Performed by: INTERNAL MEDICINE

## 2024-05-29 PROCEDURE — 1111F DSCHRG MED/CURRENT MED MERGE: CPT | Mod: CPTII,S$GLB,, | Performed by: INTERNAL MEDICINE

## 2024-05-29 PROCEDURE — 99215 OFFICE O/P EST HI 40 MIN: CPT | Mod: S$GLB,,, | Performed by: INTERNAL MEDICINE

## 2024-05-29 PROCEDURE — 1160F RVW MEDS BY RX/DR IN RCRD: CPT | Mod: CPTII,S$GLB,, | Performed by: INTERNAL MEDICINE

## 2024-05-29 PROCEDURE — 1157F ADVNC CARE PLAN IN RCRD: CPT | Mod: CPTII,S$GLB,, | Performed by: INTERNAL MEDICINE

## 2024-05-29 PROCEDURE — 1101F PT FALLS ASSESS-DOCD LE1/YR: CPT | Mod: CPTII,S$GLB,, | Performed by: INTERNAL MEDICINE

## 2024-05-29 PROCEDURE — 3288F FALL RISK ASSESSMENT DOCD: CPT | Mod: CPTII,S$GLB,, | Performed by: INTERNAL MEDICINE

## 2024-05-29 NOTE — PROGRESS NOTES
Oncology Nutrition   Chemotherapy Infusion Visit  Late entry, RD visit on 05/28/24  Nutrition Follow Up   RD met with pt at chairside during infusion tx. Pt present for cycle 6 Opdivo. Pt's spouse, Courtney, with questions regarding pt's diet. Pt currently went to see Dr. Xiong for adrenal insufficiency. Spouse reports they were instructed to increase protein intake and are looking for ways to increase protein. RD gave pt and spouse examples of ways to add more protein to meals and snacks. Pt weight has been stable.       Wt Readings from Last 10 Encounters:   05/28/24 68.8 kg (151 lb 10.8 oz)   05/27/24 68.8 kg (151 lb 10.8 oz)   05/15/24 67.7 kg (149 lb 4 oz)   05/12/24 69.4 kg (153 lb)   05/09/24 67.9 kg (149 lb 11.1 oz)   05/08/24 67.7 kg (149 lb 4 oz)   05/06/24 67.4 kg (148 lb 7.7 oz)   05/03/24 67.4 kg (148 lb 7.7 oz)   04/30/24 66.6 kg (146 lb 13.2 oz)   04/30/24 66.6 kg (146 lb 13.2 oz)       All other nutrition questions/concerns addressed as appropriate. Will continue to monitor prn throughout treatment.     Yoana Watts, CUONGN, LDN  05/29/2024  8:34 AM

## 2024-05-29 NOTE — PROGRESS NOTES
Subjective:        Patient ID: Aren Bey Jr. is a 74 y.o. White male who presents for return patient evaluation for chronic renal failure.      He was admitted in May.  He has stent change q 3 months per .  He has an iliostomy.    He is on po vancomycin for c diff.    Review of Systems      The past medical, family and social histories were reviewed for this encounter.     /68 (BP Location: Right arm, Patient Position: Sitting, BP Method: Large (Manual))   Pulse 64   SpO2 97%     Objective:      Physical Exam  Vitals reviewed.   Constitutional:       General: He is not in acute distress.     Appearance: He is well-developed.   HENT:      Head: Normocephalic and atraumatic.   Eyes:      General: No scleral icterus.     Conjunctiva/sclera: Conjunctivae normal.   Neck:      Vascular: No JVD.   Cardiovascular:      Rate and Rhythm: Normal rate and regular rhythm.      Heart sounds: Normal heart sounds. No murmur heard.     No friction rub. No gallop.   Pulmonary:      Effort: Pulmonary effort is normal. No respiratory distress.      Breath sounds: Normal breath sounds. No wheezing.   Abdominal:      General: Bowel sounds are normal. There is no distension.      Palpations: Abdomen is soft.      Tenderness: There is no abdominal tenderness.   Musculoskeletal:      Right lower leg: No edema.      Left lower leg: No edema.   Skin:     General: Skin is warm and dry.      Findings: No rash.   Neurological:      Mental Status: He is alert and oriented to person, place, and time.         Assessment:       1. Stage 3a chronic kidney disease    2. Hyponatremia    3. Adrenal insufficiency          Lab Results   Component Value Date    CREATININE 2.0 (H) 05/27/2024    BUN 27 (H) 05/27/2024     05/27/2024    K 4.7 05/27/2024     05/27/2024    CO2 22 (L) 05/27/2024     Lab Results   Component Value Date    CALCIUM 9.6 05/27/2024    PHOS 4.8 (H) 04/04/2024     Lab Results   Component Value Date     "HCT 37.8 (L) 05/27/2024     No results found for: "UTPCR"   Plan:   Return to clinic in 6 months.  Labs for next visit include labs per standing orders q 3 months.  UACR for next time.  Baseline creatinine is 1.4-1.8 since 2023.  Function is labile due to urologic issues.    Computed KFRE 2-Year unavailable. One or more values for this score either were not found within the given timeframe or did not fit some other criterion.    Computed KFRE 5-Year unavailable. One or more values for this score either were not found within the given timeframe or did not fit some other criterion.    "

## 2024-05-30 ENCOUNTER — TELEPHONE (OUTPATIENT)
Dept: INFUSION THERAPY | Facility: HOSPITAL | Age: 74
End: 2024-05-30
Payer: MEDICARE

## 2024-05-30 NOTE — TELEPHONE ENCOUNTER
Pt's spouse, Courtney, called to speak to RD regarding the pt's potassium intake with his stage 3 chronic kidney disease. RD discussed eating foods with 200mg or less of potassium foods per serving. RD emailed spouse a handout on Potassium and Chronic Kidney Disease Stage 3-5 Nutrition Therapy.    Yoana Watts, CUONGN, LDN

## 2024-06-05 RX ORDER — QUETIAPINE FUMARATE 25 MG/1
TABLET, FILM COATED ORAL
Qty: 90 TABLET | Refills: 1 | Status: SHIPPED | OUTPATIENT
Start: 2024-06-05

## 2024-06-12 ENCOUNTER — CLINICAL SUPPORT (OUTPATIENT)
Dept: FAMILY MEDICINE | Facility: CLINIC | Age: 74
End: 2024-06-12
Payer: MEDICARE

## 2024-06-12 DIAGNOSIS — Z71.89 ENCOUNTER FOR INJECTION EDUCATION: Primary | ICD-10-CM

## 2024-06-12 PROCEDURE — 99211 OFF/OP EST MAY X REQ PHY/QHP: CPT | Mod: S$GLB,,, | Performed by: FAMILY MEDICINE

## 2024-06-13 NOTE — PROGRESS NOTES
Confirmed name and > pt and wife here for education on self admin IM inj. Med rx'd by Endocrine. Demonstrated how to prep med, select site(leg) and admin. Pt's wife verbalized understanding. Printed info given on how to admin IM injection. Advised them to reach out to Endocrinology with any questions about the medication.

## 2024-06-14 ENCOUNTER — TELEPHONE (OUTPATIENT)
Dept: INFUSION THERAPY | Facility: HOSPITAL | Age: 74
End: 2024-06-14
Payer: MEDICARE

## 2024-06-14 NOTE — TELEPHONE ENCOUNTER
----- Message from Kelly Hidalgo, Patient Care Assistant sent at 6/13/2024  2:46 PM CDT -----  Type: Needs Medical Advice  Who Called:  rich  Al Call Back Number: [phone]    Additional Information: rich is wanting to see about moving 6/21 labs to infusion on same day as provider appointment  6/24 if possible .  Please call to further discuss thank you

## 2024-06-14 NOTE — TELEPHONE ENCOUNTER
Spoke with Ms Courtney the patient's wife in regards to her husbands appt. She is some what up set because Dr Álvarez appointment is not scheduled on Tuesdays before infusions. I made a note for the schedulers to make sure all appointments are on Tuesdays from now until the end of the year.

## 2024-06-21 ENCOUNTER — HOSPITAL ENCOUNTER (OUTPATIENT)
Dept: RADIOLOGY | Facility: HOSPITAL | Age: 74
Discharge: HOME | End: 2024-06-21
Attending: INTERNAL MEDICINE
Payer: MEDICARE

## 2024-06-21 DIAGNOSIS — C67.8 MALIGNANT NEOPLASM OF OVERLAPPING SITES OF BLADDER: ICD-10-CM

## 2024-06-21 LAB — GLUCOSE SERPL-MCNC: 87 MG/DL (ref 70–110)

## 2024-06-21 PROCEDURE — A9552 F18 FDG: HCPCS | Mod: PN | Performed by: INTERNAL MEDICINE

## 2024-06-21 RX ORDER — FLUDEOXYGLUCOSE F18 500 MCI/ML
12.6 INJECTION INTRAVENOUS
Status: COMPLETED | OUTPATIENT
Start: 2024-06-21 | End: 2024-06-21

## 2024-06-21 RX ADMIN — FLUDEOXYGLUCOSE F-18 12.6 MILLICURIE: 500 INJECTION INTRAVENOUS at 08:06

## 2024-06-21 NOTE — PROGRESS NOTES
PET Imaging Questionnaire    Are you a Diabetic? Recent Blood Sugar level? No    Are you anemic? Bone Marrow Stimulation Meds? No    Have you had a CT Scan, if so when & where was your last one? Yes -     Have you had a PET Scan, if so when & where was your last one? Yes -     Chemotherapy or currently on Chemotherapy? Yes    Radiation therapy? No    Surgical History:   Past Surgical History:   Procedure Laterality Date    CORONARY ARTERY BYPASS GRAFT      11/2021    CREATION, ILEAL CONDUIT  08/2023    ENDOSCOPY OF ILEAL CONDUIT Bilateral 5/15/2024    Procedure: ENDOSCOPY, ILEAL CONDUIT - Bilateral Stent Exchange  Bilateral Retrograde Pyelogram;  Surgeon: Manuelito Olivares MD;  Location: [facility] OR;  Service: Urology;  Laterality: Bilateral;    HERNIA REPAIR      umbilical    PROSTATECTOMY      PTA, POPLITEAL      REMOVAL-STENT Bilateral 01/23/2024    Procedure: REMOVAL-STENT;  Surgeon: South Ghosh MD;  Location: [facility] OR [address] [place] [address];  Service: Urology;  Laterality: Bilateral;    URETERAL STENT PLACEMENT Bilateral 01/23/2024    Procedure: INSERTION, STENT, URETER;  Surgeon: South Ghosh MD;  [facility];  Service: Urology;  Laterality: Bilateral;        Have you been fasting for at least 6 hours? Yes    Is there any chance you may be pregnant or breastfeeding? No    Assay: 13.2 MCi@:08:10   Injection Site:LT AC    Residual: 0.6 mCi@: 08:14   Technologist: Aren Boland Injected:12.6 mCi

## 2024-06-23 NOTE — PROGRESS NOTES
PATIENT: Aren Bey Jr.  MRN: [id number]  DATE: 6/24/2024      Diagnosis:   1. Malignant neoplasm of overlapping sites of bladder    2. Other specified disorders involving the immune mechanism, not elsewhere classified    3. Immunodeficiency due to drug therapy    4. Adrenal insufficiency    5. Hypothyroidism, unspecified type    6. Aneurysm of right popliteal artery    7. Lung nodule    8. Normocytic anemia    9. Other primary thrombophilia    10. Ureteral stent present    11. Coronary artery disease, unspecified vessel or lesion type, unspecified whether angina present, unspecified whether native or transplanted heart    12. Stage 3a chronic kidney disease          Chief Complaint: Malignant neoplasm of overlapping sites of bladder      Oncologic History:      Oncologic History     Oncologic Treatment     Pathology           Subjective:    Interval History:  Mr. Bey is a 74 y.o. male with HTN, Seizures, bladder cancer, h/o pulmonary embolism, who presents for bladder cancer.  Since the last clinic visit the patient under PET-CT on 06/21/2024 showing no evidence of disease.  The patient denies CP, cough, SOB, abdominal pain, nausea, vomiting, constipation, diarrhea.  The patient denies fever, chills, night sweats, weight loss, new lumps or bumps, easy bruising or bleeding.    Prior History:  Patient underwent transurethral resection of bladder tumor on 04/28/2023 with path showing invasive urothelial carcinoma, high-grade with involvement of muscularis propria and positive for lymphovascular invasion.  Patient underwent chemotherapy at [facility].  The patient then underwent radical cystectomy and prostatectomy on 08/15/2023 with pathology showing invasive high-grade urothelial carcinoma measuring 4.2 cm, positive lymphovascular invasion, tumor identified within the soft tissue surrounding the left distal ureter, 4/6 positive regional lymph nodes with extranodal extension  present. pT3aN2.  Also seen was acinar adenocarcinoma of the prostate grade group 1 (Burlington score 3+3=6) in 1 2 5% of prostate tissue pT2N0.  The patient's surgery was ultimately complicated by small-bowel obstruction, and pulmonary embolism.  PT recevied 1 dose of Nivolumab 9/18/23.  The patient was placed on anticoagulation for pulmonary embolism and ultimately developed a right perinephric bleed requiring embolization.  PT also developed UTI and acquired ureteral obstruction with placement of bilateral ureteral stents.  PT also developed C diff and was hospitalized for suspected recurrence at [facility] from 10/21/2023 to 10/25/2023.  Patient was discharged on fidaxomicin.   The patient saw Dr. Ghosh with Urology on 11/13/2023 for with recommendations for virtual visit in January to discuss possible operative intervention replaced stents, possible revision of ureteral ileal anastomosis pending disease progression.  It was instructed that the patient would need catheterized specimen from urostomy to rule out UTI if patient with future symptoms.  Patient underwent PET-CT on 11/16/2023 showing a new hypermetabolic subpleural nodule in the posterior right lower lobe measuring 2.2 x 1.3 cm.  The patient endorses a fever starting on the night of 11/16/2023.  Patient was prescribed Bactrim for suspected potential urinary tract infection.   The patient was admitted to the hospital from 11/17/23-11/21/23 for C diff colitis and suspected urinary tract infection the patient was discharged on fidaxomicin 200 mg b.i.d. for additional 7 days as well as 200 mg every other day for 25 days.  Patient was also discharged on doxycycline 100 mg b.i.d. for 25 days with instructions from Infectious Disease to remain on antibiotics as long as his ureteral stents were in place.   The patient underwent biopsy of nodule in the right lung on 12/08/2023 showing fibrosis and chronic inflammation but no evidence of neoplasm or  granuloma.   The patient was to have ureteral stents exchanged on 01/02/2024 but missed his appointment due to inclement weather.    The patient had bilateral ureteral stents exchanged by Dr. Ghosh on 1/23/24.   The patient underwent PET-CT on 03/01/2024 showing decrease in size of right lower lobe nodule now measuring 0.8 cm.   The patient underwent CTA chest on 03/06/2024 showing no evidence of pulmonary embolism or pneumonitis.  The patient underwent nuclear stress test on 03/11/2024 showed abnormal myocardial perfusion with mild intensity, small size, reversible perfusion abnormality consistent with ischemia in the basal to mid inferolateral walls.   The patient was admitted to the hospital from 04/04/2024 until 04/11/2024 with initial complaint of weakness, hypotensio, and diarrhea and found to have adrenal insufficiency, hypothyroidism, and C diff.   The patient was admitted hospital from 05/11/2024 until 05/13/2024 for urinary tract infection.  Blood cultures grew out E coli.  The patient was sent home on Cipro to complete 7 additional days of treatment.  The patient was also sent home on oral vancomycin for prevention of C diff. patient underwent bilateral ureteral stent exchange on 05/15/2024 under the care of Dr. Olivares.    Past Medical History:   Past Medical History:   Diagnosis Date    Adrenal insufficiency 05/2024    Aneurysm artery, popliteal 2018    right leg    Bladder cancer     Coronary artery disease     cabg x 4 11/2021    Dyslipidemia     H/O Clostridium difficile infection     Hx pulmonary embolism     8/2023    Hypertension     Hyperthyroidism 05/2024    Immunotherapy     nivolomab / monthly md manuel    Malignant neoplasm of prostate     Presence of urostomy     Seizures     remote hx  last ~2007    SOB (shortness of breath)        Past Surgical HIstory:   Past Surgical History:   Procedure Laterality Date    CORONARY ARTERY BYPASS GRAFT      11/2021    CREATION, ILEAL CONDUIT  08/2023     ENDOSCOPY OF ILEAL CONDUIT Bilateral 5/15/2024    Procedure: ENDOSCOPY, ILEAL CONDUIT - Bilateral Stent Exchange  Bilateral Retrograde Pyelogram;  Surgeon: Manuelito Olivares MD;  Location: [facility] OR;  Service: Urology;  Laterality: Bilateral;    HERNIA REPAIR      umbilical    PROSTATECTOMY      PTA, POPLITEAL      REMOVAL-STENT Bilateral 01/23/2024    Procedure: REMOVAL-STENT;  Surgeon: South Ghosh MD;  Location: [facility] OR 1ST FLR;  Service: Urology;  Laterality: Bilateral;    URETERAL STENT PLACEMENT Bilateral 01/23/2024    Procedure: INSERTION, STENT, URETER;  Surgeon: South Ghosh MD;  Location: [facility] OR 1ST FLR;  Service: Urology;  Laterality: Bilateral;       Family History:   Family History   Problem Relation Name Age of Onset    Heart disease Mother      Hypertension Mother      Peripheral vascular disease Father 72        Social History:  reports that he has never smoked. He has never been exposed to tobacco smoke. He has never used smokeless tobacco. He reports that he does not currently use alcohol. He reports that he does not use drugs.    Allergies:  Review of patient's allergies indicates:   Allergen Reactions    Clopidogrel Other (See Comments)     Increased body temperature and redness        Medications:  Current Outpatient Medications   Medication Sig Dispense Refill    acetaminophen (TYLENOL) 500 MG tablet Take 500-1,000 mg by mouth every 6 (six) hours as needed (fever/pain).      ASCORBIC ACID, VITAMIN C, ORAL Take 1 tablet by mouth once daily.      aspirin (ECOTRIN) 81 MG EC tablet Take 1 tablet (81 mg total) by mouth once daily. 90 tablet 3    atorvastatin (LIPITOR) 40 MG tablet Take 40 mg by mouth once daily.      b complex vitamins tablet Take 1 tablet by mouth once daily.      cholecalciferol, vitamin D3, 125 mcg (5,000 unit) Tab Take 5,000 Units by mouth once daily.      cyanocobalamin, vitamin B-12, (VITAMIN B-12) 2,500 mcg Subl Take 2,500 mcg by mouth once daily.      ferrous sulfate (SLOW  RELEASE IRON) 142 mg (45 mg iron) TbSR Take 45 mg by mouth Daily.      hydrocortisone (CORTEF) 20 MG Tab Take 1 tablet (20 mg total) by mouth once daily. Will need 10 extra tablet for stress dosing 100 tablet 3    hydrocortisone (CORTEF) 5 MG Tab Take 20 mg in AM and 5 mg tablet in early afternoon (Patient taking differently: Take 5 mg by mouth after lunch.) 30 tablet 6    Lactobacillus rhamnosus GG (CULTURELLE) 10 billion cell capsule Take 1 capsule by mouth once daily. 30 capsule 0    LIDOcaine-prilocaine (EMLA) cream Apply topically once as needed (to port access).      multivitamin with minerals tablet Take 1 tablet by mouth once daily.      nitroGLYCERIN (NITROSTAT) 0.4 MG SL tablet Place 1 tablet (0.4 mg total) under the tongue every 5 (five) minutes as needed for Chest pain. 25 tablet 0    QUEtiapine (SEROQUEL) 25 MG Tab Take one half tab by mouth nightly as needed for insomnia 90 tablet 1    sodium chloride 0.9% SolP 50 mL with nivolumab 40 mg/4 mL Soln Inject into the vein every 30 days.      zinc gluconate 50 mg tablet Take 50 mg by mouth once daily.      levothyroxine (SYNTHROID) 50 MCG tablet Take 1 tablet (50 mcg total) by mouth before breakfast. 30 tablet 11     No current facility-administered medications for this visit.       Review of Systems   Constitutional:  Negative for appetite change, chills, diaphoresis, fatigue, fever and unexpected weight change.   HENT:  Negative for mouth sores.    Eyes:  Negative for visual disturbance.   Respiratory:  Negative for cough and shortness of breath.    Cardiovascular:  Negative for chest pain and palpitations.   Gastrointestinal:  Negative for abdominal pain, constipation, diarrhea, nausea and vomiting.   Genitourinary:  Negative for difficulty urinating, dysuria, flank pain and frequency.   Musculoskeletal:  Negative for back pain.   Skin:  Negative for color change and rash.   Neurological:  Negative for headaches.   Hematological:  Negative for  "adenopathy. Does not bruise/bleed easily.   Psychiatric/Behavioral:  The patient is not nervous/anxious.        ECOG Performance Status: 1   Objective:      Vitals:   Vitals:    06/24/24 1017   BP: 130/70   BP Location: Right arm   Patient Position: Sitting   BP Method: Medium (Manual)   Pulse: 66   Resp: 18   Temp: 96.6 °F (35.9 °C)   TempSrc: Temporal   SpO2: 96%   Weight: 69.7 kg (153 lb 10.6 oz)   Height: 5' 9" (1.753 m)       Physical Exam  Constitutional:       General: He is not in acute distress.     Appearance: He is well-developed. He is not diaphoretic.   HENT:      Head: Normocephalic and atraumatic.   Cardiovascular:      Rate and Rhythm: Normal rate and regular rhythm.      Heart sounds: Normal heart sounds. No murmur heard.     No friction rub. No gallop.   Pulmonary:      Effort: Pulmonary effort is normal. No respiratory distress.      Breath sounds: Normal breath sounds. No wheezing or rales.   Chest:      Chest wall: No tenderness.   Abdominal:      General: Bowel sounds are normal. There is no distension.      Palpations: Abdomen is soft. There is no mass.      Tenderness: There is no abdominal tenderness. There is no rebound.   Musculoskeletal:      Cervical back: Normal range of motion.   Lymphadenopathy:      Cervical: No cervical adenopathy.      Upper Body:      Right upper body: No supraclavicular or axillary adenopathy.      Left upper body: No supraclavicular or axillary adenopathy.   Skin:     General: Skin is warm and dry.      Findings: No erythema or rash.   Neurological:      Mental Status: He is alert and oriented to person, place, and time.   Psychiatric:         Behavior: Behavior normal.         Laboratory Data:  Hospital Outpatient Visit on 06/24/2024   Component Date Value Ref Range Status    POC Glucose 06/24/2024 68 (A)  70 - 110 MG/DL Final   Hospital Outpatient Visit on 06/21/2024   Component Date Value Ref Range Status    POC Glucose 06/21/2024 87  70 - 110 MG/DL Final "   Lab Visit on 06/21/2024   Component Date Value Ref Range Status    WBC 06/21/2024 7.58  3.90 - 12.70 K/uL Final    RBC 06/21/2024 4.17 (L)  4.60 - 6.20 M/uL Final    Hemoglobin 06/21/2024 12.7 (L)  14.0 - 18.0 g/dL Final    Hematocrit 06/21/2024 37.8 (L)  40.0 - 54.0 % Final    MCV 06/21/2024 91  82 - 98 fL Final    MCH 06/21/2024 30.5  27.0 - 31.0 pg Final    MCHC 06/21/2024 33.6  32.0 - 36.0 g/dL Final    RDW 06/21/2024 16.2 (H)  11.5 - 14.5 % Final    Platelets 06/21/2024 199  150 - 450 K/uL Final    MPV 06/21/2024 8.2 (L)  9.2 - 12.9 fL Final    Immature Granulocytes 06/21/2024 0.3  0.0 - 0.5 % Final    Gran # (ANC) 06/21/2024 3.1  1.8 - 7.7 K/uL Final    Immature Grans (Abs) 06/21/2024 0.02  0.00 - 0.04 K/uL Final    Comment: Mild elevation in immature granulocytes is non specific and   can be seen in a variety of conditions including stress response,   acute inflammation, trauma and pregnancy. Correlation with other   laboratory and clinical findings is essential.      Lymph # 06/21/2024 3.5  1.0 - 4.8 K/uL Final    Mono # 06/21/2024 0.6  0.3 - 1.0 K/uL Final    Eos # 06/21/2024 0.3  0.0 - 0.5 K/uL Final    Baso # 06/21/2024 0.11  0.00 - 0.20 K/uL Final    nRBC 06/21/2024 0  0 /100 WBC Final    Gran % 06/21/2024 41.3  38.0 - 73.0 % Final    Lymph % 06/21/2024 45.6  18.0 - 48.0 % Final    Mono % 06/21/2024 7.7  4.0 - 15.0 % Final    Eosinophil % 06/21/2024 3.6  0.0 - 8.0 % Final    Basophil % 06/21/2024 1.5  0.0 - 1.9 % Final    Differential Method 06/21/2024 Automated   Final    Sodium 06/21/2024 138  136 - 145 mmol/L Final    Potassium 06/21/2024 4.9  3.5 - 5.1 mmol/L Final    Chloride 06/21/2024 105  95 - 110 mmol/L Final    CO2 06/21/2024 22 (L)  23 - 29 mmol/L Final    Glucose 06/21/2024 85  70 - 110 mg/dL Final    BUN 06/21/2024 31 (H)  8 - 23 mg/dL Final    Creatinine 06/21/2024 2.2 (H)  0.5 - 1.4 mg/dL Final    Calcium 06/21/2024 10.0  8.7 - 10.5 mg/dL Final    Total Protein 06/21/2024 6.8  6.0 - 8.4  g/dL Final    Albumin 06/21/2024 3.9  3.5 - 5.2 g/dL Final    Total Bilirubin 06/21/2024 0.5  0.1 - 1.0 mg/dL Final    Comment: For infants and newborns, interpretation of results should be based  on gestational age, weight and in agreement with clinical  observations.    Premature Infant recommended reference ranges:  Up to 24 hours.............<8.0 mg/dL  Up to 48 hours............<12.0 mg/dL  3-5 days..................<15.0 mg/dL  6-29 days.................<15.0 mg/dL      Alkaline Phosphatase 06/21/2024 66  55 - 135 U/L Final    AST 06/21/2024 31  10 - 40 U/L Final    ALT 06/21/2024 28  10 - 44 U/L Final    eGFR 06/21/2024 30.3 (A)  >60 mL/min/1.73 m^2 Final    Anion Gap 06/21/2024 11  8 - 16 mmol/L Final    TSH 06/21/2024 169.100 (H)  0.400 - 4.000 uIU/mL Final    Free T4 06/21/2024 0.42 (L)  0.71 - 1.51 ng/dL Final         Imaging:     PET/CT 6/21/24  Head and Neck:     Brain demonstrates normal metabolism. However, FDG-PET has an approximate 60% sensitivity for brain metastases which are best detected by MRI with gadolinium. Physiologic uptake is in the neck.     Chest:     No FDG avid pulmonary lesions are present. No enlarged or FDG avid lymph nodes are in the chest.     Abdomen and Pelvis:     Physiologic uptake is in the liver, spleen, urinary system and bowel. No enlarged or FDG avid lymph nodes are in the abdomen or pelvis. Adrenal glands are normal. Cystectomy, prostatectomy and ureteral diversion operative changes are stable without evidence of obstruction.     Musculoskeletal:     No FDG avid osseous lesions are present.       Assessment:       1. Malignant neoplasm of overlapping sites of bladder    2. Other specified disorders involving the immune mechanism, not elsewhere classified    3. Immunodeficiency due to drug therapy    4. Adrenal insufficiency    5. Hypothyroidism, unspecified type    6. Aneurysm of right popliteal artery    7. Lung nodule    8. Normocytic anemia    9. Other primary  thrombophilia    10. Ureteral stent present    11. Coronary artery disease, unspecified vessel or lesion type, unspecified whether angina present, unspecified whether native or transplanted heart    12. Stage 3a chronic kidney disease                     Plan:     Bladder Cancer - pt with stage IIIB bladder cancer pT3N2 s/p neoadjuvant chemo followed by radical cystoprostatectomy 8/15/23  -PT received one dose of Nivolumab on 9/18/23  -PET/CT suggestive of a RLL pulmonary nodule  -Biopsy on 12/08/23 negative for malignancy  -PET/CT on 6/21/24 showed LEANDRO  -Pt to proceed with cycle 7 of Nivolumab  -Visit today included increased complexity associated with the care of the episodic problem (immunotherapy) addressed and managing the longitudinal care of the patient due to the serious and/or complex managed problem(s) Nivolumab    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  -No sign of infection  -Will monitor    Hypothyroidism - Will increased levothyroxine to 50mcg daily given marked elevation in TSH and low T4    Adrenal Insufficiency - pt with positive Cosyntropin stim test in the hospital  -PT on Hydrocortisone 20mg daily  -Pt following with endocrinology    CAD - pt with prior bypass surgery  -Cardiac stress test on 3/11/24 was abnormal with mild intensity, small size, reversible perfusion abnormality consistent with ischemia in the basal to mid inferolateral walls  -Pt to follow up with cardiologist    Pulmonary nodule - see above    Prostate Cancer - patient with acinar adenocarcinoma of the prostate grade group 1 (Sarah score 3+3=6) in 1 2 5% of prostate tissue pT2N0  -PSA 10/31/23 0.02ng/mL  -Will monitor    Ureteral Stents - Stents exchanged last on 5/15/24 under the care of Dr Olivares  -Pt to follow up with Urology    Anemia - Hemoglobin 12.7g/dL on 6/21/24  -Stable  -Will monitor     Thrombophilia - pt with prior PE  -Pt subsequently developed a right perinephric bleed requiring embolization    -Patient with IVC filter in place  -Pt following with Dr Claire  -Vascular surgery to determine if he could be started on Eliquis  -Will monitor    Right popliteal aneurysm - pt to see vascular surgery    CKDIII - creatinine up to 2.2 on 6/24/24  -Will have pt follow up with Nephrology  -Will monitor    Route Chart for Scheduling    Med Onc Chart Routing      Follow up with physician 4 weeks. Pt can proceed with treatment.  Pt needs a CBC, CMP, TSH in 4 weeks with an appt with me the day after the labs with treatment.  Pt needs a follow up appt with Dr Weiss in Nepgrology.   Follow up with [facility]    Infusion scheduling note    Injection scheduling note    Labs    Imaging    Pharmacy appointment    Other referrals              Treatment Plan Information   OP NIVOLUMAB 480 MG Q4W   Jackson Jimenez MD   Upcoming Treatment Dates - OP NIVOLUMAB 480 MG Q4W    6/25/2024       Chemotherapy       nivolumab 480 mg in 0.9% NaCl 98 mL infusion  7/23/2024       Chemotherapy       nivolumab 480 mg in 0.9% NaCl 98 mL infusion  8/20/2024       Chemotherapy       nivolumab 480 mg in 0.9% NaCl 98 mL infusion  9/17/2024       Chemotherapy       nivolumab 480 mg in 0.9% NaCl 98 mL infusion    Therapy Plan Information  dextrose 5 % (D5W) 100 mL flush bag  Intravenous, Once  heparin, porcine (PF) 100 unit/mL injection flush 500 Units  500 Units, Intravenous, PRN  alteplase injection 2 mg  2 mg, Intra-Catheter, PRN      Jackson Jimenez MD  [facility]  Hematology and Oncology  [facility]   [address]   [place] [address]   O: [phone]  F: [phone]

## 2024-06-24 ENCOUNTER — OFFICE VISIT (OUTPATIENT)
Dept: HEMATOLOGY/ONCOLOGY | Facility: CLINIC | Age: 74
End: 2024-06-24
Payer: MEDICARE

## 2024-06-24 ENCOUNTER — HOSPITAL ENCOUNTER (OUTPATIENT)
Dept: RADIOLOGY | Facility: HOSPITAL | Age: 74
Discharge: HOME | End: 2024-06-24
Attending: INTERNAL MEDICINE
Payer: MEDICARE

## 2024-06-24 ENCOUNTER — PATIENT MESSAGE (OUTPATIENT)
Dept: NEPHROLOGY | Facility: CLINIC | Age: 74
End: 2024-06-24
Payer: MEDICARE

## 2024-06-24 VITALS
HEIGHT: 69 IN | HEART RATE: 66 BPM | TEMPERATURE: 97 F | RESPIRATION RATE: 18 BRPM | WEIGHT: 153.69 LBS | BODY MASS INDEX: 22.76 KG/M2 | OXYGEN SATURATION: 96 % | DIASTOLIC BLOOD PRESSURE: 70 MMHG | SYSTOLIC BLOOD PRESSURE: 130 MMHG

## 2024-06-24 DIAGNOSIS — D89.89 OTHER SPECIFIED DISORDERS INVOLVING THE IMMUNE MECHANISM, NOT ELSEWHERE CLASSIFIED: ICD-10-CM

## 2024-06-24 DIAGNOSIS — R91.1 LUNG NODULE: ICD-10-CM

## 2024-06-24 DIAGNOSIS — D68.59 OTHER PRIMARY THROMBOPHILIA: ICD-10-CM

## 2024-06-24 DIAGNOSIS — E03.9 HYPOTHYROIDISM, UNSPECIFIED TYPE: ICD-10-CM

## 2024-06-24 DIAGNOSIS — N18.31 STAGE 3A CHRONIC KIDNEY DISEASE: ICD-10-CM

## 2024-06-24 DIAGNOSIS — I25.10 CORONARY ARTERY DISEASE, UNSPECIFIED VESSEL OR LESION TYPE, UNSPECIFIED WHETHER ANGINA PRESENT, UNSPECIFIED WHETHER NATIVE OR TRANSPLANTED HEART: ICD-10-CM

## 2024-06-24 DIAGNOSIS — D84.821 IMMUNODEFICIENCY DUE TO DRUG THERAPY: ICD-10-CM

## 2024-06-24 DIAGNOSIS — Z79.899 IMMUNODEFICIENCY DUE TO DRUG THERAPY: ICD-10-CM

## 2024-06-24 DIAGNOSIS — E27.40 ADRENAL INSUFFICIENCY: ICD-10-CM

## 2024-06-24 DIAGNOSIS — Z96.0 URETERAL STENT PRESENT: ICD-10-CM

## 2024-06-24 DIAGNOSIS — I72.4 ANEURYSM OF RIGHT POPLITEAL ARTERY: ICD-10-CM

## 2024-06-24 DIAGNOSIS — C67.8 MALIGNANT NEOPLASM OF OVERLAPPING SITES OF BLADDER: Primary | ICD-10-CM

## 2024-06-24 DIAGNOSIS — D64.9 NORMOCYTIC ANEMIA: ICD-10-CM

## 2024-06-24 LAB — GLUCOSE SERPL-MCNC: 68 MG/DL (ref 70–110)

## 2024-06-24 PROCEDURE — 78815 PET IMAGE W/CT SKULL-THIGH: CPT | Mod: TC,PN

## 2024-06-24 PROCEDURE — A9552 F18 FDG: HCPCS | Mod: PN | Performed by: INTERNAL MEDICINE

## 2024-06-24 PROCEDURE — G2211 COMPLEX E/M VISIT ADD ON: HCPCS | Mod: S$GLB,,, | Performed by: INTERNAL MEDICINE

## 2024-06-24 PROCEDURE — 1101F PT FALLS ASSESS-DOCD LE1/YR: CPT | Mod: CPTII,S$GLB,, | Performed by: INTERNAL MEDICINE

## 2024-06-24 PROCEDURE — 99215 OFFICE O/P EST HI 40 MIN: CPT | Mod: S$GLB,,, | Performed by: INTERNAL MEDICINE

## 2024-06-24 PROCEDURE — 1157F ADVNC CARE PLAN IN RCRD: CPT | Mod: CPTII,S$GLB,, | Performed by: INTERNAL MEDICINE

## 2024-06-24 PROCEDURE — 1126F AMNT PAIN NOTED NONE PRSNT: CPT | Mod: CPTII,S$GLB,, | Performed by: INTERNAL MEDICINE

## 2024-06-24 PROCEDURE — 3288F FALL RISK ASSESSMENT DOCD: CPT | Mod: CPTII,S$GLB,, | Performed by: INTERNAL MEDICINE

## 2024-06-24 PROCEDURE — 3078F DIAST BP <80 MM HG: CPT | Mod: CPTII,S$GLB,, | Performed by: INTERNAL MEDICINE

## 2024-06-24 PROCEDURE — 3075F SYST BP GE 130 - 139MM HG: CPT | Mod: CPTII,S$GLB,, | Performed by: INTERNAL MEDICINE

## 2024-06-24 PROCEDURE — 1159F MED LIST DOCD IN RCRD: CPT | Mod: CPTII,S$GLB,, | Performed by: INTERNAL MEDICINE

## 2024-06-24 PROCEDURE — 78815 PET IMAGE W/CT SKULL-THIGH: CPT | Mod: 26,PS,, | Performed by: RADIOLOGY

## 2024-06-24 PROCEDURE — 99999 PR PBB SHADOW E&M-EST. PATIENT-LVL IV: CPT | Mod: PBBFAC,,, | Performed by: INTERNAL MEDICINE

## 2024-06-24 RX ORDER — DIPHENHYDRAMINE HYDROCHLORIDE 50 MG/ML
50 INJECTION INTRAMUSCULAR; INTRAVENOUS ONCE AS NEEDED
Status: CANCELLED | OUTPATIENT
Start: 2024-06-25

## 2024-06-24 RX ORDER — EPINEPHRINE 0.3 MG/.3ML
0.3 INJECTION SUBCUTANEOUS ONCE AS NEEDED
Status: CANCELLED | OUTPATIENT
Start: 2024-06-25

## 2024-06-24 RX ORDER — LEVOTHYROXINE SODIUM 50 UG/1
50 TABLET ORAL
Qty: 30 TABLET | Refills: 11 | Status: SHIPPED | OUTPATIENT
Start: 2024-06-24 | End: 2025-06-24

## 2024-06-24 RX ORDER — SODIUM CHLORIDE 0.9 % (FLUSH) 0.9 %
10 SYRINGE (ML) INJECTION
Status: CANCELLED | OUTPATIENT
Start: 2024-06-25

## 2024-06-24 RX ORDER — PROCHLORPERAZINE EDISYLATE 5 MG/ML
5 INJECTION INTRAMUSCULAR; INTRAVENOUS ONCE AS NEEDED
Status: CANCELLED
Start: 2024-06-25

## 2024-06-24 RX ORDER — FLUDEOXYGLUCOSE F18 500 MCI/ML
12 INJECTION INTRAVENOUS
Status: COMPLETED | OUTPATIENT
Start: 2024-06-24 | End: 2024-06-24

## 2024-06-24 RX ORDER — HEPARIN 100 UNIT/ML
500 SYRINGE INTRAVENOUS
Status: CANCELLED | OUTPATIENT
Start: 2024-06-25

## 2024-06-24 RX ADMIN — FLUDEOXYGLUCOSE F-18 12.9 MILLICURIE: 500 INJECTION INTRAVENOUS at 07:06

## 2024-06-24 NOTE — PROGRESS NOTES
PET Imaging Questionnaire    Are you a Diabetic? Recent Blood Sugar level? No    Are you anemic? Bone Marrow Stimulation Meds? No    Have you had a CT Scan, if so when & where was your last one? Yes -     Have you had a PET Scan, if so when & where was your last one? Yes -   Chemotherapy or currently on Chemotherapy? Yes    Radiation therapy? No    Surgical History:   Past Surgical History:   Procedure Laterality Date    CORONARY ARTERY BYPASS GRAFT      11/2021    CREATION, ILEAL CONDUIT  08/2023    ENDOSCOPY OF ILEAL CONDUIT Bilateral 5/15/2024    Procedure: ENDOSCOPY, ILEAL CONDUIT - Bilateral Stent Exchange  Bilateral Retrograde Pyelogram;  Surgeon: Manuelito Olivares MD;  Location: [facility] OR;  Service: Urology;  Laterality: Bilateral;    HERNIA REPAIR      umbilical    PROSTATECTOMY      PTA, POPLITEAL      REMOVAL-STENT Bilateral 01/23/2024    Procedure: REMOVAL-STENT;  Surgeon: South Ghosh MD;  Location: [facility] OR [address] [place] [address];  Service: Urology;  Laterality: Bilateral;    URETERAL STENT PLACEMENT Bilateral 01/23/2024    Procedure: INSERTION, STENT, URETER;  Surgeon: South Ghosh MD;  [facility];  Service: Urology;  Laterality: Bilateral;        Have you been fasting for at least 6 hours? Yes    Is there any chance you may be pregnant or breastfeeding? No    Assay: 14.8 MCi@:720   Injection Site:lt hand    Residual: 1.89 mCi@: 722   Technologist: Laura Boland Injected:12.9mCi

## 2024-06-25 ENCOUNTER — INFUSION (OUTPATIENT)
Dept: INFUSION THERAPY | Facility: HOSPITAL | Age: 74
End: 2024-06-25
Attending: INTERNAL MEDICINE
Payer: MEDICARE

## 2024-06-25 ENCOUNTER — DOCUMENTATION ONLY (OUTPATIENT)
Dept: INFUSION THERAPY | Facility: HOSPITAL | Age: 74
End: 2024-06-25

## 2024-06-25 ENCOUNTER — TELEPHONE (OUTPATIENT)
Dept: ENDOCRINOLOGY | Facility: CLINIC | Age: 74
End: 2024-06-25
Payer: MEDICARE

## 2024-06-25 VITALS
RESPIRATION RATE: 18 BRPM | HEIGHT: 69 IN | BODY MASS INDEX: 22.76 KG/M2 | DIASTOLIC BLOOD PRESSURE: 71 MMHG | TEMPERATURE: 98 F | WEIGHT: 153.69 LBS | SYSTOLIC BLOOD PRESSURE: 124 MMHG | HEART RATE: 56 BPM

## 2024-06-25 DIAGNOSIS — C67.8 MALIGNANT NEOPLASM OF OVERLAPPING SITES OF BLADDER: Primary | ICD-10-CM

## 2024-06-25 PROCEDURE — 25000003 PHARM REV CODE 250: Mod: PN | Performed by: INTERNAL MEDICINE

## 2024-06-25 PROCEDURE — 63600175 PHARM REV CODE 636 W HCPCS: Mod: JZ,JG,PN | Performed by: INTERNAL MEDICINE

## 2024-06-25 PROCEDURE — 96413 CHEMO IV INFUSION 1 HR: CPT | Mod: PN

## 2024-06-25 RX ORDER — SODIUM CHLORIDE 0.9 % (FLUSH) 0.9 %
10 SYRINGE (ML) INJECTION
Status: DISCONTINUED | OUTPATIENT
Start: 2024-06-25 | End: 2024-06-25 | Stop reason: HOSPADM

## 2024-06-25 RX ORDER — HEPARIN 100 UNIT/ML
500 SYRINGE INTRAVENOUS
Status: DISCONTINUED | OUTPATIENT
Start: 2024-06-25 | End: 2024-06-25 | Stop reason: HOSPADM

## 2024-06-25 RX ADMIN — SODIUM CHLORIDE: 9 INJECTION, SOLUTION INTRAVENOUS at 09:06

## 2024-06-25 RX ADMIN — Medication 500 UNITS: at 12:06

## 2024-06-25 RX ADMIN — SODIUM CHLORIDE 480 MG: 9 INJECTION, SOLUTION INTRAVENOUS at 11:06

## 2024-06-25 NOTE — TELEPHONE ENCOUNTER
" Issues with draining his urine or infections lately.---No but strong smell.  Clear urine.  A few blood clots a few weeks ago.      2.   UTI improve? Yes     3.  Dr. Armstrong.--Changed stents on the 15th     Given the multiple bladder issues, his function is labile due to this.--Wife voiced understanding.        Per patient wife PET scan seen by Dr Jimenez yesterday and was "no visible cancer" and they have 6 more months of immunotherapy.   "

## 2024-06-25 NOTE — PROGRESS NOTES
TRI met with pt and wife at chairside. Pt said he received good news today from Dr Jimenez. He said he was told he had no evidence of disease. Pt and wife both said they are pleased.  Pt's wife said she has been seeing Dr Torrez and this is helping with her stress levels. She did have questions and concerns about palliative care. She said they had a good a virtual visit with Dr. Mohan. She also said she spoke with a nurse from there regarding feeling unprepared to help her  if he had an adrenal emergence. She said she was given information and supplies from Dr Xiong however does not feel properly prepared. Wife said she was expecting to hear back from the nurse regarding this and regarding having palliative care as the contact for her to manage all of the pt's needs and multiple doctors. She is feeling overwhelmed.     TRI offered to contact palliative care in inquire about follow up. TRI call [facility] Palliative care [phone] and spoke with Ingris. TRI informed her of the above conversation. Ingris will speak with Eugenia CALVO and dr Mohan for follow up. Ingris said pt has a follow up apt scheduled with Dr Mohan on 7/10 @ 11AM. She can move the apt up to 7/2 if the pt would like. TRI will let pt and wife know.     TRI updated pt's wife and provided her with SW card to call TRI as needed. TRI informed her Palliative care apt can be moved to 7/2 if they would like. They will keep the 7/10 apt.     TRI left message for Ingris with Palliative care pt wants to keep 7/10 appointment.     Radha Goldberg LCSW

## 2024-06-25 NOTE — TELEPHONE ENCOUNTER
"Eugenia ROQUE, RN in palliative care called on behalf of the pt's wife. She's very nervous about how/when to administer the emergency steroid inj prn adrenal crisis. States she is not comfortable with the "training" she rec'd from their pharmacist/friend-of-family. I can have them come for a nurse visit, but we do not have a demonstrator injection to train them with. So I'm not sure how helpful this will be. Please advise.   "

## 2024-06-25 NOTE — PLAN OF CARE
Problem: Adult Inpatient Plan of Care  Goal: Plan of Care Review  Outcome: Progressing  Flowsheets (Taken 6/25/2024 0950)  Plan of Care Reviewed With:   patient   spouse  Goal: Patient-Specific Goal (Individualized)  Outcome: Progressing  Flowsheets (Taken 6/25/2024 0950)  Individualized Care Needs: recliner, pillows, spouse chairside, personal tablet  Anxieties, Fears or Concerns: good PET scan results     Problem: Fatigue  Goal: Improved Activity Tolerance  Outcome: Progressing  Intervention: Promote Improved Energy  Flowsheets (Taken 6/25/2024 0950)  Fatigue Management: paced activity encouraged  Sleep/Rest Enhancement:   natural light exposure provided   noise level reduced   relaxation techniques promoted   family presence promoted  Activity Management:   Ambulated -L4   Up in chair - L3     Pt tolerated opdivo, VSS. Pt voiced no new complaints or concerns at this time. NAD noted, pt d/c home.

## 2024-06-25 NOTE — TELEPHONE ENCOUNTER
Please contact him to see if he has had issues with draining his urine or infections lately.    He had a complicated UTI in May and was seeing ID.  Did this improve?    He has bladder cancer and has an ileal conduit with regular stent changes q 3 months per Dr. Armstrong.    Given the multiple bladder issues, his function is labile due to this.

## 2024-06-25 NOTE — TELEPHONE ENCOUNTER
----- Message from Yumiko Sloan sent at 6/25/2024 12:05 PM CDT -----  Regarding: Needs Medical Advice  Contact: Jina with Dr. Mohan's office at [phone]  Type: Needs Medical Advice  Who Called:  Jina with Dr. Mohan's office at [phone]  Additional Information: Please call to discuss patient. Thank you

## 2024-06-25 NOTE — TELEPHONE ENCOUNTER
"S/w Jina from Dr Mohan's office.she stated wife was having a hard time understanding how to get the steroid injections.read a msg to jina that Dr. Xiong had said   "Sorry to hear that. I spoke with Dr. Olivares and gave him recs on steroid dosing. Will just get a one time IV dose on day of surgery that they will handle"   Jina will call wife and will allison us back  "

## 2024-06-27 ENCOUNTER — CLINICAL SUPPORT (OUTPATIENT)
Dept: ENDOCRINOLOGY | Facility: CLINIC | Age: 74
End: 2024-06-27
Payer: MEDICARE

## 2024-06-27 DIAGNOSIS — E27.40 ADRENAL INSUFFICIENCY: Primary | ICD-10-CM

## 2024-07-10 ENCOUNTER — OFFICE VISIT (OUTPATIENT)
Dept: PALLIATIVE MEDICINE | Facility: CLINIC | Age: 74
End: 2024-07-10
Payer: MEDICARE

## 2024-07-10 DIAGNOSIS — C61 PROSTATE CANCER: ICD-10-CM

## 2024-07-10 DIAGNOSIS — Z51.5 PALLIATIVE CARE BY SPECIALIST: Primary | ICD-10-CM

## 2024-07-10 DIAGNOSIS — Z71.89 ADVANCE CARE PLANNING: ICD-10-CM

## 2024-07-10 DIAGNOSIS — C67.8 MALIGNANT NEOPLASM OF OVERLAPPING SITES OF BLADDER: ICD-10-CM

## 2024-07-10 PROCEDURE — 1123F ACP DISCUSS/DSCN MKR DOCD: CPT | Mod: CPTII,95,, | Performed by: STUDENT IN AN ORGANIZED HEALTH CARE EDUCATION/TRAINING PROGRAM

## 2024-07-10 PROCEDURE — 99215 OFFICE O/P EST HI 40 MIN: CPT | Mod: 95,,, | Performed by: STUDENT IN AN ORGANIZED HEALTH CARE EDUCATION/TRAINING PROGRAM

## 2024-07-10 NOTE — PROGRESS NOTES
The patient location is: [place]    Visit type: audiovisual    Face to Face time with patient: 20  40 minutes of total time spent on the encounter, which includes face to face time and non-face to face time preparing to see the patient (eg, review of tests), Obtaining and/or reviewing separately obtained history, Documenting clinical information in the electronic or other health record, Independently interpreting results (not separately reported) and communicating results to the patient/family/caregiver, or Care coordination (not separately reported).         Each patient to whom he or she provides medical services by telemedicine is:  (1) informed of the relationship between the physician and patient and the respective role of any other health care provider with respect to management of the patient; and (2) notified that he or she may decline to receive medical services by telemedicine and may withdraw from such care at any time.    Notes:       Office Visit  [facility] Palliative Care        ASSESSMENT/PLAN:     Plan/Recommendations:  Diagnoses and all orders for this visit:    Palliative care by specialist  Patient is independent ADLS, well supported by wife, no in home needs identified today  Patient's goal at this time is to avoid hospitalization, remain independent and spend time with his family.   ECOG 2     Malignant neoplasm of overlapping sites of bladder  Prostate cancer  Patient under management of Dr Jimenez  Continue to follow    Advance care planning  Living will on file  MPOA on file  5 Wishes completed but needs to be notarized     Follow up: 4 months with me    SUBJECTIVE:     History of Present Illness:  Patient is a 73 y.o. year old male with h/o bladder cancer under the management of Dr Jimenez. He has had a complicated  course since diagnosis, he was seen by Palliative Care while inpatient at [facility] for Cdiff, he is referred for continued support with symptom management and C  discussion        Cancer History   Muscle invasive bladder cancer s/p radical cystectomy w/ ileal loop urinary diversion on 8/15/2023 showing pT3aN2 disease. He did have neoadjuvant chemotherapy. The patient's surgery was ultimately complicated by small-bowel obstruction, and pulmonary embolism.  PT recevied 1 dose of Nivolumab 9/18/23.  The patient was placed on anticoagulation for pulmonary embolism and ultimately developed a right perinephric bleed requiring embolization. He is currently not on anticoagulation and does IVC filter.  PT also developed UTI and acquired ureteral obstruction with placement of bilateral ureteral stents.  PT also developed C diff and was hospitalized for suspected recurrence at [facility] from 10/21/2023 to 10/25/2023.  Patient was discharged on fidaxomicin.       Palliative Discussion:    7/10/24:  Had stents changed with Dr. Olivares. Still having difficulty with administering injection. Reached out to Dr. Shelley's office. His nurse helped demonstrate how to administer the injection but thought one needle was missing. Dr. Xiong's nurse was able to appropriately show how to emergently administer the injection.     Had an incident where his blood pressure dropped to 60s/40s. Blood pressure improved so did not need to administer medication. Wife now feels comfortable with administering without panicking because she knows how to administer.    Living will completed. If two providers determine he were to not make a recovery, he would want to be living on machines long term. POA is in place.     Patient is feeling well. PET scan with no evidence of disease.     Nivolumab until December and then every 3 month PET scans.     Feels that nivolumab could kill him quicker than the cancer itself. Otherwise doing well.     Last visit:  Hospital follow up. Wife, Courtney has watched palliative care videos on website. Was admitted at [facility] for 2 days, discharged yesterday. Courtney states that he has a  "high tolerance for pain. Previously admitted for UTI and cdiff infection for 8 days. He now feels great though.     Wife was asked to give steroid shot in the leg but felt that she was not taught how to do it. They feel out of all 10 doctors they have only Dr. Jimenez and Dr. Claire have given an emergency cell phone number. They need more guidance overall. She wants more support on how to administer medications and when to call for help. He either is totally fine and suddenly can be in a crisis. Crisis meaning high blood pressure and fevers.     He has tender chest to touch. He denies pain otherwise. Denies nausea, vomiting, diarrhea, constipation. Sleeps through the night. Appetite is appropriate. "I eat too much". He is an organic peterson. They do not each out of boxes. His son is a gandhi. He previously was having shortness of breath when diagnosed with adrenal insufficiency, now improved.     11/2023:  Patient  seen virtually today, for follow up, last seen prior to week of thanksgiving and patient had opted to take a break from his treatment so as not to be sick when his family came to [facility]. Wife is present and assisting with history, patient doing well, she reports difficult engaging with new PCP, patient has preference for a male doctor only. She has secured him an appt with [facility] but it is far out. Patient denies any emergent needs .  Since last visit, they have been seen by Oncology, his notes indicates : -  -PET/CT suggestive of a RLL pulmonary nodule  -PT will need biopsy prior to initiating treatment  -Would treat with Pembrolizumab if pt with metastatic disease    Patient also to have removal of urethral stents in December. Wife reports she is unsure why patient needs to see Palliative Care, reminded that our role is supportive, we can manage any symptoms as well as connect him to the resources here at the [facility] to include support groups, Psychologist, PT, Nutritionists , SW and Integrative " Oncology Patient can see us as often our as little as he chooses.    Discussed goals, patient  enjoys fishing and gardening and is hoping to be able to do this again some day. His wife would also like to have more time with him at home.     ROS:  Review of Systems   Constitutional:  Negative for appetite change and unexpected weight change.   HENT:  Negative for mouth sores.    Eyes:  Negative for visual disturbance.   Respiratory:  Negative for cough and shortness of breath.    Cardiovascular:  Positive for chest pain.   Gastrointestinal:  Negative for abdominal pain and diarrhea.   Genitourinary:  Negative for frequency.   Musculoskeletal:  Negative for back pain.   Skin:  Negative for rash.   Neurological:  Negative for headaches.   Hematological:  Negative for adenopathy.   Psychiatric/Behavioral:  The patient is not nervous/anxious.        Past Medical History:   Diagnosis Date    Adrenal insufficiency 05/2024    Aneurysm artery, popliteal 2018    right leg    Bladder cancer     Coronary artery disease     cabg x 4 11/2021    Dyslipidemia     H/O Clostridium difficile infection     Hx pulmonary embolism     8/2023    Hypertension     Hyperthyroidism 05/2024    Immunotherapy     nivolomab / monthly md manuel    Malignant neoplasm of prostate     Presence of urostomy     Seizures     remote hx  last ~2007    SOB (shortness of breath)      Past Surgical History:   Procedure Laterality Date    CORONARY ARTERY BYPASS GRAFT      11/2021    CREATION, ILEAL CONDUIT  08/2023    ENDOSCOPY OF ILEAL CONDUIT Bilateral 5/15/2024    Procedure: ENDOSCOPY, ILEAL CONDUIT - Bilateral Stent Exchange  Bilateral Retrograde Pyelogram;  Surgeon: Manuelito Olivares MD;  Location: [facility] OR;  Service: Urology;  Laterality: Bilateral;    HERNIA REPAIR      umbilical    PROSTATECTOMY      PTA, POPLITEAL      REMOVAL-STENT Bilateral 01/23/2024    Procedure: REMOVAL-STENT;  Surgeon: South Ghosh MD;  Location: [facility] OR [address] [place] [address];  Service:  Urology;  Laterality: Bilateral;    URETERAL STENT PLACEMENT Bilateral 01/23/2024    Procedure: INSERTION, STENT, URETER;  Surgeon: South Ghosh MD;  Location: [facility] OR [address] [place] [address];  Service: Urology;  Laterality: Bilateral;     Family History   Problem Relation Name Age of Onset    Heart disease Mother      Hypertension Mother      Peripheral vascular disease Father 72      Review of patient's allergies indicates:   Allergen Reactions    Clopidogrel Other (See Comments)     Increased body temperature and redness      Social Determinants of Health     Tobacco Use: Low Risk  (6/25/2024)    Patient History     Smoking Tobacco Use: Never     Smokeless Tobacco Use: Never     Passive Exposure: Never   Alcohol Use: Not At Risk (3/27/2024)    [facility]    AUDIT-C     Frequency of Alcohol Consumption: Never     Average Number of Drinks: Patient does not drink     Frequency of Binge Drinking: Never   Financial Resource Strain: Low Risk  (3/27/2024)    Received from [facility] and Its Subsidiaries and Affiliates, [facility] and Its Subsidiaries and Affiliates    Overall Financial Resource Strain (CARDIA)     Difficulty of Paying Living Expenses: Not hard at all   Food Insecurity: No Food Insecurity (5/13/2024)    Hunger Vital Sign     Worried About Running Out of Food in the Last Year: Never true     Ran Out of Food in the Last Year: Never true   Transportation Needs: No Transportation Needs (5/13/2024)    TRANSPORTATION NEEDS     Transportation : No   Physical Activity: Inactive (3/27/2024)    Received from [facility] and Its Subsidiaries and Affiliates, [facility]     Exercise Vital Sign     Days of Exercise per Week: 0 days     Minutes of Exercise per Session: 0 min   Stress: No Stress Concern Present (3/27/2024)    [facility], [facility]    [employer] [place] [employer] - Occupational Stress Questionnaire     Feeling of Stress : Not at all   Housing Stability: Low Risk  (5/13/2024)    Housing Stability Vital Sign     Unable to Pay for Housing in the Last Year: No     Homeless in the Last Year: No   Depression: Low Risk  (2/26/2024)    Depression     Last PHQ-4: Flowsheet Data: 0   Utilities: Not At Risk (3/27/2024)    [facility], [facility]    [facility] Utilities     Threatened with loss of utilities: No   Health Literacy: Adequate Health Literacy (3/27/2024)    Received from [facility]     Health Literacy     Frequency of need for help with medical instructions: Never   Social Isolation: Not on file      The Modified Caregiver Strain Index (MCSI) -   No data recorded   No data recorded   No data recorded   No data recorded   No data recorded   No data recorded   No data recorded   No data recorded   No data recorded   No data recorded   No data recorded   No data recorded   No data recorded   No data recorded       OBJECTIVE:     Physical Exam:  Vitals:    Physical Exam  Pulmonary:      Effort: Pulmonary effort is normal.   Musculoskeletal:         General: Normal range of motion.   Skin:     Coloration: Skin is not jaundiced or pale.   Neurological:      Mental Status: He is alert and oriented to person, place, and time.   Psychiatric:         Mood and Affect: Mood  normal.         Behavior: Behavior normal.         Thought Content: Thought content normal.         Judgment: Judgment normal.           Review of Symptoms      Symptom Assessment (ESAS 0-10 Scale)  Pain:  0  Dyspnea:  0  Anxiety:  0  Nausea:  0  Depression:  0  Anorexia:  0  Fatigue:  0  Insomnia:  0  Restlessness:  0  Agitation:  0     Diarrhea:  Positive    Anxiety:  Is not nervous/anxious    ECOG Performance Status rdGrdrrdarddrderd:rd rd3rd Psychosocial/Cultural:   See Palliative Psychosocial Note: Yes  **Primary  to Follow**  Palliative Care  Consult: No     Time-Based Charting:  Yes  Chart Review: 20 minutes  Symptom Assessment: 30 minutes    Total Time Spent: 50 minutes      Advance Care Planning   Advance Directives:   Living Will: Yes        Copy on chart: Yes    Medical Power of : Yes      Decision Making:  Patient answered questions  Goals of Care: What is most important right now is to focus on curative/life-prolongation (regardless of treatment burdens). Accordingly, we have decided that the best plan to meet the patient's goals includes continuing with treatment.                Medications:    Current Outpatient Medications:     acetaminophen (TYLENOL) 500 MG tablet, Take 500-1,000 mg by mouth every 6 (six) hours as needed (fever/pain)., Disp: , Rfl:     ASCORBIC ACID, VITAMIN C, ORAL, Take 1 tablet by mouth once daily., Disp: , Rfl:     aspirin (ECOTRIN) 81 MG EC tablet, Take 1 tablet (81 mg total) by mouth once daily., Disp: 90 tablet, Rfl: 3    atorvastatin (LIPITOR) 40 MG tablet, Take 40 mg by mouth once daily., Disp: , Rfl:     b complex vitamins tablet, Take 1 tablet by mouth once daily., Disp: , Rfl:     cholecalciferol, vitamin D3, 125 mcg (5,000 unit) Tab, Take 5,000 Units by mouth once daily., Disp: , Rfl:     cyanocobalamin, vitamin B-12, (VITAMIN B-12) 2,500 mcg Subl, Take 2,500 mcg by mouth once daily., Disp: , Rfl:     ferrous sulfate (SLOW RELEASE IRON) 142 mg (45 mg  iron) TbSR, Take 45 mg by mouth Daily., Disp: , Rfl:     hydrocortisone (CORTEF) 20 MG Tab, Take 1 tablet (20 mg total) by mouth once daily. Will need 10 extra tablet for stress dosing, Disp: 100 tablet, Rfl: 3    hydrocortisone (CORTEF) 5 MG Tab, Take 20 mg in AM and 5 mg tablet in early afternoon (Patient taking differently: Take 5 mg by mouth after lunch.), Disp: 30 tablet, Rfl: 6    Lactobacillus rhamnosus GG (CULTURELLE) 10 billion cell capsule, Take 1 capsule by mouth once daily., Disp: 30 capsule, Rfl: 0    levothyroxine (SYNTHROID) 50 MCG tablet, Take 1 tablet (50 mcg total) by mouth before breakfast., Disp: 30 tablet, Rfl: 11    LIDOcaine-prilocaine (EMLA) cream, Apply topically once as needed (to port access)., Disp: , Rfl:     multivitamin with minerals tablet, Take 1 tablet by mouth once daily., Disp: , Rfl:     nitroGLYCERIN (NITROSTAT) 0.4 MG SL tablet, Place 1 tablet (0.4 mg total) under the tongue every 5 (five) minutes as needed for Chest pain., Disp: 25 tablet, Rfl: 0    QUEtiapine (SEROQUEL) 25 MG Tab, Take one half tab by mouth nightly as needed for insomnia, Disp: 90 tablet, Rfl: 1    sodium chloride 0.9% SolP 50 mL with nivolumab 40 mg/4 mL Soln, Inject into the vein every 30 days., Disp: , Rfl:     zinc gluconate 50 mg tablet, Take 50 mg by mouth once daily., Disp: , Rfl:     Labs:  CBC:   WBC   Date Value Ref Range Status   06/21/2024 7.58 3.90 - 12.70 K/uL Final     Hemoglobin   Date Value Ref Range Status   06/21/2024 12.7 (L) 14.0 - 18.0 g/dL Final     Hematocrit   Date Value Ref Range Status   06/21/2024 37.8 (L) 40.0 - 54.0 % Final     MCV   Date Value Ref Range Status   06/21/2024 91 82 - 98 fL Final     Platelets   Date Value Ref Range Status   06/21/2024 199 150 - 450 K/uL Final       LFT:   Lab Results   Component Value Date    AST 31 06/21/2024    ALKPHOS 66 06/21/2024    BILITOT 0.5 06/21/2024       Albumin:   Albumin   Date Value Ref Range Status   06/21/2024 3.9 3.5 - 5.2 g/dL  Final     Protein:   Total Protein   Date Value Ref Range Status   06/21/2024 6.8 6.0 - 8.4 g/dL Final       Radiology:  PET CT 6/24/24:  FINDINGS:  Head and Neck:     Brain demonstrates normal metabolism.  However, FDG-PET has an approximate 60% sensitivity for brain metastases which are best detected by MRI with gadolinium.  Physiologic uptake is in the neck.     Chest:     No FDG avid pulmonary lesions are present. No enlarged or FDG avid lymph nodes are in the chest.     Abdomen and Pelvis:     Physiologic uptake is in the liver, spleen, urinary system and bowel. No enlarged or FDG avid lymph nodes are in the abdomen or pelvis. Adrenal glands are normal.  Cystectomy, prostatectomy and ureteral diversion operative changes are stable without evidence of obstruction.     Musculoskeletal:     No FDG avid osseous lesions are present.     Impression:     No FDG avid malignancy is present on today's exam.      Signature: Donavan Mohan DO

## 2024-07-15 ENCOUNTER — PATIENT MESSAGE (OUTPATIENT)
Dept: UROLOGY | Facility: CLINIC | Age: 74
End: 2024-07-15
Payer: MEDICARE

## 2024-07-16 ENCOUNTER — TELEPHONE (OUTPATIENT)
Dept: UROLOGY | Facility: CLINIC | Age: 74
End: 2024-07-16
Payer: MEDICARE

## 2024-07-17 ENCOUNTER — TELEPHONE (OUTPATIENT)
Dept: UROLOGY | Facility: CLINIC | Age: 74
End: 2024-07-17
Payer: MEDICARE

## 2024-07-17 NOTE — TELEPHONE ENCOUNTER
Spoke with patient's wife who was able to provide acceptable patient identifiers prior to start of conversation.   Appointment with Dr Ghosh scheduled- virtually at pt request.

## 2024-07-22 ENCOUNTER — LAB VISIT (OUTPATIENT)
Dept: LAB | Facility: HOSPITAL | Age: 74
End: 2024-07-22
Attending: INTERNAL MEDICINE
Payer: MEDICARE

## 2024-07-22 ENCOUNTER — OFFICE VISIT (OUTPATIENT)
Dept: UROLOGY | Facility: CLINIC | Age: 74
End: 2024-07-22
Payer: MEDICARE

## 2024-07-22 DIAGNOSIS — D89.89 OTHER SPECIFIED DISORDERS INVOLVING THE IMMUNE MECHANISM, NOT ELSEWHERE CLASSIFIED: ICD-10-CM

## 2024-07-22 DIAGNOSIS — C67.9 UROTHELIAL CARCINOMA OF BLADDER: Primary | ICD-10-CM

## 2024-07-22 DIAGNOSIS — N99.89 URETERAL-ILEAL LOOP ANASTOMOTIC STRICTURE: ICD-10-CM

## 2024-07-22 DIAGNOSIS — C67.8 MALIGNANT NEOPLASM OF OVERLAPPING SITES OF BLADDER: ICD-10-CM

## 2024-07-22 LAB
ALBUMIN SERPL BCP-MCNC: 4.1 G/DL (ref 3.5–5.2)
ALP SERPL-CCNC: 73 U/L (ref 55–135)
ALT SERPL W/O P-5'-P-CCNC: 30 U/L (ref 10–44)
ANION GAP SERPL CALC-SCNC: 13 MMOL/L (ref 8–16)
AST SERPL-CCNC: 32 U/L (ref 10–40)
BASOPHILS # BLD AUTO: 0.08 K/UL (ref 0–0.2)
BASOPHILS NFR BLD: 0.7 % (ref 0–1.9)
BILIRUB SERPL-MCNC: 0.4 MG/DL (ref 0.1–1)
BUN SERPL-MCNC: 23 MG/DL (ref 8–23)
CALCIUM SERPL-MCNC: 9.9 MG/DL (ref 8.7–10.5)
CHLORIDE SERPL-SCNC: 101 MMOL/L (ref 95–110)
CO2 SERPL-SCNC: 21 MMOL/L (ref 23–29)
CREAT SERPL-MCNC: 1.8 MG/DL (ref 0.5–1.4)
DIFFERENTIAL METHOD BLD: ABNORMAL
EOSINOPHIL # BLD AUTO: 0.2 K/UL (ref 0–0.5)
EOSINOPHIL NFR BLD: 1.9 % (ref 0–8)
ERYTHROCYTE [DISTWIDTH] IN BLOOD BY AUTOMATED COUNT: 15.9 % (ref 11.5–14.5)
EST. GFR  (NO RACE VARIABLE): 38.5 ML/MIN/1.73 M^2
GLUCOSE SERPL-MCNC: 75 MG/DL (ref 70–110)
HCT VFR BLD AUTO: 38 % (ref 40–54)
HGB BLD-MCNC: 12.9 G/DL (ref 14–18)
IMM GRANULOCYTES # BLD AUTO: 0.05 K/UL (ref 0–0.04)
IMM GRANULOCYTES NFR BLD AUTO: 0.5 % (ref 0–0.5)
LYMPHOCYTES # BLD AUTO: 1.3 K/UL (ref 1–4.8)
LYMPHOCYTES NFR BLD: 11.9 % (ref 18–48)
MCH RBC QN AUTO: 31.1 PG (ref 27–31)
MCHC RBC AUTO-ENTMCNC: 33.9 G/DL (ref 32–36)
MCV RBC AUTO: 92 FL (ref 82–98)
MONOCYTES # BLD AUTO: 1 K/UL (ref 0.3–1)
MONOCYTES NFR BLD: 9.5 % (ref 4–15)
NEUTROPHILS # BLD AUTO: 8.2 K/UL (ref 1.8–7.7)
NEUTROPHILS NFR BLD: 75.5 % (ref 38–73)
NRBC BLD-RTO: 0 /100 WBC
PLATELET # BLD AUTO: 215 K/UL (ref 150–450)
PMV BLD AUTO: 8.8 FL (ref 9.2–12.9)
POTASSIUM SERPL-SCNC: 4.6 MMOL/L (ref 3.5–5.1)
PROT SERPL-MCNC: 7.3 G/DL (ref 6–8.4)
RBC # BLD AUTO: 4.15 M/UL (ref 4.6–6.2)
SODIUM SERPL-SCNC: 135 MMOL/L (ref 136–145)
T4 FREE SERPL-MCNC: 0.56 NG/DL (ref 0.71–1.51)
TSH SERPL DL<=0.005 MIU/L-ACNC: 109.12 UIU/ML (ref 0.4–4)
WBC # BLD AUTO: 10.79 K/UL (ref 3.9–12.7)

## 2024-07-22 PROCEDURE — 36415 COLL VENOUS BLD VENIPUNCTURE: CPT | Mod: PN | Performed by: INTERNAL MEDICINE

## 2024-07-22 PROCEDURE — 99214 OFFICE O/P EST MOD 30 MIN: CPT | Mod: 95,,, | Performed by: STUDENT IN AN ORGANIZED HEALTH CARE EDUCATION/TRAINING PROGRAM

## 2024-07-22 PROCEDURE — 84439 ASSAY OF FREE THYROXINE: CPT | Performed by: INTERNAL MEDICINE

## 2024-07-22 PROCEDURE — 84443 ASSAY THYROID STIM HORMONE: CPT | Performed by: INTERNAL MEDICINE

## 2024-07-22 PROCEDURE — 80053 COMPREHEN METABOLIC PANEL: CPT | Mod: PN | Performed by: INTERNAL MEDICINE

## 2024-07-22 PROCEDURE — 85025 COMPLETE CBC W/AUTO DIFF WBC: CPT | Mod: PN | Performed by: INTERNAL MEDICINE

## 2024-07-22 PROCEDURE — 1157F ADVNC CARE PLAN IN RCRD: CPT | Mod: CPTII,95,, | Performed by: STUDENT IN AN ORGANIZED HEALTH CARE EDUCATION/TRAINING PROGRAM

## 2024-07-23 ENCOUNTER — TELEPHONE (OUTPATIENT)
Dept: UROLOGY | Facility: CLINIC | Age: 74
End: 2024-07-23
Payer: MEDICARE

## 2024-07-23 ENCOUNTER — PATIENT MESSAGE (OUTPATIENT)
Dept: HEMATOLOGY/ONCOLOGY | Facility: CLINIC | Age: 74
End: 2024-07-23

## 2024-07-23 ENCOUNTER — OFFICE VISIT (OUTPATIENT)
Dept: HEMATOLOGY/ONCOLOGY | Facility: CLINIC | Age: 74
End: 2024-07-23
Payer: MEDICARE

## 2024-07-23 ENCOUNTER — PATIENT MESSAGE (OUTPATIENT)
Dept: UROLOGY | Facility: CLINIC | Age: 74
End: 2024-07-23
Payer: MEDICARE

## 2024-07-23 ENCOUNTER — DOCUMENTATION ONLY (OUTPATIENT)
Dept: INFUSION THERAPY | Facility: HOSPITAL | Age: 74
End: 2024-07-23
Payer: MEDICARE

## 2024-07-23 ENCOUNTER — INFUSION (OUTPATIENT)
Dept: INFUSION THERAPY | Facility: HOSPITAL | Age: 74
End: 2024-07-23
Attending: INTERNAL MEDICINE
Payer: MEDICARE

## 2024-07-23 VITALS
DIASTOLIC BLOOD PRESSURE: 76 MMHG | SYSTOLIC BLOOD PRESSURE: 134 MMHG | BODY MASS INDEX: 23.54 KG/M2 | TEMPERATURE: 97 F | RESPIRATION RATE: 18 BRPM | HEART RATE: 65 BPM | WEIGHT: 158.94 LBS | HEIGHT: 69 IN

## 2024-07-23 VITALS
BODY MASS INDEX: 23.54 KG/M2 | RESPIRATION RATE: 16 BRPM | WEIGHT: 158.94 LBS | TEMPERATURE: 97 F | OXYGEN SATURATION: 100 % | HEIGHT: 69 IN | SYSTOLIC BLOOD PRESSURE: 152 MMHG | DIASTOLIC BLOOD PRESSURE: 77 MMHG | HEART RATE: 55 BPM

## 2024-07-23 DIAGNOSIS — E03.9 HYPOTHYROIDISM, UNSPECIFIED TYPE: ICD-10-CM

## 2024-07-23 DIAGNOSIS — C67.8 MALIGNANT NEOPLASM OF OVERLAPPING SITES OF BLADDER: Primary | ICD-10-CM

## 2024-07-23 PROCEDURE — 25000003 PHARM REV CODE 250: Mod: PN | Performed by: NURSE PRACTITIONER

## 2024-07-23 PROCEDURE — 63600175 PHARM REV CODE 636 W HCPCS: Mod: JZ,JG,PN | Performed by: NURSE PRACTITIONER

## 2024-07-23 PROCEDURE — 1157F ADVNC CARE PLAN IN RCRD: CPT | Mod: CPTII,S$GLB,, | Performed by: NURSE PRACTITIONER

## 2024-07-23 PROCEDURE — 1101F PT FALLS ASSESS-DOCD LE1/YR: CPT | Mod: CPTII,S$GLB,, | Performed by: NURSE PRACTITIONER

## 2024-07-23 PROCEDURE — 99999 PR PBB SHADOW E&M-EST. PATIENT-LVL IV: CPT | Mod: PBBFAC,,, | Performed by: NURSE PRACTITIONER

## 2024-07-23 PROCEDURE — 3078F DIAST BP <80 MM HG: CPT | Mod: CPTII,S$GLB,, | Performed by: NURSE PRACTITIONER

## 2024-07-23 PROCEDURE — 3075F SYST BP GE 130 - 139MM HG: CPT | Mod: CPTII,S$GLB,, | Performed by: NURSE PRACTITIONER

## 2024-07-23 PROCEDURE — 99214 OFFICE O/P EST MOD 30 MIN: CPT | Mod: S$GLB,,, | Performed by: NURSE PRACTITIONER

## 2024-07-23 PROCEDURE — 1126F AMNT PAIN NOTED NONE PRSNT: CPT | Mod: CPTII,S$GLB,, | Performed by: NURSE PRACTITIONER

## 2024-07-23 PROCEDURE — 3288F FALL RISK ASSESSMENT DOCD: CPT | Mod: CPTII,S$GLB,, | Performed by: NURSE PRACTITIONER

## 2024-07-23 PROCEDURE — 96413 CHEMO IV INFUSION 1 HR: CPT | Mod: PN

## 2024-07-23 RX ORDER — SODIUM CHLORIDE 0.9 % (FLUSH) 0.9 %
10 SYRINGE (ML) INJECTION
Status: DISCONTINUED | OUTPATIENT
Start: 2024-07-23 | End: 2024-07-23 | Stop reason: HOSPADM

## 2024-07-23 RX ORDER — DIPHENHYDRAMINE HYDROCHLORIDE 50 MG/ML
50 INJECTION INTRAMUSCULAR; INTRAVENOUS ONCE AS NEEDED
Status: DISCONTINUED | OUTPATIENT
Start: 2024-07-23 | End: 2024-07-23 | Stop reason: HOSPADM

## 2024-07-23 RX ORDER — SODIUM CHLORIDE 0.9 % (FLUSH) 0.9 %
10 SYRINGE (ML) INJECTION
Status: CANCELLED | OUTPATIENT
Start: 2024-07-23

## 2024-07-23 RX ORDER — EPINEPHRINE 0.3 MG/.3ML
0.3 INJECTION SUBCUTANEOUS ONCE AS NEEDED
Status: CANCELLED | OUTPATIENT
Start: 2024-07-23

## 2024-07-23 RX ORDER — PROCHLORPERAZINE EDISYLATE 5 MG/ML
5 INJECTION INTRAMUSCULAR; INTRAVENOUS ONCE AS NEEDED
Status: CANCELLED
Start: 2024-07-23

## 2024-07-23 RX ORDER — EPINEPHRINE 0.3 MG/.3ML
0.3 INJECTION SUBCUTANEOUS ONCE AS NEEDED
Status: DISCONTINUED | OUTPATIENT
Start: 2024-07-23 | End: 2024-07-23 | Stop reason: HOSPADM

## 2024-07-23 RX ORDER — DIPHENHYDRAMINE HYDROCHLORIDE 50 MG/ML
50 INJECTION INTRAMUSCULAR; INTRAVENOUS ONCE AS NEEDED
Status: CANCELLED | OUTPATIENT
Start: 2024-07-23

## 2024-07-23 RX ORDER — HEPARIN 100 UNIT/ML
500 SYRINGE INTRAVENOUS
Status: DISCONTINUED | OUTPATIENT
Start: 2024-07-23 | End: 2024-07-23 | Stop reason: HOSPADM

## 2024-07-23 RX ORDER — LEVOTHYROXINE SODIUM 75 UG/1
75 TABLET ORAL
Qty: 30 TABLET | Refills: 11 | Status: SHIPPED | OUTPATIENT
Start: 2024-07-23 | End: 2025-07-23

## 2024-07-23 RX ORDER — HEPARIN 100 UNIT/ML
500 SYRINGE INTRAVENOUS
Status: CANCELLED | OUTPATIENT
Start: 2024-07-23

## 2024-07-23 RX ADMIN — Medication 500 UNITS: at 10:07

## 2024-07-23 RX ADMIN — SODIUM CHLORIDE 480 MG: 9 INJECTION, SOLUTION INTRAVENOUS at 10:07

## 2024-07-23 RX ADMIN — SODIUM CHLORIDE: 9 INJECTION, SOLUTION INTRAVENOUS at 10:07

## 2024-07-23 NOTE — PLAN OF CARE
Problem: Adult Inpatient Plan of Care  Goal: Patient-Specific Goal (Individualized)  Outcome: Progressing  Flowsheets (Taken 7/23/2024 0957)  Individualized Care Needs: recliner, pillow,spouse chairside  Anxieties, Fears or Concerns: none  Patient/Family-Specific Goals (Include Timeframe): no s/s of rx during tx     Problem: Fatigue  Goal: Improved Activity Tolerance  Outcome: Progressing  Intervention: Promote Improved Energy  Flowsheets (Taken 7/23/2024 0957)  Fatigue Management: frequent rest breaks encouraged  Sleep/Rest Enhancement: regular sleep/rest pattern promoted  Activity Management: Ambulated -L4  Environmental Support: rest periods encouraged

## 2024-07-23 NOTE — PROGRESS NOTES
Oncology Nutrition       Weight Check   Chart reviewed. Weight check completed on pt.     CBW:158#  Weight at initiation of tx:143# (12/21/23)    Wt Readings from Last 10 Encounters:   07/23/24 72.1 kg (158 lb 15.2 oz)   07/23/24 72.1 kg (158 lb 15.2 oz)   06/25/24 69.7 kg (153 lb 10.6 oz)   06/24/24 69.7 kg (153 lb 10.6 oz)   05/28/24 68.8 kg (151 lb 10.8 oz)   05/27/24 68.8 kg (151 lb 10.8 oz)   05/15/24 67.7 kg (149 lb 4 oz)   05/12/24 69.4 kg (153 lb)   05/09/24 67.9 kg (149 lb 11.1 oz)   05/08/24 67.7 kg (149 lb 4 oz)          RD plan of care: Weight is currently 158#. No significant change at this time. Per nursing nutrition risk report, pt denies any nutritional concerns or challenges at this time. RD to continue to monitor prn; no nutritional interventions are needed at this time.     Yoana Watts, CUONGN, LDN  07/23/2024  2:04 PM

## 2024-07-23 NOTE — PROGRESS NOTES
Medicated for nausea, states feels shakey, questioned as to DT's pt. states
feels like its starting. Dr. Barber notified , called back discharge pending. PATIENT: Aren Bey Jr.  MRN: [id number]  DATE: 7/23/2024      Diagnosis:   1. Malignant neoplasm of overlapping sites of bladder    2. Hypothyroidism, unspecified type          Chief Complaint: Clearance for C8 Nivo      Oncologic History:   Oncology History   Malignant neoplasm of overlapping sites of bladder   10/23/2023 Initial Diagnosis    Malignant neoplasm of overlapping sites of bladder     10/31/2023 Cancer Staged    Staging form: Urinary Bladder, AJCC 8th Edition  - Pathologic: Stage IIIB (pT3, pN2, cM0)     12/21/2023 -  Chemotherapy    Treatment Summary   Plan Name: OP NIVOLUMAB 480 MG Q4W  Treatment Goal: Curative  Status: Active  Start Date: 12/21/2023  End Date: 12/10/2024 (Planned)  Provider: Jackson Jimenez MD  Chemotherapy: [No matching medication found in this treatment plan]     Prostate cancer   10/31/2023 Initial Diagnosis    Prostate cancer     10/31/2023 Cancer Staged    Staging form: Prostate, AJCC 8th Edition  - Pathologic: pT2, pN0, cM0           Subjective:    Interval History:  Mr. Bey is a 74 y.o. male with HTN, Seizures, bladder cancer, h/o pulmonary embolism, who presents to the clinic with his wife for bladder cancer.  Since the last clinic visit, the patient's wife reports increased blood & mucus from ileostomy.  Wife is concerned that this is a start of problems to come with infection.  Scheduled stent replacement by Dr. Olivares on 08/15. Would like a message sent to Dr. Olivares for prophylactic measures prior to stent replacement. The patient c/o increased weight gain & fatigue. The patient denies CP, cough, SOB, abdominal pain, nausea, vomiting, constipation, diarrhea.  The patient denies fever, chills, night sweats, weight loss, new lumps or bumps, easy bruising or bleeding.    Prior History:  Patient underwent transurethral resection of bladder tumor on 04/28/2023 with path showing invasive urothelial carcinoma, high-grade with involvement of muscularis propria and  positive for lymphovascular invasion.  Patient underwent chemotherapy at [facility].  The patient then underwent radical cystectomy and prostatectomy on 08/15/2023 with pathology showing invasive high-grade urothelial carcinoma measuring 4.2 cm, positive lymphovascular invasion, tumor identified within the soft tissue surrounding the left distal ureter, 4/6 positive regional lymph nodes with extranodal extension present. pT3aN2.  Also seen was acinar adenocarcinoma of the prostate grade group 1 ([place] score 3+3=6) in 1 2 5% of prostate tissue pT2N0.  The patient's surgery was ultimately complicated by small-bowel obstruction, and pulmonary embolism.  PT recevied 1 dose of Nivolumab 9/18/23.  The patient was placed on anticoagulation for pulmonary embolism and ultimately developed a right perinephric bleed requiring embolization.  PT also developed UTI and acquired ureteral obstruction with placement of bilateral ureteral stents.  PT also developed C diff and was hospitalized for suspected recurrence at [facility] from 10/21/2023 to 10/25/2023.  Patient was discharged on fidaxomicin.   The patient saw Dr. Ghosh with Urology on 11/13/2023 for with recommendations for virtual visit in January to discuss possible operative intervention replaced stents, possible revision of ureteral ileal anastomosis pending disease progression.  It was instructed that the patient would need catheterized specimen from urostomy to rule out UTI if patient with future symptoms.  Patient underwent PET-CT on 11/16/2023 showing a new hypermetabolic subpleural nodule in the posterior right lower lobe measuring 2.2 x 1.3 cm.  The patient endorses a fever starting on the night of 11/16/2023.  Patient was prescribed Bactrim for suspected potential urinary tract infection.   The patient was admitted to the hospital from 11/17/23-11/21/23 for C diff colitis and suspected urinary tract infection the patient was discharged on  fidaxomicin 200 mg b.i.d. for additional 7 days as well as 200 mg every other day for 25 days.  Patient was also discharged on doxycycline 100 mg b.i.d. for 25 days with instructions from Infectious Disease to remain on antibiotics as long as his ureteral stents were in place.   The patient underwent biopsy of nodule in the right lung on 12/08/2023 showing fibrosis and chronic inflammation but no evidence of neoplasm or granuloma.   The patient was to have ureteral stents exchanged on 01/02/2024 but missed his appointment due to inclement weather.    The patient had bilateral ureteral stents exchanged by Dr. Ghosh on 1/23/24.   The patient underwent PET-CT on 03/01/2024 showing decrease in size of right lower lobe nodule now measuring 0.8 cm.   The patient underwent CTA chest on 03/06/2024 showing no evidence of pulmonary embolism or pneumonitis.  The patient underwent nuclear stress test on 03/11/2024 showed abnormal myocardial perfusion with mild intensity, small size, reversible perfusion abnormality consistent with ischemia in the basal to mid inferolateral walls.   The patient was admitted to the hospital from 04/04/2024 until 04/11/2024 with initial complaint of weakness, hypotensio, and diarrhea and found to have adrenal insufficiency, hypothyroidism, and C diff.   The patient was admitted hospital from 05/11/2024 until 05/13/2024 for urinary tract infection.  Blood cultures grew out E coli.  The patient was sent home on Cipro to complete 7 additional days of treatment.  The patient was also sent home on oral vancomycin for prevention of C diff. patient underwent bilateral ureteral stent exchange on 05/15/2024 under the care of Dr. Olivares.    Past Medical History:   Past Medical History:   Diagnosis Date    Adrenal insufficiency 05/2024    Aneurysm artery, popliteal 2018    right leg    Bladder cancer     Coronary artery disease     cabg x 4 11/2021    Dyslipidemia     H/O Clostridium difficile infection      Hx pulmonary embolism     8/2023    Hypertension     Hyperthyroidism 05/2024    Immunotherapy     nivolomab / monthly md manuel    Malignant neoplasm of prostate     Presence of urostomy     Seizures     remote hx  last ~2007    SOB (shortness of breath)        Past Surgical HIstory:   Past Surgical History:   Procedure Laterality Date    CORONARY ARTERY BYPASS GRAFT      11/2021    CREATION, ILEAL CONDUIT  08/2023    ENDOSCOPY OF ILEAL CONDUIT Bilateral 5/15/2024    Procedure: ENDOSCOPY, ILEAL CONDUIT - Bilateral Stent Exchange  Bilateral Retrograde Pyelogram;  Surgeon: Manuelito Olivares MD;  [facility];  Service: Urology;  Laterality: Bilateral;    HERNIA REPAIR      umbilical    PROSTATECTOMY      PTA, POPLITEAL      REMOVAL-STENT Bilateral 01/23/2024    Procedure: REMOVAL-STENT;  Surgeon: South Ghosh MD;  Location: [facility] OR [facility] FLR;  Service: Urology;  Laterality: Bilateral;    URETERAL STENT PLACEMENT Bilateral 01/23/2024    Procedure: INSERTION, STENT, URETER;  Surgeon: South Ghosh MD;  Location: [facility] OR [facility] FLR;  Service: Urology;  Laterality: Bilateral;       Family History:   Family History   Problem Relation Name Age of Onset    Heart disease Mother      Hypertension Mother      Peripheral vascular disease Father 72        Social History:  reports that he has never smoked. He has never been exposed to tobacco smoke. He has never used smokeless tobacco. He reports that he does not currently use alcohol. He reports that he does not use drugs.    Allergies:  Review of patient's allergies indicates:   Allergen Reactions    Clopidogrel Other (See Comments)     Increased body temperature and redness        Medications:  Current Outpatient Medications   Medication Sig Dispense Refill    acetaminophen (TYLENOL) 500 MG tablet Take 500-1,000 mg by mouth every 6 (six) hours as needed (fever/pain).      ASCORBIC ACID, VITAMIN C, ORAL Take 1 tablet by mouth once daily.      aspirin (ECOTRIN) 81 MG EC tablet  Take 1 tablet (81 mg total) by mouth once daily. 90 tablet 3    atorvastatin (LIPITOR) 40 MG tablet Take 40 mg by mouth once daily.      b complex vitamins tablet Take 1 tablet by mouth once daily.      cholecalciferol, vitamin D3, 125 mcg (5,000 unit) Tab Take 5,000 Units by mouth once daily.      cyanocobalamin, vitamin B-12, (VITAMIN B-12) 2,500 mcg Subl Take 2,500 mcg by mouth once daily.      ferrous sulfate (SLOW RELEASE IRON) 142 mg (45 mg iron) TbSR Take 45 mg by mouth Daily.      hydrocortisone (CORTEF) 20 MG Tab Take 1 tablet (20 mg total) by mouth once daily. Will need 10 extra tablet for stress dosing 100 tablet 3    hydrocortisone (CORTEF) 5 MG Tab Take 20 mg in AM and 5 mg tablet in early afternoon (Patient taking differently: Take 5 mg by mouth after lunch.) 30 tablet 6    Lactobacillus rhamnosus GG (CULTURELLE) 10 billion cell capsule Take 1 capsule by mouth once daily. 30 capsule 0    LIDOcaine-prilocaine (EMLA) cream Apply topically once as needed (to port access).      multivitamin with minerals tablet Take 1 tablet by mouth once daily.      nitroGLYCERIN (NITROSTAT) 0.4 MG SL tablet Place 1 tablet (0.4 mg total) under the tongue every 5 (five) minutes as needed for Chest pain. 25 tablet 0    QUEtiapine (SEROQUEL) 25 MG Tab Take one half tab by mouth nightly as needed for insomnia 90 tablet 1    sodium chloride 0.9% SolP 50 mL with nivolumab 40 mg/4 mL Soln Inject into the vein every 30 days.      zinc gluconate 50 mg tablet Take 50 mg by mouth once daily.      levothyroxine (SYNTHROID) 75 MCG tablet Take 1 tablet (75 mcg total) by mouth before breakfast. 30 tablet 11     No current facility-administered medications for this visit.       Review of Systems   Constitutional:  Negative for appetite change, chills, diaphoresis, fatigue, fever and unexpected weight change.   HENT:  Negative for mouth sores.    Eyes:  Negative for visual disturbance.   Respiratory:  Negative for cough and shortness of  "breath.    Cardiovascular:  Negative for chest pain and palpitations.   Gastrointestinal:  Negative for abdominal pain, constipation, diarrhea, nausea and vomiting.   Genitourinary:  Negative for difficulty urinating, dysuria, flank pain and frequency.   Musculoskeletal:  Negative for back pain.   Skin:  Negative for color change and rash.   Neurological:  Negative for headaches.   Hematological:  Negative for adenopathy. Does not bruise/bleed easily.   Psychiatric/Behavioral:  The patient is not nervous/anxious.        ECOG Performance Status: 1   Objective:      Vitals: Weight:  Gain of 5 pounds in 4 weeks  Vitals:    07/23/24 0911   BP: 134/76   BP Location: Left arm   Patient Position: Sitting   BP Method: Medium (Automatic)   Pulse: 65   Resp: 18   Temp: 97.1 °F (36.2 °C)   TempSrc: Temporal   Weight: 72.1 kg (158 lb 15.2 oz)   Height: 5' 9" (1.753 m)       Physical Exam  Constitutional:       General: He is not in acute distress.     Appearance: He is well-developed. He is not diaphoretic.   HENT:      Head: Normocephalic and atraumatic.   Cardiovascular:      Rate and Rhythm: Normal rate and regular rhythm.      Heart sounds: Normal heart sounds. No murmur heard.     No friction rub. No gallop.   Pulmonary:      Effort: Pulmonary effort is normal. No respiratory distress.      Breath sounds: Normal breath sounds. No wheezing or rales.   Chest:      Chest wall: No tenderness.   Abdominal:      General: Bowel sounds are normal. There is no distension.      Palpations: Abdomen is soft. There is no mass.      Tenderness: There is no abdominal tenderness. There is no rebound.   Musculoskeletal:      Cervical back: Normal range of motion.   Lymphadenopathy:      Cervical: No cervical adenopathy.      Upper Body:      Right upper body: No supraclavicular or axillary adenopathy.      Left upper body: No supraclavicular or axillary adenopathy.   Skin:     General: Skin is warm and dry.      Findings: No erythema or " rash.   Neurological:      Mental Status: He is alert and oriented to person, place, and time.   Psychiatric:         Behavior: Behavior normal.       Laboratory Data:  Lab Visit on 07/22/2024   Component Date Value Ref Range Status    WBC 07/22/2024 10.79  3.90 - 12.70 K/uL Final    RBC 07/22/2024 4.15 (L)  4.60 - 6.20 M/uL Final    Hemoglobin 07/22/2024 12.9 (L)  14.0 - 18.0 g/dL Final    Hematocrit 07/22/2024 38.0 (L)  40.0 - 54.0 % Final    MCV 07/22/2024 92  82 - 98 fL Final    MCH 07/22/2024 31.1 (H)  27.0 - 31.0 pg Final    MCHC 07/22/2024 33.9  32.0 - 36.0 g/dL Final    RDW 07/22/2024 15.9 (H)  11.5 - 14.5 % Final    Platelets 07/22/2024 215  150 - 450 K/uL Final    MPV 07/22/2024 8.8 (L)  9.2 - 12.9 fL Final    Immature Granulocytes 07/22/2024 0.5  0.0 - 0.5 % Final    Gran # (ANC) 07/22/2024 8.2 (H)  1.8 - 7.7 K/uL Final    Immature Grans (Abs) 07/22/2024 0.05 (H)  0.00 - 0.04 K/uL Final    Comment: Mild elevation in immature granulocytes is non specific and   can be seen in a variety of conditions including stress response,   acute inflammation, trauma and pregnancy. Correlation with other   laboratory and clinical findings is essential.      Lymph # 07/22/2024 1.3  1.0 - 4.8 K/uL Final    Mono # 07/22/2024 1.0  0.3 - 1.0 K/uL Final    Eos # 07/22/2024 0.2  0.0 - 0.5 K/uL Final    Baso # 07/22/2024 0.08  0.00 - 0.20 K/uL Final    nRBC 07/22/2024 0  0 /100 WBC Final    Gran % 07/22/2024 75.5 (H)  38.0 - 73.0 % Final    Lymph % 07/22/2024 11.9 (L)  18.0 - 48.0 % Final    Mono % 07/22/2024 9.5  4.0 - 15.0 % Final    Eosinophil % 07/22/2024 1.9  0.0 - 8.0 % Final    Basophil % 07/22/2024 0.7  0.0 - 1.9 % Final    Differential Method 07/22/2024 Automated   Final    Sodium 07/22/2024 135 (L)  136 - 145 mmol/L Final    Potassium 07/22/2024 4.6  3.5 - 5.1 mmol/L Final    Chloride 07/22/2024 101  95 - 110 mmol/L Final    CO2 07/22/2024 21 (L)  23 - 29 mmol/L Final    Glucose 07/22/2024 75  70 - 110 mg/dL Final     BUN 07/22/2024 23  8 - 23 mg/dL Final    Creatinine 07/22/2024 1.8 (H)  0.5 - 1.4 mg/dL Final    Calcium 07/22/2024 9.9  8.7 - 10.5 mg/dL Final    Total Protein 07/22/2024 7.3  6.0 - 8.4 g/dL Final    Albumin 07/22/2024 4.1  3.5 - 5.2 g/dL Final    Total Bilirubin 07/22/2024 0.4  0.1 - 1.0 mg/dL Final    Comment: For infants and newborns, interpretation of results should be based  on gestational age, weight and in agreement with clinical  observations.    Premature Infant recommended reference ranges:  Up to 24 hours.............<8.0 mg/dL  Up to 48 hours............<12.0 mg/dL  3-5 days..................<15.0 mg/dL  6-29 days.................<15.0 mg/dL      Alkaline Phosphatase 07/22/2024 73  55 - 135 U/L Final    AST 07/22/2024 32  10 - 40 U/L Final    ALT 07/22/2024 30  10 - 44 U/L Final    eGFR 07/22/2024 38.5 (A)  >60 mL/min/1.73 m^2 Final    Anion Gap 07/22/2024 13  8 - 16 mmol/L Final    TSH 07/22/2024 109.120 (H)  0.400 - 4.000 uIU/mL Final    Free T4 07/22/2024 0.56 (L)  0.71 - 1.51 ng/dL Final         Imaging:     PET/CT 6/21/24  Head and Neck:     Brain demonstrates normal metabolism. However, FDG-PET has an approximate 60% sensitivity for brain metastases which are best detected by MRI with gadolinium. Physiologic uptake is in the neck.     Chest:     No FDG avid pulmonary lesions are present. No enlarged or FDG avid lymph nodes are in the chest.     Abdomen and Pelvis:     Physiologic uptake is in the liver, spleen, urinary system and bowel. No enlarged or FDG avid lymph nodes are in the abdomen or pelvis. Adrenal glands are normal. Cystectomy, prostatectomy and ureteral diversion operative changes are stable without evidence of obstruction.     Musculoskeletal:     No FDG avid osseous lesions are present.       Assessment:       1. Malignant neoplasm of overlapping sites of bladder    2. Hypothyroidism, unspecified type         Plan:     Bladder Cancer - pt with stage IIIB bladder cancer pT3N2 s/p  neoadjuvant chemo followed by radical cystoprostatectomy 8/15/23  -PT received one dose of Nivolumab on 9/18/23  -PET/CT suggestive of a RLL pulmonary nodule  -Biopsy on 12/08/23 negative for malignancy  -PET/CT on 6/21/24 showed LEANDRO  -Pt to proceed with cycle 7 of Nivolumab  07/23/24:  Proceed with C8 of Nivo; f/u with Dr. Jimenez as scheduled on 08/20 with labs prior.  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    Adrenal Insufficiency - pt with positive Cosyntropin stim test in the hospital  -PT on Hydrocortisone 20mg daily  -Pt following with endocrinology    CAD - pt with prior bypass surgery  -Cardiac stress test on 3/11/24 was abnormal with mild intensity, small size, reversible perfusion abnormality consistent with ischemia in the basal to mid inferolateral walls  -Pt to follow up with cardiologist    Pulmonary nodule - see above    Prostate Cancer - patient with acinar adenocarcinoma of the prostate grade group 1 ([place] score 3+3=6) in 1 2 5% of prostate tissue pT2N0  -PSA 10/31/23 0.02ng/mL  -Will monitor    Ureteral Stents - Stents exchanged last on 5/15/24 under the care of Dr Olivares  -Pt to follow up with Urology  07/23/24:  plans for stent exchange on 08/15 per Dr. Olivares    Anemia - Hemoglobin 12.9g/dL on 7/22/24  -Stable  -Will monitor     Thrombophilia - pt with prior PE  -Pt subsequently developed a right perinephric bleed requiring  embolization   -Patient with IVC filter in place  -Pt following with Dr Claire  -Vascular surgery to determine if he could be started on Eliquis  -Will monitor    Right popliteal aneurysm - pt to see vascular surgery    CKDIII - creatinine up to 1.8 on 7/22/24  -Will have pt follow up with Nephrology  -Will monitor    LARRY Machado, FNP-C  [facility]  30 minutes were spent in coordination of patient's care, record review and counseling.    Route Chart for Scheduling    Med Onc Chart Routing      Follow up with physician . F/u with Dr. Jimenez on 08/20 as scheduled with labs the day prior   Follow up with [facility]    Infusion scheduling note   Proceedw ith C8 today   Injection scheduling note    Labs    Imaging    Pharmacy appointment    Other referrals          Treatment Plan Information   OP NIVOLUMAB 480 MG Q4W   Jackson Jimenez MD   Upcoming Treatment Dates - OP NIVOLUMAB 480 MG Q4W    7/23/2024       Chemotherapy       nivolumab 480 mg in 0.9% NaCl 98 mL infusion  8/20/2024       Chemotherapy       nivolumab 480 mg in 0.9% NaCl 98 mL infusion  9/17/2024       Chemotherapy       nivolumab 480 mg in 0.9% NaCl 98 mL infusion  10/15/2024       Chemotherapy       nivolumab 480 mg in 0.9% NaCl 98 mL infusion    Therapy Plan Information  dextrose 5 % (D5W) 100 mL flush bag  Intravenous, Once  heparin, porcine (PF) 100 unit/mL injection flush 500 Units  500 Units, Intravenous, PRN  alteplase injection 2 mg  2 mg, Intra-Catheter, PRN

## 2024-07-23 NOTE — TELEPHONE ENCOUNTER
Please schedule a nurse visit for catheterized urostomy urine culture prior to his upcoming stent exchange. They will need to bring the appliance to change it after the cath unless they have a 2 piece urostomy. If the blood pressure remains low then they should be evaluated more urgently in the ED.

## 2024-07-23 NOTE — TELEPHONE ENCOUNTER
----- Message from Jason Richardson sent at 7/23/2024  3:03 PM CDT -----  Regarding: advice  Contact: patient  Type: Needs Medical Advice  Who Called:  Wife Courtney   Symptoms (please be specific):    How long has patient had these symptoms:    Pharmacy name and phone #:    Best Call Back Number: [phone]  Additional Information: She has questions about the portal message that was sent to the pt. She stated that it is to much to explain in a message. Also they had a death in the family and will not be able to make it tomorrow but can make it on Thursday. Please call to advise. Thanks

## 2024-07-23 NOTE — PLAN OF CARE
Problem: Adult Inpatient Plan of Care  Goal: Plan of Care Review  Outcome: Progressing  Flowsheets (Taken 7/23/2024 1042)  Plan of Care Reviewed With:   patient   spouse   Pt tolerated opdivo infusion well.  No adverse reaction noted.   PAD flushed with heparin and de-accessed per protocol.  Patient left clinic in no acute distress.

## 2024-07-23 NOTE — Clinical Note
Patient in for tx today; wife concerned over blood/mucus in ileostomy bag now & prophylactics prior to 08/15 stent exchange with you.  Few specks of blood, slight mucus in bag. Afebrile.  Patient's wife would like someone from your office to reach out in re of prophylactic measures to stent exchange. Thank you.

## 2024-08-01 PROBLEM — N10 ACUTE PYELONEPHRITIS: Status: ACTIVE | Noted: 2023-11-19

## 2024-08-01 PROBLEM — N18.30 CHRONIC KIDNEY DISEASE, STAGE 3: Status: ACTIVE | Noted: 2023-12-06

## 2024-08-01 PROBLEM — Z86.19 HISTORY OF CLOSTRIDIUM DIFFICILE INFECTION: Status: ACTIVE | Noted: 2024-04-05

## 2024-08-03 PROBLEM — Z75.8 DISCHARGE PLANNING ISSUES: Status: ACTIVE | Noted: 2024-08-03

## 2024-08-03 PROBLEM — K59.00 CONSTIPATION: Status: ACTIVE | Noted: 2024-08-03

## 2024-08-04 ENCOUNTER — PATIENT MESSAGE (OUTPATIENT)
Dept: NEPHROLOGY | Facility: CLINIC | Age: 74
End: 2024-08-04
Payer: MEDICARE

## 2024-08-04 ENCOUNTER — PATIENT MESSAGE (OUTPATIENT)
Dept: ENDOCRINOLOGY | Facility: CLINIC | Age: 74
End: 2024-08-04
Payer: MEDICARE

## 2024-08-04 ENCOUNTER — PATIENT MESSAGE (OUTPATIENT)
Dept: UROLOGY | Facility: CLINIC | Age: 74
End: 2024-08-04
Payer: MEDICARE

## 2024-08-04 ENCOUNTER — PATIENT MESSAGE (OUTPATIENT)
Dept: FAMILY MEDICINE | Facility: CLINIC | Age: 74
End: 2024-08-04
Payer: MEDICARE

## 2024-08-05 ENCOUNTER — TELEPHONE (OUTPATIENT)
Dept: UROLOGY | Facility: CLINIC | Age: 74
End: 2024-08-05
Payer: MEDICARE

## 2024-08-07 ENCOUNTER — TELEPHONE (OUTPATIENT)
Dept: FAMILY MEDICINE | Facility: CLINIC | Age: 74
End: 2024-08-07
Payer: MEDICARE

## 2024-08-07 NOTE — TELEPHONE ENCOUNTER
----- Message from Raven Bell sent at 8/7/2024  3:30 PM CDT -----  Contact: pt wife rich  Type:  Sooner Appointment Request    Caller is requesting a sooner appointment.  Caller declined first available appointment listed below.  Caller will not accept being placed on the waitlist and is requesting a message be sent to doctor.    Name of Caller:  pt wife rich  When is the first available appointment?  N/a  Symptoms:  pt has been in icu for 3 days then returned home.pt went back today to have a stent placed.pt is swollen and holding fluid.pt did have a bowel movement today   Would the patient rather a call back or a response via [facility]? call  Best Call Back Number:  [phone]  Additional Information:  pt's wife has seen dr haas today and she states that he told her that pt needs to be seen tomorrow between 11 and 12 because he has an appt with dr nolan at 1.30pm. please call

## 2024-08-07 NOTE — TELEPHONE ENCOUNTER
"Called pt wife to discuss, offered to be scheduled with NP. Declined because she was not comfortable with pt seeing Megha.. stated she was not effective the last visit, "she basically read us back what we already knew and I just didn't care for her". Sts she rather wait until they see   "

## 2024-08-08 ENCOUNTER — OFFICE VISIT (OUTPATIENT)
Dept: ENDOCRINOLOGY | Facility: CLINIC | Age: 74
End: 2024-08-08
Payer: MEDICARE

## 2024-08-08 VITALS
HEIGHT: 69 IN | SYSTOLIC BLOOD PRESSURE: 118 MMHG | BODY MASS INDEX: 24.73 KG/M2 | DIASTOLIC BLOOD PRESSURE: 68 MMHG | OXYGEN SATURATION: 97 % | HEART RATE: 53 BPM | WEIGHT: 167 LBS

## 2024-08-08 DIAGNOSIS — E03.9 HYPOTHYROIDISM, UNSPECIFIED TYPE: ICD-10-CM

## 2024-08-08 DIAGNOSIS — E27.40 ADRENAL INSUFFICIENCY: Primary | ICD-10-CM

## 2024-08-08 DIAGNOSIS — R60.0 LOWER EXTREMITY EDEMA: ICD-10-CM

## 2024-08-08 PROCEDURE — 1160F RVW MEDS BY RX/DR IN RCRD: CPT | Mod: CPTII,S$GLB,, | Performed by: INTERNAL MEDICINE

## 2024-08-08 PROCEDURE — 99999 PR PBB SHADOW E&M-EST. PATIENT-LVL IV: CPT | Mod: PBBFAC,,, | Performed by: INTERNAL MEDICINE

## 2024-08-08 PROCEDURE — G2211 COMPLEX E/M VISIT ADD ON: HCPCS | Mod: S$GLB,,, | Performed by: INTERNAL MEDICINE

## 2024-08-08 PROCEDURE — 1101F PT FALLS ASSESS-DOCD LE1/YR: CPT | Mod: CPTII,S$GLB,, | Performed by: INTERNAL MEDICINE

## 2024-08-08 PROCEDURE — 3074F SYST BP LT 130 MM HG: CPT | Mod: CPTII,S$GLB,, | Performed by: INTERNAL MEDICINE

## 2024-08-08 PROCEDURE — 1157F ADVNC CARE PLAN IN RCRD: CPT | Mod: CPTII,S$GLB,, | Performed by: INTERNAL MEDICINE

## 2024-08-08 PROCEDURE — 3078F DIAST BP <80 MM HG: CPT | Mod: CPTII,S$GLB,, | Performed by: INTERNAL MEDICINE

## 2024-08-08 PROCEDURE — 1111F DSCHRG MED/CURRENT MED MERGE: CPT | Mod: CPTII,S$GLB,, | Performed by: INTERNAL MEDICINE

## 2024-08-08 PROCEDURE — 3288F FALL RISK ASSESSMENT DOCD: CPT | Mod: CPTII,S$GLB,, | Performed by: INTERNAL MEDICINE

## 2024-08-08 PROCEDURE — 99214 OFFICE O/P EST MOD 30 MIN: CPT | Mod: S$GLB,,, | Performed by: INTERNAL MEDICINE

## 2024-08-08 PROCEDURE — 1159F MED LIST DOCD IN RCRD: CPT | Mod: CPTII,S$GLB,, | Performed by: INTERNAL MEDICINE

## 2024-08-12 ENCOUNTER — OFFICE VISIT (OUTPATIENT)
Dept: NEPHROLOGY | Facility: CLINIC | Age: 74
End: 2024-08-12
Payer: MEDICARE

## 2024-08-12 VITALS
DIASTOLIC BLOOD PRESSURE: 60 MMHG | BODY MASS INDEX: 23.7 KG/M2 | SYSTOLIC BLOOD PRESSURE: 120 MMHG | OXYGEN SATURATION: 99 % | HEIGHT: 69 IN | HEART RATE: 60 BPM

## 2024-08-12 DIAGNOSIS — E27.40 ADRENAL INSUFFICIENCY: ICD-10-CM

## 2024-08-12 DIAGNOSIS — I10 PRIMARY HYPERTENSION: ICD-10-CM

## 2024-08-12 DIAGNOSIS — E87.1 HYPONATREMIA: ICD-10-CM

## 2024-08-12 DIAGNOSIS — N18.32 STAGE 3B CHRONIC KIDNEY DISEASE: Primary | ICD-10-CM

## 2024-08-12 PROBLEM — N17.9 AKI (ACUTE KIDNEY INJURY): Status: RESOLVED | Noted: 2023-11-17 | Resolved: 2024-08-12

## 2024-08-12 PROBLEM — N39.0 COMPLICATED UTI (URINARY TRACT INFECTION): Status: RESOLVED | Noted: 2024-01-22 | Resolved: 2024-08-12

## 2024-08-12 PROBLEM — N12 PYELONEPHRITIS: Status: RESOLVED | Noted: 2024-05-11 | Resolved: 2024-08-12

## 2024-08-12 PROCEDURE — 99214 OFFICE O/P EST MOD 30 MIN: CPT | Mod: S$GLB,,, | Performed by: INTERNAL MEDICINE

## 2024-08-12 PROCEDURE — 1159F MED LIST DOCD IN RCRD: CPT | Mod: CPTII,S$GLB,, | Performed by: INTERNAL MEDICINE

## 2024-08-12 PROCEDURE — 3074F SYST BP LT 130 MM HG: CPT | Mod: CPTII,S$GLB,, | Performed by: INTERNAL MEDICINE

## 2024-08-12 PROCEDURE — 3078F DIAST BP <80 MM HG: CPT | Mod: CPTII,S$GLB,, | Performed by: INTERNAL MEDICINE

## 2024-08-12 PROCEDURE — 1160F RVW MEDS BY RX/DR IN RCRD: CPT | Mod: CPTII,S$GLB,, | Performed by: INTERNAL MEDICINE

## 2024-08-12 PROCEDURE — 1111F DSCHRG MED/CURRENT MED MERGE: CPT | Mod: CPTII,S$GLB,, | Performed by: INTERNAL MEDICINE

## 2024-08-12 PROCEDURE — 1157F ADVNC CARE PLAN IN RCRD: CPT | Mod: CPTII,S$GLB,, | Performed by: INTERNAL MEDICINE

## 2024-08-12 PROCEDURE — 99999 PR PBB SHADOW E&M-EST. PATIENT-LVL III: CPT | Mod: PBBFAC,,, | Performed by: INTERNAL MEDICINE

## 2024-08-12 NOTE — PROGRESS NOTES
"  Subjective:        Patient ID: Aren Bey Jr. is a 74 y.o. White male who presents for return patient evaluation for chronic renal failure.      He has stent change q 3 months per .  He has an iliostomy.    He had a UTI in early August and also had stent exchange.  He had 15 pound weight gain with the steroids but has lost most of that back.      Review of Systems   Constitutional:  Negative for chills, diaphoresis and fever.   Respiratory:  Negative for cough and shortness of breath.    Cardiovascular:  Negative for chest pain and leg swelling.   Gastrointestinal:  Negative for abdominal pain, diarrhea, nausea and vomiting.   Genitourinary:  Negative for difficulty urinating, dysuria and hematuria.   Musculoskeletal:  Negative for myalgias.   Neurological:  Negative for headaches.   Hematological:  Does not bruise/bleed easily.       The past medical, family and social histories were reviewed for this encounter.     /60 (BP Location: Left arm, Patient Position: Sitting, BP Method: Medium (Manual))   Pulse 60   Ht 5' 9" (1.753 m)   SpO2 99%   BMI 23.70 kg/m²     Objective:      Physical Exam  Vitals reviewed.   Constitutional:       General: He is not in acute distress.     Appearance: He is well-developed.   HENT:      Head: Normocephalic and atraumatic.   Eyes:      General: No scleral icterus.     Conjunctiva/sclera: Conjunctivae normal.   Neck:      Vascular: No JVD.   Cardiovascular:      Rate and Rhythm: Normal rate and regular rhythm.      Heart sounds: Normal heart sounds. No murmur heard.     No friction rub. No gallop.   Pulmonary:      Effort: Pulmonary effort is normal. No respiratory distress.      Breath sounds: Normal breath sounds. No wheezing.   Abdominal:      General: Bowel sounds are normal. There is no distension.      Palpations: Abdomen is soft.      Tenderness: There is no abdominal tenderness.   Musculoskeletal:      Right lower leg: No edema.      Left lower leg: No " "edema.   Skin:     General: Skin is warm and dry.      Findings: No rash.   Neurological:      Mental Status: He is alert and oriented to person, place, and time.         Assessment:       1. Stage 3b chronic kidney disease    2. Hyponatremia    3. Adrenal insufficiency    4. Primary hypertension          Lab Results   Component Value Date    CREATININE 1.67 (H) 08/07/2024    BUN 29 (H) 08/07/2024     08/07/2024    K 3.4 (L) 08/07/2024     08/07/2024    CO2 25 08/07/2024     Lab Results   Component Value Date    CALCIUM 9.1 08/07/2024    PHOS 4.8 (H) 04/04/2024     Lab Results   Component Value Date    HCT 36.1 (L) 08/07/2024     No results found for: "UTPCR"   Plan:   Return to clinic in 6 months.  Labs for next visit include labs per standing orders q 3 months.  UACR for next time.  Baseline creatinine is 1.4-1.8 since 2023.  Function is labile due to urologic issues.  We discussed his potassium today.    Computed KFRE 2-Year unavailable. One or more values for this score either were not found within the given timeframe or did not fit some other criterion.    Computed KFRE 5-Year unavailable. One or more values for this score either were not found within the given timeframe or did not fit some other criterion.    "

## 2024-08-19 ENCOUNTER — LAB VISIT (OUTPATIENT)
Dept: LAB | Facility: HOSPITAL | Age: 74
End: 2024-08-19
Attending: INTERNAL MEDICINE
Payer: MEDICARE

## 2024-08-19 DIAGNOSIS — E03.9 HYPOTHYROIDISM, UNSPECIFIED TYPE: ICD-10-CM

## 2024-08-19 DIAGNOSIS — C67.8 MALIGNANT NEOPLASM OF OVERLAPPING SITES OF BLADDER: ICD-10-CM

## 2024-08-19 DIAGNOSIS — E27.40 ADRENAL INSUFFICIENCY: ICD-10-CM

## 2024-08-19 DIAGNOSIS — D89.89 OTHER SPECIFIED DISORDERS INVOLVING THE IMMUNE MECHANISM, NOT ELSEWHERE CLASSIFIED: ICD-10-CM

## 2024-08-19 LAB
ALBUMIN SERPL BCP-MCNC: 4.1 G/DL (ref 3.5–5.2)
ALBUMIN SERPL BCP-MCNC: 4.1 G/DL (ref 3.5–5.2)
ALP SERPL-CCNC: 61 U/L (ref 55–135)
ALP SERPL-CCNC: 61 U/L (ref 55–135)
ALT SERPL W/O P-5'-P-CCNC: 37 U/L (ref 10–44)
ALT SERPL W/O P-5'-P-CCNC: 37 U/L (ref 10–44)
ANION GAP SERPL CALC-SCNC: 13 MMOL/L (ref 8–16)
ANION GAP SERPL CALC-SCNC: 13 MMOL/L (ref 8–16)
AST SERPL-CCNC: 35 U/L (ref 10–40)
AST SERPL-CCNC: 35 U/L (ref 10–40)
BASOPHILS # BLD AUTO: 0.08 K/UL (ref 0–0.2)
BASOPHILS NFR BLD: 1.1 % (ref 0–1.9)
BILIRUB SERPL-MCNC: 0.6 MG/DL (ref 0.1–1)
BILIRUB SERPL-MCNC: 0.6 MG/DL (ref 0.1–1)
BUN SERPL-MCNC: 16 MG/DL (ref 8–23)
BUN SERPL-MCNC: 16 MG/DL (ref 8–23)
CALCIUM SERPL-MCNC: 9.8 MG/DL (ref 8.7–10.5)
CALCIUM SERPL-MCNC: 9.8 MG/DL (ref 8.7–10.5)
CHLORIDE SERPL-SCNC: 103 MMOL/L (ref 95–110)
CHLORIDE SERPL-SCNC: 103 MMOL/L (ref 95–110)
CO2 SERPL-SCNC: 23 MMOL/L (ref 23–29)
CO2 SERPL-SCNC: 23 MMOL/L (ref 23–29)
CREAT SERPL-MCNC: 1.9 MG/DL (ref 0.5–1.4)
CREAT SERPL-MCNC: 1.9 MG/DL (ref 0.5–1.4)
DIFFERENTIAL METHOD BLD: ABNORMAL
EOSINOPHIL # BLD AUTO: 0.1 K/UL (ref 0–0.5)
EOSINOPHIL NFR BLD: 1.1 % (ref 0–8)
ERYTHROCYTE [DISTWIDTH] IN BLOOD BY AUTOMATED COUNT: 14.6 % (ref 11.5–14.5)
EST. GFR  (NO RACE VARIABLE): 36.1 ML/MIN/1.73 M^2
EST. GFR  (NO RACE VARIABLE): 36.1 ML/MIN/1.73 M^2
GLUCOSE SERPL-MCNC: 96 MG/DL (ref 70–110)
GLUCOSE SERPL-MCNC: 96 MG/DL (ref 70–110)
HCT VFR BLD AUTO: 35.5 % (ref 40–54)
HGB BLD-MCNC: 12.1 G/DL (ref 14–18)
IMM GRANULOCYTES # BLD AUTO: 0.03 K/UL (ref 0–0.04)
IMM GRANULOCYTES NFR BLD AUTO: 0.4 % (ref 0–0.5)
LYMPHOCYTES # BLD AUTO: 1.1 K/UL (ref 1–4.8)
LYMPHOCYTES NFR BLD: 15.5 % (ref 18–48)
MCH RBC QN AUTO: 31.7 PG (ref 27–31)
MCHC RBC AUTO-ENTMCNC: 34.1 G/DL (ref 32–36)
MCV RBC AUTO: 93 FL (ref 82–98)
MONOCYTES # BLD AUTO: 0.7 K/UL (ref 0.3–1)
MONOCYTES NFR BLD: 10 % (ref 4–15)
NEUTROPHILS # BLD AUTO: 5 K/UL (ref 1.8–7.7)
NEUTROPHILS NFR BLD: 71.9 % (ref 38–73)
NRBC BLD-RTO: 0 /100 WBC
PLATELET # BLD AUTO: 201 K/UL (ref 150–450)
PMV BLD AUTO: 9.2 FL (ref 9.2–12.9)
POTASSIUM SERPL-SCNC: 3.8 MMOL/L (ref 3.5–5.1)
POTASSIUM SERPL-SCNC: 3.8 MMOL/L (ref 3.5–5.1)
PROT SERPL-MCNC: 6.9 G/DL (ref 6–8.4)
PROT SERPL-MCNC: 6.9 G/DL (ref 6–8.4)
RBC # BLD AUTO: 3.82 M/UL (ref 4.6–6.2)
SODIUM SERPL-SCNC: 139 MMOL/L (ref 136–145)
SODIUM SERPL-SCNC: 139 MMOL/L (ref 136–145)
T4 FREE SERPL-MCNC: 0.92 NG/DL (ref 0.71–1.51)
TSH SERPL DL<=0.005 MIU/L-ACNC: 24.02 UIU/ML (ref 0.4–4)
TSH SERPL DL<=0.005 MIU/L-ACNC: 24.02 UIU/ML (ref 0.4–4)
WBC # BLD AUTO: 6.97 K/UL (ref 3.9–12.7)

## 2024-08-19 PROCEDURE — 80053 COMPREHEN METABOLIC PANEL: CPT | Mod: PN

## 2024-08-19 PROCEDURE — 84443 ASSAY THYROID STIM HORMONE: CPT

## 2024-08-19 PROCEDURE — 84439 ASSAY OF FREE THYROXINE: CPT

## 2024-08-19 PROCEDURE — 85025 COMPLETE CBC W/AUTO DIFF WBC: CPT | Mod: PN

## 2024-08-19 PROCEDURE — 36415 COLL VENOUS BLD VENIPUNCTURE: CPT | Mod: PN

## 2024-08-19 NOTE — PROGRESS NOTES
PATIENT: Aren Bey Jr.  MRN: [id number]  DATE: 8/20/2024      Diagnosis:   1. Malignant neoplasm of overlapping sites of bladder    2. Other specified disorders involving the immune mechanism, not elsewhere classified    3. Immunodeficiency due to drug therapy    4. Adrenal insufficiency    5. Hypothyroidism, unspecified type    6. Aneurysm of right popliteal artery    7. Lung nodule    8. Normocytic anemia    9. Other primary thrombophilia    10. Ureteral stent present    11. Coronary artery disease, unspecified vessel or lesion type, unspecified whether angina present, unspecified whether native or transplanted heart    12. Stage 3a chronic kidney disease            Chief Complaint: Malignant neoplasm of overlapping sites of bladder      Oncologic History:      Oncologic History     Oncologic Treatment     Pathology           Subjective:    Interval History:  Mr. Bey is a 74 y.o. male with HTN, Seizures, bladder cancer, h/o pulmonary embolism, who presents for bladder cancer.  Since the last clinic the patient was admitted from 08/01/2024 until 08/04/2024 for sepsis secondary to UTI.  The patient required IV steroids for adrenal crisis during admission.  The patient was discharged on Cipro and underwent a Solu-Cortef taper.  CT of the chest, abdomen, and pelvis on 08/01/2024 showed extensive left greater than right perinephric stranding and stable metallic focus in the inferior right renal hilum.  On 08/07/2024 the patient underwent bilateral ureteral stent exchange under the care of Dr. Olivares.  Currently the patient states he feels well.  The patient denies CP, cough, SOB, abdominal pain, nausea, vomiting, constipation, diarrhea.  The patient denies fever, chills, night sweats, weight loss, new lumps or bumps, easy bruising or bleeding.    Prior History:  Patient underwent transurethral resection of bladder tumor on 04/28/2023 with path showing invasive urothelial carcinoma, high-grade with  involvement of muscularis propria and positive for lymphovascular invasion.  Patient underwent chemotherapy at [facility].  The patient then underwent radical cystectomy and prostatectomy on 08/15/2023 with pathology showing invasive high-grade urothelial carcinoma measuring 4.2 cm, positive lymphovascular invasion, tumor identified within the soft tissue surrounding the left distal ureter, 4/6 positive regional lymph nodes with extranodal extension present. pT3aN2.  Also seen was acinar adenocarcinoma of the prostate grade group 1 (Sarah score 3+3=6) in 1 2 5% of prostate tissue pT2N0.  The patient's surgery was ultimately complicated by small-bowel obstruction, and pulmonary embolism.  PT recevied 1 dose of Nivolumab 9/18/23.  The patient was placed on anticoagulation for pulmonary embolism and ultimately developed a right perinephric bleed requiring embolization.  PT also developed UTI and acquired ureteral obstruction with placement of bilateral ureteral stents.  PT also developed C diff and was hospitalized for suspected recurrence at [facility] from 10/21/2023 to 10/25/2023.  Patient was discharged on fidaxomicin.   The patient saw Dr. Ghosh with Urology on 11/13/2023 for with recommendations for virtual visit in January to discuss possible operative intervention replaced stents, possible revision of ureteral ileal anastomosis pending disease progression.  It was instructed that the patient would need catheterized specimen from urostomy to rule out UTI if patient with future symptoms.  Patient underwent PET-CT on 11/16/2023 showing a new hypermetabolic subpleural nodule in the posterior right lower lobe measuring 2.2 x 1.3 cm.  The patient endorses a fever starting on the night of 11/16/2023.  Patient was prescribed Bactrim for suspected potential urinary tract infection.   The patient was admitted to the hospital from 11/17/23-11/21/23 for C diff colitis and suspected urinary tract  infection the patient was discharged on fidaxomicin 200 mg b.i.d. for additional 7 days as well as 200 mg every other day for 25 days.  Patient was also discharged on doxycycline 100 mg b.i.d. for 25 days with instructions from Infectious Disease to remain on antibiotics as long as his ureteral stents were in place.   The patient underwent biopsy of nodule in the right lung on 12/08/2023 showing fibrosis and chronic inflammation but no evidence of neoplasm or granuloma.   The patient was to have ureteral stents exchanged on 01/02/2024 but missed his appointment due to inclement weather.    The patient had bilateral ureteral stents exchanged by Dr. Ghosh on 1/23/24.   The patient underwent PET-CT on 03/01/2024 showing decrease in size of right lower lobe nodule now measuring 0.8 cm.   The patient underwent CTA chest on 03/06/2024 showing no evidence of pulmonary embolism or pneumonitis.  The patient underwent nuclear stress test on 03/11/2024 showed abnormal myocardial perfusion with mild intensity, small size, reversible perfusion abnormality consistent with ischemia in the basal to mid inferolateral walls.   The patient was admitted to the hospital from 04/04/2024 until 04/11/2024 with initial complaint of weakness, hypotensio, and diarrhea and found to have adrenal insufficiency, hypothyroidism, and C diff.   The patient was admitted hospital from 05/11/2024 until 05/13/2024 for urinary tract infection.  Blood cultures grew out E coli.  The patient was sent home on Cipro to complete 7 additional days of treatment.  The patient was also sent home on oral vancomycin for prevention of C diff. patient underwent bilateral ureteral stent exchange on 05/15/2024 under the care of Dr. Olivares.    Past Medical History:   Past Medical History:   Diagnosis Date    Adrenal insufficiency 05/2024    Aneurysm artery, popliteal 2018    right leg    Bladder cancer     Coronary artery disease     cabg x 4 11/2021    Dyslipidemia      Encounter for blood transfusion     several transfusions    H/O Clostridium difficile infection     Hx pulmonary embolism     8/2023    Hypertension     Hyperthyroidism 05/2024    Immunotherapy     nivolomab / monthly md manuel    Malignant neoplasm of prostate     Presence of urostomy     Seizures     remote hx  last ~2007    SOB (shortness of breath)     Transfusion reaction     possible reaction, not sure       Past Surgical HIstory:   Past Surgical History:   Procedure Laterality Date    CORONARY ARTERY BYPASS GRAFT      11/2021    CREATION, ILEAL CONDUIT  08/2023    ENDOSCOPY OF ILEAL CONDUIT Bilateral 5/15/2024    Procedure: ENDOSCOPY, ILEAL CONDUIT - Bilateral Stent Exchange  Bilateral Retrograde Pyelogram;  Surgeon: Manuelito Olivares MD;  Location: [facility] OR;  Service: Urology;  Laterality: Bilateral;    ENDOSCOPY OF ILEAL CONDUIT N/A 8/7/2024    Procedure: ENDOSCOPY, ILEAL CONDUIT;  Surgeon: Manuelito Olivares MD;  Location: [facility] OR;  Service: Urology;  Laterality: N/A;    HERNIA REPAIR      umbilical    PROSTATECTOMY      PTA, POPLITEAL      REMOVAL-STENT Bilateral 01/23/2024    Procedure: REMOVAL-STENT;  Surgeon: South Ghosh MD;  [facility];  Service: Urology;  Laterality: Bilateral;    REPLACEMENT OF STENT Bilateral 8/7/2024    Procedure: REPLACEMENT, STENT-bilateral ureteral  exchange with Retrograde;  Surgeon: Manuelito Olivares MD;  Location: [facility] OR;  Service: Urology;  Laterality: Bilateral;    URETERAL STENT PLACEMENT Bilateral 01/23/2024    Procedure: INSERTION, STENT, URETER;  Surgeon: South Ghosh MD;  Location: [facility] OR 1ST FLR;  Service: Urology;  Laterality: Bilateral;       Family History:   Family History   Problem Relation Name Age of Onset    Heart disease Mother      Hypertension Mother      Peripheral vascular disease Father 72        Social History:  reports that he has never smoked. He has never been exposed to tobacco smoke. He has never used smokeless tobacco. He reports that  he does not currently use alcohol. He reports that he does not use drugs.    Allergies:  Review of patient's allergies indicates:   Allergen Reactions    Clopidogrel Other (See Comments)     Increased body temperature and redness        Medications:  Current Outpatient Medications   Medication Sig Dispense Refill    aspirin (ECOTRIN) 81 MG EC tablet Take 1 tablet (81 mg total) by mouth once daily. 90 tablet 3    atorvastatin (LIPITOR) 40 MG tablet Take 40 mg by mouth once daily.      hydrocortisone (CORTEF) 20 MG Tab Take 1 tablet (20 mg total) by mouth once daily. Will need 10 extra tablet for stress dosing 100 tablet 3    levothyroxine (SYNTHROID) 88 MCG tablet Take 1 tablet (88 mcg total) by mouth before breakfast. 30 tablet 11    LIDOcaine-prilocaine (EMLA) cream Apply topically once as needed (to port access for immunotherapy treatment).      pantoprazole (PROTONIX) 40 MG tablet Take 1 tablet (40 mg total) by mouth once daily. 30 tablet 1    QUEtiapine (SEROQUEL) 25 MG Tab Take one half tab by mouth nightly as needed for insomnia (Patient taking differently: 12.5 mg nightly.) 90 tablet 1     No current facility-administered medications for this visit.     Facility-Administered Medications Ordered in Other Visits   Medication Dose Route Frequency Provider Last Rate Last Admin    0.9% NaCl 250 mL flush bag   Intravenous PRN Jackson Jimenez MD 25 mL/hr at 08/20/24 1445 New Bag at 08/20/24 1445    alteplase injection 2 mg  2 mg Intra-Catheter PRN Jackson Jimenez MD        diphenhydrAMINE injection 50 mg  50 mg Intravenous Once PRN Jackson Jimenez MD        EPINEPHrine (EPIPEN) 0.3 mg/0.3 mL pen injection 0.3 mg  0.3 mg Intramuscular Once PRN Jackson Jimenez MD        heparin, porcine (PF) 100 unit/mL injection flush 500 Units  500 Units Intravenous PRN Jackson Jimenez MD        hydrocortisone sodium succinate injection 100 mg  100 mg Intravenous Once PRN Jackson Jimenez MD        nivolumab 480 mg in  "0.9% NaCl 111 mL infusion  480 mg Intravenous 1 time in Clinic/HOD Jackson Jimenez  mL/hr at 08/20/24 1510 480 mg at 08/20/24 1510    prochlorperazine injection Soln 5 mg  5 mg Intravenous Once PRN Jackson Jimenez MD        sodium chloride 0.9% flush 10 mL  10 mL Intravenous PRN Jackson Jimenez MD           Review of Systems   Constitutional:  Negative for appetite change, chills, diaphoresis, fatigue, fever and unexpected weight change.   HENT:  Negative for mouth sores.    Eyes:  Negative for visual disturbance.   Respiratory:  Negative for cough and shortness of breath.    Cardiovascular:  Negative for chest pain and palpitations.   Gastrointestinal:  Negative for abdominal pain, constipation, diarrhea, nausea and vomiting.   Genitourinary:  Negative for difficulty urinating, dysuria, flank pain and frequency.   Musculoskeletal:  Negative for back pain.   Skin:  Negative for color change and rash.   Neurological:  Negative for headaches.   Hematological:  Negative for adenopathy. Does not bruise/bleed easily.   Psychiatric/Behavioral:  The patient is not nervous/anxious.        ECOG Performance Status: 1   Objective:      Vitals:   Vitals:    08/20/24 1342   BP: 139/71   BP Location: Left arm   Patient Position: Sitting   BP Method: Medium (Automatic)   Pulse: 65   Resp: 16   Temp: 97.3 °F (36.3 °C)   TempSrc: Temporal   SpO2: 98%   Weight: 72.9 kg (160 lb 11.5 oz)   Height: 5' 9" (1.753 m)         Physical Exam  Constitutional:       General: He is not in acute distress.     Appearance: He is well-developed. He is not diaphoretic.   HENT:      Head: Normocephalic and atraumatic.   Cardiovascular:      Rate and Rhythm: Normal rate and regular rhythm.      Heart sounds: Normal heart sounds. No murmur heard.     No friction rub. No gallop.   Pulmonary:      Effort: Pulmonary effort is normal. No respiratory distress.      Breath sounds: Normal breath sounds. No wheezing or rales.   Chest:      Chest " wall: No tenderness.   Abdominal:      General: Bowel sounds are normal. There is no distension.      Palpations: Abdomen is soft. There is no mass.      Tenderness: There is no abdominal tenderness. There is no rebound.   Musculoskeletal:      Cervical back: Normal range of motion.   Lymphadenopathy:      Cervical: No cervical adenopathy.      Upper Body:      Right upper body: No supraclavicular or axillary adenopathy.      Left upper body: No supraclavicular or axillary adenopathy.   Skin:     General: Skin is warm and dry.      Findings: No erythema or rash.   Neurological:      Mental Status: He is alert and oriented to person, place, and time.   Psychiatric:         Behavior: Behavior normal.         Laboratory Data:  Lab Visit on 08/19/2024   Component Date Value Ref Range Status    TSH 08/19/2024 24.020 (H)  0.400 - 4.000 uIU/mL Final    Sodium 08/19/2024 139  136 - 145 mmol/L Final    Potassium 08/19/2024 3.8  3.5 - 5.1 mmol/L Final    Chloride 08/19/2024 103  95 - 110 mmol/L Final    CO2 08/19/2024 23  23 - 29 mmol/L Final    Glucose 08/19/2024 96  70 - 110 mg/dL Final    BUN 08/19/2024 16  8 - 23 mg/dL Final    Creatinine 08/19/2024 1.9 (H)  0.5 - 1.4 mg/dL Final    Calcium 08/19/2024 9.8  8.7 - 10.5 mg/dL Final    Total Protein 08/19/2024 6.9  6.0 - 8.4 g/dL Final    Albumin 08/19/2024 4.1  3.5 - 5.2 g/dL Final    Total Bilirubin 08/19/2024 0.6  0.1 - 1.0 mg/dL Final    Comment: For infants and newborns, interpretation of results should be based  on gestational age, weight and in agreement with clinical  observations.    Premature Infant recommended reference ranges:  Up to 24 hours.............<8.0 mg/dL  Up to 48 hours............<12.0 mg/dL  3-5 days..................<15.0 mg/dL  6-29 days.................<15.0 mg/dL      Alkaline Phosphatase 08/19/2024 61  55 - 135 U/L Final    AST 08/19/2024 35  10 - 40 U/L Final    ALT 08/19/2024 37  10 - 44 U/L Final    eGFR 08/19/2024 36.1 (A)  >60 mL/min/1.73  m^2 Final    Anion Gap 08/19/2024 13  8 - 16 mmol/L Final    WBC 08/19/2024 6.97  3.90 - 12.70 K/uL Final    RBC 08/19/2024 3.82 (L)  4.60 - 6.20 M/uL Final    Hemoglobin 08/19/2024 12.1 (L)  14.0 - 18.0 g/dL Final    Hematocrit 08/19/2024 35.5 (L)  40.0 - 54.0 % Final    MCV 08/19/2024 93  82 - 98 fL Final    MCH 08/19/2024 31.7 (H)  27.0 - 31.0 pg Final    MCHC 08/19/2024 34.1  32.0 - 36.0 g/dL Final    RDW 08/19/2024 14.6 (H)  11.5 - 14.5 % Final    Platelets 08/19/2024 201  150 - 450 K/uL Final    MPV 08/19/2024 9.2  9.2 - 12.9 fL Final    Immature Granulocytes 08/19/2024 0.4  0.0 - 0.5 % Final    Gran # (ANC) 08/19/2024 5.0  1.8 - 7.7 K/uL Final    Immature Grans (Abs) 08/19/2024 0.03  0.00 - 0.04 K/uL Final    Comment: Mild elevation in immature granulocytes is non specific and   can be seen in a variety of conditions including stress response,   acute inflammation, trauma and pregnancy. Correlation with other   laboratory and clinical findings is essential.      Lymph # 08/19/2024 1.1  1.0 - 4.8 K/uL Final    Mono # 08/19/2024 0.7  0.3 - 1.0 K/uL Final    Eos # 08/19/2024 0.1  0.0 - 0.5 K/uL Final    Baso # 08/19/2024 0.08  0.00 - 0.20 K/uL Final    nRBC 08/19/2024 0  0 /100 WBC Final    Gran % 08/19/2024 71.9  38.0 - 73.0 % Final    Lymph % 08/19/2024 15.5 (L)  18.0 - 48.0 % Final    Mono % 08/19/2024 10.0  4.0 - 15.0 % Final    Eosinophil % 08/19/2024 1.1  0.0 - 8.0 % Final    Basophil % 08/19/2024 1.1  0.0 - 1.9 % Final    Differential Method 08/19/2024 Automated   Final    TSH 08/19/2024 24.020 (H)  0.400 - 4.000 uIU/mL Final    Sodium 08/19/2024 139  136 - 145 mmol/L Final    Potassium 08/19/2024 3.8  3.5 - 5.1 mmol/L Final    Chloride 08/19/2024 103  95 - 110 mmol/L Final    CO2 08/19/2024 23  23 - 29 mmol/L Final    Glucose 08/19/2024 96  70 - 110 mg/dL Final    BUN 08/19/2024 16  8 - 23 mg/dL Final    Creatinine 08/19/2024 1.9 (H)  0.5 - 1.4 mg/dL Final    Calcium 08/19/2024 9.8  8.7 - 10.5 mg/dL  Final    Total Protein 08/19/2024 6.9  6.0 - 8.4 g/dL Final    Albumin 08/19/2024 4.1  3.5 - 5.2 g/dL Final    Total Bilirubin 08/19/2024 0.6  0.1 - 1.0 mg/dL Final    Comment: For infants and newborns, interpretation of results should be based  on gestational age, weight and in agreement with clinical  observations.    Premature Infant recommended reference ranges:  Up to 24 hours.............<8.0 mg/dL  Up to 48 hours............<12.0 mg/dL  3-5 days..................<15.0 mg/dL  6-29 days.................<15.0 mg/dL      Alkaline Phosphatase 08/19/2024 61  55 - 135 U/L Final    AST 08/19/2024 35  10 - 40 U/L Final    ALT 08/19/2024 37  10 - 44 U/L Final    eGFR 08/19/2024 36.1 (A)  >60 mL/min/1.73 m^2 Final    Anion Gap 08/19/2024 13  8 - 16 mmol/L Final    Free T4 08/19/2024 0.92  0.71 - 1.51 ng/dL Final         Imaging:     PET/CT 6/21/24  Head and Neck:     Brain demonstrates normal metabolism. However, FDG-PET has an approximate 60% sensitivity for brain metastases which are best detected by MRI with gadolinium. Physiologic uptake is in the neck.     Chest:     No FDG avid pulmonary lesions are present. No enlarged or FDG avid lymph nodes are in the chest.     Abdomen and Pelvis:     Physiologic uptake is in the liver, spleen, urinary system and bowel. No enlarged or FDG avid lymph nodes are in the abdomen or pelvis. Adrenal glands are normal. Cystectomy, prostatectomy and ureteral diversion operative changes are stable without evidence of obstruction.     Musculoskeletal:     No FDG avid osseous lesions are present.       Assessment:       1. Malignant neoplasm of overlapping sites of bladder    2. Other specified disorders involving the immune mechanism, not elsewhere classified    3. Immunodeficiency due to drug therapy    4. Adrenal insufficiency    5. Hypothyroidism, unspecified type    6. Aneurysm of right popliteal artery    7. Lung nodule    8. Normocytic anemia    9. Other primary thrombophilia     10. Ureteral stent present    11. Coronary artery disease, unspecified vessel or lesion type, unspecified whether angina present, unspecified whether native or transplanted heart    12. Stage 3a chronic kidney disease                       Plan:     Bladder Cancer - pt with stage IIIB bladder cancer pT3N2 s/p neoadjuvant chemo followed by radical cystoprostatectomy 8/15/23  -PT received one dose of Nivolumab on 9/18/23  -PET/CT suggestive of a RLL pulmonary nodule  -Biopsy on 12/08/23 negative for malignancy  -PET/CT on 6/21/24 showed LEANDRO  -Pt to proceed with cycle 9 of Nivolumab  -No disease seen on Ct C/A/P 8/01/24  -Will repeat PET/CT prior to next appt  -Visit today included increased complexity associated with the care of the episodic problem (immunotherapy) addressed and managing the longitudinal care of the patient due to the serious and/or complex managed problem(s) Nivolumab    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  -No sign of infection  -Will monitor    Hypothyroidism - Synthroid increased to 88mcg by Dr Xiong  -T4 normal with continued increased TSH    Adrenal Insufficiency - pt with positive Cosyntropin stim test in the hospital  -PT on Hydrocortisone 20mg daily  -Pt following with endocrinology    CAD - pt with prior bypass surgery  -Cardiac stress test on 3/11/24 was abnormal with mild intensity, small size, reversible perfusion abnormality consistent with ischemia in the basal to mid inferolateral walls  -Pt to follow up with cardiologist    Pulmonary nodule - see above    Prostate Cancer - patient with acinar adenocarcinoma of the prostate grade group 1 (Sarah score 3+3=6) in 1 2 5% of prostate tissue pT2N0  -PSA 10/31/23 0.02ng/mL  -Will monitor    Ureteral Stents - Stents exchanged last on 8/07/24 under the care of Dr Olivares  -Pt to follow up with Urology    Anemia - Hemoglobin 12.1g/dL on 8/19/24  -Stable  -Will monitor     Thrombophilia - pt with prior PE  -Pt subsequently  developed a right perinephric bleed requiring embolization   -Patient with IVC filter in place  -Pt following with Dr Claire  -Vascular surgery to determine if he could be started on Eliquis  -Will monitor    Right popliteal aneurysm - pt to see vascular surgery    CKDIII - creatinine up to 1.9 on 8/19/24  -Pt following with Nephrology  -Will monitor    Route Chart for Scheduling    Med Onc Chart Routing      Follow up with physician 4 weeks. Pt can proceed with treatment.  Pt needs a CBC, CMP and TSH and PEt/CT in 4 weeks on 9/16/24 with an appt with [facility] following day.   Follow up with [facility]    Infusion scheduling note    Injection scheduling note    Labs    Imaging    Pharmacy appointment    Other referrals              Treatment Plan Information   OP NIVOLUMAB 480 MG Q4W   Jackson Jimenez MD   Upcoming Treatment Dates - OP NIVOLUMAB 480 MG Q4W    9/17/2024       Chemotherapy       nivolumab 480 mg in 0.9% NaCl 98 mL infusion  10/15/2024       Chemotherapy       nivolumab 480 mg in 0.9% NaCl 98 mL infusion  11/12/2024       Chemotherapy       nivolumab 480 mg in 0.9% NaCl 98 mL infusion  12/10/2024       Chemotherapy       nivolumab 480 mg in 0.9% NaCl 98 mL infusion    Therapy Plan Information  dextrose 5 % (D5W) 100 mL flush bag  Intravenous, Once  heparin, porcine (PF) 100 unit/mL injection flush 500 Units  500 Units, Intravenous, PRN  alteplase injection 2 mg  2 mg, Intra-Catheter, PRN      Jackson Jimenez MD  [facility]  Hematology and Oncology  [facility]   [address] [place] [address]   O: [phone]  F: [phone]

## 2024-08-20 ENCOUNTER — INFUSION (OUTPATIENT)
Dept: INFUSION THERAPY | Facility: HOSPITAL | Age: 74
End: 2024-08-20
Attending: INTERNAL MEDICINE
Payer: MEDICARE

## 2024-08-20 ENCOUNTER — OFFICE VISIT (OUTPATIENT)
Dept: HEMATOLOGY/ONCOLOGY | Facility: CLINIC | Age: 74
End: 2024-08-20
Payer: MEDICARE

## 2024-08-20 ENCOUNTER — TELEPHONE (OUTPATIENT)
Dept: ENDOCRINOLOGY | Facility: CLINIC | Age: 74
End: 2024-08-20
Payer: MEDICARE

## 2024-08-20 VITALS
DIASTOLIC BLOOD PRESSURE: 71 MMHG | BODY MASS INDEX: 23.8 KG/M2 | TEMPERATURE: 97 F | OXYGEN SATURATION: 98 % | WEIGHT: 160.69 LBS | HEART RATE: 65 BPM | RESPIRATION RATE: 16 BRPM | HEIGHT: 69 IN | SYSTOLIC BLOOD PRESSURE: 139 MMHG

## 2024-08-20 VITALS
HEIGHT: 69 IN | RESPIRATION RATE: 16 BRPM | TEMPERATURE: 97 F | WEIGHT: 160.69 LBS | DIASTOLIC BLOOD PRESSURE: 86 MMHG | BODY MASS INDEX: 23.8 KG/M2 | SYSTOLIC BLOOD PRESSURE: 160 MMHG | HEART RATE: 53 BPM | OXYGEN SATURATION: 98 %

## 2024-08-20 DIAGNOSIS — Z96.0 URETERAL STENT PRESENT: ICD-10-CM

## 2024-08-20 DIAGNOSIS — Z79.899 IMMUNODEFICIENCY DUE TO DRUG THERAPY: ICD-10-CM

## 2024-08-20 DIAGNOSIS — D84.821 IMMUNODEFICIENCY DUE TO DRUG THERAPY: ICD-10-CM

## 2024-08-20 DIAGNOSIS — E03.9 HYPOTHYROIDISM, UNSPECIFIED TYPE: Primary | ICD-10-CM

## 2024-08-20 DIAGNOSIS — D89.89 OTHER SPECIFIED DISORDERS INVOLVING THE IMMUNE MECHANISM, NOT ELSEWHERE CLASSIFIED: ICD-10-CM

## 2024-08-20 DIAGNOSIS — I25.10 CORONARY ARTERY DISEASE, UNSPECIFIED VESSEL OR LESION TYPE, UNSPECIFIED WHETHER ANGINA PRESENT, UNSPECIFIED WHETHER NATIVE OR TRANSPLANTED HEART: ICD-10-CM

## 2024-08-20 DIAGNOSIS — I72.4 ANEURYSM OF RIGHT POPLITEAL ARTERY: ICD-10-CM

## 2024-08-20 DIAGNOSIS — R91.1 LUNG NODULE: ICD-10-CM

## 2024-08-20 DIAGNOSIS — N18.31 STAGE 3A CHRONIC KIDNEY DISEASE: ICD-10-CM

## 2024-08-20 DIAGNOSIS — C67.8 MALIGNANT NEOPLASM OF OVERLAPPING SITES OF BLADDER: Primary | ICD-10-CM

## 2024-08-20 DIAGNOSIS — E27.40 ADRENAL INSUFFICIENCY: ICD-10-CM

## 2024-08-20 DIAGNOSIS — E03.9 HYPOTHYROIDISM, UNSPECIFIED TYPE: ICD-10-CM

## 2024-08-20 DIAGNOSIS — D64.9 NORMOCYTIC ANEMIA: ICD-10-CM

## 2024-08-20 DIAGNOSIS — D68.59 OTHER PRIMARY THROMBOPHILIA: ICD-10-CM

## 2024-08-20 PROCEDURE — 3078F DIAST BP <80 MM HG: CPT | Mod: CPTII,S$GLB,, | Performed by: INTERNAL MEDICINE

## 2024-08-20 PROCEDURE — 99999 PR PBB SHADOW E&M-EST. PATIENT-LVL IV: CPT | Mod: PBBFAC,,, | Performed by: INTERNAL MEDICINE

## 2024-08-20 PROCEDURE — 3075F SYST BP GE 130 - 139MM HG: CPT | Mod: CPTII,S$GLB,, | Performed by: INTERNAL MEDICINE

## 2024-08-20 PROCEDURE — G2211 COMPLEX E/M VISIT ADD ON: HCPCS | Mod: S$GLB,,, | Performed by: INTERNAL MEDICINE

## 2024-08-20 PROCEDURE — 25000003 PHARM REV CODE 250: Mod: PN | Performed by: INTERNAL MEDICINE

## 2024-08-20 PROCEDURE — 1157F ADVNC CARE PLAN IN RCRD: CPT | Mod: CPTII,S$GLB,, | Performed by: INTERNAL MEDICINE

## 2024-08-20 PROCEDURE — A4216 STERILE WATER/SALINE, 10 ML: HCPCS | Mod: PN | Performed by: INTERNAL MEDICINE

## 2024-08-20 PROCEDURE — 96413 CHEMO IV INFUSION 1 HR: CPT | Mod: PN

## 2024-08-20 PROCEDURE — 1111F DSCHRG MED/CURRENT MED MERGE: CPT | Mod: CPTII,S$GLB,, | Performed by: INTERNAL MEDICINE

## 2024-08-20 PROCEDURE — 3288F FALL RISK ASSESSMENT DOCD: CPT | Mod: CPTII,S$GLB,, | Performed by: INTERNAL MEDICINE

## 2024-08-20 PROCEDURE — 1101F PT FALLS ASSESS-DOCD LE1/YR: CPT | Mod: CPTII,S$GLB,, | Performed by: INTERNAL MEDICINE

## 2024-08-20 PROCEDURE — 99215 OFFICE O/P EST HI 40 MIN: CPT | Mod: S$GLB,,, | Performed by: INTERNAL MEDICINE

## 2024-08-20 PROCEDURE — 1126F AMNT PAIN NOTED NONE PRSNT: CPT | Mod: CPTII,S$GLB,, | Performed by: INTERNAL MEDICINE

## 2024-08-20 PROCEDURE — 63600175 PHARM REV CODE 636 W HCPCS: Mod: JZ,JG,PN | Performed by: INTERNAL MEDICINE

## 2024-08-20 RX ORDER — HEPARIN 100 UNIT/ML
500 SYRINGE INTRAVENOUS
Status: DISCONTINUED | OUTPATIENT
Start: 2024-08-20 | End: 2024-08-20 | Stop reason: HOSPADM

## 2024-08-20 RX ORDER — DIPHENHYDRAMINE HYDROCHLORIDE 50 MG/ML
50 INJECTION INTRAMUSCULAR; INTRAVENOUS ONCE AS NEEDED
Status: CANCELLED | OUTPATIENT
Start: 2024-08-20

## 2024-08-20 RX ORDER — DIPHENHYDRAMINE HYDROCHLORIDE 50 MG/ML
50 INJECTION INTRAMUSCULAR; INTRAVENOUS ONCE AS NEEDED
Status: DISCONTINUED | OUTPATIENT
Start: 2024-08-20 | End: 2024-08-20 | Stop reason: HOSPADM

## 2024-08-20 RX ORDER — HEPARIN 100 UNIT/ML
500 SYRINGE INTRAVENOUS
Status: CANCELLED | OUTPATIENT
Start: 2024-08-20

## 2024-08-20 RX ORDER — EPINEPHRINE 0.3 MG/.3ML
0.3 INJECTION SUBCUTANEOUS ONCE AS NEEDED
Status: DISCONTINUED | OUTPATIENT
Start: 2024-08-20 | End: 2024-08-20 | Stop reason: HOSPADM

## 2024-08-20 RX ORDER — SODIUM CHLORIDE 0.9 % (FLUSH) 0.9 %
10 SYRINGE (ML) INJECTION
Status: DISCONTINUED | OUTPATIENT
Start: 2024-08-20 | End: 2024-08-20 | Stop reason: HOSPADM

## 2024-08-20 RX ORDER — PROCHLORPERAZINE EDISYLATE 5 MG/ML
5 INJECTION INTRAMUSCULAR; INTRAVENOUS ONCE AS NEEDED
Status: CANCELLED
Start: 2024-08-20

## 2024-08-20 RX ORDER — SODIUM CHLORIDE 0.9 % (FLUSH) 0.9 %
10 SYRINGE (ML) INJECTION
Status: CANCELLED | OUTPATIENT
Start: 2024-08-20

## 2024-08-20 RX ORDER — LEVOTHYROXINE SODIUM 88 UG/1
88 TABLET ORAL
Qty: 30 TABLET | Refills: 11 | Status: SHIPPED | OUTPATIENT
Start: 2024-08-20 | End: 2025-08-20

## 2024-08-20 RX ORDER — EPINEPHRINE 0.3 MG/.3ML
0.3 INJECTION SUBCUTANEOUS ONCE AS NEEDED
Status: CANCELLED | OUTPATIENT
Start: 2024-08-20

## 2024-08-20 RX ORDER — PROCHLORPERAZINE EDISYLATE 5 MG/ML
5 INJECTION INTRAMUSCULAR; INTRAVENOUS ONCE AS NEEDED
Status: DISCONTINUED | OUTPATIENT
Start: 2024-08-20 | End: 2024-08-20 | Stop reason: HOSPADM

## 2024-08-20 RX ADMIN — Medication 500 UNITS: at 03:08

## 2024-08-20 RX ADMIN — SODIUM CHLORIDE 480 MG: 9 INJECTION, SOLUTION INTRAVENOUS at 03:08

## 2024-08-20 RX ADMIN — SODIUM CHLORIDE: 9 INJECTION, SOLUTION INTRAVENOUS at 02:08

## 2024-08-20 RX ADMIN — Medication 10 ML: at 03:08

## 2024-08-20 NOTE — PROGRESS NOTES
[facility]  Urologic Oncology Clinic Note        Telehealth visit  Visit type: audio + visual   Patient location : Home      Patient was identified by name and birth date. The patient agreed to proceed with a telehealth visit. The visit was performed by audio and video communication.       Date of Service: 7/22/2024        Chief Complaint/Reason for Consultation: Node positive bladder cancer    Requesting Provider:   No referring provider defined for this encounter.      History of Present Illness:   Patient 74 y.o. 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 He did have neoadjuvant chemotherapy. The patient's surgery was ultimately complicated by small-bowel obstruction, and pulmonary embolism.  PT recevied 1 dose of Nivolumab 9/18/23.  The patient was placed on anticoagulation for pulmonary embolism and ultimately developed a right perinephric bleed requiring embolization. He is currently not on anticoagulation and does IVC filter.  PT also developed UTI and acquired ureteral obstruction with placement of bilateral ureteral stents.  PT also developed C diff and was hospitalized for suspected recurrence at [facility] from 10/21/2023 to 10/25/2023.  Patient was discharged on fidaxomicin.     Blood thinners:  none    No Hx of TIA, MI, and CVA   Abdominal surgeries:  radical cystectomy w/ ileal loop urinary diversion, umbilical hernia      Patient and wife here in virtual visit today to discuss timing of ureteral / ileal loop reconstruction of bilateral anastomotic strictures to ileal loop.    Overall doing well. Reports that he is still working through recent , multiple episodes of c diff.    Urologic History:     8/15/2023 -- Radical cystectomy w/ ileal loop urinary diversion -- final path pT3aN2 , mateo 3+3  prostate cancer  9/28/2023 -- IR placement of nephroureteral stents   5/15/2024 -- Ureteral stent exchange  6/24/2024 -- NM PET -- negative for metastatic disease      Patient Active Problem List    Diagnosis Date Noted    Constipation 08/03/2024    Discharge planning issues 08/03/2024    Bacteremia due to Escherichia coli 05/13/2024    Adrenal insufficiency 05/06/2024    Acute delirium 04/07/2024    General weakness 04/07/2024    Hypotension 04/07/2024    Hyperthyroidism 04/07/2024    History of Clostridium difficile infection 04/05/2024    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    Diarrhea due to drug 04/04/2024    Nausea and vomiting 04/04/2024    History of thromboembolism 04/04/2024    Poor appetite 04/04/2024    Ureteroileal conduit stricture 04/02/2024    Shortness of breath 03/08/2024    Precordial pain 03/08/2024    Mitral and aortic regurgitation 03/08/2024    Arterial bruit 03/08/2024    Cough 02/26/2024    History of immunotherapy 02/26/2024    Coronary artery disease involving coronary bypass graft of native heart with angina pectoris 01/22/2024    Chronic kidney disease, stage 3 12/06/2023    Other hydronephrosis 12/06/2023    Acute pyelonephritis 11/19/2023    Prostate cancer 10/31/2023    Malignant neoplasm of overlapping sites of bladder 10/23/2023    History of pulmonary embolus (PE) 10/23/2023    Neoplasm, bladder 10/23/2023    Presence of urostomy 10/21/2023    ACP (advance care planning) 10/21/2021    Aneurysm of right popliteal artery 10/18/2021    Dyslipidemia 09/15/2021    Essential hypertension 09/10/2021          Review of patient's allergies indicates:   Allergen Reactions    Clopidogrel Other (See Comments)     Increased body temperature and redness         Past Medical History:   Diagnosis Date    Adrenal insufficiency 05/2024    Aneurysm artery, popliteal 2018    right leg    Bladder cancer     Coronary artery disease     cabg x 4 11/2021    Dyslipidemia     Encounter for blood  transfusion     several transfusions    H/O Clostridium difficile infection     Hx pulmonary embolism     8/2023    Hypertension     Hyperthyroidism 05/2024    Immunotherapy     nivolomab / monthly md manuel    Malignant neoplasm of prostate     Presence of urostomy     Seizures     remote hx  last ~2007    SOB (shortness of breath)     Transfusion reaction     possible reaction, not sure      Past Surgical History:   Procedure Laterality Date    CORONARY ARTERY BYPASS GRAFT      11/2021    CREATION, ILEAL CONDUIT  08/2023    ENDOSCOPY OF ILEAL CONDUIT Bilateral 5/15/2024    Procedure: ENDOSCOPY, ILEAL CONDUIT - Bilateral Stent Exchange  Bilateral Retrograde Pyelogram;  Surgeon: Manuelito Olivares MD;  [facility];  Service: Urology;  Laterality: Bilateral;    ENDOSCOPY OF ILEAL CONDUIT N/A 8/7/2024    Procedure: ENDOSCOPY, ILEAL CONDUIT;  Surgeon: Manuelito Olivares MD;  [facility];  Service: Urology;  Laterality: N/A;    HERNIA REPAIR      umbilical    PROSTATECTOMY      PTA, POPLITEAL      REMOVAL-STENT Bilateral 01/23/2024    Procedure: REMOVAL-STENT;  Surgeon: South Ghosh MD;  [facility];  Service: Urology;  Laterality: Bilateral;    REPLACEMENT OF STENT Bilateral 8/7/2024    Procedure: REPLACEMENT, STENT-bilateral ureteral  exchange with Retrograde;  Surgeon: Manuelito Olivares MD;  Location[facility];  Service: Urology;  Laterality: Bilateral;    URETERAL STENT PLACEMENT Bilateral 01/23/2024    Procedure: INSERTION, STENT, URETER;  Surgeon: South Ghosh MD;  Location: [facility] OR 1ST FLR;  Service: Urology;  Laterality: Bilateral;      Family History   Problem Relation Name Age of Onset    Heart disease Mother      Hypertension Mother      Peripheral vascular disease Father 72       Social History     Tobacco Use    Smoking status: Never     Passive exposure: Never    Smokeless tobacco: Never   Substance Use Topics    Alcohol use: Not Currently        Review of Systems   Constitutional:   Negative for activity change, fatigue and fever.   HENT:  Negative for congestion.    Respiratory:  Negative for cough.    Cardiovascular:  Negative for chest pain.   Gastrointestinal:  Negative for abdominal pain.   Genitourinary:  Negative for hematuria.             OBJECTIVE:     No exam performed as this was virtual visit.          LAB:      CMP  Sodium   Date Value Ref Range Status   08/19/2024 139 136 - 145 mmol/L Final   08/19/2024 139 136 - 145 mmol/L Final     Potassium   Date Value Ref Range Status   08/19/2024 3.8 3.5 - 5.1 mmol/L Final   08/19/2024 3.8 3.5 - 5.1 mmol/L Final     Chloride   Date Value Ref Range Status   08/19/2024 103 95 - 110 mmol/L Final   08/19/2024 103 95 - 110 mmol/L Final     CO2   Date Value Ref Range Status   08/19/2024 23 23 - 29 mmol/L Final   08/19/2024 23 23 - 29 mmol/L Final     Glucose   Date Value Ref Range Status   08/19/2024 96 70 - 110 mg/dL Final   08/19/2024 96 70 - 110 mg/dL Final     BUN   Date Value Ref Range Status   08/19/2024 16 8 - 23 mg/dL Final   08/19/2024 16 8 - 23 mg/dL Final     Creatinine   Date Value Ref Range Status   08/19/2024 1.9 (H) 0.5 - 1.4 mg/dL Final   08/19/2024 1.9 (H) 0.5 - 1.4 mg/dL Final     Calcium   Date Value Ref Range Status   08/19/2024 9.8 8.7 - 10.5 mg/dL Final   08/19/2024 9.8 8.7 - 10.5 mg/dL Final     Total Protein   Date Value Ref Range Status   08/19/2024 6.9 6.0 - 8.4 g/dL Final   08/19/2024 6.9 6.0 - 8.4 g/dL Final     Albumin   Date Value Ref Range Status   08/19/2024 4.1 3.5 - 5.2 g/dL Final   08/19/2024 4.1 3.5 - 5.2 g/dL Final     Total Bilirubin   Date Value Ref Range Status   08/19/2024 0.6 0.1 - 1.0 mg/dL Final     Comment:     For infants and newborns, interpretation of results should be based  on gestational age, weight and in agreement with clinical  observations.    Premature Infant recommended reference ranges:  Up to 24 hours.............<8.0 mg/dL  Up to 48 hours............<12.0 mg/dL  3-5  days..................<15.0 mg/dL  6-29 days.................<15.0 mg/dL     2024 0.6 0.1 - 1.0 mg/dL Final     Comment:     For infants and newborns, interpretation of results should be based  on gestational age, weight and in agreement with clinical  observations.    Premature Infant recommended reference ranges:  Up to 24 hours.............<8.0 mg/dL  Up to 48 hours............<12.0 mg/dL  3-5 days..................<15.0 mg/dL  6-29 days.................<15.0 mg/dL       Alkaline Phosphatase   Date Value Ref Range Status   2024 61 55 - 135 U/L Final   2024 61 55 - 135 U/L Final     AST   Date Value Ref Range Status   2024 35 10 - 40 U/L Final   2024 35 10 - 40 U/L Final     ALT   Date Value Ref Range Status   2024 37 10 - 44 U/L Final   2024 37 10 - 44 U/L Final     Anion Gap   Date Value Ref Range Status   2024 13 8 - 16 mmol/L Final   2024 13 8 - 16 mmol/L Final     eGFR   Date Value Ref Range Status   2024 36.1 (A) >60 mL/min/1.73 m^2 Final   2024 36.1 (A) >60 mL/min/1.73 m^2 Final     Lab Results   Component Value Date    WBC 6.97 2024    HGB 12.1 (L) 2024    HCT 35.5 (L) 2024    MCV 93 2024     2024               IMAGIN/24/2024    NM PET CT FDG SKULL BASE TO MID THIGH     CLINICAL HISTORY:  Bladder cancer, invasive, monitor; Malignant neoplasm of overlapping sites of bladder     TECHNIQUE:  12.9 mCi F18-FDG was administered intravenously via the left hand.  Approximately 60 minutes after radiotracer distribution, PET images were acquired from the skull base to the mid thigh. Transmission images were acquired to correct for attenuation using a whole body low-dose CT scan without contrast. Glycemia at the time of injection was 68 mg/dL.     COMPARISON:  2024     FINDINGS:  Head and Neck:     Brain demonstrates normal metabolism.  However, FDG-PET has an approximate 60% sensitivity for brain  metastases which are best detected by MRI with gadolinium.  Physiologic uptake is in the neck.     Chest:     No FDG avid pulmonary lesions are present. No enlarged or FDG avid lymph nodes are in the chest.     Abdomen and Pelvis:     Physiologic uptake is in the liver, spleen, urinary system and bowel. No enlarged or FDG avid lymph nodes are in the abdomen or pelvis. Adrenal glands are normal.  Cystectomy, prostatectomy and ureteral diversion operative changes are stable without evidence of obstruction.     Musculoskeletal:     No FDG avid osseous lesions are present.     Impression:     No FDG avid malignancy is present on today's exam.        Electronically signed by:Javier Hood MD  Date:                                            06/24/2024  Time:                                           09:43    ASSESSMENT/PLAN:       74 yo M w hx of radical cystectomy w/ ileal loop urinary diversion on 8/15/2023 -- final path pT3aN2 w/ 3+3 prostate cancer. Now s/p single dose of nivolumab.      Plan:    Node positive, Bladder Cancer  Discussed overall prognosis is poor in this setting with 5 year survival approaching 30%. Discussed agreement to pursue nivolumab adjuvant therapy.     Patient doing well, PET scan negative to date.     Managed with Nivolumab.    Ureteral strictures  Discussed management with stents.   Discussed that stents are not permanent and that they need to be changed every 3-4 months  Discussed possible revision of uretero-ileal anastomosis in future pending disease progression.   Would like to wait 6 months to 1 year to perform ureter-ileal anastomotic revision    At this time doing well, discussed that would want to wait at least one year to ensure no disease progression on immunotherapy to determine timing of possible repair of ureteral stricture disease. Discussed that this would be an extensive operation with open abdominal incision. Discussed that this would have to be done with time of nivolumab  therapy which may result in disease progresiion in the interim.     Plan to see back in September 2024 after next PET scan in person to determine functional status for operation.       - Visit today included increased complexity associated with the care of the episodic problem ureteral strictures addressed and managing the longitudinal care of the patient due to the serious and/or complex managed problem of ureteral strictures.    South Ghosh MD  Urologic Oncology  P: [phone]

## 2024-08-20 NOTE — PLAN OF CARE
Problem: Adult Inpatient Plan of Care  Goal: Patient-Specific Goal (Individualized)  Outcome: Progressing  Flowsheets (Taken 8/20/2024 1445)  Individualized Care Needs:   recliner, snacks   conversation   reviewed symptoms   oriented to unit/bay  Anxieties, Fears or Concerns: none expressed  Patient/Family-Specific Goals (Include Timeframe): no s/s reaction during infusion     Problem: Fatigue  Goal: Improved Activity Tolerance  Intervention: Promote Improved Energy  Flowsheets (Taken 8/20/2024 1445)  Sleep/Rest Enhancement:   natural light exposure provided   noise level reduced   room darkened  Activity Management:   Ambulated -L4   Ambulated in [place] - L4     Patient here today for C9D1 Opdivo. VSS. Port accessed to right chest without difficulty; patent with blood return. Orders released. Pt oriented to unit. Symptoms reviewed. Questions and concerns addressed.

## 2024-08-20 NOTE — PLAN OF CARE
Problem: Adult Inpatient Plan of Care  Goal: Plan of Care Review  Outcome: Progressing  Flowsheets (Taken 8/20/2024 9726)  Plan of Care Reviewed With:   patient   spouse     Patient tolerated Opdivo without incident. Port flushed with blood return, heparin locked, and needle removed. AVS provided per portal;schedule reviewed. Ambulates per self.   No acute distress noted.  Next appointment 9/17/24.

## 2024-08-21 ENCOUNTER — TELEPHONE (OUTPATIENT)
Dept: UROLOGY | Facility: CLINIC | Age: 74
End: 2024-08-21
Payer: MEDICARE

## 2024-08-21 NOTE — TELEPHONE ENCOUNTER
Spoke with patient's wife who was able to provide acceptable patient identifiers prior to start of conversation.   Appt with Dr Ghosh scheduled.

## 2024-08-26 RX ORDER — HYDROCORTISONE 5 MG/1
TABLET ORAL
Qty: 150 TABLET | Refills: 6 | Status: SHIPPED | OUTPATIENT
Start: 2024-08-26

## 2024-08-27 ENCOUNTER — PATIENT MESSAGE (OUTPATIENT)
Dept: ENDOCRINOLOGY | Facility: CLINIC | Age: 74
End: 2024-08-27
Payer: MEDICARE

## 2024-08-29 ENCOUNTER — TELEPHONE (OUTPATIENT)
Dept: VASCULAR SURGERY | Facility: CLINIC | Age: 74
End: 2024-08-29
Payer: MEDICARE

## 2024-08-29 NOTE — TELEPHONE ENCOUNTER
----- Message from Dileep Alejo sent at 8/29/2024  2:13 PM CDT -----  Type: Needs Medical Advice  Who Called:  pt wife   Pharmacy name and phone #:    Best Call Back Number: [phone]  Additional Information: pt is calling the office because  is having bilateral ultrasound on September 18 ast 10:45 and needs to scheduel an appt with dr rodríguez after that to revioew results, please call back to advise, thanks

## 2024-08-29 NOTE — TELEPHONE ENCOUNTER
Courtney was advised that I would call her  with Dr Claire's recommendations after he reviews the ultrasound

## 2024-09-04 ENCOUNTER — OFFICE VISIT (OUTPATIENT)
Dept: ENDOCRINOLOGY | Facility: CLINIC | Age: 74
End: 2024-09-04
Payer: MEDICARE

## 2024-09-04 VITALS
WEIGHT: 156.63 LBS | SYSTOLIC BLOOD PRESSURE: 128 MMHG | HEIGHT: 69 IN | OXYGEN SATURATION: 99 % | DIASTOLIC BLOOD PRESSURE: 78 MMHG | HEART RATE: 73 BPM | BODY MASS INDEX: 23.2 KG/M2

## 2024-09-04 DIAGNOSIS — E27.40 ADRENAL INSUFFICIENCY: Primary | ICD-10-CM

## 2024-09-04 DIAGNOSIS — E03.9 HYPOTHYROIDISM, UNSPECIFIED TYPE: ICD-10-CM

## 2024-09-04 PROCEDURE — 3074F SYST BP LT 130 MM HG: CPT | Mod: CPTII,S$GLB,, | Performed by: INTERNAL MEDICINE

## 2024-09-04 PROCEDURE — 1159F MED LIST DOCD IN RCRD: CPT | Mod: CPTII,S$GLB,, | Performed by: INTERNAL MEDICINE

## 2024-09-04 PROCEDURE — 1160F RVW MEDS BY RX/DR IN RCRD: CPT | Mod: CPTII,S$GLB,, | Performed by: INTERNAL MEDICINE

## 2024-09-04 PROCEDURE — 99999 PR PBB SHADOW E&M-EST. PATIENT-LVL IV: CPT | Mod: PBBFAC,,, | Performed by: INTERNAL MEDICINE

## 2024-09-04 PROCEDURE — 1101F PT FALLS ASSESS-DOCD LE1/YR: CPT | Mod: CPTII,S$GLB,, | Performed by: INTERNAL MEDICINE

## 2024-09-04 PROCEDURE — 3078F DIAST BP <80 MM HG: CPT | Mod: CPTII,S$GLB,, | Performed by: INTERNAL MEDICINE

## 2024-09-04 PROCEDURE — 1157F ADVNC CARE PLAN IN RCRD: CPT | Mod: CPTII,S$GLB,, | Performed by: INTERNAL MEDICINE

## 2024-09-04 PROCEDURE — 99214 OFFICE O/P EST MOD 30 MIN: CPT | Mod: S$GLB,,, | Performed by: INTERNAL MEDICINE

## 2024-09-04 PROCEDURE — 3288F FALL RISK ASSESSMENT DOCD: CPT | Mod: CPTII,S$GLB,, | Performed by: INTERNAL MEDICINE

## 2024-09-04 NOTE — PROGRESS NOTES
CHIEF COMPLAINT:  Adrenal insufficiency/hyperthyroidism  74 y.o. old being seen as a new patient.  Patient with bladder cancer.  Recently discovered to have adrenal insufficiency and hyperthyroidism. Given Nivolumab (Opdivo) 9/2023.  He has had a CT of the head as well as a PET scan.  On Hydrocortisone 20 mg daily. On steroids since April 2024. Was hypotensive at the time.  No n/V. No history of steroids. No head trauma. States had difficulty taking an afternoon dose. Not lightheaded when standing. Staying well hydrated.     He was started on methimazole but then became hypothyroid and methimazole stopped after 4/29/24. Now on synthroid 88 mcg daily. Started that dose 8/20/24. No N/V. Overall feeling well. He is trying to exercise. Doing elliptical. Not lightheaded when standing. Has a medic alert clip for his watch band.           PAST MEDICAL HISTORY/PAST SURGICAL HISTORY:  Reviewed in [facility]    SOCIAL HISTORY: Reviewed in [facility]    FAMILY HISTORY:  No known thyroid disease. No known pituitary disorders.     MEDICATIONS/ALLERGIES: The patient's MedCard has been updated and reviewed.            PE:    GENERAL: Well developed, well nourished.  NECK: Supple, trachea midline, no palpable thyroid nodules  CHEST: Resp even and unlabored, CTA bilateral.  CARDIAC: RRR, S1, S2 heard, no murmurs, rubs, S3, or S4    LABS/Radiology   Latest Reference Range & Units 08/19/24 10:06   Sodium 136 - 145 mmol/L  136 - 145 mmol/L 139  139   Potassium 3.5 - 5.1 mmol/L  3.5 - 5.1 mmol/L 3.8  3.8   Chloride 95 - 110 mmol/L  95 - 110 mmol/L 103  103   CO2 23 - 29 mmol/L  23 - 29 mmol/L 23  23   Anion Gap 8 - 16 mmol/L  8 - 16 mmol/L 13  13   BUN 8 - 23 mg/dL  8 - 23 mg/dL 16  16   Creatinine 0.5 - 1.4 mg/dL  0.5 - 1.4 mg/dL 1.9 (H)  1.9 (H)   eGFR >60 mL/min/1.73 m^2  >60 mL/min/1.73 m^2 36.1 !  36.1 !   Glucose 70 - 110 mg/dL  70 - 110 mg/dL 96  96   Calcium 8.7 - 10.5 mg/dL  8.7 - 10.5 mg/dL 9.8  9.8   ALP 55 - 135 U/L  55 - 135 U/L 61  61    PROTEIN TOTAL 6.0 - 8.4 g/dL  6.0 - 8.4 g/dL 6.9  6.9   Albumin 3.5 - 5.2 g/dL  3.5 - 5.2 g/dL 4.1  4.1   BILIRUBIN TOTAL 0.1 - 1.0 mg/dL  0.1 - 1.0 mg/dL 0.6  0.6   AST 10 - 40 U/L  10 - 40 U/L 35  35   ALT 10 - 44 U/L  10 - 44 U/L 37  37   (H): Data is abnormally high  !: Data is abnormal   Latest Reference Range & Units 08/19/24 10:06   TSH 0.400 - 4.000 uIU/mL  0.400 - 4.000 uIU/mL 24.020 (H)  24.020 (H)   Free T4 0.71 - 1.51 ng/dL 0.92   (H): Data is abnormally high        ASSESSMENT/PLAN:  1. Adrenal insufficiency-currently on an immune checkpoint inhibitor (Opdivo).  Recently with urosepsis.  Requiring high doses of steroids.  He is back on his maintenance dose of steroids.  Discussed medical alert bracelet.    2.  Hypothyroidism-possibly due to immune check point inhibitor.  Synthroid recently increased.  Already scheduled for repeat labs.    3.  Bladder cancer-currently on immune checkpoint inhibitor (Opdivo).       FOLLOWUP   F/U 3 months- ok to double.

## 2024-09-16 ENCOUNTER — HOSPITAL ENCOUNTER (OUTPATIENT)
Dept: RADIOLOGY | Facility: HOSPITAL | Age: 74
Discharge: HOME | End: 2024-09-16
Attending: INTERNAL MEDICINE
Payer: MEDICARE

## 2024-09-16 DIAGNOSIS — C67.8 MALIGNANT NEOPLASM OF OVERLAPPING SITES OF BLADDER: ICD-10-CM

## 2024-09-16 LAB — GLUCOSE SERPL-MCNC: 83 MG/DL (ref 70–110)

## 2024-09-16 PROCEDURE — 78815 PET IMAGE W/CT SKULL-THIGH: CPT | Mod: TC,PN

## 2024-09-16 PROCEDURE — A9552 F18 FDG: HCPCS | Mod: PN | Performed by: INTERNAL MEDICINE

## 2024-09-16 PROCEDURE — 78815 PET IMAGE W/CT SKULL-THIGH: CPT | Mod: 26,PS,, | Performed by: RADIOLOGY

## 2024-09-16 RX ORDER — FLUDEOXYGLUCOSE F18 500 MCI/ML
12 INJECTION INTRAVENOUS
Status: COMPLETED | OUTPATIENT
Start: 2024-09-16 | End: 2024-09-16

## 2024-09-16 RX ADMIN — FLUDEOXYGLUCOSE F-18 13.4 MILLICURIE: 500 INJECTION INTRAVENOUS at 11:09

## 2024-09-16 NOTE — PROGRESS NOTES
PET Imaging Questionnaire    Are you a Diabetic? Recent Blood Sugar level? No    Are you anemic? Bone Marrow Stimulation Meds? No    Have you had a CT Scan, if so when & where was your last one? Yes -     Have you had a PET Scan, if so when & where was your last one? Yes -     Chemotherapy or currently on Chemotherapy? Yes    Radiation therapy? No    Surgical History:   Past Surgical History:   Procedure Laterality Date    CORONARY ARTERY BYPASS GRAFT      11/2021    CREATION, ILEAL CONDUIT  08/2023    ENDOSCOPY OF ILEAL CONDUIT Bilateral 5/15/2024    Procedure: ENDOSCOPY, ILEAL CONDUIT - Bilateral Stent Exchange  Bilateral Retrograde Pyelogram;  Surgeon: Manuelito Olivares MD;  Location: [facility] OR;  Service: Urology;  Laterality: Bilateral;    ENDOSCOPY OF ILEAL CONDUIT N/A 8/7/2024    Procedure: ENDOSCOPY, ILEAL CONDUIT;  Surgeon: Manuelito Olivares MD;  Location: [facility] OR;  Service: Urology;  Laterality: N/A;    HERNIA REPAIR      umbilical    PROSTATECTOMY      PTA, POPLITEAL      REMOVAL-STENT Bilateral 01/23/2024    Procedure: REMOVAL-STENT;  Surgeon: South Ghosh MD;  Location: [facility] OR 1ST FLR;  Service: Urology;  Laterality: Bilateral;    REPLACEMENT OF STENT Bilateral 8/7/2024    Procedure: REPLACEMENT, STENT-bilateral ureteral  exchange with Retrograde;  Surgeon: Manuelito Olivares MD;  Location: [facility] OR;  Service: Urology;  Laterality: Bilateral;    URETERAL STENT PLACEMENT Bilateral 01/23/2024    Procedure: INSERTION, STENT, URETER;  Surgeon: South Ghosh MD;  Location: [facility] OR 1ST FLR;  Service: Urology;  Laterality: Bilateral;        Have you been fasting for at least 6 hours? Yes    Is there any chance you may be pregnant or breastfeeding? No    Assay: 15.1 MCi@:1131   Injection Site:rt ac     Residual: 1.7 mCi@: 1133   Technologist: Laura Boland Injected:13.4mCi

## 2024-09-16 NOTE — PROGRESS NOTES
PATIENT: Aren Bey Jr.  MRN: [id number]  DATE: 9/17/2024      Diagnosis:   1. Malignant neoplasm of overlapping sites of bladder    2. Other specified disorders involving the immune mechanism, not elsewhere classified    3. Immunodeficiency due to drug therapy    4. Pelvic mass    5. Adrenal insufficiency    6. Hypothyroidism, unspecified type    7. Aneurysm of right popliteal artery    8. Lung nodule    9. Normocytic anemia    10. Other primary thrombophilia    11. Ureteral stent present    12. Coronary artery disease, unspecified vessel or lesion type, unspecified whether angina present, unspecified whether native or transplanted heart    13. Stage 3a chronic kidney disease              Chief Complaint: Malignant neoplasm of overlapping sites of bladder (1 month follow up)      Oncologic History:      Oncologic History     Oncologic Treatment     Pathology           Subjective:    Interval History:  Mr. Bey is a 74 y.o. male with HTN, Seizures, bladder cancer, h/o pulmonary embolism, who presents for bladder cancer.  Since the last clinic visit the patient underwent PET-CT on 09/16/2024 showing mild uptake in the left seminal vesicle similar to previous; 1.2 cm new rounded soft tissue density in the right hemipelvis near the rectum.  The patient denies CP, cough, SOB, abdominal pain, nausea, vomiting, constipation, diarrhea.  The patient denies fever, chills, night sweats, weight loss, new lumps or bumps, easy bruising or bleeding.    Prior History:  Patient underwent transurethral resection of bladder tumor on 04/28/2023 with path showing invasive urothelial carcinoma, high-grade with involvement of muscularis propria and positive for lymphovascular invasion.  Patient underwent chemotherapy at [facility].  The patient then underwent radical cystectomy and prostatectomy on 08/15/2023 with pathology showing invasive high-grade urothelial carcinoma measuring 4.2 cm, positive  lymphovascular invasion, tumor identified within the soft tissue surrounding the left distal ureter, 4/6 positive regional lymph nodes with extranodal extension present. pT3aN2.  Also seen was acinar adenocarcinoma of the prostate grade group 1 (Sarah score 3+3=6) in 1 2 5% of prostate tissue pT2N0.  The patient's surgery was ultimately complicated by small-bowel obstruction, and pulmonary embolism.  PT recevied 1 dose of Nivolumab 9/18/23.  The patient was placed on anticoagulation for pulmonary embolism and ultimately developed a right perinephric bleed requiring embolization.  PT also developed UTI and acquired ureteral obstruction with placement of bilateral ureteral stents.  PT also developed C diff and was hospitalized for suspected recurrence at [facility] from 10/21/2023 to 10/25/2023.  Patient was discharged on fidaxomicin.   The patient saw Dr. Ghosh with Urology on 11/13/2023 for with recommendations for virtual visit in January to discuss possible operative intervention replaced stents, possible revision of ureteral ileal anastomosis pending disease progression.  It was instructed that the patient would need catheterized specimen from urostomy to rule out UTI if patient with future symptoms.  Patient underwent PET-CT on 11/16/2023 showing a new hypermetabolic subpleural nodule in the posterior right lower lobe measuring 2.2 x 1.3 cm.  The patient endorses a fever starting on the night of 11/16/2023.  Patient was prescribed Bactrim for suspected potential urinary tract infection.   The patient was admitted to the hospital from 11/17/23-11/21/23 for C diff colitis and suspected urinary tract infection the patient was discharged on fidaxomicin 200 mg b.i.d. for additional 7 days as well as 200 mg every other day for 25 days.  Patient was also discharged on doxycycline 100 mg b.i.d. for 25 days with instructions from Infectious Disease to remain on antibiotics as long as his ureteral stents were in  place.   The patient underwent biopsy of nodule in the right lung on 12/08/2023 showing fibrosis and chronic inflammation but no evidence of neoplasm or granuloma.   The patient was to have ureteral stents exchanged on 01/02/2024 but missed his appointment due to inclement weather.    The patient had bilateral ureteral stents exchanged by Dr. Ghosh on 1/23/24.   The patient underwent PET-CT on 03/01/2024 showing decrease in size of right lower lobe nodule now measuring 0.8 cm.   The patient underwent CTA chest on 03/06/2024 showing no evidence of pulmonary embolism or pneumonitis.  The patient underwent nuclear stress test on 03/11/2024 showed abnormal myocardial perfusion with mild intensity, small size, reversible perfusion abnormality consistent with ischemia in the basal to mid inferolateral walls.   The patient was admitted to the hospital from 04/04/2024 until 04/11/2024 with initial complaint of weakness, hypotensio, and diarrhea and found to have adrenal insufficiency, hypothyroidism, and C diff.   The patient was admitted hospital from 05/11/2024 until 05/13/2024 for urinary tract infection.  Blood cultures grew out E coli.  The patient was sent home on Cipro to complete 7 additional days of treatment.  The patient was also sent home on oral vancomycin for prevention of C diff. patient underwent bilateral ureteral stent exchange on 05/15/2024 under the care of Dr. Olivares.   The patient was admitted from 08/01/2024 until 08/04/2024 for sepsis secondary to UTI.  The patient required IV steroids for adrenal crisis during admission.  The patient was discharged on Cipro and underwent a Solu-Cortef taper.  CT of the chest, abdomen, and pelvis on 08/01/2024 showed extensive left greater than right perinephric stranding and stable metallic focus in the inferior right renal hilum.  On 08/07/2024 the patient underwent bilateral ureteral stent exchange under the care of Dr. Olivares.    Past Medical History:   Past  Medical History:   Diagnosis Date    Adrenal insufficiency 05/2024    Aneurysm artery, popliteal 2018    right leg    Bladder cancer     Coronary artery disease     cabg x 4 11/2021    Dyslipidemia     Encounter for blood transfusion     several transfusions    H/O Clostridium difficile infection     Hx pulmonary embolism     8/2023    Hypertension     Hyperthyroidism 05/2024    Immunotherapy     nivolomab / monthly md manuel    Malignant neoplasm of prostate     Presence of urostomy     Seizures     remote hx  last ~2007    SOB (shortness of breath)     Transfusion reaction     possible reaction, not sure       Past Surgical HIstory:   Past Surgical History:   Procedure Laterality Date    CORONARY ARTERY BYPASS GRAFT      11/2021    CREATION, ILEAL CONDUIT  08/2023    ENDOSCOPY OF ILEAL CONDUIT Bilateral 5/15/2024    Procedure: ENDOSCOPY, ILEAL CONDUIT - Bilateral Stent Exchange  Bilateral Retrograde Pyelogram;  Surgeon: Manuelito Olivares MD;  Location: [facility] OR;  Service: Urology;  Laterality: Bilateral;    ENDOSCOPY OF ILEAL CONDUIT N/A 8/7/2024    Procedure: ENDOSCOPY, ILEAL CONDUIT;  Surgeon: Manuelito Olivares MD;  Location: [facility] OR;  Service: Urology;  Laterality: N/A;    HERNIA REPAIR      umbilical    PROSTATECTOMY      PTA, POPLITEAL      REMOVAL-STENT Bilateral 01/23/2024    Procedure: REMOVAL-STENT;  Surgeon: South Ghosh MD;  Location: [facility] OR [address] [place] [address];  Service: Urology;  Laterality: Bilateral;    REPLACEMENT OF STENT Bilateral 8/7/2024    Procedure: REPLACEMENT, STENT-bilateral ureteral  exchange with Retrograde;  Surgeon: Manuelito Olivares MD;  Location: [facility] OR;  Service: Urology;  Laterality: Bilateral;    URETERAL STENT PLACEMENT Bilateral 01/23/2024    Procedure: INSERTION, STENT, URETER;  Surgeon: South Ghosh MD;  [facility];  Service: Urology;  Laterality: Bilateral;       Family History:   Family History   Problem Relation Name Age of Onset    Heart disease Mother      Hypertension  Mother      Peripheral vascular disease Father 72        Social History:  reports that he has never smoked. He has never been exposed to tobacco smoke. He has never used smokeless tobacco. He reports that he does not currently use alcohol. He reports that he does not use drugs.    Allergies:  Review of patient's allergies indicates:   Allergen Reactions    Clopidogrel Other (See Comments)     Increased body temperature and redness        Medications:  Current Outpatient Medications   Medication Sig Dispense Refill    aspirin (ECOTRIN) 81 MG EC tablet Take 1 tablet (81 mg total) by mouth once daily. 90 tablet 3    atorvastatin (LIPITOR) 40 MG tablet Take 40 mg by mouth once daily.      cholecalciferol, vitamin D3, (VITAMIN D3) 125 mcg (5,000 unit) Tab Take 5,000 Units by mouth once daily.      ferrous sulfate (FEOSOL) 325 mg (65 mg iron) Tab tablet Take 325 mg by mouth daily with breakfast.      hydrocortisone (CORTEF) 20 MG Tab Take 1 tablet (20 mg total) by mouth once daily. Will need 10 extra tablet for stress dosing 100 tablet 3    hydrocortisone (CORTEF) 5 MG Tab TAKE 4 TABLETS (20 MG) IN THE MORNING AND TAKE 1 TABLET (5 MG) TABLET IN EARLY AFTERNOON 150 tablet 6    levothyroxine (SYNTHROID) 88 MCG tablet Take 1 tablet (88 mcg total) by mouth before breakfast. 30 tablet 11    LIDOcaine-prilocaine (EMLA) cream Apply topically once as needed (to port access for immunotherapy treatment).      mecobalamin (B12 ACTIVE ORAL) Take by mouth.      multivitamin (THERAGRAN) per tablet Take 1 tablet by mouth once daily.      pantoprazole (PROTONIX) 40 MG tablet Take 1 tablet (40 mg total) by mouth once daily. 30 tablet 1    QUEtiapine (SEROQUEL) 25 MG Tab Take one half tab by mouth nightly as needed for insomnia (Patient taking differently: 12.5 mg nightly.) 90 tablet 1    ZINC ACETATE ORAL Take by mouth.       No current facility-administered medications for this visit.     Facility-Administered Medications Ordered in  "Other Visits   Medication Dose Route Frequency Provider Last Rate Last Admin    0.9% NaCl 250 mL flush bag   Intravenous PRN Jackson Jimenez MD 25 mL/hr at 09/17/24 1213 New Bag at 09/17/24 1213    diphenhydrAMINE injection 50 mg  50 mg Intravenous Once PRN Jackson Jimenez MD        EPINEPHrine (EPIPEN) 0.3 mg/0.3 mL pen injection 0.3 mg  0.3 mg Intramuscular Once PRN Jackson Jimenez MD        heparin, porcine (PF) 100 unit/mL injection flush 500 Units  500 Units Intravenous PRN Jackson Jimenez MD   500 Units at 09/17/24 1318    hydrocortisone sodium succinate injection 100 mg  100 mg Intravenous Once PRN Jackson Jimenez MD        prochlorperazine injection Soln 5 mg  5 mg Intravenous Once PRN Jackson Jimenez MD        sodium chloride 0.9% flush 10 mL  10 mL Intravenous PRN Jackson Jimenez MD   10 mL at 09/17/24 1317       Review of Systems   Constitutional:  Negative for appetite change, chills, diaphoresis, fatigue, fever and unexpected weight change.   HENT:  Negative for mouth sores.    Eyes:  Negative for visual disturbance.   Respiratory:  Negative for cough and shortness of breath.    Cardiovascular:  Negative for chest pain and palpitations.   Gastrointestinal:  Negative for abdominal pain, constipation, diarrhea, nausea and vomiting.   Genitourinary:  Negative for difficulty urinating, dysuria, flank pain and frequency.   Musculoskeletal:  Negative for back pain.   Skin:  Negative for color change and rash.   Neurological:  Negative for headaches.   Hematological:  Negative for adenopathy. Does not bruise/bleed easily.   Psychiatric/Behavioral:  The patient is not nervous/anxious.        ECOG Performance Status: 1   Objective:      Vitals:   Vitals:    09/17/24 1108   BP: 110/70   BP Location: Left arm   Patient Position: Sitting   BP Method: Medium (Manual)   Pulse: 64   Resp: 16   Temp: 96.9 °F (36.1 °C)   TempSrc: Temporal   SpO2: 99%   Weight: 70.5 kg (155 lb 6.8 oz)   Height: 5' 9" (1.753 " m)           Physical Exam  Constitutional:       General: He is not in acute distress.     Appearance: He is well-developed. He is not diaphoretic.   HENT:      Head: Normocephalic and atraumatic.   Cardiovascular:      Rate and Rhythm: Normal rate and regular rhythm.      Heart sounds: Normal heart sounds. No murmur heard.     No friction rub. No gallop.   Pulmonary:      Effort: Pulmonary effort is normal. No respiratory distress.      Breath sounds: Normal breath sounds. No wheezing or rales.   Chest:      Chest wall: No tenderness.   Abdominal:      General: Bowel sounds are normal. There is no distension.      Palpations: Abdomen is soft. There is no mass.      Tenderness: There is no abdominal tenderness. There is no rebound.   Musculoskeletal:      Cervical back: Normal range of motion.   Lymphadenopathy:      Cervical: No cervical adenopathy.      Upper Body:      Right upper body: No supraclavicular or axillary adenopathy.      Left upper body: No supraclavicular or axillary adenopathy.   Skin:     General: Skin is warm and dry.      Findings: No erythema or rash.   Neurological:      Mental Status: He is alert and oriented to person, place, and time.   Psychiatric:         Behavior: Behavior normal.         Laboratory Data:  Hospital Outpatient Visit on 09/16/2024   Component Date Value Ref Range Status    POC Glucose 09/16/2024 83  70 - 110 MG/DL Final   Lab Visit on 09/16/2024   Component Date Value Ref Range Status    WBC 09/16/2024 9.86  3.90 - 12.70 K/uL Final    RBC 09/16/2024 3.90 (L)  4.60 - 6.20 M/uL Final    Hemoglobin 09/16/2024 12.5 (L)  14.0 - 18.0 g/dL Final    Hematocrit 09/16/2024 36.5 (L)  40.0 - 54.0 % Final    MCV 09/16/2024 94  82 - 98 fL Final    MCH 09/16/2024 32.1 (H)  27.0 - 31.0 pg Final    MCHC 09/16/2024 34.2  32.0 - 36.0 g/dL Final    RDW 09/16/2024 13.5  11.5 - 14.5 % Final    Platelets 09/16/2024 231  150 - 450 K/uL Final    MPV 09/16/2024 9.0 (L)  9.2 - 12.9 fL Final     Immature Granulocytes 09/16/2024 0.5  0.0 - 0.5 % Final    Gran # (ANC) 09/16/2024 7.9 (H)  1.8 - 7.7 K/uL Final    Immature Grans (Abs) 09/16/2024 0.05 (H)  0.00 - 0.04 K/uL Final    Comment: Mild elevation in immature granulocytes is non specific and   can be seen in a variety of conditions including stress response,   acute inflammation, trauma and pregnancy. Correlation with other   laboratory and clinical findings is essential.      Lymph # 09/16/2024 1.0  1.0 - 4.8 K/uL Final    Mono # 09/16/2024 0.7  0.3 - 1.0 K/uL Final    Eos # 09/16/2024 0.1  0.0 - 0.5 K/uL Final    Baso # 09/16/2024 0.09  0.00 - 0.20 K/uL Final    nRBC 09/16/2024 0  0 /100 WBC Final    Gran % 09/16/2024 80.1 (H)  38.0 - 73.0 % Final    Lymph % 09/16/2024 10.1 (L)  18.0 - 48.0 % Final    Mono % 09/16/2024 7.4  4.0 - 15.0 % Final    Eosinophil % 09/16/2024 1.0  0.0 - 8.0 % Final    Basophil % 09/16/2024 0.9  0.0 - 1.9 % Final    Differential Method 09/16/2024 Automated   Final    Sodium 09/16/2024 136  136 - 145 mmol/L Final    Potassium 09/16/2024 4.7  3.5 - 5.1 mmol/L Final    Chloride 09/16/2024 104  95 - 110 mmol/L Final    CO2 09/16/2024 20 (L)  23 - 29 mmol/L Final    Glucose 09/16/2024 91  70 - 110 mg/dL Final    BUN 09/16/2024 25 (H)  8 - 23 mg/dL Final    Creatinine 09/16/2024 1.7 (H)  0.5 - 1.4 mg/dL Final    Calcium 09/16/2024 9.7  8.7 - 10.5 mg/dL Final    Total Protein 09/16/2024 7.0  6.0 - 8.4 g/dL Final    Albumin 09/16/2024 4.1  3.5 - 5.2 g/dL Final    Total Bilirubin 09/16/2024 0.7  0.1 - 1.0 mg/dL Final    Comment: For infants and newborns, interpretation of results should be based  on gestational age, weight and in agreement with clinical  observations.    Premature Infant recommended reference ranges:  Up to 24 hours.............<8.0 mg/dL  Up to 48 hours............<12.0 mg/dL  3-5 days..................<15.0 mg/dL  6-29 days.................<15.0 mg/dL      Alkaline Phosphatase 09/16/2024 64  55 - 135 U/L Final    AST  09/16/2024 27  10 - 40 U/L Final    ALT 09/16/2024 20  10 - 44 U/L Final    eGFR 09/16/2024 41.3 (A)  >60 mL/min/1.73 m^2 Final    Anion Gap 09/16/2024 12  8 - 16 mmol/L Final    TSH 09/16/2024 3.635  0.400 - 4.000 uIU/mL Final         Imaging:     PET/CT 9/16/24    Head and Neck:     Brain demonstrates normal metabolism. However, FDG-PET has an approximate 60% sensitivity for brain metastases which are best detected by MRI with gadolinium. Physiologic uptake is in the neck.     Chest:     No FDG avid pulmonary lesions are present. No enlarged or FDG avid lymph nodes are in the chest.     Abdomen and Pelvis:     Physiologic uptake is in the liver, spleen, urinary system and bowel. Operative changes are stable. Adrenal glands are normal. In the pelvis, at the level of the seminal vesicles, there is mild uptake on the left, max SUV of 6.4, previously 5.1, and max SUV of 5.8 on the left, previously 4.7. In the lower right hemipelvis, near the rectum, there is a new 1.2 cm rounded soft tissue density on image 254 with a max SUV of 2.3.     Musculoskeletal:     No FDG avid osseous lesions are present.       Assessment:       1. Malignant neoplasm of overlapping sites of bladder    2. Other specified disorders involving the immune mechanism, not elsewhere classified    3. Immunodeficiency due to drug therapy    4. Pelvic mass    5. Adrenal insufficiency    6. Hypothyroidism, unspecified type    7. Aneurysm of right popliteal artery    8. Lung nodule    9. Normocytic anemia    10. Other primary thrombophilia    11. Ureteral stent present    12. Coronary artery disease, unspecified vessel or lesion type, unspecified whether angina present, unspecified whether native or transplanted heart    13. Stage 3a chronic kidney disease                         Plan:     Bladder Cancer - pt with stage IIIB bladder cancer pT3N2 s/p neoadjuvant chemo followed by radical cystoprostatectomy 8/15/23  -PT received one dose of Nivolumab on  9/18/23  -PET/CT suggestive of a RLL pulmonary nodule  -Biopsy on 12/08/23 negative for malignancy  -PET/CT on 6/21/24 showed LEANDRO  -Pt to proceed with cycle 10 of Nivolumab  -No disease seen on Ct C/A/P 8/01/24  -PET/CT on 9/16/24 showed a 1.2 cm rounded soft tissue density in the right hemipelvis near the rectum  -Will discuss need to biopsy at tumor board  -Visit today included increased complexity associated with the care of the episodic problem (immunotherapy) addressed and managing the longitudinal care of the patient due to the serious and/or complex managed problem(s) Nivolumab    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  -No sign of infection  -Will monitor    Pelvis Mass - seen on PET/CT 9/16/24  -Will discuss at general tumor board    Hypothyroidism - Synthroid increased to 88mcg by Dr Xiong  -T4 normal with continued increased TSH    Adrenal Insufficiency - pt with positive Cosyntropin stim test in the hospital  -PT on Hydrocortisone 20mg daily  -Pt following with endocrinology    CAD - pt with prior bypass surgery  -Cardiac stress test on 3/11/24 was abnormal with mild intensity, small size, reversible perfusion abnormality consistent with ischemia in the basal to mid inferolateral walls  -Pt to follow up with cardiologist    Pulmonary nodule - see above    Prostate Cancer - patient with acinar adenocarcinoma of the prostate grade group 1 ([place] score 3+3=6) in 1 2 5% of prostate tissue pT2N0  -PSA 10/31/23 0.02ng/mL  -Will monitor    Ureteral Stents - Stents exchanged last on 8/07/24 under the care of Dr Olivares  -Pt to follow up with Urology    Anemia - Hemoglobin 12.5g/dL on 9/16/24  -Stable  -Will monitor     Thrombophilia - pt with prior PE  -Pt subsequently developed a right perinephric bleed requiring embolization   -Patient with IVC filter in place  -Pt following with Dr Claire  -Vascular surgery to determine if he could be started on Eliquis  -Will monitor    Right popliteal  aneurysm - pt to see vascular surgery    CKDIII - creatinine 1.7 on 9/16/24  -Pt following with Nephrology  -Will monitor    Route Chart for Scheduling    Med Onc Chart Routing      Follow up with physician 4 weeks. Pt can proceed with treatment.  Pt needs a CBC, CMP, TSH in 4 weeks with an appt with me the day after labs with treatment.   Follow up with KW[facility]    Infusion scheduling note    Injection scheduling note    Labs    Imaging    Pharmacy appointment    Other referrals              Treatment Plan Information   OP NIVOLUMAB 480 MG Q4W Jackson Jimenez MD   Associated diagnosis: Malignant neoplasm of overlapping sites of bladder Stage IIIB pT3, pN2, cM0 noted on 10/23/2023   Line of treatment: Adjuvant  Treatment Goal: Curative     Upcoming Treatment Dates - OP NIVOLUMAB 480 MG Q4W    10/15/2024       Chemotherapy       nivolumab 480 mg in 0.9% NaCl 98 mL infusion  11/12/2024       Chemotherapy       nivolumab 480 mg in 0.9% NaCl 98 mL infusion  12/10/2024       Chemotherapy       nivolumab 480 mg in 0.9% NaCl 98 mL infusion    Therapy Plan Information  BEZLOTOXUMAB (ZINPLAVA) ONE DOSE for History of Clostridium difficile infection, noted on 4/5/2024  dextrose 5 % (D5W) 100 mL flush bag  Intravenous, Once  heparin, porcine (PF) 100 unit/mL injection flush 500 Units  500 Units, Intravenous, PRN  alteplase injection 2 mg  2 mg, Intra-Catheter, PRN      No therapy plan of the specified type found.    No therapy plan of the specified type found.      Jackson Jimenez MD  [facility]  Hematology and Oncology  [facility] [address]   [place] [address]   O: [phone]  F: [phone]

## 2024-09-17 ENCOUNTER — INFUSION (OUTPATIENT)
Dept: INFUSION THERAPY | Facility: HOSPITAL | Age: 74
End: 2024-09-17
Attending: INTERNAL MEDICINE
Payer: MEDICARE

## 2024-09-17 ENCOUNTER — OFFICE VISIT (OUTPATIENT)
Dept: HEMATOLOGY/ONCOLOGY | Facility: CLINIC | Age: 74
End: 2024-09-17
Payer: MEDICARE

## 2024-09-17 VITALS
WEIGHT: 155.44 LBS | BODY MASS INDEX: 23.02 KG/M2 | OXYGEN SATURATION: 99 % | DIASTOLIC BLOOD PRESSURE: 70 MMHG | HEIGHT: 69 IN | RESPIRATION RATE: 16 BRPM | TEMPERATURE: 97 F | HEART RATE: 64 BPM | SYSTOLIC BLOOD PRESSURE: 110 MMHG

## 2024-09-17 VITALS
WEIGHT: 155.44 LBS | HEIGHT: 69 IN | TEMPERATURE: 97 F | OXYGEN SATURATION: 99 % | DIASTOLIC BLOOD PRESSURE: 71 MMHG | RESPIRATION RATE: 16 BRPM | HEART RATE: 62 BPM | SYSTOLIC BLOOD PRESSURE: 126 MMHG | BODY MASS INDEX: 23.02 KG/M2

## 2024-09-17 DIAGNOSIS — D68.59 OTHER PRIMARY THROMBOPHILIA: ICD-10-CM

## 2024-09-17 DIAGNOSIS — Z96.0 URETERAL STENT PRESENT: ICD-10-CM

## 2024-09-17 DIAGNOSIS — Z79.899 IMMUNODEFICIENCY DUE TO DRUG THERAPY: ICD-10-CM

## 2024-09-17 DIAGNOSIS — D89.89 OTHER SPECIFIED DISORDERS INVOLVING THE IMMUNE MECHANISM, NOT ELSEWHERE CLASSIFIED: ICD-10-CM

## 2024-09-17 DIAGNOSIS — C67.8 MALIGNANT NEOPLASM OF OVERLAPPING SITES OF BLADDER: Primary | ICD-10-CM

## 2024-09-17 DIAGNOSIS — D64.9 NORMOCYTIC ANEMIA: ICD-10-CM

## 2024-09-17 DIAGNOSIS — I25.10 CORONARY ARTERY DISEASE, UNSPECIFIED VESSEL OR LESION TYPE, UNSPECIFIED WHETHER ANGINA PRESENT, UNSPECIFIED WHETHER NATIVE OR TRANSPLANTED HEART: ICD-10-CM

## 2024-09-17 DIAGNOSIS — N18.31 STAGE 3A CHRONIC KIDNEY DISEASE: ICD-10-CM

## 2024-09-17 DIAGNOSIS — R91.1 LUNG NODULE: ICD-10-CM

## 2024-09-17 DIAGNOSIS — E03.9 HYPOTHYROIDISM, UNSPECIFIED TYPE: ICD-10-CM

## 2024-09-17 DIAGNOSIS — D84.821 IMMUNODEFICIENCY DUE TO DRUG THERAPY: ICD-10-CM

## 2024-09-17 DIAGNOSIS — R19.00 PELVIC MASS: ICD-10-CM

## 2024-09-17 DIAGNOSIS — I72.4 ANEURYSM OF RIGHT POPLITEAL ARTERY: ICD-10-CM

## 2024-09-17 DIAGNOSIS — E27.40 ADRENAL INSUFFICIENCY: ICD-10-CM

## 2024-09-17 PROCEDURE — 25000003 PHARM REV CODE 250: Mod: PN | Performed by: INTERNAL MEDICINE

## 2024-09-17 PROCEDURE — A4216 STERILE WATER/SALINE, 10 ML: HCPCS | Mod: PN | Performed by: INTERNAL MEDICINE

## 2024-09-17 PROCEDURE — 1126F AMNT PAIN NOTED NONE PRSNT: CPT | Mod: CPTII,S$GLB,, | Performed by: INTERNAL MEDICINE

## 2024-09-17 PROCEDURE — 3078F DIAST BP <80 MM HG: CPT | Mod: CPTII,S$GLB,, | Performed by: INTERNAL MEDICINE

## 2024-09-17 PROCEDURE — 63600175 PHARM REV CODE 636 W HCPCS: Mod: JZ,JG,PN | Performed by: INTERNAL MEDICINE

## 2024-09-17 PROCEDURE — G2211 COMPLEX E/M VISIT ADD ON: HCPCS | Mod: S$GLB,,, | Performed by: INTERNAL MEDICINE

## 2024-09-17 PROCEDURE — 99999 PR PBB SHADOW E&M-EST. PATIENT-LVL IV: CPT | Mod: PBBFAC,,, | Performed by: INTERNAL MEDICINE

## 2024-09-17 PROCEDURE — 96413 CHEMO IV INFUSION 1 HR: CPT | Mod: PN

## 2024-09-17 PROCEDURE — 1157F ADVNC CARE PLAN IN RCRD: CPT | Mod: CPTII,S$GLB,, | Performed by: INTERNAL MEDICINE

## 2024-09-17 PROCEDURE — 3288F FALL RISK ASSESSMENT DOCD: CPT | Mod: CPTII,S$GLB,, | Performed by: INTERNAL MEDICINE

## 2024-09-17 PROCEDURE — 3074F SYST BP LT 130 MM HG: CPT | Mod: CPTII,S$GLB,, | Performed by: INTERNAL MEDICINE

## 2024-09-17 PROCEDURE — 99215 OFFICE O/P EST HI 40 MIN: CPT | Mod: S$GLB,,, | Performed by: INTERNAL MEDICINE

## 2024-09-17 PROCEDURE — 1101F PT FALLS ASSESS-DOCD LE1/YR: CPT | Mod: CPTII,S$GLB,, | Performed by: INTERNAL MEDICINE

## 2024-09-17 RX ORDER — DIPHENHYDRAMINE HYDROCHLORIDE 50 MG/ML
50 INJECTION INTRAMUSCULAR; INTRAVENOUS ONCE AS NEEDED
Status: CANCELLED | OUTPATIENT
Start: 2024-09-17

## 2024-09-17 RX ORDER — PROCHLORPERAZINE EDISYLATE 5 MG/ML
5 INJECTION INTRAMUSCULAR; INTRAVENOUS ONCE AS NEEDED
Status: DISCONTINUED | OUTPATIENT
Start: 2024-09-17 | End: 2024-09-17 | Stop reason: HOSPADM

## 2024-09-17 RX ORDER — HEPARIN 100 UNIT/ML
500 SYRINGE INTRAVENOUS
Status: CANCELLED | OUTPATIENT
Start: 2024-09-17

## 2024-09-17 RX ORDER — HEPARIN 100 UNIT/ML
500 SYRINGE INTRAVENOUS
Status: DISCONTINUED | OUTPATIENT
Start: 2024-09-17 | End: 2024-09-17 | Stop reason: HOSPADM

## 2024-09-17 RX ORDER — EPINEPHRINE 0.3 MG/.3ML
0.3 INJECTION SUBCUTANEOUS ONCE AS NEEDED
Status: DISCONTINUED | OUTPATIENT
Start: 2024-09-17 | End: 2024-09-17 | Stop reason: HOSPADM

## 2024-09-17 RX ORDER — SODIUM CHLORIDE 0.9 % (FLUSH) 0.9 %
10 SYRINGE (ML) INJECTION
Status: CANCELLED | OUTPATIENT
Start: 2024-09-17

## 2024-09-17 RX ORDER — DIPHENHYDRAMINE HYDROCHLORIDE 50 MG/ML
50 INJECTION INTRAMUSCULAR; INTRAVENOUS ONCE AS NEEDED
Status: DISCONTINUED | OUTPATIENT
Start: 2024-09-17 | End: 2024-09-17 | Stop reason: HOSPADM

## 2024-09-17 RX ORDER — PROCHLORPERAZINE EDISYLATE 5 MG/ML
5 INJECTION INTRAMUSCULAR; INTRAVENOUS ONCE AS NEEDED
Status: CANCELLED
Start: 2024-09-17

## 2024-09-17 RX ORDER — EPINEPHRINE 0.3 MG/.3ML
0.3 INJECTION SUBCUTANEOUS ONCE AS NEEDED
Status: CANCELLED | OUTPATIENT
Start: 2024-09-17

## 2024-09-17 RX ORDER — SODIUM CHLORIDE 0.9 % (FLUSH) 0.9 %
10 SYRINGE (ML) INJECTION
Status: DISCONTINUED | OUTPATIENT
Start: 2024-09-17 | End: 2024-09-17 | Stop reason: HOSPADM

## 2024-09-17 RX ADMIN — SODIUM CHLORIDE: 9 INJECTION, SOLUTION INTRAVENOUS at 12:09

## 2024-09-17 RX ADMIN — Medication 10 ML: at 01:09

## 2024-09-17 RX ADMIN — Medication 500 UNITS: at 01:09

## 2024-09-17 RX ADMIN — SODIUM CHLORIDE 480 MG: 9 INJECTION, SOLUTION INTRAVENOUS at 12:09

## 2024-09-17 NOTE — PLAN OF CARE
Problem: Adult Inpatient Plan of Care  Goal: Plan of Care Review  Outcome: Progressing  Flowsheets (Taken 9/17/2024 1318)  Plan of Care Reviewed With:   patient   spouse  Goal: Patient-Specific Goal (Individualized)  Outcome: Progressing  Flowsheets (Taken 9/17/2024 1318)  Individualized Care Needs: education, conversation, wife at chairside, home ipad, snacks, pillow, recliner  Anxieties, Fears or Concerns: none voiced     Problem: Fatigue  Goal: Improved Activity Tolerance  Outcome: Progressing  Intervention: Promote Improved Energy  Flowsheets (Taken 9/17/2024 1318)  Fatigue Management:   paced activity encouraged   frequent rest breaks encouraged   fatigue-related activity identified  Sleep/Rest Enhancement:   relaxation techniques promoted   therapeutic touch utilized   natural light exposure provided   noise level reduced   family presence promoted  Activity Management:   Ambulated -L4   Up in chair - L3  Environmental Support:   rest periods encouraged   distractions minimized   calm environment promoted   personal routine supported

## 2024-09-17 NOTE — PLAN OF CARE
Pt arrived to clinic today for C10D1 treatment with Opdivo infusion and tolerated well with no changes throughout therapy. Pt aware of side effects and number to call for any needs and discharged to home in NAD with wife. Pt aware of f/u appts.

## 2024-09-18 ENCOUNTER — TUMOR BOARD CONFERENCE (OUTPATIENT)
Dept: HEMATOLOGY/ONCOLOGY | Facility: CLINIC | Age: 74
End: 2024-09-18
Payer: MEDICARE

## 2024-09-18 ENCOUNTER — DOCUMENTATION ONLY (OUTPATIENT)
Dept: HEMATOLOGY/ONCOLOGY | Facility: CLINIC | Age: 74
End: 2024-09-18
Payer: MEDICARE

## 2024-09-18 ENCOUNTER — HOSPITAL ENCOUNTER (OUTPATIENT)
Dept: RADIOLOGY | Facility: HOSPITAL | Age: 74
Discharge: HOME | End: 2024-09-18
Attending: STUDENT IN AN ORGANIZED HEALTH CARE EDUCATION/TRAINING PROGRAM
Payer: MEDICARE

## 2024-09-18 ENCOUNTER — TELEPHONE (OUTPATIENT)
Dept: HEMATOLOGY/ONCOLOGY | Facility: CLINIC | Age: 74
End: 2024-09-18
Payer: MEDICARE

## 2024-09-18 DIAGNOSIS — R19.00 PELVIC MASS: Primary | ICD-10-CM

## 2024-09-18 DIAGNOSIS — I72.4 POPLITEAL ANEURYSM: ICD-10-CM

## 2024-09-18 DIAGNOSIS — I73.9 PAD (PERIPHERAL ARTERY DISEASE): ICD-10-CM

## 2024-09-18 PROCEDURE — 93922 UPR/L XTREMITY ART 2 LEVELS: CPT | Mod: 26,,, | Performed by: RADIOLOGY

## 2024-09-18 PROCEDURE — 93925 LOWER EXTREMITY STUDY: CPT | Mod: TC,PO

## 2024-09-18 PROCEDURE — 93925 LOWER EXTREMITY STUDY: CPT | Mod: 26,,, | Performed by: RADIOLOGY

## 2024-09-18 PROCEDURE — 93922 UPR/L XTREMITY ART 2 LEVELS: CPT | Mod: TC,PO

## 2024-09-18 NOTE — NURSING
Reviewed chart, sent message to Tasneem to schedule transrectal US with Dr. Kelly per Tumor board discussion with Dr. Kelly and Dr. Jimenez today.  Will continue to follow.

## 2024-09-18 NOTE — TELEPHONE ENCOUNTER
I called the patient and informed him that his case was discussed at GI tumor board today.  I instructed the patient that his images were reviewed by Dr. Kenton Rondon who felt the patient would be a candidate for transrectal ultrasound biopsy of the right-sided pelvic lesion noted on recent PET-CT.  I instructed him that our office would reach out to him with an appointment.  The patient expressed understanding.  All questions were answered to his satisfaction.    Jackson Jimenez MD  [facility]  Hematology and Oncology  [facility]   [address] [place] [address]   O: [phone]  F: [phone]

## 2024-09-18 NOTE — TELEPHONE ENCOUNTER
----- Message from Julius Reyes sent at 9/18/2024  2:45 PM CDT -----  Type: Needs Medical Advice  Who Called:  pt  Best Call Back Number: [phone]    Additional Information: Pt is calling the office needs to speak with the nurse regarding testing that was done.Please call back and advise.

## 2024-09-18 NOTE — NURSING
Spoke with patients wife, explained that the chart was sent to Dr. Kelly's office and Tasneem will be reaching out to schedule the patient in the next few days for transrectal US with biopsy. Wife verbalized agreement.  All questions and concerns addressed at this time.  Will continue to follow.

## 2024-09-23 ENCOUNTER — OFFICE VISIT (OUTPATIENT)
Dept: UROLOGY | Facility: CLINIC | Age: 74
End: 2024-09-23
Payer: MEDICARE

## 2024-09-23 VITALS
HEIGHT: 69 IN | DIASTOLIC BLOOD PRESSURE: 75 MMHG | BODY MASS INDEX: 23.38 KG/M2 | WEIGHT: 157.88 LBS | HEART RATE: 66 BPM | SYSTOLIC BLOOD PRESSURE: 145 MMHG

## 2024-09-23 DIAGNOSIS — C67.9 UROTHELIAL CARCINOMA OF BLADDER: Primary | ICD-10-CM

## 2024-09-23 DIAGNOSIS — N99.89 URETERAL-ILEAL LOOP ANASTOMOTIC STRICTURE: ICD-10-CM

## 2024-09-23 DIAGNOSIS — N13.30 HYDRONEPHROSIS, UNSPECIFIED HYDRONEPHROSIS TYPE: ICD-10-CM

## 2024-09-23 PROCEDURE — 99214 OFFICE O/P EST MOD 30 MIN: CPT | Mod: S$GLB,,, | Performed by: STUDENT IN AN ORGANIZED HEALTH CARE EDUCATION/TRAINING PROGRAM

## 2024-09-23 PROCEDURE — 3078F DIAST BP <80 MM HG: CPT | Mod: CPTII,S$GLB,, | Performed by: STUDENT IN AN ORGANIZED HEALTH CARE EDUCATION/TRAINING PROGRAM

## 2024-09-23 PROCEDURE — 1101F PT FALLS ASSESS-DOCD LE1/YR: CPT | Mod: CPTII,S$GLB,, | Performed by: STUDENT IN AN ORGANIZED HEALTH CARE EDUCATION/TRAINING PROGRAM

## 2024-09-23 PROCEDURE — 3288F FALL RISK ASSESSMENT DOCD: CPT | Mod: CPTII,S$GLB,, | Performed by: STUDENT IN AN ORGANIZED HEALTH CARE EDUCATION/TRAINING PROGRAM

## 2024-09-23 PROCEDURE — G2211 COMPLEX E/M VISIT ADD ON: HCPCS | Mod: S$GLB,,, | Performed by: STUDENT IN AN ORGANIZED HEALTH CARE EDUCATION/TRAINING PROGRAM

## 2024-09-23 PROCEDURE — 1126F AMNT PAIN NOTED NONE PRSNT: CPT | Mod: CPTII,S$GLB,, | Performed by: STUDENT IN AN ORGANIZED HEALTH CARE EDUCATION/TRAINING PROGRAM

## 2024-09-23 PROCEDURE — 1157F ADVNC CARE PLAN IN RCRD: CPT | Mod: CPTII,S$GLB,, | Performed by: STUDENT IN AN ORGANIZED HEALTH CARE EDUCATION/TRAINING PROGRAM

## 2024-09-23 PROCEDURE — 1159F MED LIST DOCD IN RCRD: CPT | Mod: CPTII,S$GLB,, | Performed by: STUDENT IN AN ORGANIZED HEALTH CARE EDUCATION/TRAINING PROGRAM

## 2024-09-23 PROCEDURE — 3077F SYST BP >= 140 MM HG: CPT | Mod: CPTII,S$GLB,, | Performed by: STUDENT IN AN ORGANIZED HEALTH CARE EDUCATION/TRAINING PROGRAM

## 2024-09-23 PROCEDURE — 99999 PR PBB SHADOW E&M-EST. PATIENT-LVL III: CPT | Mod: PBBFAC,,, | Performed by: STUDENT IN AN ORGANIZED HEALTH CARE EDUCATION/TRAINING PROGRAM

## 2024-09-23 NOTE — PROGRESS NOTES
[facility]  Urologic Oncology Clinic Note            Date of Service: 9/23/2024          Chief Complaint/Reason for Consultation: Node positive bladder cancer    Requesting Provider:   No referring provider defined for this encounter.      History of Present Illness:   Patient 74 y.o. 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 He did have neoadjuvant chemotherapy. The patient's surgery was ultimately complicated by small-bowel obstruction, and pulmonary embolism.  PT recevied 1 dose of Nivolumab 9/18/23.  The patient was placed on anticoagulation for pulmonary embolism and ultimately developed a right perinephric bleed requiring embolization. He is currently not on anticoagulation and does IVC filter.  PT also developed UTI and acquired ureteral obstruction with placement of bilateral ureteral stents.  PT also developed C diff and was hospitalized for suspected recurrence at [facility] from 10/21/2023 to 10/25/2023.  Patient was discharged on fidaxomicin.     Blood thinners:  none    No Hx of TIA, MI, and CVA   Abdominal surgeries:  radical cystectomy w/ ileal loop urinary diversion, umbilical hernia      Since seeing me earlier this year he has been managed with nivolumab and developed adrenal insufficiency and hypothyroidism.      Here today to discuss ileal-ureteral anastomotic stricture.     Overall doing well, gardening daily, back to daily activities. Doing well with stoma pouching. Tolerating diet. No more c-diff. However, has been hospitalized multiple times for UTIs over the past couple months.         Urologic History:     8/15/2023 -- Radical cystectomy w/ ileal loop urinary diversion -- final path pT3aN2 , mateo 3+3 prostate cancer  9/28/2023 -- IR placement of nephroureteral stents   5/15/2024 --  Ureteral stent exchange  6/24/2024 -- FDG PET -- negative for metastatic disease  8/7/2024 -- Ureteral stent exchange  9/16/2024 -- FDG PET -- two soft tissue masses in right raul-pelvis near rectum with low level activity uptake      Patient Active Problem List    Diagnosis Date Noted    Constipation 08/03/2024    Discharge planning issues 08/03/2024    Bacteremia due to Escherichia coli 05/13/2024    Adrenal insufficiency 05/06/2024    Acute delirium 04/07/2024    General weakness 04/07/2024    Hypotension 04/07/2024    Hyperthyroidism 04/07/2024    History of Clostridium difficile infection 04/05/2024    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    Diarrhea due to drug 04/04/2024    Nausea and vomiting 04/04/2024    History of thromboembolism 04/04/2024    Poor appetite 04/04/2024    Ureteroileal conduit stricture 04/02/2024    Shortness of breath 03/08/2024    Precordial pain 03/08/2024    Mitral and aortic regurgitation 03/08/2024    Arterial bruit 03/08/2024    Cough 02/26/2024    History of immunotherapy 02/26/2024    Coronary artery disease involving coronary bypass graft of native heart with angina pectoris 01/22/2024    Chronic kidney disease, stage 3 12/06/2023    Other hydronephrosis 12/06/2023    Acute pyelonephritis 11/19/2023    Prostate cancer 10/31/2023    Malignant neoplasm of overlapping sites of bladder 10/23/2023    History of pulmonary embolus (PE) 10/23/2023    Neoplasm, bladder 10/23/2023    Presence of urostomy 10/21/2023    ACP (advance care planning) 10/21/2021    Aneurysm of right popliteal artery 10/18/2021    Dyslipidemia 09/15/2021    Essential hypertension 09/10/2021          Review of patient's allergies indicates:   Allergen Reactions    Clopidogrel Other (See Comments)     Increased body temperature and redness         Past Medical History:   Diagnosis Date    Adrenal insufficiency 05/2024    Aneurysm artery, popliteal 2018    right leg    Bladder cancer     Coronary artery  disease     cabg x 4 11/2021    Dyslipidemia     Encounter for blood transfusion     several transfusions    H/O Clostridium difficile infection     Hx pulmonary embolism     8/2023    Hypertension     Hyperthyroidism 05/2024    Immunotherapy     nivolomab / monthly md manuel    Malignant neoplasm of prostate     Presence of urostomy     Seizures     remote hx  last ~2007    SOB (shortness of breath)     Transfusion reaction     possible reaction, not sure      Past Surgical History:   Procedure Laterality Date    CORONARY ARTERY BYPASS GRAFT      11/2021    CREATION, ILEAL CONDUIT  08/2023    ENDOSCOPY OF ILEAL CONDUIT Bilateral 5/15/2024    Procedure: ENDOSCOPY, ILEAL CONDUIT - Bilateral Stent Exchange  Bilateral Retrograde Pyelogram;  Surgeon: Manuelito Olivares MD;  Location: [facility] OR;  Service: Urology;  Laterality: Bilateral;    ENDOSCOPY OF ILEAL CONDUIT N/A 8/7/2024    Procedure: ENDOSCOPY, ILEAL CONDUIT;  Surgeon: Manuelito Olivares MD;  Location: [facility] OR;  Service: Urology;  Laterality: N/A;    HERNIA REPAIR      umbilical    PROSTATECTOMY      PTA, POPLITEAL      REMOVAL-STENT Bilateral 01/23/2024    Procedure: REMOVAL-STENT;  Surgeon: South Ghosh MD;  [facility];  Service: Urology;  Laterality: Bilateral;    REPLACEMENT OF STENT Bilateral 8/7/2024    Procedure: REPLACEMENT, STENT-bilateral ureteral  exchange with Retrograde;  Surgeon: Manuelito Olivares MD;  Location: [facility] OR;  Service: Urology;  Laterality: Bilateral;    URETERAL STENT PLACEMENT Bilateral 01/23/2024    Procedure: INSERTION, STENT, URETER;  Surgeon: South Ghosh MD;  Location: [facility] OR 1ST FLR;  Service: Urology;  Laterality: Bilateral;      Family History   Problem Relation Name Age of Onset    Heart disease Mother      Hypertension Mother      Peripheral vascular disease Father 72       Social History     Tobacco Use    Smoking status: Never     Passive exposure: Never    Smokeless tobacco: Never   Substance Use Topics    Alcohol  "use: Not Currently        Review of Systems   Constitutional:  Negative for activity change, fatigue and fever.   HENT:  Negative for congestion.    Respiratory:  Negative for cough.    Cardiovascular:  Negative for chest pain.   Gastrointestinal:  Negative for abdominal pain.   Genitourinary:  Negative for hematuria.             OBJECTIVE:     Vitals:    09/23/24 1328   BP: (!) 145/75   Pulse: 66   Weight: 71.6 kg (157 lb 13.6 oz)   Height: 5' 9" (1.753 m)     NAD, Aox3   Non-labored respirations  No JVD or pedal edema  Stoma pink patent and productive with stents in place.           LAB:      All laboratory data listed below was independently interpreted and reviewed with patient in clinic today      CMP  Sodium   Date Value Ref Range Status   09/16/2024 136 136 - 145 mmol/L Final     Potassium   Date Value Ref Range Status   09/16/2024 4.7 3.5 - 5.1 mmol/L Final     Chloride   Date Value Ref Range Status   09/16/2024 104 95 - 110 mmol/L Final     CO2   Date Value Ref Range Status   09/16/2024 20 (L) 23 - 29 mmol/L Final     Glucose   Date Value Ref Range Status   09/16/2024 91 70 - 110 mg/dL Final     BUN   Date Value Ref Range Status   09/16/2024 25 (H) 8 - 23 mg/dL Final     Creatinine   Date Value Ref Range Status   09/16/2024 1.7 (H) 0.5 - 1.4 mg/dL Final     Calcium   Date Value Ref Range Status   09/16/2024 9.7 8.7 - 10.5 mg/dL Final     Total Protein   Date Value Ref Range Status   09/16/2024 7.0 6.0 - 8.4 g/dL Final     Albumin   Date Value Ref Range Status   09/16/2024 4.1 3.5 - 5.2 g/dL Final     Total Bilirubin   Date Value Ref Range Status   09/16/2024 0.7 0.1 - 1.0 mg/dL Final     Comment:     For infants and newborns, interpretation of results should be based  on gestational age, weight and in agreement with clinical  observations.    Premature Infant recommended reference ranges:  Up to 24 hours.............<8.0 mg/dL  Up to 48 hours............<12.0 mg/dL  3-5 days..................<15.0 " mg/dL  6-29 days.................<15.0 mg/dL       Alkaline Phosphatase   Date Value Ref Range Status   09/16/2024 64 55 - 135 U/L Final     AST   Date Value Ref Range Status   09/16/2024 27 10 - 40 U/L Final     ALT   Date Value Ref Range Status   09/16/2024 20 10 - 44 U/L Final     Anion Gap   Date Value Ref Range Status   09/16/2024 12 8 - 16 mmol/L Final     eGFR   Date Value Ref Range Status   09/16/2024 41.3 (A) >60 mL/min/1.73 m^2 Final     Lab Results   Component Value Date    WBC 9.86 09/16/2024    HGB 12.5 (L) 09/16/2024    HCT 36.5 (L) 09/16/2024    MCV 94 09/16/2024     09/16/2024               IMAGING:      All imaging data listed below was independently interpreted and reviewed with patient in clinic today    9/16/2024  NM PET CT FDG SKULL BASE TO MID THIGH     CLINICAL HISTORY:  Bladder cancer, invasive, assess treatment response; Malignant neoplasm of overlapping sites of bladder     TECHNIQUE:  13.4 mCi F18-FDG was administered intravenously via the right antecubital fossa.  Approximately 60 minutes after radiotracer distribution, PET images were acquired from the skull base to the mid thigh. Transmission images were acquired to correct for attenuation using a whole body low-dose CT scan without contrast. Glycemia at the time of injection was 83 mg/dL.     COMPARISON:  06/24/2024     FINDINGS:  Head and Neck:     Brain demonstrates normal metabolism.  However, FDG-PET has an approximate 60% sensitivity for brain metastases which are best detected by MRI with gadolinium.  Physiologic uptake is in the neck.     Chest:     No FDG avid pulmonary lesions are present. No enlarged or FDG avid lymph nodes are in the chest.     Abdomen and Pelvis:     Physiologic uptake is in the liver, spleen, urinary system and bowel.  Operative changes are stable.  Adrenal glands are normal.  In the pelvis, at the level of the seminal vesicles, there is mild uptake on the left, max SUV of 6.4, previously 5.1, and  max SUV of 5.8 on the left, previously 4.7.  In the lower right hemipelvis, near the rectum, there is a new 1.2 cm rounded soft tissue density on image 254 with a max SUV of 2.3.     Musculoskeletal:     No FDG avid osseous lesions are present.     Impression:     1. New small rounded soft tissue density with low level uptake in the right pelvis near the rectum is indeterminate.  Attention on follow-up warranted.  2. Fairly symmetric uptake at the level of the seminal vesicles, slightly more prominent on the prior study.  This may be physiologic.        Electronically signed by:Javier Hood MD  Date:                                            09/16/2024  Time:                                           13:23        ASSESSMENT/PLAN:       73 yo M w hx of radical cystectomy w/ ileal loop urinary diversion on 8/15/2023 -- final path pT3aN2 w/ 3+3 prostate cancer. Now s/p single dose of nivolumab.      Plan:    Node positive, Bladder Cancer  Discussed overall prognosis is poor in this setting with 5 year survival approaching 30%. Discussed agreement to pursue nivolumab adjuvant therapy.     Managed with Nivolumab w. Dr. Jimenez. Has had side-effects including adrenal insufficiency and hypothyroidism.    Interval PET imaging has shown no residual disease over the course of the last year, however most recent PET concerning for new soft tissue lesion in the pelvis. Plans to have colonoscopy and possible IR biopsy.        Ureteral strictures  Discussed management with stents.   Discussed that stents are not permanent and that they need to be changed every 3-4 months  Discussed possible revision of uretero-ileal anastomosis in future pending disease progression in Jan 2025    Discussed again that we would want to wait at least one year to ensure no disease progression on immunotherapy to determine timing of possible repair of ureteral stricture disease. Discussed that this would be an extensive operation with open abdominal  incision.Discussed risks and benefits of operation including failure requiring continued ureteral stent placement and exchange.         - Visit today included increased complexity associated with the care of the episodic problem ureteral strictures addressed and managing the longitudinal care of the patient due to the serious and/or complex managed problem of ureteral strictures.    South Ghosh MD  Urologic Oncology  P: [phone]

## 2024-09-25 PROBLEM — R93.3 IMAGING OF GASTROINTESTINAL TRACT ABNORMAL: Status: ACTIVE | Noted: 2024-09-25

## 2024-09-25 NOTE — NURSING
[facility]      GI MULTIDISCIPLINARY TUMOR BOARD      Date: 09/18/2024  MRN: [id number]    Site:   Cancer Type: Prostate cancer; Bladder cancer     Cancer Site: Pelvic lesion seen on PET, Prostate, Malignant neoplasm of overlapping sites of bladder       Cancer Staging Complete: Yes       Presenting Hospital / Clinic: [facility]     Virtual Tumor Board Conference: In person       PRESENTER:   Presenter: Jackson Jimenez MD     Specialties Present: Medical Oncology; Hematology; Surgical Oncology; Radiation Oncology; Pathology; Navigation; Research; Genetics; Integrative Oncology; Radiology; Gastrointestinal; Nuclear Medicine       PATIENT SUMMARY: Mr. Bey is a 74 y.o. male with HTN, Seizures, bladder cancer, h/o pulmonary embolism, who presents for bladder cancer.  Since the last clinic visit the patient underwent PET-CT on 09/16/2024 showing mild uptake in the left seminal vesicle similar to previous; 1.2 cm new rounded soft tissue density in the right hemipelvis near the rectum.  The patient denies CP, cough, SOB, abdominal pain, nausea, vomiting, constipation, diarrhea.  The patient denies fever, chills, night sweats, weight loss, new lumps or bumps, easy bruising or bleeding.     Prior History:  Patient underwent transurethral resection of bladder tumor on 04/28/2023 with path showing invasive urothelial carcinoma, high-grade with involvement of muscularis propria and positive for lymphovascular invasion.  Patient underwent chemotherapy at [facility].  The patient then underwent radical cystectomy and prostatectomy on 08/15/2023 with pathology showing invasive high-grade urothelial carcinoma measuring 4.2 cm, positive lymphovascular invasion, tumor identified within the soft tissue surrounding the left distal ureter, 4/6 positive regional lymph nodes with extranodal extension present.  pT3aN2.  Also seen was acinar adenocarcinoma of the prostate grade group 1 (Laredo score 3+3=6) in 1 2 5% of prostate tissue pT2N0.  The patient's surgery was ultimately complicated by small-bowel obstruction, and pulmonary embolism.  PT recevied 1 dose of Nivolumab 9/18/23.  The patient was placed on anticoagulation for pulmonary embolism and ultimately developed a right perinephric bleed requiring embolization.  PT also developed UTI and acquired ureteral obstruction with placement of bilateral ureteral stents.  PT also developed C diff and was hospitalized for suspected recurrence at [facility] from 10/21/2023 to 10/25/2023.  Patient was discharged on fidaxomicin.              The patient saw Dr. Ghosh with Urology on 11/13/2023 for with recommendations for virtual visit in January to discuss possible operative intervention replaced stents, possible revision of ureteral ileal anastomosis pending disease progression.  It was instructed that the patient would need catheterized specimen from urostomy to rule out UTI if patient with future symptoms.  Patient underwent PET-CT on 11/16/2023 showing a new hypermetabolic subpleural nodule in the posterior right lower lobe measuring 2.2 x 1.3 cm.  The patient endorses a fever starting on the night of 11/16/2023.  Patient was prescribed Bactrim for suspected potential urinary tract infection.              The patient was admitted to the hospital from 11/17/23-11/21/23 for C diff colitis and suspected urinary tract infection the patient was discharged on fidaxomicin 200 mg b.i.d. for additional 7 days as well as 200 mg every other day for 25 days.  Patient was also discharged on doxycycline 100 mg b.i.d. for 25 days with instructions from Infectious Disease to remain on antibiotics as long as his ureteral stents were in place.              The patient underwent biopsy of nodule in the right lung on 12/08/2023 showing fibrosis and chronic inflammation but no evidence of  neoplasm or granuloma.              The patient was to have ureteral stents exchanged on 01/02/2024 but missed his appointment due to inclement weather.               The patient had bilateral ureteral stents exchanged by Dr. Ghosh on 1/23/24.              The patient underwent PET-CT on 03/01/2024 showing decrease in size of right lower lobe nodule now measuring 0.8 cm.              The patient underwent CTA chest on 03/06/2024 showing no evidence of pulmonary embolism or pneumonitis.  The patient underwent nuclear stress test on 03/11/2024 showed abnormal myocardial perfusion with mild intensity, small size, reversible perfusion abnormality consistent with ischemia in the basal to mid inferolateral walls.              The patient was admitted to the hospital from 04/04/2024 until 04/11/2024 with initial complaint of weakness, hypotensio, and diarrhea and found to have adrenal insufficiency, hypothyroidism, and C diff.              The patient was admitted hospital from 05/11/2024 until 05/13/2024 for urinary tract infection.  Blood cultures grew out E coli.  The patient was sent home on Cipro to complete 7 additional days of treatment.  The patient was also sent home on oral vancomycin for prevention of C diff. patient underwent bilateral ureteral stent exchange on 05/15/2024 under the care of Dr. Olivares.              The patient was admitted from 08/01/2024 until 08/04/2024 for sepsis secondary to UTI.  The patient required IV steroids for adrenal crisis during admission.  The patient was discharged on Cipro and underwent a Solu-Cortef taper.  CT of the chest, abdomen, and pelvis on 08/01/2024 showed extensive left greater than right perinephric stranding and stable metallic focus in the inferior right renal hilum.  On 08/07/2024 the patient underwent bilateral ureteral stent exchange under the care of Dr. Olivares.         Background Information  Patient Status: a current patient  Reason for Consultation: Initial  Presentation  Biopsy Date: 10/23/23  Biopsy Results: Cancer         BOARD RECOMMENDATIONS:   Recommended Plan (General)  Recommended Plan: Biopsy  Recommended Plan Note: Patient presented to discuss ability to biopsy pelvis lesion transrectally.  Plan is for patient to see Dr. Kelly for EUS if lesion can not be reached he will be sent to IR for biopsy of pelivic lesion.         Consult: Surgery, Hem/Onc, Rad/Onc, GI, and Radiology    Cancer Staging   Malignant neoplasm of overlapping sites of bladder  Staging form: Urinary Bladder, AJCC 8th Edition  - Pathologic: Stage IIIB (pT3, pN2, cM0) - Signed by Jackson Jimenez MD on 10/31/2023    Neoplasm, bladder  Staging form: Urinary Bladder, AJCC 8th Edition  - Clinical: Stage IIIB (cT3, cN2, cM0) - Unsigned    Prostate cancer  Staging form: Prostate, AJCC 8th Edition  - Pathologic: pT2, pN0, cM0 - Signed by Jackson Jimenez MD on 10/31/2023           [facility] Treatment Guidelines reviewed and care planned is consistent with guidelines.   (i.e., NCCN, NCI, PD, ACO, AUA, etc.)    PRESENTATION AT CANCER CONFERENCE:   Presentation at Cancer Conference: Prospective       CLINICAL TRIAL ELIGIBILITY:   Clinical Trial Eligibility  Clinical Trial Eligibility: None available  Clinical Trial Note: No trial available.       Jackson Jimenez MD

## 2024-09-26 ENCOUNTER — TELEPHONE (OUTPATIENT)
Dept: HEMATOLOGY/ONCOLOGY | Facility: CLINIC | Age: 74
End: 2024-09-26
Payer: MEDICARE

## 2024-09-26 ENCOUNTER — TELEPHONE (OUTPATIENT)
Dept: FAMILY MEDICINE | Facility: CLINIC | Age: 74
End: 2024-09-26
Payer: MEDICARE

## 2024-09-26 ENCOUNTER — TELEPHONE (OUTPATIENT)
Dept: UROLOGY | Facility: CLINIC | Age: 74
End: 2024-09-26
Payer: MEDICARE

## 2024-09-26 NOTE — TELEPHONE ENCOUNTER
Rec'd msg from Dr Armstrong's office:     Patient is scheduled to have Bilateral  Ureteral stent exchange with Dr Olivares on 10/9/24 , may patient have surgical clearance and permission to stop taking Aspirin 5-7 days prior to procedure. Thank you     Please advise if cleared to d/c ASA or if pt needs to be seen prior to d/c'ing ASA for surg.

## 2024-09-26 NOTE — TELEPHONE ENCOUNTER
Spoke to the patient wife Courtney and she stated that in the future that she does not want the patient to see an nurse practioner she only wants him to she the MD and she will be discussing this with the provider.

## 2024-09-27 ENCOUNTER — PATIENT MESSAGE (OUTPATIENT)
Dept: UROLOGY | Facility: CLINIC | Age: 74
End: 2024-09-27
Payer: MEDICARE

## 2024-09-27 ENCOUNTER — TELEPHONE (OUTPATIENT)
Dept: HEMATOLOGY/ONCOLOGY | Facility: CLINIC | Age: 74
End: 2024-09-27
Payer: MEDICARE

## 2024-09-27 ENCOUNTER — PATIENT MESSAGE (OUTPATIENT)
Dept: NEPHROLOGY | Facility: CLINIC | Age: 74
End: 2024-09-27
Payer: MEDICARE

## 2024-09-27 NOTE — TELEPHONE ENCOUNTER
I called the patient and instructed him that the transrectal biopsy done by Dr. Rondon on 09/25/2024 unfortunately showed recurrence of his urothelial carcinoma.  I instructed him that we would need to stop immunotherapy and switch to an alternative treatment.  I instructed him that I would have him scheduled to see me next week with an appointment.  The patient expressed understanding.  All questions were answered to his satisfaction.    Jackson Jimenez MD  [facility]  Hematology and Oncology  [facility] [address] [place] [address]   O: [phone]  F: [phone]

## 2024-09-30 ENCOUNTER — TELEPHONE (OUTPATIENT)
Dept: UROLOGY | Facility: CLINIC | Age: 74
End: 2024-09-30
Payer: MEDICARE

## 2024-09-30 ENCOUNTER — DOCUMENTATION ONLY (OUTPATIENT)
Dept: HEMATOLOGY/ONCOLOGY | Facility: CLINIC | Age: 74
End: 2024-09-30
Payer: MEDICARE

## 2024-09-30 NOTE — PROGRESS NOTES
Reviewed chart, pt completed EUS with Dr. Kelly, pathology report is in [facility] for review by Dr. Jimenez. Pt has follow up appointment scheduled.  Will continue to follow.

## 2024-09-30 NOTE — TELEPHONE ENCOUNTER
Yes - still planning the stent exchange as scheduled.  Please make sure he comes in 7-10 days prior for a catheterized urine culture

## 2024-09-30 NOTE — TELEPHONE ENCOUNTER
----- Message from [place] sent at 9/30/2024 11:58 AM CDT -----  Type:  Needs Medical Advice    Who Called: the patient  Symptoms (please be specific):  How long has patient had these symptoms:    Pharmacy name and phone #:    Would the patient rather a call back or a response via [facility]? call back  Best Call Back Number: [phone]   Additional Information: Spouse states she would like to speak to staff in regards to 10/2 appt   Please advise   Thanks

## 2024-10-01 ENCOUNTER — OFFICE VISIT (OUTPATIENT)
Dept: HEMATOLOGY/ONCOLOGY | Facility: CLINIC | Age: 74
End: 2024-10-01
Payer: MEDICARE

## 2024-10-01 VITALS
SYSTOLIC BLOOD PRESSURE: 118 MMHG | HEART RATE: 59 BPM | BODY MASS INDEX: 23.05 KG/M2 | TEMPERATURE: 98 F | HEIGHT: 69 IN | RESPIRATION RATE: 18 BRPM | WEIGHT: 155.63 LBS | DIASTOLIC BLOOD PRESSURE: 70 MMHG | OXYGEN SATURATION: 98 %

## 2024-10-01 DIAGNOSIS — C79.89 METASTATIC CANCER TO PELVIS: ICD-10-CM

## 2024-10-01 DIAGNOSIS — E27.40 ADRENAL INSUFFICIENCY: ICD-10-CM

## 2024-10-01 DIAGNOSIS — I25.10 CORONARY ARTERY DISEASE, UNSPECIFIED VESSEL OR LESION TYPE, UNSPECIFIED WHETHER ANGINA PRESENT, UNSPECIFIED WHETHER NATIVE OR TRANSPLANTED HEART: ICD-10-CM

## 2024-10-01 DIAGNOSIS — D68.59 OTHER PRIMARY THROMBOPHILIA: ICD-10-CM

## 2024-10-01 DIAGNOSIS — R91.1 LUNG NODULE: ICD-10-CM

## 2024-10-01 DIAGNOSIS — D64.9 NORMOCYTIC ANEMIA: ICD-10-CM

## 2024-10-01 DIAGNOSIS — Z96.0 URETERAL STENT PRESENT: ICD-10-CM

## 2024-10-01 DIAGNOSIS — C67.8 MALIGNANT NEOPLASM OF OVERLAPPING SITES OF BLADDER: Primary | ICD-10-CM

## 2024-10-01 DIAGNOSIS — D89.89 OTHER SPECIFIED DISORDERS INVOLVING THE IMMUNE MECHANISM, NOT ELSEWHERE CLASSIFIED: ICD-10-CM

## 2024-10-01 DIAGNOSIS — N18.31 STAGE 3A CHRONIC KIDNEY DISEASE: ICD-10-CM

## 2024-10-01 DIAGNOSIS — E03.9 HYPOTHYROIDISM, UNSPECIFIED TYPE: ICD-10-CM

## 2024-10-01 DIAGNOSIS — I72.4 ANEURYSM OF RIGHT POPLITEAL ARTERY: ICD-10-CM

## 2024-10-01 PROCEDURE — G2211 COMPLEX E/M VISIT ADD ON: HCPCS | Mod: S$GLB,,, | Performed by: INTERNAL MEDICINE

## 2024-10-01 PROCEDURE — 99215 OFFICE O/P EST HI 40 MIN: CPT | Mod: S$GLB,,, | Performed by: INTERNAL MEDICINE

## 2024-10-01 PROCEDURE — 3288F FALL RISK ASSESSMENT DOCD: CPT | Mod: CPTII,S$GLB,, | Performed by: INTERNAL MEDICINE

## 2024-10-01 PROCEDURE — 99999 PR PBB SHADOW E&M-EST. PATIENT-LVL IV: CPT | Mod: PBBFAC,,, | Performed by: INTERNAL MEDICINE

## 2024-10-01 PROCEDURE — 1101F PT FALLS ASSESS-DOCD LE1/YR: CPT | Mod: CPTII,S$GLB,, | Performed by: INTERNAL MEDICINE

## 2024-10-01 PROCEDURE — 1157F ADVNC CARE PLAN IN RCRD: CPT | Mod: CPTII,S$GLB,, | Performed by: INTERNAL MEDICINE

## 2024-10-01 PROCEDURE — 3074F SYST BP LT 130 MM HG: CPT | Mod: CPTII,S$GLB,, | Performed by: INTERNAL MEDICINE

## 2024-10-01 PROCEDURE — 1126F AMNT PAIN NOTED NONE PRSNT: CPT | Mod: CPTII,S$GLB,, | Performed by: INTERNAL MEDICINE

## 2024-10-01 PROCEDURE — 3078F DIAST BP <80 MM HG: CPT | Mod: CPTII,S$GLB,, | Performed by: INTERNAL MEDICINE

## 2024-10-01 NOTE — PROGRESS NOTES
PATIENT: Aren Bey Jr.  MRN: [id number]  DATE: 10/1/2024      Diagnosis:   1. Malignant neoplasm of overlapping sites of bladder    2. 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 Other specified disorders involving the immune mechanism, not elsewhere classified    4. Adrenal insufficiency    5. Hypothyroidism, unspecified type    6. Aneurysm of right popliteal artery    7. Lung nodule    8. Normocytic anemia    9. Other primary thrombophilia    10. Ureteral stent present    11. Coronary artery disease, unspecified vessel or lesion type, unspecified whether angina present, unspecified whether native or transplanted heart    12. Stage 3a chronic kidney disease                Chief Complaint: Malignant neoplasm of overlapping sites of bladder  Other V (2 week follow up )      Oncologic History:      Oncologic History     Oncologic Treatment     Pathology           Subjective:    Interval History:  Mr. Bey is a 74 y.o. male with HTN, Seizures, bladder cancer, h/o pulmonary embolism, who presents for bladder cancer.  Since the last clinic visit the patient underwent perirectal ultrasound with biopsy 09/25/2024 with biopsy showing metastatic urothelial carcinoma.  The patient states he is placed on antibiotics after the procedure and noticed his urine had cleared.  The patient states he is to have a stent exchange in his ureters and 10/09/2024. The patient denies CP, cough, SOB, abdominal pain, nausea, vomiting, constipation, diarrhea.  The patient denies fever, chills, night sweats, weight loss, new lumps or bumps, easy bruising or bleeding.    Prior History:  Patient underwent transurethral resection of bladder tumor on 04/28/2023 with path showing invasive urothelial carcinoma, high-grade with involvement of muscularis propria and positive for lymphovascular invasion.  Patient underwent chemotherapy at [facility].  The patient then underwent radical cystectomy and prostatectomy on 08/15/2023  with pathology showing invasive high-grade urothelial carcinoma measuring 4.2 cm, positive lymphovascular invasion, tumor identified within the soft tissue surrounding the left distal ureter, 4/6 positive regional lymph nodes with extranodal extension present. pT3aN2.  Also seen was acinar adenocarcinoma of the prostate grade group 1 (Sarah score 3+3=6) in 1 2 5% of prostate tissue pT2N0.  The patient's surgery was ultimately complicated by small-bowel obstruction, and pulmonary embolism.  PT recevied 1 dose of Nivolumab 9/18/23.  The patient was placed on anticoagulation for pulmonary embolism and ultimately developed a right perinephric bleed requiring embolization.  PT also developed UTI and acquired ureteral obstruction with placement of bilateral ureteral stents.  PT also developed C diff and was hospitalized for suspected recurrence at [facility] from 10/21/2023 to 10/25/2023.  Patient was discharged on fidaxomicin.   The patient saw Dr. Ghosh with Urology on 11/13/2023 for with recommendations for virtual visit in January to discuss possible operative intervention replaced stents, possible revision of ureteral ileal anastomosis pending disease progression.  It was instructed that the patient would need catheterized specimen from urostomy to rule out UTI if patient with future symptoms.  Patient underwent PET-CT on 11/16/2023 showing a new hypermetabolic subpleural nodule in the posterior right lower lobe measuring 2.2 x 1.3 cm.  The patient endorses a fever starting on the night of 11/16/2023.  Patient was prescribed Bactrim for suspected potential urinary tract infection.   The patient was admitted to the hospital from 11/17/23-11/21/23 for C diff colitis and suspected urinary tract infection the patient was discharged on fidaxomicin 200 mg b.i.d. for additional 7 days as well as 200 mg every other day for 25 days.  Patient was also discharged on doxycycline 100 mg b.i.d. for 25 days with instructions from  Infectious Disease to remain on antibiotics as long as his ureteral stents were in place.   The patient underwent biopsy of nodule in the right lung on 12/08/2023 showing fibrosis and chronic inflammation but no evidence of neoplasm or granuloma.   The patient was to have ureteral stents exchanged on 01/02/2024 but missed his appointment due to inclement weather.    The patient had bilateral ureteral stents exchanged by Dr. Ghosh on 1/23/24.   The patient underwent PET-CT on 03/01/2024 showing decrease in size of right lower lobe nodule now measuring 0.8 cm.   The patient underwent CTA chest on 03/06/2024 showing no evidence of pulmonary embolism or pneumonitis.  The patient underwent nuclear stress test on 03/11/2024 showed abnormal myocardial perfusion with mild intensity, small size, reversible perfusion abnormality consistent with ischemia in the basal to mid inferolateral walls.   The patient was admitted to the hospital from 04/04/2024 until 04/11/2024 with initial complaint of weakness, hypotensio, and diarrhea and found to have adrenal insufficiency, hypothyroidism, and C diff.   The patient was admitted hospital from 05/11/2024 until 05/13/2024 for urinary tract infection.  Blood cultures grew out E coli.  The patient was sent home on Cipro to complete 7 additional days of treatment.  The patient was also sent home on oral vancomycin for prevention of C diff. patient underwent bilateral ureteral stent exchange on 05/15/2024 under the care of Dr. Olivares.   The patient was admitted from 08/01/2024 until 08/04/2024 for sepsis secondary to UTI.  The patient required IV steroids for adrenal crisis during admission.  The patient was discharged on Cipro and underwent a Solu-Cortef taper.  CT of the chest, abdomen, and pelvis on 08/01/2024 showed extensive left greater than right perinephric stranding and stable metallic focus in the inferior right renal hilum.  On 08/07/2024 the patient underwent bilateral  ureteral stent exchange under the care of Dr. Olivares.   The patient underwent PET-CT on 09/16/2024 showing mild uptake in the left seminal vesicle similar to previous; 1.2 cm new rounded soft tissue density in the right hemipelvis near the rectum.     Past Medical History:   Past Medical History:   Diagnosis Date    Adrenal insufficiency 05/2024    Aneurysm artery, popliteal 2018    right leg    Bladder cancer     CKD (chronic kidney disease) stage 3, GFR 30-59 ml/min     Coronary artery disease     cabg x 4 11/2021 [facility]    Dyslipidemia     Encounter for blood transfusion     several transfusions    H/O Clostridium difficile infection     Hx pulmonary embolism     8/2023    Hypertension     Hyperthyroidism 05/2024    Immunotherapy     nivolomab / monthly md manuel    Malignant neoplasm of prostate     Presence of urostomy     Seizures     remote hx  last ~2007    SOB (shortness of breath)     Transfusion reaction     possible reaction, not sure       Past Surgical HIstory:   Past Surgical History:   Procedure Laterality Date    COLONOSCOPY N/A 9/25/2024    Procedure: COLONOSCOPY;  Surgeon: Pablo Kelly MD;  Location: [facility];  Service: Endoscopy;  Laterality: N/A;    CORONARY ARTERY BYPASS GRAFT      11/2021    CREATION, ILEAL CONDUIT  08/2023    ENDOSCOPIC ULTRASOUND OF LOWER GASTROINTESTINAL TRACT Left 9/25/2024    Procedure: ULTRASOUND, LOWER GI TRACT, ENDOSCOPIC;  Surgeon: Pablo Kelly MD;  Location: [facility];  Service: Endoscopy;  Laterality: Left;    ENDOSCOPY OF ILEAL CONDUIT Bilateral 5/15/2024    Procedure: ENDOSCOPY, ILEAL CONDUIT - Bilateral Stent Exchange  Bilateral Retrograde Pyelogram;  Surgeon: Manuelito Olivares MD;  Location: [facility];  Service: Urology;  Laterality: Bilateral;    ENDOSCOPY OF ILEAL CONDUIT N/A 8/7/2024    Procedure: ENDOSCOPY, ILEAL CONDUIT;  Surgeon: Manuelito Olivares MD;  Location: [facility];  Service: Urology;  Laterality: N/A;    HERNIA REPAIR      umbilical     PROSTATECTOMY      PTA, POPLITEAL      REMOVAL-STENT Bilateral 01/23/2024    Procedure: REMOVAL-STENT;  Surgeon: South Ghosh MD;  Location: [facility] OR 1ST FLR;  Service: Urology;  Laterality: Bilateral;    REPLACEMENT OF STENT Bilateral 8/7/2024    Procedure: REPLACEMENT, STENT-bilateral ureteral  exchange with Retrograde;  Surgeon: Manuelito Olivares MD;  [facility];  Service: Urology;  Laterality: Bilateral;    URETERAL STENT PLACEMENT Bilateral 01/23/2024    Procedure: INSERTION, STENT, URETER;  Surgeon: South Ghosh MD;  Location: [facility] OR 1ST FLR;  Service: Urology;  Laterality: Bilateral;       Family History:   Family History   Problem Relation Name Age of Onset    Heart disease Mother      Hypertension Mother      Peripheral vascular disease Father 72        Social History:  reports that he has never smoked. He has never been exposed to tobacco smoke. He has never used smokeless tobacco. He reports that he does not currently use alcohol. He reports that he does not use drugs.    Allergies:  Review of patient's allergies indicates:   Allergen Reactions    Clopidogrel Other (See Comments)     Increased body temperature and redness        Medications:  Current Outpatient Medications   Medication Sig Dispense Refill    aspirin (ECOTRIN) 81 MG EC tablet Take 1 tablet (81 mg total) by mouth once daily. 90 tablet 3    atorvastatin (LIPITOR) 40 MG tablet Take 40 mg by mouth once daily.      cholecalciferol, vitamin D3, (VITAMIN D3) 125 mcg (5,000 unit) Tab Take 5,000 Units by mouth once daily.      ferrous sulfate (FEOSOL) 325 mg (65 mg iron) Tab tablet Take 325 mg by mouth daily with breakfast.      hydrocortisone (CORTEF) 20 MG Tab Take 1 tablet (20 mg total) by mouth once daily. Will need 10 extra tablet for stress dosing 100 tablet 3    hydrocortisone (CORTEF) 5 MG Tab TAKE 4 TABLETS (20 MG) IN THE MORNING AND TAKE 1 TABLET (5 MG) TABLET IN EARLY AFTERNOON (Patient taking differently: 10 mg. TAKE 4 TABLETS  "(20 MG) IN THE MORNING AND TAKE 1 TABLET (5 MG) TABLET IN EARLY AFTERNOON) 150 tablet 6    levothyroxine (SYNTHROID) 88 MCG tablet Take 1 tablet (88 mcg total) by mouth before breakfast. 30 tablet 11    LIDOcaine-prilocaine (EMLA) cream Apply topically once as needed (to port access for immunotherapy treatment).      mecobalamin (B12 ACTIVE ORAL) Take by mouth.      multivitamin (THERAGRAN) per tablet Take 1 tablet by mouth once daily.      QUEtiapine (SEROQUEL) 25 MG Tab Take one half tab by mouth nightly as needed for insomnia (Patient taking differently: 12.5 mg nightly.) 90 tablet 1    ZINC ACETATE ORAL Take by mouth.       No current facility-administered medications for this visit.       Review of Systems   Constitutional:  Negative for appetite change, chills, diaphoresis, fatigue, fever and unexpected weight change.   HENT:  Negative for mouth sores.    Eyes:  Negative for visual disturbance.   Respiratory:  Negative for cough and shortness of breath.    Cardiovascular:  Negative for chest pain and palpitations.   Gastrointestinal:  Negative for abdominal pain, constipation, diarrhea, nausea and vomiting.   Genitourinary:  Negative for difficulty urinating, dysuria, flank pain and frequency.   Musculoskeletal:  Negative for back pain.   Skin:  Negative for color change and rash.   Neurological:  Negative for headaches.   Hematological:  Negative for adenopathy. Does not bruise/bleed easily.   Psychiatric/Behavioral:  The patient is not nervous/anxious.        ECOG Performance Status: 1   Objective:      Vitals:   Vitals:    10/01/24 1000   BP: 118/70   BP Location: Left arm   Patient Position: Sitting   Pulse: (!) 59   Resp: 18   Temp: 97.8 °F (36.6 °C)   TempSrc: Temporal   SpO2: 98%   Weight: 70.6 kg (155 lb 10.3 oz)   Height: 5' 9" (1.753 m)             Physical Exam  Constitutional:       General: He is not in acute distress.     Appearance: He is well-developed. He is not diaphoretic.   HENT:      Head: " Normocephalic and atraumatic.   Cardiovascular:      Rate and Rhythm: Normal rate and regular rhythm.      Heart sounds: Normal heart sounds. No murmur heard.     No friction rub. No gallop.   Pulmonary:      Effort: Pulmonary effort is normal. No respiratory distress.      Breath sounds: Normal breath sounds. No wheezing or rales.   Chest:      Chest wall: No tenderness.   Abdominal:      General: Bowel sounds are normal. There is no distension.      Palpations: Abdomen is soft. There is no mass.      Tenderness: There is no abdominal tenderness. There is no rebound.   Musculoskeletal:      Cervical back: Normal range of motion.   Lymphadenopathy:      Cervical: No cervical adenopathy.      Upper Body:      Right upper body: No supraclavicular or axillary adenopathy.      Left upper body: No supraclavicular or axillary adenopathy.   Skin:     General: Skin is warm and dry.      Findings: No erythema or rash.   Neurological:      Mental Status: He is alert and oriented to person, place, and time.   Psychiatric:         Behavior: Behavior normal.         Laboratory Data:  Hospital Outpatient Visit on 10/01/2024   Component Date Value Ref Range Status    WBC 10/01/2024 7.20  3.90 - 12.70 K/uL Final    RBC 10/01/2024 4.04 (L)  4.60 - 6.20 M/uL Final    Hemoglobin 10/01/2024 12.6 (L)  14.0 - 18.0 g/dL Final    Hematocrit 10/01/2024 38.1 (L)  40.0 - 54.0 % Final    MCV 10/01/2024 94  82 - 98 fL Final    MCH 10/01/2024 31.2 (H)  27.0 - 31.0 pg Final    MCHC 10/01/2024 33.1  32.0 - 36.0 g/dL Final    RDW 10/01/2024 13.2  11.5 - 14.5 % Final    Platelets 10/01/2024 253  150 - 450 K/uL Final    MPV 10/01/2024 9.6  9.2 - 12.9 fL Final         Imaging:     PET/CT 9/16/24    Head and Neck:     Brain demonstrates normal metabolism. However, FDG-PET has an approximate 60% sensitivity for brain metastases which are best detected by MRI with gadolinium. Physiologic uptake is in the neck.     Chest:     No FDG avid pulmonary lesions  are present. No enlarged or FDG avid lymph nodes are in the chest.     Abdomen and Pelvis:     Physiologic uptake is in the liver, spleen, urinary system and bowel. Operative changes are stable. Adrenal glands are normal. In the pelvis, at the level of the seminal vesicles, there is mild uptake on the left, max SUV of 6.4, previously 5.1, and max SUV of 5.8 on the left, previously 4.7. In the lower right hemipelvis, near the rectum, there is a new 1.2 cm rounded soft tissue density on image 254 with a max SUV of 2.3.     Musculoskeletal:     No FDG avid osseous lesions are present.       Assessment:       1. Malignant neoplasm of overlapping sites of bladder    2. 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 Other specified disorders involving the immune mechanism, not elsewhere classified    4. Adrenal insufficiency    5. Hypothyroidism, unspecified type    6. Aneurysm of right popliteal artery    7. Lung nodule    8. Normocytic anemia    9. Other primary thrombophilia    10. Ureteral stent present    11. Coronary artery disease, unspecified vessel or lesion type, unspecified whether angina present, unspecified whether native or transplanted heart    12. Stage 3a chronic kidney disease                           Plan:     Bladder Cancer - pt with stage IV bladder cancer [id number] s/p neoadjuvant chemo followed by radical cystoprostatectomy 8/15/23  -PT received one dose of Nivolumab on 9/18/23  -PET/CT suggestive of a RLL pulmonary nodule  -Biopsy on 12/08/23 negative for malignancy  -Pt completed 10 cycles of Nivolumab  -PET/CT on 9/16/24 showed a 1.2 cm rounded soft tissue density in the right hemipelvis near the rectum  -Pt underwent transrectal biopsy 9/25/24 with path showing Urothelial carcinoma   -Will discuss pt at [facility] tumor board to determine if any local therapies appropriate  -Will plan on treatment with systemic therapy  -Pt given information on Enfortumab-Vedotin and Gemcitabine/Carboplatin  -Visit today included  increased complexity associated with the care of the episodic problem (Chemotherapy) addressed and managing the longitudinal care of the patient due to the serious and/or complex managed problem(s) Nivolumab      Hypothyroidism - Synthroid 88mcg   -Management per Dr Xiong in endocrinology  -TSH normal 9/16/24    Adrenal Insufficiency - pt with positive Cosyntropin stim test in the hospital  -PT on Hydrocortisone 20mg daily  -Pt following with endocrinology    CAD - pt with prior bypass surgery  -Cardiac stress test on 3/11/24 was abnormal with mild intensity, small size, reversible perfusion abnormality consistent with ischemia in the basal to mid inferolateral walls  -Pt to follow up with cardiologist    Pulmonary nodule - see above    Prostate Cancer - patient with acinar adenocarcinoma of the prostate grade group 1 (Phoenix score 3+3=6) in 1 2 5% of prostate tissue pT2N0  -PSA 10/31/23 0.02ng/mL  -Will monitor    Ureteral Stents - Stents exchanged last on 8/07/24 under the care of Dr Olivares  -Pt to follow up with Urology    Anemia - Hemoglobin 12.5g/dL on 9/16/24  -Stable  -Will monitor     Thrombophilia - pt with prior PE  -Pt subsequently developed a right perinephric bleed requiring embolization   -Patient with IVC filter in place  -Pt following with Dr Claire  -Vascular surgery to determine if he could be started on Eliquis  -Will monitor    Right popliteal aneurysm - pt to see vascular surgery    CKDIII - creatinine 1.7 on 9/16/24  -Pt following with Nephrology  -Will monitor    Route Chart for Scheduling    Med Onc Chart Routing      Follow up with physician 2 weeks. Pt needs an appt with me on 10/11/24.  OK to double book.   Follow up with [facility]    Infusion scheduling note    Injection scheduling note    Labs    Imaging    Pharmacy appointment    Other referrals                Therapy Plan Information  BEZLOTOXUMAB (ZINPLAVA) ONE DOSE for History of Clostridium difficile infection, noted on  4/5/2024  dextrose 5 % (D5W) 100 mL flush bag  Intravenous, Once  heparin, porcine (PF) 100 unit/mL injection flush 500 Units  500 Units, Intravenous, PRN  alteplase injection 2 mg  2 mg, Intra-Catheter, PRN      No therapy plan of the specified type found.    No therapy plan of the specified type found.      Jackson Jimenez MD  [facility]  Hematology and Oncology  [facility]ly [facility]   [address]   [place] [address]   O: [phone]  F: [phone]

## 2024-10-10 ENCOUNTER — TUMOR BOARD CONFERENCE (OUTPATIENT)
Dept: HEMATOLOGY/ONCOLOGY | Facility: CLINIC | Age: 74
End: 2024-10-10
Payer: MEDICARE

## 2024-10-10 NOTE — PROGRESS NOTES
[facility]       MULTIDISCIPLINARY TUMOR BOARD    Date: 10/10/2024  MRN: [id number]    Site:   Cancer Type: Bladder cancer     Cancer Site: metastatic urothelial carcinoma     Cancer Staging Complete: Yes     Presenting Hospital / Clinic: [facility]     Virtual Tumor Board Conference: In person     PRESENTER:   Presenter: Dr. Jackson Jimenez     Specialties Present: Medical Oncology; Hematology; Radiation Oncology; Surgical Oncology; Pathology; Navigation; Research; Integrative Oncology; Radiology; Urology; Genetics     PATIENT SUMMARY:   74 y.o. male with HTN, Seizures, bladder cancer, h/o pulmonary embolism, who presents for bladder cancer.  Since the last clinic visit the patient underwent perirectal ultrasound with biopsy 09/25/2024 with biopsy showing metastatic urothelial carcinoma.  The patient states he is placed on antibiotics after the procedure and noticed his urine had cleared.  The patient states he is to have a stent exchange in his ureters and 10/09/2024. The patient denies CP, cough, SOB, abdominal pain, nausea, vomiting, constipation, diarrhea.  The patient denies fever, chills, night sweats, weight loss, new lumps or bumps, easy bruising or bleeding.     Background Information  Patient Status: a current patient  Reason for Consultation: Follow-Up from Prior Tumor Board  Biopsy Date: 09/25/24  Biopsy Results: Cancer  Treatment to Date: Surgical Intervention(s); Adjuvant Chemotherapy      BOARD RECOMMENDATIONS:   Recommended Plan (General)  Recommended Plan: Chemotherapy; Surgery  Recommended Plan Note: Plan to proceed with chemo for metastatic urothelial carcinoma. Consult IR for cryoablation of enlarged pelvic nodes. Possible surgical excision.         Cancer Staging   Malignant neoplasm of overlapping sites of bladder  Staging form: Urinary Bladder, AJCC 8th Edition  - Pathologic: Stage SIM (pT3,  pN2, pM1a) - Signed by Jackson Jimenez MD on 10/1/2024    Neoplasm, bladder  Staging form: Urinary Bladder, AJCC 8th Edition  - Clinical: Stage IIIB (cT3, cN2, cM0) - Unsigned    Prostate cancer  Staging form: Prostate, AJCC 8th Edition  - Pathologic: pT2, pN0, cM0 - Signed by Jackson Jimenez MD on 10/31/2023         [facility] Treatment Guidelines reviewed and care planned is consistent with guidelines.   (i.e., NCCN, NCI, PD, ACO, AUA, etc.)    PRESENTATION AT CANCER CONFERENCE:   Presentation at Cancer Conference: Prospective     CLINICAL TRIAL ELIGIBILITY:   Clinical Trial Eligibility  Clinical Trial Eligibility: Not eligible       Rupali Charles

## 2024-10-10 NOTE — PROGRESS NOTES
PATIENT: Aren Bey Jr.  MRN: [id number]  DATE: 10/11/2024      Diagnosis:   1. 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 Malignant neoplasm of overlapping sites of bladder    3. Other specified disorders involving the immune mechanism, not elsewhere classified    4. Adrenal insufficiency    5. Hypothyroidism, unspecified type    6. Aneurysm of right popliteal artery    7. Lung nodule    8. Normocytic anemia    9. Other primary thrombophilia    10. Ureteral stent present    11. Coronary artery disease, unspecified vessel or lesion type, unspecified whether angina present, unspecified whether native or transplanted heart    12. Stage 3a chronic kidney disease                  Chief Complaint: Malignant neoplasm of overlapping sites of bladder (1 week follow up)      Oncologic History:      Oncologic History     Oncologic Treatment     Pathology           Subjective:    Interval History:  Mr. eBy is a 74 y.o. male with HTN, Seizures, bladder cancer, h/o pulmonary embolism, who presents for bladder cancer.  Since the last clinic visit with the patient had replacement of bilateral single J ureteral stents on 10/09/2024 under the care of Dr. Olivares.  The patient's case was discussed at tumor board on 10/10/2024 with recommendations for systemic treatment followed by potential radiation versus IR ablation versus surgery  The patient denies CP, cough, SOB, abdominal pain, nausea, vomiting, constipation, diarrhea.  The patient denies fever, chills, night sweats, weight loss, new lumps or bumps, easy bruising or bleeding.    Prior History:  Patient underwent transurethral resection of bladder tumor on 04/28/2023 with path showing invasive urothelial carcinoma, high-grade with involvement of muscularis propria and positive for lymphovascular invasion.  Patient underwent chemotherapy at [facility].  The patient then underwent radical cystectomy and prostatectomy on 08/15/2023 with pathology showing  invasive high-grade urothelial carcinoma measuring 4.2 cm, positive lymphovascular invasion, tumor identified within the soft tissue surrounding the left distal ureter, 4/6 positive regional lymph nodes with extranodal extension present. pT3aN2.  Also seen was acinar adenocarcinoma of the prostate grade group 1 (Sarah score 3+3=6) in 1 2 5% of prostate tissue pT2N0.  The patient's surgery was ultimately complicated by small-bowel obstruction, and pulmonary embolism.  PT recevied 1 dose of Nivolumab 9/18/23.  The patient was placed on anticoagulation for pulmonary embolism and ultimately developed a right perinephric bleed requiring embolization.  PT also developed UTI and acquired ureteral obstruction with placement of bilateral ureteral stents.  PT also developed C diff and was hospitalized for suspected recurrence at [facility] from 10/21/2023 to 10/25/2023.  Patient was discharged on fidaxomicin.   The patient saw Dr. Ghosh with Urology on 11/13/2023 for with recommendations for virtual visit in January to discuss possible operative intervention replaced stents, possible revision of ureteral ileal anastomosis pending disease progression.  It was instructed that the patient would need catheterized specimen from urostomy to rule out UTI if patient with future symptoms.  Patient underwent PET-CT on 11/16/2023 showing a new hypermetabolic subpleural nodule in the posterior right lower lobe measuring 2.2 x 1.3 cm.  The patient endorses a fever starting on the night of 11/16/2023.  Patient was prescribed Bactrim for suspected potential urinary tract infection.   The patient was admitted to the hospital from 11/17/23-11/21/23 for C diff colitis and suspected urinary tract infection the patient was discharged on fidaxomicin 200 mg b.i.d. for additional 7 days as well as 200 mg every other day for 25 days.  Patient was also discharged on doxycycline 100 mg b.i.d. for 25 days with instructions from Infectious Disease to  remain on antibiotics as long as his ureteral stents were in place.   The patient underwent biopsy of nodule in the right lung on 12/08/2023 showing fibrosis and chronic inflammation but no evidence of neoplasm or granuloma.   The patient was to have ureteral stents exchanged on 01/02/2024 but missed his appointment due to inclement weather.    The patient had bilateral ureteral stents exchanged by Dr. Ghosh on 1/23/24.   The patient underwent PET-CT on 03/01/2024 showing decrease in size of right lower lobe nodule now measuring 0.8 cm.   The patient underwent CTA chest on 03/06/2024 showing no evidence of pulmonary embolism or pneumonitis.  The patient underwent nuclear stress test on 03/11/2024 showed abnormal myocardial perfusion with mild intensity, small size, reversible perfusion abnormality consistent with ischemia in the basal to mid inferolateral walls.   The patient was admitted to the hospital from 04/04/2024 until 04/11/2024 with initial complaint of weakness, hypotensio, and diarrhea and found to have adrenal insufficiency, hypothyroidism, and C diff.   The patient was admitted hospital from 05/11/2024 until 05/13/2024 for urinary tract infection.  Blood cultures grew out E coli.  The patient was sent home on Cipro to complete 7 additional days of treatment.  The patient was also sent home on oral vancomycin for prevention of C diff. patient underwent bilateral ureteral stent exchange on 05/15/2024 under the care of Dr. Olivares.   The patient was admitted from 08/01/2024 until 08/04/2024 for sepsis secondary to UTI.  The patient required IV steroids for adrenal crisis during admission.  The patient was discharged on Cipro and underwent a Solu-Cortef taper.  CT of the chest, abdomen, and pelvis on 08/01/2024 showed extensive left greater than right perinephric stranding and stable metallic focus in the inferior right renal hilum.  On 08/07/2024 the patient underwent bilateral ureteral stent exchange under  the care of Dr. Olivares.   The patient underwent PET-CT on 09/16/2024 showing mild uptake in the left seminal vesicle similar to previous; 1.2 cm new rounded soft tissue density in the right hemipelvis near the rectum.    The patient underwent perirectal ultrasound with biopsy 09/25/2024 with biopsy showing metastatic urothelial carcinoma.    Past Medical History:   Past Medical History:   Diagnosis Date    Adrenal insufficiency 05/2024    Aneurysm artery, popliteal 2018    right leg    Bladder cancer     CKD (chronic kidney disease) stage 3, GFR 30-59 ml/min     Coronary artery disease     cabg x 4 11/2021 [facility]    Dyslipidemia     Encounter for blood transfusion     several transfusions    H/O Clostridium difficile infection     Hx pulmonary embolism     8/2023    Hypertension     Hyperthyroidism 05/2024    Immunotherapy     nivolomab / monthly md manuel    Malignant neoplasm of prostate     Presence of urostomy     Seizures     remote hx  last ~2007    SOB (shortness of breath)     Transfusion reaction     possible reaction, not sure       Past Surgical HIstory:   Past Surgical History:   Procedure Laterality Date    COLONOSCOPY N/A 9/25/2024    Procedure: COLONOSCOPY;  Surgeon: Pablo Kelly MD;  Location: [facility] ENDO;  Service: Endoscopy;  Laterality: N/A;    CORONARY ARTERY BYPASS GRAFT      11/2021    CREATION, ILEAL CONDUIT  08/2023    ENDOSCOPIC ULTRASOUND OF LOWER GASTROINTESTINAL TRACT Left 9/25/2024    Procedure: ULTRASOUND, LOWER GI TRACT, ENDOSCOPIC;  Surgeon: Pablo Kelly MD;  Location: [facility] ENDO;  Service: Endoscopy;  Laterality: Left;    ENDOSCOPY OF ILEAL CONDUIT Bilateral 5/15/2024    Procedure: ENDOSCOPY, ILEAL CONDUIT - Bilateral Stent Exchange  Bilateral Retrograde Pyelogram;  Surgeon: Manuelito Olivares MD;  Location: [facility] OR;  Service: Urology;  Laterality: Bilateral;    ENDOSCOPY OF ILEAL CONDUIT N/A 8/7/2024    Procedure: ENDOSCOPY, ILEAL CONDUIT;  Surgeon: Manuelito Olivares MD;   Location: [facility] OR;  Service: Urology;  Laterality: N/A;    ENDOSCOPY OF ILEAL CONDUIT Bilateral 10/9/2024    Procedure: ENDOSCOPY, ILEAL CONDUIT With Retrograde Pyelogram;  Surgeon: Manuelito Olivares MD;  Location: [facility] OR;  Service: Urology;  Laterality: Bilateral;    HERNIA REPAIR      umbilical    PROSTATECTOMY      PTA, POPLITEAL      REMOVAL-STENT Bilateral 01/23/2024    Procedure: REMOVAL-STENT;  Surgeon: South Ghosh MD;  Location: [facility] OR 1ST FLR;  Service: Urology;  Laterality: Bilateral;    REPLACEMENT OF STENT Bilateral 8/7/2024    Procedure: REPLACEMENT, STENT-bilateral ureteral  exchange with Retrograde;  Surgeon: Manuelito Olivares MD;  Location: [facility] OR;  Service: Urology;  Laterality: Bilateral;    REPLACEMENT OF STENT Bilateral 10/9/2024    Procedure: REPLACEMENT, STENT, Exchange;  Surgeon: Manuelito Olivares MD;  Location: [facility] OR;  Service: Urology;  Laterality: Bilateral;    URETERAL STENT PLACEMENT Bilateral 01/23/2024    Procedure: INSERTION, STENT, URETER;  Surgeon: South Ghosh MD;  Location: [facility] OR 1ST FLR;  Service: Urology;  Laterality: Bilateral;       Family History:   Family History   Problem Relation Name Age of Onset    Heart disease Mother      Hypertension Mother      Peripheral vascular disease Father 72        Social History:  reports that he has never smoked. He has never been exposed to tobacco smoke. He has never used smokeless tobacco. He reports that he does not currently use alcohol. He reports that he does not use drugs.    Allergies:  Review of patient's allergies indicates:   Allergen Reactions    Clopidogrel Other (See Comments)     Increased body temperature and redness        Medications:  Current Outpatient Medications   Medication Sig Dispense Refill    aspirin (ECOTRIN) 81 MG EC tablet Take 1 tablet (81 mg total) by mouth once daily. 90 tablet 3    atorvastatin (LIPITOR) 40 MG tablet Take 40 mg by mouth once daily.      cholecalciferol, vitamin D3, (VITAMIN D3) 125 mcg  (5,000 unit) Tab Take 5,000 Units by mouth once daily.      ferrous sulfate (FEOSOL) 325 mg (65 mg iron) Tab tablet Take 325 mg by mouth daily with breakfast.      hydrocortisone (CORTEF) 20 MG Tab Take 1 tablet (20 mg total) by mouth once daily. Will need 10 extra tablet for stress dosing 100 tablet 3    hydrocortisone (CORTEF) 5 MG Tab TAKE 4 TABLETS (20 MG) IN THE MORNING AND TAKE 1 TABLET (5 MG) TABLET IN EARLY AFTERNOON (Patient taking differently: 10 mg. TAKE 4 TABLETS (20 MG) IN THE MORNING AND TAKE 1 TABLET (5 MG) TABLET IN EARLY AFTERNOON) 150 tablet 6    levothyroxine (SYNTHROID) 88 MCG tablet Take 1 tablet (88 mcg total) by mouth before breakfast. 30 tablet 11    LIDOcaine-prilocaine (EMLA) cream Apply topically once as needed (to port access for immunotherapy treatment).      mecobalamin (B12 ACTIVE ORAL) Take by mouth.      multivitamin (THERAGRAN) per tablet Take 1 tablet by mouth once daily.      QUEtiapine (SEROQUEL) 25 MG Tab Take one half tab by mouth nightly as needed for insomnia (Patient taking differently: 12.5 mg nightly.) 90 tablet 1    vancomycin (VANCOCIN) 125 MG capsule Take 1 capsule (125 mg total) by mouth 4 (four) times daily. for 7 days 28 capsule 0    ZINC ACETATE ORAL Take by mouth.       No current facility-administered medications for this visit.       Review of Systems   Constitutional:  Negative for appetite change, chills, diaphoresis, fatigue, fever and unexpected weight change.   HENT:  Negative for mouth sores.    Eyes:  Negative for visual disturbance.   Respiratory:  Negative for cough and shortness of breath.    Cardiovascular:  Negative for chest pain and palpitations.   Gastrointestinal:  Negative for abdominal pain, constipation, diarrhea, nausea and vomiting.   Genitourinary:  Negative for difficulty urinating, dysuria, flank pain and frequency.   Musculoskeletal:  Negative for back pain.   Skin:  Negative for color change and rash.   Neurological:  Negative for  "headaches.   Hematological:  Negative for adenopathy. Does not bruise/bleed easily.   Psychiatric/Behavioral:  The patient is not nervous/anxious.        ECOG Performance Status: 1   Objective:      Vitals:   Vitals:    10/11/24 0928   BP: 110/66   Pulse: 66   Resp: 16   Temp: 97.4 °F (36.3 °C)   TempSrc: Temporal   SpO2: 95%   Weight: 70.4 kg (155 lb 3.3 oz)   Height: 5' 9" (1.753 m)               Physical Exam  Constitutional:       General: He is not in acute distress.     Appearance: He is well-developed. He is not diaphoretic.   HENT:      Head: Normocephalic and atraumatic.   Cardiovascular:      Rate and Rhythm: Normal rate and regular rhythm.      Heart sounds: Normal heart sounds. No murmur heard.     No friction rub. No gallop.   Pulmonary:      Effort: Pulmonary effort is normal. No respiratory distress.      Breath sounds: Normal breath sounds. No wheezing or rales.   Chest:      Chest wall: No tenderness.   Abdominal:      General: Bowel sounds are normal. There is no distension.      Palpations: Abdomen is soft. There is no mass.      Tenderness: There is no abdominal tenderness. There is no rebound.   Musculoskeletal:      Cervical back: Normal range of motion.   Lymphadenopathy:      Cervical: No cervical adenopathy.      Upper Body:      Right upper body: No supraclavicular or axillary adenopathy.      Left upper body: No supraclavicular or axillary adenopathy.   Skin:     General: Skin is warm and dry.      Findings: No erythema or rash.   Neurological:      Mental Status: He is alert and oriented to person, place, and time.   Psychiatric:         Behavior: Behavior normal.         Laboratory Data:  No visits with results within 1 Week(s) from this visit.   Latest known visit with results is:   Hospital Outpatient Visit on 10/01/2024   Component Date Value Ref Range Status    Sodium 10/01/2024 134 (L)  136 - 145 mmol/L Final    Potassium 10/01/2024 4.7  3.5 - 5.1 mmol/L Final    Anion Gap reference " range revised on 4/28/2023    Chloride 10/01/2024 100  95 - 110 mmol/L Final    CO2 10/01/2024 24  22 - 31 mmol/L Final    Glucose 10/01/2024 100  70 - 110 mg/dL Final    Comment: The ADA recommends the following guidelines for fasting glucose:    Normal:       less than 100 mg/dL    Prediabetes:  100 mg/dL to 125 mg/dL    Diabetes:     126 mg/dL or higher      BUN 10/01/2024 27 (H)  9 - 21 mg/dL Final    Creatinine 10/01/2024 1.53 (H)  0.50 - 1.40 mg/dL Final    Calcium 10/01/2024 9.7  8.4 - 10.2 mg/dL Final    Anion Gap 10/01/2024 10  5 - 12 mmol/L Final    Anion Gap reference range revised on 4/28/2023    eGFR 10/01/2024 47 (A)  >60 mL/min/1.73 m^2 Final    aPTT 10/01/2024 27.9  24.6 - 36.7 sec Final    PTT normal range is not established for pediatrics.    WBC 10/01/2024 7.20  3.90 - 12.70 K/uL Final    RBC 10/01/2024 4.04 (L)  4.60 - 6.20 M/uL Final    Hemoglobin 10/01/2024 12.6 (L)  14.0 - 18.0 g/dL Final    Hematocrit 10/01/2024 38.1 (L)  40.0 - 54.0 % Final    MCV 10/01/2024 94  82 - 98 fL Final    MCH 10/01/2024 31.2 (H)  27.0 - 31.0 pg Final    MCHC 10/01/2024 33.1  32.0 - 36.0 g/dL Final    RDW 10/01/2024 13.2  11.5 - 14.5 % Final    Platelets 10/01/2024 253  150 - 450 K/uL Final    MPV 10/01/2024 9.6  9.2 - 12.9 fL Final    PT 10/01/2024 12.7  11.8 - 14.7 sec Final    PT normal range is not established for pediatrics.    INR 10/01/2024 1.0   Final         Imaging:     PET/CT 9/16/24    Head and Neck:     Brain demonstrates normal metabolism. However, FDG-PET has an approximate 60% sensitivity for brain metastases which are best detected by MRI with gadolinium. Physiologic uptake is in the neck.     Chest:     No FDG avid pulmonary lesions are present. No enlarged or FDG avid lymph nodes are in the chest.     Abdomen and Pelvis:     Physiologic uptake is in the liver, spleen, urinary system and bowel. Operative changes are stable. Adrenal glands are normal. In the pelvis, at the level of the seminal  vesicles, there is mild uptake on the left, max SUV of 6.4, previously 5.1, and max SUV of 5.8 on the left, previously 4.7. In the lower right hemipelvis, near the rectum, there is a new 1.2 cm rounded soft tissue density on image 254 with a max SUV of 2.3.     Musculoskeletal:     No FDG avid osseous lesions are present.       Assessment:       1. 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 Malignant neoplasm of overlapping sites of bladder    3. Other specified disorders involving the immune mechanism, not elsewhere classified    4. Adrenal insufficiency    5. Hypothyroidism, unspecified type    6. Aneurysm of right popliteal artery    7. Lung nodule    8. Normocytic anemia    9. Other primary thrombophilia    10. Ureteral stent present    11. Coronary artery disease, unspecified vessel or lesion type, unspecified whether angina present, unspecified whether native or transplanted heart    12. Stage 3a chronic kidney disease                             Plan:     Bladder Cancer - pt with stage IV bladder cancer [id number] s/p neoadjuvant chemo followed by radical cystoprostatectomy 8/15/23  -PT received one dose of Nivolumab on 9/18/23  -PET/CT suggestive of a RLL pulmonary nodule  -Biopsy on 12/08/23 negative for malignancy  -Pt completed 10 cycles of Nivolumab  -PET/CT on 9/16/24 showed a 1.2 cm rounded soft tissue density in the right hemipelvis near the rectum  -Pt underwent transrectal biopsy 9/25/24 with path showing Urothelial carcinoma   -Case was discussed at tumor board on 10/10/2024 with recommendations for systemic treatment followed by potential radiation versus IR ablation versus surgery  -Will proceed with Enfortumab Vedotin  -[facility] blood testing and pharmacogenomic testing to be sent  -Will send biopsy from 9/25/24 for [facility] testing  -Visit today included increased complexity associated with the care of the episodic problem (Chemotherapy) addressed and managing the longitudinal care of the patient due to the  serious and/or complex managed problem(s) Nivolumab    Hypothyroidism - Synthroid 88mcg   -Management per Dr Xiong in endocrinology  -TSH normal 9/16/24    Adrenal Insufficiency - pt with positive Cosyntropin stim test in the hospital  -PT on Hydrocortisone 20mg daily  -Pt following with endocrinology    CAD - pt with prior bypass surgery  -Cardiac stress test on 3/11/24 was abnormal with mild intensity, small size, reversible perfusion abnormality consistent with ischemia in the basal to mid inferolateral walls  -Pt to follow up with cardiologist    Pulmonary nodule - see above    Prostate Cancer - patient with acinar adenocarcinoma of the prostate grade group 1 (Sarah score 3+3=6) in 1 2 5% of prostate tissue pT2N0  -PSA 10/31/23 0.02ng/mL  -Will monitor    Ureteral Stents - Stents exchanged last on 10/09/24 under the care of Dr Olivares  -Pt to follow up with Urology    Anemia - Hemoglobin 12.6g/dL on 1001/24  -Stable  -Will monitor     Thrombophilia - pt with prior PE  -Pt subsequently developed a right perinephric bleed requiring embolization   -Patient with IVC filter in place  -Pt following with Dr Claire  -Vascular surgery to determine if he could be started on Eliquis  -Will monitor    Right popliteal aneurysm - pt to see vascular surgery    CKDIII - creatinine 1.53 on 10/01/24  -Pt following with Nephrology  -Will monitor    Route Chart for Scheduling    Med Onc Chart Routing      Follow up with physician Other. Pt needs blood work today with [facility] blood and Pharmacogenomic testing.  Pt needs approval for enfortumab vedotin.  Pt needs a chemo class. Pt needs a CBC, CMP and TSH with an appt with me rene day prior to treatment,  Pt needs to be set up for chemocare  today   Follow up with [facility]    Infusion scheduling note    Injection scheduling note    Labs    Imaging    Pharmacy appointment    Other referrals              Treatment Plan Information   OP ENFORTUMAB VEDOTIN Q4W Jackson Jimenez MD    Associated diagnosis: Malignant neoplasm of overlapping sites of bladder Stage SIM pT3, pN2, pM1a noted on 10/23/2023   Line of treatment: Second Line  Treatment Goal: Palliative     Upcoming Treatment Dates - OP ENFORTUMAB VEDOTIN Q4W    10/15/2024       Pre-Medications       ondansetron injection 8 mg       Chemotherapy       enfortumab vedotin-ejfv (PADCEV) 90 mg in D5W 109 mL infusion  10/22/2024       Pre-Medications       ondansetron injection 8 mg       Chemotherapy       enfortumab vedotin-ejfv (PADCEV) 90 mg in D5W 109 mL infusion  10/29/2024       Pre-Medications       ondansetron injection 8 mg       Chemotherapy       enfortumab vedotin-ejfv (PADCEV) 90 mg in D5W 109 mL infusion  11/12/2024       Pre-Medications       ondansetron injection 8 mg       Chemotherapy       enfortumab vedotin-ejfv (PADCEV) 90 mg in D5W 109 mL infusion    Therapy Plan Information  BEZLOTOXUMAB (ZINPLAVA) ONE DOSE for History of Clostridium difficile infection, noted on 4/5/2024  dextrose 5 % (D5W) 100 mL flush bag  Intravenous, Once  heparin, porcine (PF) 100 unit/mL injection flush 500 Units  500 Units, Intravenous, PRN  alteplase injection 2 mg  2 mg, Intra-Catheter, PRN      No therapy plan of the specified type found.    No therapy plan of the specified type found.      Jackson Jimenez MD  [facility]  Hematology and Oncology  [facility]   [address]   [place] [id number]   O: [phone]  F: [phone]

## 2024-10-11 ENCOUNTER — TELEPHONE (OUTPATIENT)
Dept: HEMATOLOGY/ONCOLOGY | Facility: CLINIC | Age: 74
End: 2024-10-11
Payer: MEDICARE

## 2024-10-11 ENCOUNTER — OFFICE VISIT (OUTPATIENT)
Dept: HEMATOLOGY/ONCOLOGY | Facility: CLINIC | Age: 74
End: 2024-10-11
Payer: MEDICARE

## 2024-10-11 ENCOUNTER — LAB VISIT (OUTPATIENT)
Dept: LAB | Facility: HOSPITAL | Age: 74
End: 2024-10-11
Attending: INTERNAL MEDICINE
Payer: MEDICARE

## 2024-10-11 ENCOUNTER — PATIENT MESSAGE (OUTPATIENT)
Dept: ADMINISTRATIVE | Facility: OTHER | Age: 74
End: 2024-10-11
Payer: MEDICARE

## 2024-10-11 ENCOUNTER — TELEPHONE (OUTPATIENT)
Dept: HEMATOLOGY/ONCOLOGY | Facility: CLINIC | Age: 74
End: 2024-10-11

## 2024-10-11 ENCOUNTER — PATIENT MESSAGE (OUTPATIENT)
Dept: HEMATOLOGY/ONCOLOGY | Facility: CLINIC | Age: 74
End: 2024-10-11

## 2024-10-11 VITALS
RESPIRATION RATE: 16 BRPM | HEART RATE: 66 BPM | WEIGHT: 155.19 LBS | DIASTOLIC BLOOD PRESSURE: 66 MMHG | OXYGEN SATURATION: 95 % | TEMPERATURE: 97 F | SYSTOLIC BLOOD PRESSURE: 110 MMHG | HEIGHT: 69 IN | BODY MASS INDEX: 22.98 KG/M2

## 2024-10-11 DIAGNOSIS — I72.4 ANEURYSM OF RIGHT POPLITEAL ARTERY: ICD-10-CM

## 2024-10-11 DIAGNOSIS — Z96.0 URETERAL STENT PRESENT: ICD-10-CM

## 2024-10-11 DIAGNOSIS — D68.59 OTHER PRIMARY THROMBOPHILIA: ICD-10-CM

## 2024-10-11 DIAGNOSIS — N18.31 STAGE 3A CHRONIC KIDNEY DISEASE: ICD-10-CM

## 2024-10-11 DIAGNOSIS — R91.1 LUNG NODULE: ICD-10-CM

## 2024-10-11 DIAGNOSIS — D64.9 NORMOCYTIC ANEMIA: ICD-10-CM

## 2024-10-11 DIAGNOSIS — C67.8 MALIGNANT NEOPLASM OF OVERLAPPING SITES OF BLADDER: ICD-10-CM

## 2024-10-11 DIAGNOSIS — E27.40 ADRENAL INSUFFICIENCY: ICD-10-CM

## 2024-10-11 DIAGNOSIS — C79.89 METASTATIC CANCER TO PELVIS: ICD-10-CM

## 2024-10-11 DIAGNOSIS — C79.89 METASTATIC CANCER TO PELVIS: Primary | ICD-10-CM

## 2024-10-11 DIAGNOSIS — I25.10 CORONARY ARTERY DISEASE, UNSPECIFIED VESSEL OR LESION TYPE, UNSPECIFIED WHETHER ANGINA PRESENT, UNSPECIFIED WHETHER NATIVE OR TRANSPLANTED HEART: ICD-10-CM

## 2024-10-11 DIAGNOSIS — D89.89 OTHER SPECIFIED DISORDERS INVOLVING THE IMMUNE MECHANISM, NOT ELSEWHERE CLASSIFIED: ICD-10-CM

## 2024-10-11 DIAGNOSIS — E03.9 HYPOTHYROIDISM, UNSPECIFIED TYPE: ICD-10-CM

## 2024-10-11 PROCEDURE — 36415 COLL VENOUS BLD VENIPUNCTURE: CPT | Mod: PN | Performed by: INTERNAL MEDICINE

## 2024-10-11 PROCEDURE — 99999 PR PBB SHADOW E&M-EST. PATIENT-LVL IV: CPT | Mod: PBBFAC,,, | Performed by: INTERNAL MEDICINE

## 2024-10-11 NOTE — PLAN OF CARE
START ON PATHWAY REGIMEN - Bladder    [id number]        Enfortumab vedotin-ejfv (Padcev)     **Always confirm dose/schedule in your pharmacy ordering system**    Patient Characteristics:  Advanced/Metastatic Disease, Second Line, Prior/Ineligible for Platinum-Based   Therapy, FGFR3 Alteration Absent or Unknown, Prior/Ineligible for PD-1/PD-L1   Inhibitor  Therapeutic Status: Advanced/Metastatic Disease  Line of Therapy: Second Line  FGFR3 Alteration Status: Awaiting Test Results  Intent of Therapy:  Non-Curative / Palliative Intent, Discussed with Patient

## 2024-10-11 NOTE — NURSING
Spoke with patients wife scheduled chemo education, labs, fu and treatment, pt verbalized agreement to time/date/location. Chemo folder completed and placed on navigaya's desk.

## 2024-10-11 NOTE — NURSING
LVM with call back to schedule patient for chemo education, labs, fu and treatment. Chemo education folder started, chart reviewed. Will continue to follow to finalized appointments and complete folder.

## 2024-10-14 ENCOUNTER — PATIENT OUTREACH (OUTPATIENT)
Dept: HEMATOLOGY/ONCOLOGY | Facility: CLINIC | Age: 74
End: 2024-10-14
Payer: MEDICARE

## 2024-10-14 ENCOUNTER — TELEPHONE (OUTPATIENT)
Dept: HEMATOLOGY/ONCOLOGY | Facility: CLINIC | Age: 74
End: 2024-10-14
Payer: MEDICARE

## 2024-10-14 NOTE — NURSING
Spoke with patients wife, she requested to change the date of fu and infusion, decided to keep the booked time rather thatn push it out further. Will continue to follow.

## 2024-10-15 ENCOUNTER — CLINICAL SUPPORT (OUTPATIENT)
Dept: HEMATOLOGY/ONCOLOGY | Facility: CLINIC | Age: 74
End: 2024-10-15
Payer: MEDICARE

## 2024-10-15 ENCOUNTER — TELEPHONE (OUTPATIENT)
Dept: HEMATOLOGY/ONCOLOGY | Facility: CLINIC | Age: 74
End: 2024-10-15
Payer: MEDICARE

## 2024-10-15 ENCOUNTER — PATIENT MESSAGE (OUTPATIENT)
Dept: ADMINISTRATIVE | Facility: OTHER | Age: 74
End: 2024-10-15
Payer: MEDICARE

## 2024-10-15 ENCOUNTER — LAB VISIT (OUTPATIENT)
Dept: LAB | Facility: HOSPITAL | Age: 74
End: 2024-10-15
Attending: INTERNAL MEDICINE
Payer: MEDICARE

## 2024-10-15 ENCOUNTER — PATIENT MESSAGE (OUTPATIENT)
Dept: HEMATOLOGY/ONCOLOGY | Facility: CLINIC | Age: 74
End: 2024-10-15

## 2024-10-15 VITALS
BODY MASS INDEX: 23.22 KG/M2 | WEIGHT: 156.75 LBS | TEMPERATURE: 98 F | OXYGEN SATURATION: 98 % | DIASTOLIC BLOOD PRESSURE: 73 MMHG | HEART RATE: 57 BPM | HEIGHT: 69 IN | SYSTOLIC BLOOD PRESSURE: 140 MMHG | RESPIRATION RATE: 16 BRPM

## 2024-10-15 DIAGNOSIS — C79.89 METASTATIC CANCER TO PELVIS: ICD-10-CM

## 2024-10-15 LAB
ALBUMIN SERPL BCP-MCNC: 3.9 G/DL (ref 3.5–5.2)
ALP SERPL-CCNC: 64 U/L (ref 55–135)
ALT SERPL W/O P-5'-P-CCNC: 70 U/L (ref 10–44)
ANION GAP SERPL CALC-SCNC: 10 MMOL/L (ref 8–16)
AST SERPL-CCNC: 46 U/L (ref 10–40)
BASOPHILS # BLD AUTO: 0.05 K/UL (ref 0–0.2)
BASOPHILS NFR BLD: 0.7 % (ref 0–1.9)
BILIRUB SERPL-MCNC: 0.5 MG/DL (ref 0.1–1)
BUN SERPL-MCNC: 27 MG/DL (ref 8–23)
CALCIUM SERPL-MCNC: 9.2 MG/DL (ref 8.7–10.5)
CHLORIDE SERPL-SCNC: 101 MMOL/L (ref 95–110)
CO2 SERPL-SCNC: 22 MMOL/L (ref 23–29)
CREAT SERPL-MCNC: 1.7 MG/DL (ref 0.5–1.4)
DIFFERENTIAL METHOD BLD: ABNORMAL
EOSINOPHIL # BLD AUTO: 0 K/UL (ref 0–0.5)
EOSINOPHIL NFR BLD: 0.4 % (ref 0–8)
ERYTHROCYTE [DISTWIDTH] IN BLOOD BY AUTOMATED COUNT: 13.1 % (ref 11.5–14.5)
EST. GFR  (NO RACE VARIABLE): 41.3 ML/MIN/1.73 M^2
GLUCOSE SERPL-MCNC: 107 MG/DL (ref 70–110)
HCT VFR BLD AUTO: 36.7 % (ref 40–54)
HGB BLD-MCNC: 12.3 G/DL (ref 14–18)
IMM GRANULOCYTES # BLD AUTO: 0.04 K/UL (ref 0–0.04)
IMM GRANULOCYTES NFR BLD AUTO: 0.5 % (ref 0–0.5)
LYMPHOCYTES # BLD AUTO: 0.9 K/UL (ref 1–4.8)
LYMPHOCYTES NFR BLD: 11.5 % (ref 18–48)
MCH RBC QN AUTO: 30.9 PG (ref 27–31)
MCHC RBC AUTO-ENTMCNC: 33.5 G/DL (ref 32–36)
MCV RBC AUTO: 92 FL (ref 82–98)
MONOCYTES # BLD AUTO: 0.4 K/UL (ref 0.3–1)
MONOCYTES NFR BLD: 4.8 % (ref 4–15)
NEUTROPHILS # BLD AUTO: 6.2 K/UL (ref 1.8–7.7)
NEUTROPHILS NFR BLD: 82.1 % (ref 38–73)
NRBC BLD-RTO: 0 /100 WBC
PLATELET # BLD AUTO: 226 K/UL (ref 150–450)
PMV BLD AUTO: 8.5 FL (ref 9.2–12.9)
POTASSIUM SERPL-SCNC: 4.9 MMOL/L (ref 3.5–5.1)
PROT SERPL-MCNC: 7.1 G/DL (ref 6–8.4)
RBC # BLD AUTO: 3.98 M/UL (ref 4.6–6.2)
SODIUM SERPL-SCNC: 133 MMOL/L (ref 136–145)
TSH SERPL DL<=0.005 MIU/L-ACNC: 3.01 UIU/ML (ref 0.4–4)
WBC # BLD AUTO: 7.56 K/UL (ref 3.9–12.7)

## 2024-10-15 PROCEDURE — 99999 PR PBB SHADOW E&M-EST. PATIENT-LVL V: CPT | Mod: PBBFAC,,,

## 2024-10-15 PROCEDURE — 85025 COMPLETE CBC W/AUTO DIFF WBC: CPT | Mod: PN | Performed by: INTERNAL MEDICINE

## 2024-10-15 PROCEDURE — 80053 COMPREHEN METABOLIC PANEL: CPT | Mod: PN | Performed by: INTERNAL MEDICINE

## 2024-10-15 PROCEDURE — 99215 OFFICE O/P EST HI 40 MIN: CPT | Mod: S$GLB,,,

## 2024-10-15 PROCEDURE — 36415 COLL VENOUS BLD VENIPUNCTURE: CPT | Mod: PN | Performed by: INTERNAL MEDICINE

## 2024-10-15 PROCEDURE — 84443 ASSAY THYROID STIM HORMONE: CPT | Performed by: INTERNAL MEDICINE

## 2024-10-15 NOTE — NURSING
Spoke with patients wife, per conversation with Fabi in education pt was concerned about the scheduling. Wife expressed that she understands the scheduling process, she was not aware that infusion would be scheduling day 3 and day 8 without speaking with her. She verbalized agreement to the scheduled times and dates and understanding that these days are scheduled when day 1 is scheduled so that the patient is ensured a chair for the needed days. Wife also expressed concern that her husbands other physicians are not getting Mr. Clifford lab results, I added Dr. Weiss and Brenden Xiong to the patients care team to ensure they would receive all test results and labs. Mrs. Bey verbalized agreement to all, thanked navigator. All questions and concerns addressed at this time, will continue to follow.

## 2024-10-18 LAB
ONEOME COMMENT: NORMAL
ONEOME METHOD: NORMAL

## 2024-10-21 NOTE — PROGRESS NOTES
PATIENT: Aren Bey Jr.  MRN: [id number]  DATE: 10/23/2024      Diagnosis:   1. Malignant neoplasm of overlapping sites of bladder    2. 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 Other specified disorders involving the immune mechanism, not elsewhere classified    4. Adrenal insufficiency    5. Hypothyroidism, unspecified type    6. Aneurysm of right popliteal artery    7. Lung nodule    8. Normocytic anemia    9. Other primary thrombophilia    10. Ureteral stent present    11. Coronary artery disease, unspecified vessel or lesion type, unspecified whether angina present, unspecified whether native or transplanted heart    12. 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 Bey is a 74 y.o. male with HTN, Seizures, bladder cancer, h/o pulmonary embolism, who presents for bladder cancer.  Since the last clinic visit the patient states he feels well.  The patient denies CP, cough, SOB, abdominal pain, nausea, vomiting, constipation, diarrhea.  The patient denies fever, chills, night sweats, weight loss, new lumps or bumps, easy bruising or bleeding.    Prior History:  Patient underwent transurethral resection of bladder tumor on 04/28/2023 with path showing invasive urothelial carcinoma, high-grade with involvement of muscularis propria and positive for lymphovascular invasion.  Patient underwent chemotherapy at [facility].  The patient then underwent radical cystectomy and prostatectomy on 08/15/2023 with pathology showing invasive high-grade urothelial carcinoma measuring 4.2 cm, positive lymphovascular invasion, tumor identified within the soft tissue surrounding the left distal ureter, 4/6 positive regional lymph nodes with extranodal extension present. pT3aN2.  Also seen was acinar adenocarcinoma of  the prostate grade group 1 ([place] score 3+3=6) in 1 2 5% of prostate tissue pT2N0.  The patient's surgery was ultimately complicated by small-bowel obstruction, and pulmonary embolism.  PT recevied 1 dose of Nivolumab 9/18/23.  The patient was placed on anticoagulation for pulmonary embolism and ultimately developed a right perinephric bleed requiring embolization.  PT also developed UTI and acquired ureteral obstruction with placement of bilateral ureteral stents.  PT also developed C diff and was hospitalized for suspected recurrence at [facility] from 10/21/2023 to 10/25/2023.  Patient was discharged on fidaxomicin.   The patient saw Dr. Ghosh with Urology on 11/13/2023 for with recommendations for virtual visit in January to discuss possible operative intervention replaced stents, possible revision of ureteral ileal anastomosis pending disease progression.  It was instructed that the patient would need catheterized specimen from urostomy to rule out UTI if patient with future symptoms.  Patient underwent PET-CT on 11/16/2023 showing a new hypermetabolic subpleural nodule in the posterior right lower lobe measuring 2.2 x 1.3 cm.  The patient endorses a fever starting on the night of 11/16/2023.  Patient was prescribed Bactrim for suspected potential urinary tract infection.   The patient was admitted to the hospital from 11/17/23-11/21/23 for C diff colitis and suspected urinary tract infection the patient was discharged on fidaxomicin 200 mg b.i.d. for additional 7 days as well as 200 mg every other day for 25 days.  Patient was also discharged on doxycycline 100 mg b.i.d. for 25 days with instructions from Infectious Disease to remain on antibiotics as long as his ureteral stents were in place.   The patient underwent biopsy of nodule in the right lung on 12/08/2023 showing fibrosis and chronic inflammation but no evidence of neoplasm or granuloma.   The patient was to have ureteral stents exchanged on  01/02/2024 but missed his appointment due to inclement weather.    The patient had bilateral ureteral stents exchanged by Dr. Ghosh on 1/23/24.   The patient underwent PET-CT on 03/01/2024 showing decrease in size of right lower lobe nodule now measuring 0.8 cm.   The patient underwent CTA chest on 03/06/2024 showing no evidence of pulmonary embolism or pneumonitis.  The patient underwent nuclear stress test on 03/11/2024 showed abnormal myocardial perfusion with mild intensity, small size, reversible perfusion abnormality consistent with ischemia in the basal to mid inferolateral walls.   The patient was admitted to the hospital from 04/04/2024 until 04/11/2024 with initial complaint of weakness, hypotensio, and diarrhea and found to have adrenal insufficiency, hypothyroidism, and C diff.   The patient was admitted hospital from 05/11/2024 until 05/13/2024 for urinary tract infection.  Blood cultures grew out E coli.  The patient was sent home on Cipro to complete 7 additional days of treatment.  The patient was also sent home on oral vancomycin for prevention of C diff. patient underwent bilateral ureteral stent exchange on 05/15/2024 under the care of Dr. Olivares.   The patient was admitted from 08/01/2024 until 08/04/2024 for sepsis secondary to UTI.  The patient required IV steroids for adrenal crisis during admission.  The patient was discharged on Cipro and underwent a Solu-Cortef taper.  CT of the chest, abdomen, and pelvis on 08/01/2024 showed extensive left greater than right perinephric stranding and stable metallic focus in the inferior right renal hilum.  On 08/07/2024 the patient underwent bilateral ureteral stent exchange under the care of Dr. Olivares.   The patient underwent PET-CT on 09/16/2024 showing mild uptake in the left seminal vesicle similar to previous; 1.2 cm new rounded soft tissue density in the right hemipelvis near the rectum.    The patient underwent perirectal ultrasound with biopsy  09/25/2024 with biopsy showing metastatic urothelial carcinoma.   The patient had replacement of bilateral single J ureteral stents on 10/09/2024 under the care of Dr. Olivares.  The patient's case was discussed at tumor board on 10/10/2024 with recommendations for systemic treatment followed by potential radiation versus IR ablation versus surgery    Past Medical History:   Past Medical History:   Diagnosis Date    Adrenal insufficiency 05/2024    Aneurysm artery, popliteal 2018    right leg    Bladder cancer     CKD (chronic kidney disease) stage 3, GFR 30-59 ml/min     Coronary artery disease     cabg x 4 11/2021 [facility]    Dyslipidemia     Encounter for blood transfusion     several transfusions    H/O Clostridium difficile infection     Hx pulmonary embolism     8/2023    Hypertension     Hyperthyroidism 05/2024    Immunotherapy     nivolomab / monthly md manuel    Malignant neoplasm of prostate     Presence of urostomy     Seizures     remote hx  last ~2007    SOB (shortness of breath)     Transfusion reaction     possible reaction, not sure       Past Surgical HIstory:   Past Surgical History:   Procedure Laterality Date    COLONOSCOPY N/A 9/25/2024    Procedure: COLONOSCOPY;  Surgeon: Pablo Kelly MD;  Location: [facility] ENDO;  Service: Endoscopy;  Laterality: N/A;    CORONARY ARTERY BYPASS GRAFT      11/2021    CREATION, ILEAL CONDUIT  08/2023    ENDOSCOPIC ULTRASOUND OF LOWER GASTROINTESTINAL TRACT Left 9/25/2024    Procedure: ULTRASOUND, LOWER GI TRACT, ENDOSCOPIC;  Surgeon: Pabol Kelly MD;  Location: [facility] ENDO;  Service: Endoscopy;  Laterality: Left;    ENDOSCOPY OF ILEAL CONDUIT Bilateral 5/15/2024    Procedure: ENDOSCOPY, ILEAL CONDUIT - Bilateral Stent Exchange  Bilateral Retrograde Pyelogram;  Surgeon: Manuelito Olivares MD;  Location: [facility] OR;  Service: Urology;  Laterality: Bilateral;    ENDOSCOPY OF ILEAL CONDUIT N/A 8/7/2024    Procedure: ENDOSCOPY, ILEAL CONDUIT;  Surgeon: Marshall  MD Manuelito;  Location: [facility] OR;  Service: Urology;  Laterality: N/A;    ENDOSCOPY OF ILEAL CONDUIT Bilateral 10/9/2024    Procedure: ENDOSCOPY, ILEAL CONDUIT With Retrograde Pyelogram;  Surgeon: Manuelito Olivares MD;  Location: [facility] OR;  Service: Urology;  Laterality: Bilateral;    HERNIA REPAIR      umbilical    PROSTATECTOMY      PTA, POPLITEAL      REMOVAL-STENT Bilateral 01/23/2024    Procedure: REMOVAL-STENT;  Surgeon: South Ghosh MD;  Location: [facility] OR 1ST FLR;  Service: Urology;  Laterality: Bilateral;    REPLACEMENT OF STENT Bilateral 8/7/2024    Procedure: REPLACEMENT, STENT-bilateral ureteral  exchange with Retrograde;  Surgeon: Manuelito Olivares MD;  Location: [facility] OR;  Service: Urology;  Laterality: Bilateral;    REPLACEMENT OF STENT Bilateral 10/9/2024    Procedure: REPLACEMENT, STENT, Exchange;  Surgeon: Manuelito Olivares MD;  Location: [facility] OR;  Service: Urology;  Laterality: Bilateral;    URETERAL STENT PLACEMENT Bilateral 01/23/2024    Procedure: INSERTION, STENT, URETER;  Surgeon: South Ghosh MD;  Location: [facility] OR 1ST FLR;  Service: Urology;  Laterality: Bilateral;       Family History:   Family History   Problem Relation Name Age of Onset    Heart disease Mother      Hypertension Mother      Peripheral vascular disease Father 72        Social History:  reports that he has never smoked. He has never been exposed to tobacco smoke. He has never used smokeless tobacco. He reports that he does not currently use alcohol. He reports that he does not use drugs.    Allergies:  Review of patient's allergies indicates:   Allergen Reactions    Clopidogrel Other (See Comments)     Increased body temperature and redness        Medications:  Current Outpatient Medications   Medication Sig Dispense Refill    aspirin (ECOTRIN) 81 MG EC tablet Take 1 tablet (81 mg total) by mouth once daily. 90 tablet 3    atorvastatin (LIPITOR) 40 MG tablet Take 40 mg by mouth once daily.      cholecalciferol, vitamin D3, (VITAMIN D3)  125 mcg (5,000 unit) Tab Take 5,000 Units by mouth once daily.      ferrous sulfate (FEOSOL) 325 mg (65 mg iron) Tab tablet Take 325 mg by mouth daily with breakfast.      hydrocortisone (CORTEF) 20 MG Tab Take 1 tablet (20 mg total) by mouth once daily. Will need 10 extra tablet for stress dosing 100 tablet 3    hydrocortisone (CORTEF) 5 MG Tab TAKE 4 TABLETS (20 MG) IN THE MORNING AND TAKE 1 TABLET (5 MG) TABLET IN EARLY AFTERNOON (Patient taking differently: 10 mg. TAKE 4 TABLETS (20 MG) IN THE MORNING AND TAKE 1 TABLET (5 MG) TABLET IN EARLY AFTERNOON) 150 tablet 6    levothyroxine (SYNTHROID) 88 MCG tablet Take 1 tablet (88 mcg total) by mouth before breakfast. 30 tablet 11    LIDOcaine-prilocaine (EMLA) cream Apply topically once as needed (to port access for immunotherapy treatment).      mecobalamin (B12 ACTIVE ORAL) Take by mouth.      multivitamin (THERAGRAN) per tablet Take 1 tablet by mouth once daily.      QUEtiapine (SEROQUEL) 25 MG Tab Take one half tab by mouth nightly as needed for insomnia (Patient taking differently: 12.5 mg nightly.) 90 tablet 1    ZINC ACETATE ORAL Take by mouth.       No current facility-administered medications for this visit.     Facility-Administered Medications Ordered in Other Visits   Medication Dose Route Frequency Provider Last Rate Last Admin    0.9% NaCl 100 mL flush bag   Intravenous PRN Jackson Jimenez MD   Stopped at 10/23/24 1109    diphenhydrAMINE injection 50 mg  50 mg Intravenous Once PRN Jackson Jimenez MD        heparin, porcine (PF) 100 unit/mL injection flush 500 Units  500 Units Intravenous PRN Jackson Jimenez MD   500 Units at 10/23/24 1109    hydrocortisone sodium succinate injection 100 mg  100 mg Intravenous Once PRN Jackson Jimenez MD        sodium chloride 0.9% flush 10 mL  10 mL Intravenous PRN Jackson Jimenez MD   10 mL at 10/23/24 1109       Review of Systems   Constitutional:  Negative for appetite change, chills, diaphoresis, fatigue,  "fever and unexpected weight change.   HENT:  Negative for mouth sores.    Eyes:  Negative for visual disturbance.   Respiratory:  Negative for cough and shortness of breath.    Cardiovascular:  Negative for chest pain and palpitations.   Gastrointestinal:  Negative for abdominal pain, constipation, diarrhea, nausea and vomiting.   Genitourinary:  Negative for difficulty urinating, dysuria, flank pain and frequency.   Musculoskeletal:  Negative for back pain.   Skin:  Negative for color change and rash.   Neurological:  Negative for headaches.   Hematological:  Negative for adenopathy. Does not bruise/bleed easily.   Psychiatric/Behavioral:  The patient is not nervous/anxious.        ECOG Performance Status: 1   Objective:      Vitals:   Vitals:    10/23/24 0813   BP: 110/66   BP Location: Left arm   Patient Position: Sitting   Pulse: 70   Resp: 14   Temp: 97.7 °F (36.5 °C)   TempSrc: Temporal   SpO2: 99%   Weight: 70.1 kg (154 lb 8.7 oz)   Height: 5' 9" (1.753 m)       Physical Exam  Constitutional:       General: He is not in acute distress.     Appearance: He is well-developed. He is not diaphoretic.   HENT:      Head: Normocephalic and atraumatic.   Cardiovascular:      Rate and Rhythm: Normal rate and regular rhythm.      Heart sounds: Normal heart sounds. No murmur heard.     No friction rub. No gallop.   Pulmonary:      Effort: Pulmonary effort is normal. No respiratory distress.      Breath sounds: Normal breath sounds. No wheezing or rales.   Chest:      Chest wall: No tenderness.   Abdominal:      General: Bowel sounds are normal. There is no distension.      Palpations: Abdomen is soft. There is no mass.      Tenderness: There is no abdominal tenderness. There is no rebound.   Musculoskeletal:      Cervical back: Normal range of motion.   Lymphadenopathy:      Cervical: No cervical adenopathy.      Upper Body:      Right upper body: No supraclavicular or axillary adenopathy.      Left upper body: No " supraclavicular or axillary adenopathy.   Skin:     General: Skin is warm and dry.      Findings: No erythema or rash.   Neurological:      Mental Status: He is alert and oriented to person, place, and time.   Psychiatric:         Behavior: Behavior normal.         Laboratory Data:  Lab Visit on 10/23/2024   Component Date Value Ref Range Status    WBC 10/23/2024 10.26  3.90 - 12.70 K/uL Final    RBC 10/23/2024 4.40 (L)  4.60 - 6.20 M/uL Final    Hemoglobin 10/23/2024 13.5 (L)  14.0 - 18.0 g/dL Final    Hematocrit 10/23/2024 40.8  40.0 - 54.0 % Final    MCV 10/23/2024 93  82 - 98 fL Final    MCH 10/23/2024 30.7  27.0 - 31.0 pg Final    MCHC 10/23/2024 33.1  32.0 - 36.0 g/dL Final    RDW 10/23/2024 12.9  11.5 - 14.5 % Final    Platelets 10/23/2024 233  150 - 450 K/uL Final    MPV 10/23/2024 8.5 (L)  9.2 - 12.9 fL Final    Immature Granulocytes 10/23/2024 0.4  0.0 - 0.5 % Final    Gran # (ANC) 10/23/2024 6.7  1.8 - 7.7 K/uL Final    Immature Grans (Abs) 10/23/2024 0.04  0.00 - 0.04 K/uL Final    Comment: Mild elevation in immature granulocytes is non specific and   can be seen in a variety of conditions including stress response,   acute inflammation, trauma and pregnancy. Correlation with other   laboratory and clinical findings is essential.      Lymph # 10/23/2024 2.0  1.0 - 4.8 K/uL Final    Mono # 10/23/2024 1.1 (H)  0.3 - 1.0 K/uL Final    Eos # 10/23/2024 0.3  0.0 - 0.5 K/uL Final    Baso # 10/23/2024 0.11  0.00 - 0.20 K/uL Final    nRBC 10/23/2024 0  0 /100 WBC Final    Gran % 10/23/2024 65.6  38.0 - 73.0 % Final    Lymph % 10/23/2024 19.1  18.0 - 48.0 % Final    Mono % 10/23/2024 10.5  4.0 - 15.0 % Final    Eosinophil % 10/23/2024 3.3  0.0 - 8.0 % Final    Basophil % 10/23/2024 1.1  0.0 - 1.9 % Final    Differential Method 10/23/2024 Automated   Final    Sodium 10/23/2024 140  136 - 145 mmol/L Final    Potassium 10/23/2024 4.1  3.5 - 5.1 mmol/L Final    Chloride 10/23/2024 104  95 - 110 mmol/L Final    CO2  10/23/2024 24  23 - 29 mmol/L Final    Glucose 10/23/2024 56 (L)  70 - 110 mg/dL Final    BUN 10/23/2024 22  8 - 23 mg/dL Final    Creatinine 10/23/2024 1.8 (H)  0.5 - 1.4 mg/dL Final    Calcium 10/23/2024 10.0  8.7 - 10.5 mg/dL Final    Total Protein 10/23/2024 7.1  6.0 - 8.4 g/dL Final    Albumin 10/23/2024 3.9  3.5 - 5.2 g/dL Final    Total Bilirubin 10/23/2024 0.5  0.1 - 1.0 mg/dL Final    Comment: For infants and newborns, interpretation of results should be based  on gestational age, weight and in agreement with clinical  observations.    Premature Infant recommended reference ranges:  Up to 24 hours.............<8.0 mg/dL  Up to 48 hours............<12.0 mg/dL  3-5 days..................<15.0 mg/dL  6-29 days.................<15.0 mg/dL      Alkaline Phosphatase 10/23/2024 64  40 - 150 U/L Final    AST 10/23/2024 30  10 - 40 U/L Final    ALT 10/23/2024 47 (H)  10 - 44 U/L Final    eGFR 10/23/2024 38.5 (A)  >60 mL/min/1.73 m^2 Final    Anion Gap 10/23/2024 12  8 - 16 mmol/L Final         Imaging:     PET/CT 9/16/24    Head and Neck:     Brain demonstrates normal metabolism. However, FDG-PET has an approximate 60% sensitivity for brain metastases which are best detected by MRI with gadolinium. Physiologic uptake is in the neck.     Chest:     No FDG avid pulmonary lesions are present. No enlarged or FDG avid lymph nodes are in the chest.     Abdomen and Pelvis:     Physiologic uptake is in the liver, spleen, urinary system and bowel. Operative changes are stable. Adrenal glands are normal. In the pelvis, at the level of the seminal vesicles, there is mild uptake on the left, max SUV of 6.4, previously 5.1, and max SUV of 5.8 on the left, previously 4.7. In the lower right hemipelvis, near the rectum, there is a new 1.2 cm rounded soft tissue density on image 254 with a max SUV of 2.3.     Musculoskeletal:     No FDG avid osseous lesions are present.       Assessment:       1. Malignant neoplasm of overlapping  sites of bladder    2. 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 Other specified disorders involving the immune mechanism, not elsewhere classified    4. Adrenal insufficiency    5. Hypothyroidism, unspecified type    6. Aneurysm of right popliteal artery    7. Lung nodule    8. Normocytic anemia    9. Other primary thrombophilia    10. Ureteral stent present    11. Coronary artery disease, unspecified vessel or lesion type, unspecified whether angina present, unspecified whether native or transplanted heart    12. Stage 3a chronic kidney disease           Plan:     Bladder Cancer - pt with stage IV bladder cancer [id number] s/p neoadjuvant chemo followed by radical cystoprostatectomy 8/15/23  -PT received one dose of Nivolumab on 9/18/23  -PET/CT suggestive of a RLL pulmonary nodule  -Biopsy on 12/08/23 negative for malignancy  -Pt completed 10 cycles of Nivolumab  -PET/CT on 9/16/24 showed a 1.2 cm rounded soft tissue density in the right hemipelvis near the rectum  -Pt underwent transrectal biopsy 9/25/24 with path showing Urothelial carcinoma   -Case was discussed at tumor board on 10/10/2024 with recommendations for systemic treatment followed by potential radiation versus IR ablation versus surgery  -Enfortumab alone versus combination Enfortumab Vedotin and Pembrolizumab discussed  -Pt does not wish to add pembrolizumab at this time  -Will proceed with Enfortumab Vedotin  -TEMPUS blood and tissue testing pending  -Pharmacogenomic testing showed no oncologic drug metabolism alterations  -Visit today included increased complexity associated with the care of the episodic problem (Chemotherapy) addressed and managing the longitudinal care of the patient due to the serious and/or complex managed problem(s) Nivolumab    Hypothyroidism - Synthroid 88mcg   -Management per Dr Xiong in endocrinology  -TSH normal 10/15/24   -TSH from today pending    Adrenal Insufficiency - pt with positive Cosyntropin stim test in the  hospital  -PT on Hydrocortisone 20mg daily  -Pt following with endocrinology    CAD - pt with prior bypass surgery  -Cardiac stress test on 3/11/24 was abnormal with mild intensity, small size, reversible perfusion abnormality consistent with ischemia in the basal to mid inferolateral walls  -Pt to follow up with cardiologist    Pulmonary nodule - see above    Prostate Cancer - patient with acinar adenocarcinoma of the prostate grade group 1 ([place] score 3+3=6) in 1 2 5% of prostate tissue pT2N0  -PSA 10/31/23 0.02ng/mL  -Will monitor    Ureteral Stents - Stents exchanged last on 10/09/24 under the care of Dr Olivares  -Pt to follow up with Urology    Anemia - Hemoglobin 13.5g/dL on 10/23/24  -Stable  -Will monitor     Thrombophilia - pt with prior PE  -Pt subsequently developed a right perinephric bleed requiring embolization   -Patient with IVC filter in place  -Pt following with Dr Claire  -Vascular surgery to determine if he could be started on Eliquis  -Will monitor    Right popliteal aneurysm - pt to see vascular surgery    CKDIII - creatinine 1.8 on 10/23/24  -Pt following with Nephrology  -Will monitor    Route Chart for Scheduling    Med Onc Chart Routing      Follow up with physician 2 weeks. PT can proceed with treatment.  Pt needs a CBC and CMP nexrt week with appt with NP and treatment.  Pt needs the same labs and an appt with me in 2 weeks.   Follow up with KW[facility] 1 week.   Infusion scheduling note    Injection scheduling note    Labs    Imaging    Pharmacy appointment    Other referrals              Treatment Plan Information   OP ENFORTUMAB VEDOTIN Q4W Jackson Jimenez MD   Associated diagnosis: Malignant neoplasm of overlapping sites of bladder Stage SIM pT3, pN2, pM1a noted on 10/23/2023   Line of treatment: Second Line  Treatment Goal: Palliative     Upcoming Treatment Dates - OP ENFORTUMAB VEDOTIN Q4W    10/30/2024       Pre-Medications       ondansetron injection 8 mg       Chemotherapy        enfortumab vedotin-ejfv (PADCEV) 90 mg in D5W 109 mL infusion  11/6/2024       Pre-Medications       ondansetron injection 8 mg       Chemotherapy       enfortumab vedotin-ejfv (PADCEV) 90 mg in D5W 109 mL infusion  11/20/2024       Pre-Medications       ondansetron injection 8 mg       Chemotherapy       enfortumab vedotin-ejfv (PADCEV) 90 mg in D5W 109 mL infusion  11/27/2024       Pre-Medications       ondansetron injection 8 mg       Chemotherapy       enfortumab vedotin-ejfv (PADCEV) 90 mg in D5W 109 mL infusion    Therapy Plan Information  BEZLOTOXUMAB (ZINPLAVA) ONE DOSE for History of Clostridium difficile infection, noted on 4/5/2024  dextrose 5 % (D5W) 100 mL flush bag  Intravenous, Once  heparin, porcine (PF) 100 unit/mL injection flush 500 Units  500 Units, Intravenous, PRN  alteplase injection 2 mg  2 mg, Intra-Catheter, PRN      No therapy plan of the specified type found.    No therapy plan of the specified type found.      Jackson Jimenez MD  [facility]  Hematology and Oncology  [facility]   [address]   [place] [address]   O: [phone]  F: [phone]

## 2024-10-22 NOTE — PROGRESS NOTES
Subjective:       Name: Aren Bey Jr.  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 is a 74 y.o. 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        Oncology History   Malignant neoplasm of overlapping sites of bladder   10/23/2023 Initial Diagnosis    Malignant neoplasm of overlapping sites of bladder     10/31/2023 Cancer Staged    Staging form: Urinary Bladder, AJCC 8th Edition  - Pathologic: Stage SIM (pT3, pN2, pM1a)     2023 - 2024 Chemotherapy    Treatment Summary   Plan Name: OP NIVOLUMAB 480 MG Q4W  Treatment Goal: Curative  Status: Inactive  Start Date: 2023  End Date: 2024  Provider: Jackson Jimenez MD  Chemotherapy: [No matching medication found in this treatment plan]     10/23/2024 -  Chemotherapy    Treatment Summary   Plan Name: OP ENFORTUMAB VEDOTIN Q4W  Treatment Goal: Palliative  Status: Active  Start Date: 10/23/2024 (Planned)  End Date: 3/26/2025 (Planned)  Provider: Jackson Jimenez MD  Chemotherapy: enfortumab vedotin-ejfv (PADCEV) 90 mg in D5W 109 mL infusion, 1.25 mg/kg = 90 mg, Intravenous, Clinic/HOD 1 time, 0 of 6 cycles     Prostate cancer   10/31/2023 Initial Diagnosis    Prostate cancer     10/31/2023 Cancer Staged    Staging form: Prostate, AJCC 8th Edition  - Pathologic: pT2, pN0, cM0          Past Medical History:   Diagnosis Date    Adrenal insufficiency 2024    Aneurysm artery, popliteal 2018    right leg    Bladder cancer     CKD (chronic kidney disease) stage 3, GFR 30-59 ml/min     Coronary artery disease     cabg x 4 2021 OLOL BR    Dyslipidemia     Encounter for blood transfusion     several transfusions    H/O Clostridium difficile infection     Hx pulmonary embolism     2023    Hypertension     Hyperthyroidism 2024    Immunotherapy     nivolomab / monthly md manuel    Malignant neoplasm of prostate     Presence  of urostomy     Seizures     remote hx  last ~2007    SOB (shortness of breath)     Transfusion reaction     possible reaction, not sure       Past Surgical History:   Procedure Laterality Date    COLONOSCOPY N/A 9/25/2024    Procedure: COLONOSCOPY;  Surgeon: Pablo Kelly MD;  Location: [facility] ENDO;  Service: Endoscopy;  Laterality: N/A;    CORONARY ARTERY BYPASS GRAFT      11/2021    CREATION, ILEAL CONDUIT  08/2023    ENDOSCOPIC ULTRASOUND OF LOWER GASTROINTESTINAL TRACT Left 9/25/2024    Procedure: ULTRASOUND, LOWER GI TRACT, ENDOSCOPIC;  Surgeon: Pablo Kelly MD;  Location: [facility] ENDO;  Service: Endoscopy;  Laterality: Left;    ENDOSCOPY OF ILEAL CONDUIT Bilateral 5/15/2024    Procedure: ENDOSCOPY, ILEAL CONDUIT - Bilateral Stent Exchange  Bilateral Retrograde Pyelogram;  Surgeon: Manuelito Olivares MD;  Location: [facility] OR;  Service: Urology;  Laterality: Bilateral;    ENDOSCOPY OF ILEAL CONDUIT N/A 8/7/2024    Procedure: ENDOSCOPY, ILEAL CONDUIT;  Surgeon: Manuelito Olivares MD;  Location: [facility] OR;  Service: Urology;  Laterality: N/A;    ENDOSCOPY OF ILEAL CONDUIT Bilateral 10/9/2024    Procedure: ENDOSCOPY, ILEAL CONDUIT With Retrograde Pyelogram;  Surgeon: Manuelito Olivares MD;  Location: [facility] OR;  Service: Urology;  Laterality: Bilateral;    HERNIA REPAIR      umbilical    PROSTATECTOMY      PTA, POPLITEAL      REMOVAL-STENT Bilateral 01/23/2024    Procedure: REMOVAL-STENT;  Surgeon: South Ghosh MD;  Location: [facility] OR [address] [place] [address];  Service: Urology;  Laterality: Bilateral;    REPLACEMENT OF STENT Bilateral 8/7/2024    Procedure: REPLACEMENT, STENT-bilateral ureteral  exchange with Retrograde;  Surgeon: Manuelito Olivares MD;  Location: [facility] OR;  Service: Urology;  Laterality: Bilateral;    REPLACEMENT OF STENT Bilateral 10/9/2024    Procedure: REPLACEMENT, STENT, Exchange;  Surgeon: Manuelito Olivares MD;  Location: [facility] OR;  Service: Urology;  Laterality: Bilateral;    URETERAL STENT PLACEMENT Bilateral 01/23/2024     Procedure: INSERTION, STENT, URETER;  Surgeon: South Ghosh MD;  Location: [facility] OR [address] [place] [address];  Service: Urology;  Laterality: Bilateral;       Family History   Problem Relation Name Age of Onset    Heart disease Mother      Hypertension Mother      Peripheral vascular disease Father 72        Social History     Socioeconomic History    Marital status:    Occupational History    Occupation: retired   Tobacco Use    Smoking status: Never     Passive exposure: Never    Smokeless tobacco: Never   Substance and Sexual Activity    Alcohol use: Not Currently    Drug use: Never    Sexual activity: Yes     Partners: Female     Social Drivers of Health     Financial Resource Strain: Low Risk  (8/2/2024)    Overall Financial Resource Strain (CARDIA)     Difficulty of Paying Living Expenses: Not hard at all   Food Insecurity: No Food Insecurity (8/2/2024)    Hunger Vital Sign     Worried About Running Out of Food in the Last Year: Never true     Ran Out of Food in the Last Year: Never true   Transportation Needs: No Transportation Needs (8/2/2024)    TRANSPORTATION NEEDS     Transportation : No   Physical Activity: Inactive (3/27/2024)    Received from [facility], [facility]    Exercise Vital Sign     Days of Exercise per Week: 0 days     Minutes of Exercise per Session: 0 min   Stress: No Stress Concern Present (8/2/2024)    Citizen of [place] [employer] - Occupational Stress Questionnaire     Feeling of Stress : Not at all   Housing Stability: Low Risk  (8/2/2024)    Housing Stability Vital Sign     Unable to Pay for Housing in the Last Year: No     Homeless in the Last Year: No       Review of patient's allergies indicates:   Allergen Reactions    Clopidogrel Other (See Comments)     Increased body temperature and redness        Review of Systems  "  Constitutional:  Negative for appetite change and unexpected weight change.   HENT:  Negative for mouth sores.    Eyes:  Negative for visual disturbance.   Respiratory:  Negative for cough and shortness of breath.    Cardiovascular:  Negative for chest pain.   Gastrointestinal:  Negative for abdominal pain and diarrhea.   Genitourinary:  Negative for frequency.   Musculoskeletal:  Negative for back pain.   Integumentary:  Negative for rash.   Neurological:  Negative for headaches.   Hematological:  Negative for adenopathy.   Psychiatric/Behavioral:  The patient is not nervous/anxious.             Objective:     Vitals:    10/15/24 1232   BP: (!) 140/73   Pulse: (!) 57   Resp: 16   Temp: 97.8 °F (36.6 °C)   TempSrc: Temporal   SpO2: 98%   Weight: 71.1 kg (156 lb 12 oz)   Height: 5' 9" (1.753 m)        Physical Exam  Pulmonary:      Effort: Pulmonary effort is normal.   Musculoskeletal:         General: Normal range of motion.   Skin:     General: Skin is warm and dry.   Neurological:      Mental Status: He is alert.   Psychiatric:         Mood and Affect: Mood normal.                Current Outpatient Medications on File Prior to Visit   Medication Sig    aspirin (ECOTRIN) 81 MG EC tablet Take 1 tablet (81 mg total) by mouth once daily.    atorvastatin (LIPITOR) 40 MG tablet Take 40 mg by mouth once daily.    cholecalciferol, vitamin D3, (VITAMIN D3) 125 mcg (5,000 unit) Tab Take 5,000 Units by mouth once daily.    ferrous sulfate (FEOSOL) 325 mg (65 mg iron) Tab tablet Take 325 mg by mouth daily with breakfast.    hydrocortisone (CORTEF) 20 MG Tab Take 1 tablet (20 mg total) by mouth once daily. Will need 10 extra tablet for stress dosing    hydrocortisone (CORTEF) 5 MG Tab TAKE 4 TABLETS (20 MG) IN THE MORNING AND TAKE 1 TABLET (5 MG) TABLET IN EARLY AFTERNOON (Patient taking differently: 10 mg. TAKE 4 TABLETS (20 MG) IN THE MORNING AND TAKE 1 TABLET (5 MG) TABLET IN EARLY AFTERNOON)    levothyroxine (SYNTHROID) " 88 MCG tablet Take 1 tablet (88 mcg total) by mouth before breakfast.    LIDOcaine-prilocaine (EMLA) cream Apply topically once as needed (to port access for immunotherapy treatment).    mecobalamin (B12 ACTIVE ORAL) Take by mouth.    multivitamin (THERAGRAN) per tablet Take 1 tablet by mouth once daily.    QUEtiapine (SEROQUEL) 25 MG Tab Take one half tab by mouth nightly as needed for insomnia (Patient taking differently: 12.5 mg nightly.)    ZINC ACETATE ORAL Take by mouth.     No current facility-administered medications on file prior to visit.       CBC:  Lab Results   Component Value Date    WBC 7.56 10/15/2024    HGB 12.3 (L) 10/15/2024    HCT 36.7 (L) 10/15/2024    MCV 92 10/15/2024     10/15/2024         CMP:  Sodium   Date Value Ref Range Status   10/15/2024 133 (L) 136 - 145 mmol/L Final     Potassium   Date Value Ref Range Status   10/15/2024 4.9 3.5 - 5.1 mmol/L Final     Chloride   Date Value Ref Range Status   10/15/2024 101 95 - 110 mmol/L Final     CO2   Date Value Ref Range Status   10/15/2024 22 (L) 23 - 29 mmol/L Final     Glucose   Date Value Ref Range Status   10/15/2024 107 70 - 110 mg/dL Final     BUN   Date Value Ref Range Status   10/15/2024 27 (H) 8 - 23 mg/dL Final     Creatinine   Date Value Ref Range Status   10/15/2024 1.7 (H) 0.5 - 1.4 mg/dL Final     Calcium   Date Value Ref Range Status   10/15/2024 9.2 8.7 - 10.5 mg/dL Final     Total Protein   Date Value Ref Range Status   10/15/2024 7.1 6.0 - 8.4 g/dL Final     Albumin   Date Value Ref Range Status   10/15/2024 3.9 3.5 - 5.2 g/dL Final     Total Bilirubin   Date Value Ref Range Status   10/15/2024 0.5 0.1 - 1.0 mg/dL Final     Comment:     For infants and newborns, interpretation of results should be based  on gestational age, weight and in agreement with clinical  observations.    Premature Infant recommended reference ranges:  Up to 24 hours.............<8.0 mg/dL  Up to 48 hours............<12.0 mg/dL  3-5  days..................<15.0 mg/dL  6-29 days.................<15.0 mg/dL       Alkaline Phosphatase   Date Value Ref Range Status   10/15/2024 64 55 - 135 U/L Final     AST   Date Value Ref Range Status   10/15/2024 46 (H) 10 - 40 U/L Final     ALT   Date Value Ref Range Status   10/15/2024 70 (H) 10 - 44 U/L Final     Anion Gap   Date Value Ref Range Status   10/15/2024 10 8 - 16 mmol/L Final     eGFR if    Date Value Ref Range Status   09/11/2021 >60.0 >60 mL/min/1.73 m^2 Final     eGFR    Date Value Ref Range Status   10/07/2023 42 mL/min/1.73mSq Final     Comment:     In accordance with NKF-ASN Task Force recommendation, calculation based on the Chronic Kidney Disease Epidemiology Collaboration (CKD-EPI) equation without adjustment for race. eGFR adjusted for gender and age and calculated in ml/min/1.73mSquared. eGFR cannot be calculated if patient is under 18 years of age.     Reference Range:   >= 60 ml/min/1.73mSquared.     eGFR if non    Date Value Ref Range Status   09/11/2021 >60.0 >60 mL/min/1.73 m^2 Final     Comment:     Calculation used to obtain the estimated glomerular filtration  rate (eGFR) is the CKD-EPI equation.          SURG [place] Surgery Retrograde Pyelogram  See OP Notes for results.     IMPRESSION: See OP Notes for results.     This procedure was auto-finalized by: Virtual Radiologist       ECOG SCORE                  Assessment:       1. 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 Treatment plan of Efortumnab Vedotin every 8 days x 6 cycles discussed with patient.  2. Handouts provided on chemotherapy agents. Premeds, supportive meds explained.  3. Discussed the mechanism of action, potential side effects of this treatment as well as ways to manage them at home.   4. Dietary  modifications discussed. Detail instructions to be provided by dietician.   5. Chemotherapy portfolio supplied with contact information.   6. Discussed follow-up with the physician for toxicity monitoring throughout treatment.    7. Scripts were escribed prior and explained for home use.   8. Consented the patient to the treatment plan and the patient was educated on the planned duration of the treatment and schedule of the treatment administration.  9. Cycle 1, Day 1 scheduled for 10/23; patient has home prescriptions.  10. Encouraged to call office - phone number provided - to assist with any concerns.      Plan was discussed with the patient at length, and he verbalized understanding. Aren was given an opportunity to ask questions that were answered to his satisfaction, and he was advised to call in the interval if any problems or questions arise.    Signed:  Fabi Aiken NP   Hematology and Oncology  [facility] - HEMATOLOGY ONCOLOGY  [facility], [place]

## 2024-10-23 ENCOUNTER — LAB VISIT (OUTPATIENT)
Dept: LAB | Facility: HOSPITAL | Age: 74
End: 2024-10-23
Attending: INTERNAL MEDICINE
Payer: MEDICARE

## 2024-10-23 ENCOUNTER — OFFICE VISIT (OUTPATIENT)
Dept: HEMATOLOGY/ONCOLOGY | Facility: CLINIC | Age: 74
End: 2024-10-23
Payer: MEDICARE

## 2024-10-23 ENCOUNTER — TELEPHONE (OUTPATIENT)
Dept: HEMATOLOGY/ONCOLOGY | Facility: CLINIC | Age: 74
End: 2024-10-23

## 2024-10-23 ENCOUNTER — DOCUMENTATION ONLY (OUTPATIENT)
Dept: INFUSION THERAPY | Facility: HOSPITAL | Age: 74
End: 2024-10-23
Payer: MEDICARE

## 2024-10-23 ENCOUNTER — INFUSION (OUTPATIENT)
Dept: INFUSION THERAPY | Facility: HOSPITAL | Age: 74
End: 2024-10-23
Attending: INTERNAL MEDICINE
Payer: MEDICARE

## 2024-10-23 VITALS
HEART RATE: 70 BPM | OXYGEN SATURATION: 99 % | HEIGHT: 69 IN | BODY MASS INDEX: 22.89 KG/M2 | TEMPERATURE: 98 F | SYSTOLIC BLOOD PRESSURE: 110 MMHG | RESPIRATION RATE: 14 BRPM | WEIGHT: 154.56 LBS | DIASTOLIC BLOOD PRESSURE: 66 MMHG

## 2024-10-23 VITALS
RESPIRATION RATE: 14 BRPM | TEMPERATURE: 98 F | OXYGEN SATURATION: 99 % | BODY MASS INDEX: 22.89 KG/M2 | DIASTOLIC BLOOD PRESSURE: 80 MMHG | WEIGHT: 154.56 LBS | HEIGHT: 69 IN | HEART RATE: 73 BPM | SYSTOLIC BLOOD PRESSURE: 133 MMHG

## 2024-10-23 DIAGNOSIS — D68.59 OTHER PRIMARY THROMBOPHILIA: ICD-10-CM

## 2024-10-23 DIAGNOSIS — R91.1 LUNG NODULE: ICD-10-CM

## 2024-10-23 DIAGNOSIS — C67.8 MALIGNANT NEOPLASM OF OVERLAPPING SITES OF BLADDER: ICD-10-CM

## 2024-10-23 DIAGNOSIS — D89.89 OTHER SPECIFIED DISORDERS INVOLVING THE IMMUNE MECHANISM, NOT ELSEWHERE CLASSIFIED: ICD-10-CM

## 2024-10-23 DIAGNOSIS — C79.89 METASTATIC CANCER TO PELVIS: ICD-10-CM

## 2024-10-23 DIAGNOSIS — I25.10 CORONARY ARTERY DISEASE, UNSPECIFIED VESSEL OR LESION TYPE, UNSPECIFIED WHETHER ANGINA PRESENT, UNSPECIFIED WHETHER NATIVE OR TRANSPLANTED HEART: ICD-10-CM

## 2024-10-23 DIAGNOSIS — Z96.0 URETERAL STENT PRESENT: ICD-10-CM

## 2024-10-23 DIAGNOSIS — C67.8 MALIGNANT NEOPLASM OF OVERLAPPING SITES OF BLADDER: Primary | ICD-10-CM

## 2024-10-23 DIAGNOSIS — E27.40 ADRENAL INSUFFICIENCY: ICD-10-CM

## 2024-10-23 DIAGNOSIS — E03.9 HYPOTHYROIDISM, UNSPECIFIED TYPE: ICD-10-CM

## 2024-10-23 DIAGNOSIS — I72.4 ANEURYSM OF RIGHT POPLITEAL ARTERY: ICD-10-CM

## 2024-10-23 DIAGNOSIS — D64.9 NORMOCYTIC ANEMIA: ICD-10-CM

## 2024-10-23 DIAGNOSIS — N18.31 STAGE 3A CHRONIC KIDNEY DISEASE: ICD-10-CM

## 2024-10-23 LAB
ALBUMIN SERPL BCP-MCNC: 3.9 G/DL (ref 3.5–5.2)
ALP SERPL-CCNC: 64 U/L (ref 40–150)
ALT SERPL W/O P-5'-P-CCNC: 47 U/L (ref 10–44)
ANION GAP SERPL CALC-SCNC: 12 MMOL/L (ref 8–16)
AST SERPL-CCNC: 30 U/L (ref 10–40)
BASOPHILS # BLD AUTO: 0.11 K/UL (ref 0–0.2)
BASOPHILS NFR BLD: 1.1 % (ref 0–1.9)
BILIRUB SERPL-MCNC: 0.5 MG/DL (ref 0.1–1)
BUN SERPL-MCNC: 22 MG/DL (ref 8–23)
CALCIUM SERPL-MCNC: 10 MG/DL (ref 8.7–10.5)
CHLORIDE SERPL-SCNC: 104 MMOL/L (ref 95–110)
CO2 SERPL-SCNC: 24 MMOL/L (ref 23–29)
CREAT SERPL-MCNC: 1.8 MG/DL (ref 0.5–1.4)
DIFFERENTIAL METHOD BLD: ABNORMAL
EOSINOPHIL # BLD AUTO: 0.3 K/UL (ref 0–0.5)
EOSINOPHIL NFR BLD: 3.3 % (ref 0–8)
ERYTHROCYTE [DISTWIDTH] IN BLOOD BY AUTOMATED COUNT: 12.9 % (ref 11.5–14.5)
EST. GFR  (NO RACE VARIABLE): 38.5 ML/MIN/1.73 M^2
GLUCOSE SERPL-MCNC: 56 MG/DL (ref 70–110)
HCT VFR BLD AUTO: 40.8 % (ref 40–54)
HGB BLD-MCNC: 13.5 G/DL (ref 14–18)
IMM GRANULOCYTES # BLD AUTO: 0.04 K/UL (ref 0–0.04)
IMM GRANULOCYTES NFR BLD AUTO: 0.4 % (ref 0–0.5)
LYMPHOCYTES # BLD AUTO: 2 K/UL (ref 1–4.8)
LYMPHOCYTES NFR BLD: 19.1 % (ref 18–48)
MCH RBC QN AUTO: 30.7 PG (ref 27–31)
MCHC RBC AUTO-ENTMCNC: 33.1 G/DL (ref 32–36)
MCV RBC AUTO: 93 FL (ref 82–98)
MONOCYTES # BLD AUTO: 1.1 K/UL (ref 0.3–1)
MONOCYTES NFR BLD: 10.5 % (ref 4–15)
NEUTROPHILS # BLD AUTO: 6.7 K/UL (ref 1.8–7.7)
NEUTROPHILS NFR BLD: 65.6 % (ref 38–73)
NRBC BLD-RTO: 0 /100 WBC
PLATELET # BLD AUTO: 233 K/UL (ref 150–450)
PMV BLD AUTO: 8.5 FL (ref 9.2–12.9)
POTASSIUM SERPL-SCNC: 4.1 MMOL/L (ref 3.5–5.1)
PROT SERPL-MCNC: 7.1 G/DL (ref 6–8.4)
RBC # BLD AUTO: 4.4 M/UL (ref 4.6–6.2)
SODIUM SERPL-SCNC: 140 MMOL/L (ref 136–145)
T4 FREE SERPL-MCNC: 0.99 NG/DL (ref 0.71–1.51)
TSH SERPL DL<=0.005 MIU/L-ACNC: 6.3 UIU/ML (ref 0.4–4)
WBC # BLD AUTO: 10.26 K/UL (ref 3.9–12.7)

## 2024-10-23 PROCEDURE — 63600175 PHARM REV CODE 636 W HCPCS: Mod: PN | Performed by: INTERNAL MEDICINE

## 2024-10-23 PROCEDURE — 99999 PR PBB SHADOW E&M-EST. PATIENT-LVL IV: CPT | Mod: PBBFAC,,, | Performed by: INTERNAL MEDICINE

## 2024-10-23 PROCEDURE — 96375 TX/PRO/DX INJ NEW DRUG ADDON: CPT | Mod: PN

## 2024-10-23 PROCEDURE — 36415 COLL VENOUS BLD VENIPUNCTURE: CPT | Mod: PN | Performed by: INTERNAL MEDICINE

## 2024-10-23 PROCEDURE — 1101F PT FALLS ASSESS-DOCD LE1/YR: CPT | Mod: CPTII,S$GLB,, | Performed by: INTERNAL MEDICINE

## 2024-10-23 PROCEDURE — 84443 ASSAY THYROID STIM HORMONE: CPT | Performed by: INTERNAL MEDICINE

## 2024-10-23 PROCEDURE — A4216 STERILE WATER/SALINE, 10 ML: HCPCS | Mod: PN | Performed by: INTERNAL MEDICINE

## 2024-10-23 PROCEDURE — 1157F ADVNC CARE PLAN IN RCRD: CPT | Mod: CPTII,S$GLB,, | Performed by: INTERNAL MEDICINE

## 2024-10-23 PROCEDURE — 80053 COMPREHEN METABOLIC PANEL: CPT | Mod: PN | Performed by: INTERNAL MEDICINE

## 2024-10-23 PROCEDURE — 96413 CHEMO IV INFUSION 1 HR: CPT | Mod: PN

## 2024-10-23 PROCEDURE — 25000003 PHARM REV CODE 250: Mod: PN | Performed by: INTERNAL MEDICINE

## 2024-10-23 PROCEDURE — 85025 COMPLETE CBC W/AUTO DIFF WBC: CPT | Mod: PN | Performed by: INTERNAL MEDICINE

## 2024-10-23 PROCEDURE — 3074F SYST BP LT 130 MM HG: CPT | Mod: CPTII,S$GLB,, | Performed by: INTERNAL MEDICINE

## 2024-10-23 PROCEDURE — 1126F AMNT PAIN NOTED NONE PRSNT: CPT | Mod: CPTII,S$GLB,, | Performed by: INTERNAL MEDICINE

## 2024-10-23 PROCEDURE — 3288F FALL RISK ASSESSMENT DOCD: CPT | Mod: CPTII,S$GLB,, | Performed by: INTERNAL MEDICINE

## 2024-10-23 PROCEDURE — 99215 OFFICE O/P EST HI 40 MIN: CPT | Mod: S$GLB,,, | Performed by: INTERNAL MEDICINE

## 2024-10-23 PROCEDURE — 3078F DIAST BP <80 MM HG: CPT | Mod: CPTII,S$GLB,, | Performed by: INTERNAL MEDICINE

## 2024-10-23 PROCEDURE — G2211 COMPLEX E/M VISIT ADD ON: HCPCS | Mod: S$GLB,,, | Performed by: INTERNAL MEDICINE

## 2024-10-23 PROCEDURE — 84439 ASSAY OF FREE THYROXINE: CPT | Performed by: INTERNAL MEDICINE

## 2024-10-23 RX ORDER — SODIUM CHLORIDE 0.9 % (FLUSH) 0.9 %
10 SYRINGE (ML) INJECTION
Status: DISCONTINUED | OUTPATIENT
Start: 2024-10-23 | End: 2024-10-23 | Stop reason: HOSPADM

## 2024-10-23 RX ORDER — ONDANSETRON HYDROCHLORIDE 2 MG/ML
8 INJECTION, SOLUTION INTRAVENOUS
Status: COMPLETED | OUTPATIENT
Start: 2024-10-23 | End: 2024-10-23

## 2024-10-23 RX ORDER — EPINEPHRINE 0.3 MG/.3ML
0.3 INJECTION SUBCUTANEOUS ONCE AS NEEDED
Status: CANCELLED | OUTPATIENT
Start: 2024-10-23

## 2024-10-23 RX ORDER — HEPARIN 100 UNIT/ML
500 SYRINGE INTRAVENOUS
Status: DISCONTINUED | OUTPATIENT
Start: 2024-10-23 | End: 2024-10-23 | Stop reason: HOSPADM

## 2024-10-23 RX ORDER — SODIUM CHLORIDE 0.9 % (FLUSH) 0.9 %
10 SYRINGE (ML) INJECTION
Status: CANCELLED | OUTPATIENT
Start: 2024-10-23

## 2024-10-23 RX ORDER — HEPARIN 100 UNIT/ML
500 SYRINGE INTRAVENOUS
Status: CANCELLED | OUTPATIENT
Start: 2024-10-23

## 2024-10-23 RX ORDER — ONDANSETRON HYDROCHLORIDE 2 MG/ML
8 INJECTION, SOLUTION INTRAVENOUS
Status: CANCELLED | OUTPATIENT
Start: 2024-10-23

## 2024-10-23 RX ORDER — DIPHENHYDRAMINE HYDROCHLORIDE 50 MG/ML
50 INJECTION INTRAMUSCULAR; INTRAVENOUS ONCE AS NEEDED
Status: CANCELLED | OUTPATIENT
Start: 2024-10-23

## 2024-10-23 RX ORDER — DIPHENHYDRAMINE HYDROCHLORIDE 50 MG/ML
50 INJECTION INTRAMUSCULAR; INTRAVENOUS ONCE AS NEEDED
Status: DISCONTINUED | OUTPATIENT
Start: 2024-10-23 | End: 2024-10-23 | Stop reason: HOSPADM

## 2024-10-23 RX ADMIN — ENFORTUMAB VEDOTIN 90 MG: 30 INJECTION, POWDER, LYOPHILIZED, FOR SOLUTION INTRAVENOUS at 10:10

## 2024-10-23 RX ADMIN — Medication 10 ML: at 11:10

## 2024-10-23 RX ADMIN — ONDANSETRON 8 MG: 2 INJECTION INTRAMUSCULAR; INTRAVENOUS at 09:10

## 2024-10-23 RX ADMIN — Medication 500 UNITS: at 11:10

## 2024-10-23 RX ADMIN — SODIUM CHLORIDE: 9 INJECTION, SOLUTION INTRAVENOUS at 09:10

## 2024-10-23 NOTE — PROGRESS NOTES
Oncology Nutrition   Chemotherapy Infusion Visit    Nutrition Follow Up   RD met with pt at chairside during infusion tx. Pt present for new treatment of Padcev. Pt states he has been doing well nutritionally and denies any N/V/D/C. Reinforced role in POC. Pt weight has been stable.       Wt Readings from Last 10 Encounters:   10/23/24 70.1 kg (154 lb 8.7 oz)   10/23/24 70.1 kg (154 lb 8.7 oz)   10/15/24 71.1 kg (156 lb 12 oz)   10/11/24 70.4 kg (155 lb 3.3 oz)   10/09/24 68 kg (150 lb)   10/01/24 70.9 kg (156 lb 4.9 oz)   10/01/24 70.6 kg (155 lb 10.3 oz)   09/25/24 67.7 kg (149 lb 4 oz)   09/23/24 71.6 kg (157 lb 13.6 oz)   09/17/24 70.5 kg (155 lb 6.8 oz)       All other nutrition questions/concerns addressed as appropriate. Will continue to monitor prn throughout treatment.     Yoana Watts RDN, LDN  10/23/2024  2:54 PM

## 2024-10-23 NOTE — TELEPHONE ENCOUNTER
----- Message from Kim sent at 10/23/2024  2:47 PM CDT -----  Regarding: advice  Contact: Courtney minor  Type: Needs Medical Advice  Who Called:  Courtney spouse  Symptoms (please be specific):    How long has patient had these symptoms:    Pharmacy name and phone #:    Best Call Back Number:[phone]    Additional Information: Spouse has questions concerning when labs are to be done.  Thanks!

## 2024-10-23 NOTE — TELEPHONE ENCOUNTER
----- Message from Dealstreet Mendoza sent at 10/23/2024  2:58 PM CDT -----  Type: Needs Medical Advice  Who Called:  rich ( wife )    Best Call Back Number: [phone]        Additional Information: rich is wanting  to talk to nurse about her  infusion and wadge  , things  were not understood would like to talk to nurse , thank you .

## 2024-10-23 NOTE — PLAN OF CARE
Problem: Fatigue  Goal: Improved Activity Tolerance  Intervention: Promote Improved Energy  Flowsheets (Taken 10/23/2024 0930)  Fatigue Management:   activity schedule adjusted   fatigue-related activity identified   frequent rest breaks encouraged   paced activity encouraged  Sleep/Rest Enhancement:   relaxation techniques promoted   regular sleep/rest pattern promoted   natural light exposure provided  Activity Management:   Ambulated -L4   Ambulated in [place] - L4   Up in stretcher chair - L1  Environmental Support:   calm environment promoted   rest periods encouraged   personal routine supported     Problem: Adult Inpatient Plan of Care  Goal: Patient-Specific Goal (Individualized)  Outcome: Progressing  Flowsheets (Taken 10/23/2024 0930)  Individualized Care Needs: recliner, warm blanket, wife at chairside, conversation  Anxieties, Fears or Concerns: first Padcev treatment  Patient/Family-Specific Goals (Include Timeframe): no s/s of reaction during treatment     Problem: Adult Inpatient Plan of Care  Goal: Plan of Care Review  Outcome: Progressing  Flowsheets (Taken 10/23/2024 1115)  Plan of Care Reviewed With:   patient   spouse   Pt tolerated his Padcev infusion well, NAD. No new c/o voiced. Pt given a schedule and reviewed, pt verbalized understanding. Pt ambulated out of the clinic without difficulty accompanied by his wife.

## 2024-10-24 ENCOUNTER — PATIENT MESSAGE (OUTPATIENT)
Dept: UROLOGY | Facility: CLINIC | Age: 74
End: 2024-10-24
Payer: MEDICARE

## 2024-10-24 ENCOUNTER — PATIENT MESSAGE (OUTPATIENT)
Dept: ADMINISTRATIVE | Facility: OTHER | Age: 74
End: 2024-10-24
Payer: MEDICARE

## 2024-10-24 ENCOUNTER — TELEPHONE (OUTPATIENT)
Dept: HEMATOLOGY/ONCOLOGY | Facility: CLINIC | Age: 74
End: 2024-10-24
Payer: MEDICARE

## 2024-10-24 ENCOUNTER — TELEPHONE (OUTPATIENT)
Dept: INFUSION THERAPY | Facility: HOSPITAL | Age: 74
End: 2024-10-24
Payer: MEDICARE

## 2024-10-24 NOTE — TELEPHONE ENCOUNTER
----- Message from Kim sent at 10/24/2024 11:45 AM CDT -----  Regarding: advice  Contact: Courtney minor  Type: Needs Medical Advice  Who Called:  Courtney spouse  Symptoms (please be specific):    How long has patient had these symptoms:    Pharmacy name and phone #:    Best Call Back Number: [phone]  Additional Information: Please call spouse concerning patient schedule.  Thanks!

## 2024-10-24 NOTE — TELEPHONE ENCOUNTER
RD received message from infusion nurse that patient's wife, Courtney, is calling trying to get information regarding her  having diarrhea from chemotherapy.     RD provided tips over the phone:  Imodium per MD recs  1 cup water after each loose bowel movement  BRAT diet  Limit high fiber foods   Monitor response to lactose - lactose intolerance possible during diarrhea flares    Answered all questions to Courtney's satisfaction. Encouraged her to reach out to dietitian office next time they are here for more information and/or call us back. Appreciated.     Eugenia Byers 10/24/2024   2:03 PM

## 2024-10-24 NOTE — TELEPHONE ENCOUNTER
----- Message from 5to1 Mendoza sent at 10/24/2024 11:13 AM CDT -----  Type: Needs Medical Advice  Who Called:  joe  Al Call Back Number: [phone]    Additional Information: joe states he needs to talk to nurse about some concerns he is having with  stomach issues  , please call to jerry discuss thank you .

## 2024-10-25 ENCOUNTER — PATIENT MESSAGE (OUTPATIENT)
Dept: HEMATOLOGY/ONCOLOGY | Facility: CLINIC | Age: 74
End: 2024-10-25
Payer: MEDICARE

## 2024-10-25 ENCOUNTER — PATIENT MESSAGE (OUTPATIENT)
Dept: ADMINISTRATIVE | Facility: OTHER | Age: 74
End: 2024-10-25
Payer: MEDICARE

## 2024-10-26 ENCOUNTER — NURSE TRIAGE (OUTPATIENT)
Dept: ADMINISTRATIVE | Facility: CLINIC | Age: 74
End: 2024-10-26
Payer: MEDICARE

## 2024-10-26 NOTE — TELEPHONE ENCOUNTER
Pt wife calling on behalf of pt. Pt has new drug therapy recently started - States pt had infusion Wed. Diarrhea thurs- relief with OTC meds. Pt has taken 4 Imodium - Has old RX  Lomitil (  in March) wants to know if can give pt that or what else to do with pt diarrhea because already gave pt the max for Imodium.    Padcev new Chemo med-   BP WNL this am 109/63 65  9a 140/79 78 1220 114/60 HR 67        temp was 97, now 98.5.   97.5 this morning 1220 98.6    Pt uses [facility]- closes at 2p. No additional questions at this time.     Spoke with Dr. Mcintyre, OCP for Dr. Jimenez. Advised that pt should take up to 8 total Imodium so pt should keep using Imodium after stool until he reaches max. Advised if pt is still having diarrhea after the 8 Imodium,  go to ED for eval. Be sure pt is following diet-  Eat broiled or baked food- No butter only, cheddar cheese, bananas, rice, potatoes. If pt develops fever or seems dehydrated go to ED.     Wife called back -- advised per OCP. Reminded about diet notes as instructed by OCP. Advised if pt develops fever, seems dehydrated, or continues to have diarrhea after 8 Imodium, go to ED. Wife advised to monitor and to call back with concerns or worsening symptoms. Verbalized understanding.     Reason for Disposition   [1] SEVERE diarrhea (e.g., 7 or more times / day more than normal) AND [2] receiving chemotherapy or radiation therapy (within past 4 weeks)    Additional Information   Negative: Shock suspected (e.g., cold/pale/clammy skin, too weak to stand, low BP, rapid pulse)   Negative: Difficult to awaken or acting confused (e.g., disoriented, slurred speech)   Negative: Sounds like a life-threatening emergency to the triager   Negative: [1] SEVERE abdominal pain (e.g., excruciating) AND [2] present > 1 hour   Negative: [1] SEVERE abdominal pain AND [2] age > 60 years   Negative: [1] Blood in the stool AND [2] moderate or large amount of blood   Negative: Black or  tarry bowel movements  (Exception: Chronic-unchanged black-grey BMs AND is taking iron pills or Pepto-Bismol.)   Negative: [1] Neutropenia known or suspected (e.g., recent cancer chemotherapy) AND [2] new-onset of diarrhea   Negative: [1] Drinking very little AND [2] dehydration suspected (e.g., no urine > 12 hours, very dry mouth, very lightheaded)   Negative: Patient sounds very sick or weak to the triager    Protocols used: Cancer - Diarrhea-A-AH

## 2024-10-27 ENCOUNTER — PATIENT MESSAGE (OUTPATIENT)
Dept: ADMINISTRATIVE | Facility: OTHER | Age: 74
End: 2024-10-27
Payer: MEDICARE

## 2024-10-28 ENCOUNTER — PATIENT MESSAGE (OUTPATIENT)
Dept: ADMINISTRATIVE | Facility: OTHER | Age: 74
End: 2024-10-28
Payer: MEDICARE

## 2024-10-30 ENCOUNTER — OFFICE VISIT (OUTPATIENT)
Dept: PALLIATIVE MEDICINE | Facility: CLINIC | Age: 74
End: 2024-10-30
Payer: MEDICARE

## 2024-10-30 ENCOUNTER — OFFICE VISIT (OUTPATIENT)
Dept: HEMATOLOGY/ONCOLOGY | Facility: CLINIC | Age: 74
End: 2024-10-30
Payer: MEDICARE

## 2024-10-30 ENCOUNTER — TELEPHONE (OUTPATIENT)
Dept: HEMATOLOGY/ONCOLOGY | Facility: CLINIC | Age: 74
End: 2024-10-30
Payer: MEDICARE

## 2024-10-30 ENCOUNTER — LAB VISIT (OUTPATIENT)
Dept: LAB | Facility: HOSPITAL | Age: 74
End: 2024-10-30
Attending: INTERNAL MEDICINE
Payer: MEDICARE

## 2024-10-30 VITALS
WEIGHT: 152.13 LBS | HEIGHT: 69 IN | TEMPERATURE: 97 F | OXYGEN SATURATION: 98 % | DIASTOLIC BLOOD PRESSURE: 60 MMHG | RESPIRATION RATE: 16 BRPM | HEART RATE: 71 BPM | SYSTOLIC BLOOD PRESSURE: 80 MMHG | BODY MASS INDEX: 22.53 KG/M2

## 2024-10-30 DIAGNOSIS — D68.59 OTHER PRIMARY THROMBOPHILIA: ICD-10-CM

## 2024-10-30 DIAGNOSIS — I25.10 CORONARY ARTERY DISEASE, UNSPECIFIED VESSEL OR LESION TYPE, UNSPECIFIED WHETHER ANGINA PRESENT, UNSPECIFIED WHETHER NATIVE OR TRANSPLANTED HEART: ICD-10-CM

## 2024-10-30 DIAGNOSIS — I95.9 HYPOTENSION, UNSPECIFIED HYPOTENSION TYPE: ICD-10-CM

## 2024-10-30 DIAGNOSIS — C67.8 MALIGNANT NEOPLASM OF OVERLAPPING SITES OF BLADDER: Primary | ICD-10-CM

## 2024-10-30 DIAGNOSIS — D64.9 NORMOCYTIC ANEMIA: ICD-10-CM

## 2024-10-30 DIAGNOSIS — C79.89 METASTATIC CANCER TO PELVIS: ICD-10-CM

## 2024-10-30 DIAGNOSIS — Z96.0 URETERAL STENT PRESENT: ICD-10-CM

## 2024-10-30 DIAGNOSIS — R91.1 LUNG NODULE: ICD-10-CM

## 2024-10-30 DIAGNOSIS — R19.7 DIARRHEA, UNSPECIFIED TYPE: ICD-10-CM

## 2024-10-30 DIAGNOSIS — C61 PROSTATE CANCER: ICD-10-CM

## 2024-10-30 DIAGNOSIS — N18.31 STAGE 3A CHRONIC KIDNEY DISEASE: ICD-10-CM

## 2024-10-30 DIAGNOSIS — C67.8 MALIGNANT NEOPLASM OF OVERLAPPING SITES OF BLADDER: ICD-10-CM

## 2024-10-30 DIAGNOSIS — E27.40 ADRENAL INSUFFICIENCY: ICD-10-CM

## 2024-10-30 DIAGNOSIS — Z51.5 PALLIATIVE CARE BY SPECIALIST: Primary | ICD-10-CM

## 2024-10-30 DIAGNOSIS — E03.9 HYPOTHYROIDISM, UNSPECIFIED TYPE: ICD-10-CM

## 2024-10-30 DIAGNOSIS — D89.89 OTHER SPECIFIED DISORDERS INVOLVING THE IMMUNE MECHANISM, NOT ELSEWHERE CLASSIFIED: ICD-10-CM

## 2024-10-30 DIAGNOSIS — I72.4 ANEURYSM OF RIGHT POPLITEAL ARTERY: ICD-10-CM

## 2024-10-30 LAB
ALBUMIN SERPL BCP-MCNC: 3.6 G/DL (ref 3.5–5.2)
ALP SERPL-CCNC: 62 U/L (ref 40–150)
ALT SERPL W/O P-5'-P-CCNC: 28 U/L (ref 10–44)
ANION GAP SERPL CALC-SCNC: 13 MMOL/L (ref 8–16)
AST SERPL-CCNC: 27 U/L (ref 10–40)
BASOPHILS # BLD AUTO: 0.08 K/UL (ref 0–0.2)
BASOPHILS NFR BLD: 0.6 % (ref 0–1.9)
BILIRUB SERPL-MCNC: 0.7 MG/DL (ref 0.1–1)
BUN SERPL-MCNC: 28 MG/DL (ref 8–23)
CALCIUM SERPL-MCNC: 10 MG/DL (ref 8.7–10.5)
CHLORIDE SERPL-SCNC: 102 MMOL/L (ref 95–110)
CO2 SERPL-SCNC: 20 MMOL/L (ref 23–29)
CREAT SERPL-MCNC: 1.9 MG/DL (ref 0.5–1.4)
DIFFERENTIAL METHOD BLD: ABNORMAL
EOSINOPHIL # BLD AUTO: 0.2 K/UL (ref 0–0.5)
EOSINOPHIL NFR BLD: 1.2 % (ref 0–8)
ERYTHROCYTE [DISTWIDTH] IN BLOOD BY AUTOMATED COUNT: 12.9 % (ref 11.5–14.5)
EST. GFR  (NO RACE VARIABLE): 36.1 ML/MIN/1.73 M^2
GLUCOSE SERPL-MCNC: 141 MG/DL (ref 70–110)
HCT VFR BLD AUTO: 38.5 % (ref 40–54)
HGB BLD-MCNC: 13.1 G/DL (ref 14–18)
IMM GRANULOCYTES # BLD AUTO: 0.08 K/UL (ref 0–0.04)
IMM GRANULOCYTES NFR BLD AUTO: 0.6 % (ref 0–0.5)
LYMPHOCYTES # BLD AUTO: 0.8 K/UL (ref 1–4.8)
LYMPHOCYTES NFR BLD: 5.3 % (ref 18–48)
MCH RBC QN AUTO: 30.8 PG (ref 27–31)
MCHC RBC AUTO-ENTMCNC: 34 G/DL (ref 32–36)
MCV RBC AUTO: 91 FL (ref 82–98)
MONOCYTES # BLD AUTO: 1.3 K/UL (ref 0.3–1)
MONOCYTES NFR BLD: 9.5 % (ref 4–15)
NEUTROPHILS # BLD AUTO: 11.6 K/UL (ref 1.8–7.7)
NEUTROPHILS NFR BLD: 82.8 % (ref 38–73)
NRBC BLD-RTO: 0 /100 WBC
PLATELET # BLD AUTO: 219 K/UL (ref 150–450)
PMV BLD AUTO: 9 FL (ref 9.2–12.9)
POTASSIUM SERPL-SCNC: 4.7 MMOL/L (ref 3.5–5.1)
PROT SERPL-MCNC: 7 G/DL (ref 6–8.4)
RBC # BLD AUTO: 4.25 M/UL (ref 4.6–6.2)
SODIUM SERPL-SCNC: 135 MMOL/L (ref 136–145)
WBC # BLD AUTO: 14.02 K/UL (ref 3.9–12.7)

## 2024-10-30 PROCEDURE — 80053 COMPREHEN METABOLIC PANEL: CPT | Mod: PN | Performed by: INTERNAL MEDICINE

## 2024-10-30 PROCEDURE — 36415 COLL VENOUS BLD VENIPUNCTURE: CPT | Mod: PN | Performed by: INTERNAL MEDICINE

## 2024-10-30 PROCEDURE — 99999 PR PBB SHADOW E&M-EST. PATIENT-LVL IV: CPT | Mod: PBBFAC,,,

## 2024-10-30 PROCEDURE — 99215 OFFICE O/P EST HI 40 MIN: CPT | Mod: 95,,, | Performed by: STUDENT IN AN ORGANIZED HEALTH CARE EDUCATION/TRAINING PROGRAM

## 2024-10-30 PROCEDURE — 85025 COMPLETE CBC W/AUTO DIFF WBC: CPT | Mod: PN | Performed by: INTERNAL MEDICINE

## 2024-10-30 PROCEDURE — 1123F ACP DISCUSS/DSCN MKR DOCD: CPT | Mod: CPTII,95,, | Performed by: STUDENT IN AN ORGANIZED HEALTH CARE EDUCATION/TRAINING PROGRAM

## 2024-10-30 RX ORDER — SODIUM CHLORIDE 0.9 % (FLUSH) 0.9 %
10 SYRINGE (ML) INJECTION
OUTPATIENT
Start: 2024-10-30

## 2024-10-30 RX ORDER — HEPARIN 100 UNIT/ML
500 SYRINGE INTRAVENOUS
OUTPATIENT
Start: 2024-10-30

## 2024-10-30 RX ORDER — DIPHENOXYLATE HYDROCHLORIDE AND ATROPINE SULFATE 2.5; .025 MG/1; MG/1
1 TABLET ORAL 4 TIMES DAILY PRN
Qty: 40 TABLET | Refills: 0 | Status: ON HOLD | OUTPATIENT
Start: 2024-10-30 | End: 2024-11-09

## 2024-10-30 RX ORDER — ONDANSETRON 4 MG/1
4 TABLET, ORALLY DISINTEGRATING ORAL EVERY 8 HOURS PRN
Qty: 90 TABLET | Refills: 0 | Status: ON HOLD | OUTPATIENT
Start: 2024-10-30

## 2024-10-31 ENCOUNTER — TELEPHONE (OUTPATIENT)
Dept: ENDOCRINOLOGY | Facility: CLINIC | Age: 74
End: 2024-10-31
Payer: MEDICARE

## 2024-11-03 PROBLEM — G89.3 NEOPLASM RELATED PAIN: Status: ACTIVE | Noted: 2024-11-03

## 2024-11-03 PROBLEM — R53.0 NEOPLASTIC (MALIGNANT) RELATED FATIGUE: Status: ACTIVE | Noted: 2024-11-03

## 2024-11-03 PROBLEM — R14.0 ABDOMINAL DISTENTION: Status: ACTIVE | Noted: 2024-11-03

## 2024-11-04 ENCOUNTER — TELEPHONE (OUTPATIENT)
Dept: INFECTIOUS DISEASES | Facility: CLINIC | Age: 74
End: 2024-11-04
Payer: MEDICARE

## 2024-11-04 PROBLEM — A49.8 CLOSTRIDIUM DIFFICILE INFECTION: Status: ACTIVE | Noted: 2024-11-04

## 2024-11-04 NOTE — TELEPHONE ENCOUNTER
Returned call to pt's wife, who states pt is currently in pt at [facility] being treated for c.diff among other medical issues, wife requesting ID visit at hospital by Dr. Rivas, explained that Dr. Dunne is currently on call at [facility] for ID, wife states she has requested ID consult, wife voiced understanding and appreciation for the call back.

## 2024-11-04 NOTE — TELEPHONE ENCOUNTER
----- Message from Pamela sent at 11/4/2024  9:58 AM CST -----  Regarding: Hospital ASAP  Type:  Needs Medical Advice    Who Called: Pt Wife    Would the patient rather a call back or a response via [facility]? Call back    Best Call Back Number: [phone]    Additional Information: Pt is in the hospital since Wednesday and is requesting a call back and would like the DR to stop by the hospital. Pt have c diff  bad . Thank you

## 2024-11-05 ENCOUNTER — TELEPHONE (OUTPATIENT)
Dept: HEMATOLOGY/ONCOLOGY | Facility: CLINIC | Age: 74
End: 2024-11-05
Payer: MEDICARE

## 2024-11-05 NOTE — TELEPHONE ENCOUNTER
I called the patient whom is currently in the hospital with diarrhea suspected from C diff as well as persistent hypokalemia.  I instructed the patient that it was unclear if the diarrhea and hypokalemia are from diarrhea associated with C diff or are if they are related to treatment with enfortumab vedotin.  I instructed the patient that the team would need to continue to treat for C diff and that we would make further decisions on outpatient management once he is out of the hospital.  The patient expressed understanding.  All questions were answered to his satisfaction.    Jackson Jimenez MD  [facility]  Hematology and Oncology  [facility]   [address] [place] [place] [address]   O: [phone]  F: [phone]

## 2024-11-06 PROBLEM — E87.20 METABOLIC ACIDOSIS: Status: ACTIVE | Noted: 2024-11-06

## 2024-11-06 PROBLEM — E87.8 ELECTROLYTE ABNORMALITY: Status: ACTIVE | Noted: 2024-11-06

## 2024-11-06 PROBLEM — Z73.89: Status: ACTIVE | Noted: 2024-11-06

## 2024-11-09 ENCOUNTER — TELEPHONE (OUTPATIENT)
Dept: HEMATOLOGY/ONCOLOGY | Facility: CLINIC | Age: 74
End: 2024-11-09
Payer: MEDICARE

## 2024-11-09 PROBLEM — A49.8 INFECTION DUE TO ESBL-PRODUCING ESCHERICHIA COLI: Status: ACTIVE | Noted: 2024-11-09

## 2024-11-09 PROBLEM — Z16.12 INFECTION DUE TO ESBL-PRODUCING ESCHERICHIA COLI: Status: ACTIVE | Noted: 2024-11-09

## 2024-11-09 NOTE — TELEPHONE ENCOUNTER
I called the patient and spoke to him in his wife.  We discussed the patient's ongoing management of the hospital including the need for stress dose steroids due to continued infection and ileus.  We discussed his treatment plan with chemo including enfortumab vedotin.  I instructed the patient that we would not consider doing any treatments while he is in the hospital.  We discussed options of decreasing the dose and then from to map vedotin as an outpatient verses continuing the current dose verses starting the patient back on immunotherapy realizing that it was not controlling cancer but at least slowing it down.  We discussed the patient's need to be off of antibiotics prior to restarting chemo.  The patient will need an appointment with me as soon as he is discharged from the hospital to discuss further management of his bladder cancer.  The patient expressed understanding.  All questions were answered to his satisfaction.    Jackson Jimenez MD  [facility]  Hematology and Oncology  [facility]   [address] [facility] [address]   [place] [address]   O: [phone]  F: [phone]

## 2024-11-10 PROBLEM — K59.81 OGILVIE SYNDROME: Status: ACTIVE | Noted: 2024-11-10

## 2024-11-11 ENCOUNTER — TELEPHONE (OUTPATIENT)
Dept: NEPHROLOGY | Facility: CLINIC | Age: 74
End: 2024-11-11
Payer: MEDICARE

## 2024-11-11 PROBLEM — Z74.09 OTHER REDUCED MOBILITY: Status: ACTIVE | Noted: 2024-11-11

## 2024-11-16 PROBLEM — D62 ACUTE BLOOD LOSS ANEMIA: Status: ACTIVE | Noted: 2024-11-16

## 2024-11-18 PROBLEM — E87.6 HYPOKALEMIA: Status: ACTIVE | Noted: 2024-11-18

## (undated) DEVICE — GUIDE WIRE MOTION .035 X 150CM

## (undated) DEVICE — DRAPE ABDOMINAL TIBURON 14X11